# Patient Record
Sex: MALE | Race: WHITE | Employment: OTHER | URBAN - METROPOLITAN AREA
[De-identification: names, ages, dates, MRNs, and addresses within clinical notes are randomized per-mention and may not be internally consistent; named-entity substitution may affect disease eponyms.]

---

## 2021-08-18 ENCOUNTER — HOSPITAL ENCOUNTER (OUTPATIENT)
Age: 86
Setting detail: SPECIMEN
Discharge: HOME | End: 2021-08-18
Payer: MEDICARE

## 2021-08-18 DIAGNOSIS — I25.10 CORONARY ARTERY DISEASE INVOLVING NATIVE CORONARY ARTERY OF NATIVE HEART WITHOUT ANGINA PECTORIS: ICD-10-CM

## 2021-08-18 DIAGNOSIS — E78.5 DYSLIPIDEMIA: ICD-10-CM

## 2021-08-18 DIAGNOSIS — I10 ESSENTIAL HYPERTENSION: ICD-10-CM

## 2021-08-18 DIAGNOSIS — M10.00 IDIOPATHIC GOUT, UNSPECIFIED CHRONICITY, UNSPECIFIED SITE: ICD-10-CM

## 2021-08-18 DIAGNOSIS — Z12.5 PROSTATE CANCER SCREENING: ICD-10-CM

## 2021-08-18 PROBLEM — I48.0 PAROXYSMAL A-FIB (HCC): Status: ACTIVE | Noted: 2021-08-18

## 2021-08-18 PROBLEM — I50.9 CHRONIC CONGESTIVE HEART FAILURE (HCC): Status: ACTIVE | Noted: 2021-08-18

## 2021-08-18 LAB
ALBUMIN SERPL-MCNC: 3.7 G/DL (ref 3.5–5.2)
ALBUMIN/GLOBULIN RATIO: 1.2 (ref 1–2.5)
ALP BLD-CCNC: 59 U/L (ref 40–129)
ALT SERPL-CCNC: 9 U/L (ref 5–41)
ANION GAP SERPL CALCULATED.3IONS-SCNC: 18 MMOL/L (ref 9–17)
AST SERPL-CCNC: 17 U/L
BILIRUB SERPL-MCNC: 0.24 MG/DL (ref 0.3–1.2)
BUN BLDV-MCNC: 31 MG/DL (ref 8–23)
BUN/CREAT BLD: ABNORMAL (ref 9–20)
CALCIUM SERPL-MCNC: 8.7 MG/DL (ref 8.6–10.4)
CHLORIDE BLD-SCNC: 103 MMOL/L (ref 98–107)
CHOLESTEROL, FASTING: 122 MG/DL
CHOLESTEROL/HDL RATIO: 2.5
CO2: 17 MMOL/L (ref 20–31)
CREAT SERPL-MCNC: 1.78 MG/DL (ref 0.7–1.2)
GFR AFRICAN AMERICAN: 44 ML/MIN
GFR NON-AFRICAN AMERICAN: 37 ML/MIN
GFR SERPL CREATININE-BSD FRML MDRD: ABNORMAL ML/MIN/{1.73_M2}
GFR SERPL CREATININE-BSD FRML MDRD: ABNORMAL ML/MIN/{1.73_M2}
GLUCOSE BLD-MCNC: 87 MG/DL (ref 70–99)
HCT VFR BLD CALC: 37.8 % (ref 40.7–50.3)
HDLC SERPL-MCNC: 48 MG/DL
HEMOGLOBIN: 10.4 G/DL (ref 13–17)
LDL CHOLESTEROL: 54 MG/DL (ref 0–130)
MCH RBC QN AUTO: 25.8 PG (ref 25.2–33.5)
MCHC RBC AUTO-ENTMCNC: 27.5 G/DL (ref 28.4–34.8)
MCV RBC AUTO: 93.8 FL (ref 82.6–102.9)
NRBC AUTOMATED: 0 PER 100 WBC
PDW BLD-RTO: 15.7 % (ref 11.8–14.4)
PLATELET # BLD: 197 K/UL (ref 138–453)
PMV BLD AUTO: 10.5 FL (ref 8.1–13.5)
POTASSIUM SERPL-SCNC: 4.7 MMOL/L (ref 3.7–5.3)
PROSTATE SPECIFIC ANTIGEN: 0.98 UG/L
RBC # BLD: 4.03 M/UL (ref 4.21–5.77)
SODIUM BLD-SCNC: 138 MMOL/L (ref 135–144)
TOTAL PROTEIN: 6.9 G/DL (ref 6.4–8.3)
TRIGLYCERIDE, FASTING: 99 MG/DL
URIC ACID: 7.1 MG/DL (ref 3.4–7)
VLDLC SERPL CALC-MCNC: NORMAL MG/DL (ref 1–30)
WBC # BLD: 4.5 K/UL (ref 3.5–11.3)

## 2021-08-30 RX ORDER — ALLOPURINOL 100 MG/1
100 TABLET ORAL DAILY
Qty: 30 TABLET | Refills: 2 | Status: SHIPPED | OUTPATIENT
Start: 2021-08-30 | End: 2021-09-18 | Stop reason: SDUPTHER

## 2021-08-30 NOTE — TELEPHONE ENCOUNTER
----- Message from Donnell Doherty sent at 8/30/2021 10:46 AM EDT -----  Subject: Refill Request    QUESTIONS  Name of Medication? Other - Alloqurinol 100mg  Patient-reported dosage and instructions? By mouth once a day   How many days do you have left? 0  Preferred Pharmacy? [id number]  Pharmacy phone number (if available)? [phone]  ---------------------------------------------------------------------------  --------------  CALL BACK INFO  What is the best way for the office to contact you? OK to leave message on   voicemail  Preferred Call Back Phone Number?  [phone]

## 2021-11-22 RX ORDER — ALLOPURINOL 100 MG/1
TABLET ORAL
Qty: 90 TABLET | Refills: 3 | Status: SHIPPED | OUTPATIENT
Start: 2021-11-22 | End: 2022-08-30

## 2022-04-02 ENCOUNTER — HOSPITAL ENCOUNTER (INPATIENT)
Age: 87
LOS: 3 days | Discharge: ANOTHER HOSPITAL | DRG: 291 | End: 2022-04-05
Attending: STUDENT IN AN ORGANIZED HEALTH CARE EDUCATION/TRAINING PROGRAM | Admitting: FAMILY MEDICINE
Payer: MEDICARE

## 2022-04-02 ENCOUNTER — APPOINTMENT (OUTPATIENT)
Dept: GENERAL RADIOLOGY | Age: 87
DRG: 291 | End: 2022-04-02
Payer: MEDICARE

## 2022-04-02 DIAGNOSIS — I50.9 ACUTE ON CHRONIC CONGESTIVE HEART FAILURE, UNSPECIFIED HEART FAILURE TYPE (HCC): Primary | ICD-10-CM

## 2022-04-02 PROBLEM — E78.2 MIXED HYPERLIPIDEMIA: Status: ACTIVE | Noted: 2022-04-02

## 2022-04-02 PROBLEM — E66.9 OBESITY, CLASS II, BMI 35-39.9: Status: ACTIVE | Noted: 2022-04-02

## 2022-04-02 LAB
ABSOLUTE EOS #: 0.1 K/UL (ref 0–0.4)
ABSOLUTE LYMPH #: 0.9 K/UL (ref 1–4.8)
ABSOLUTE MONO #: 0.9 K/UL (ref 0.1–1.3)
ANION GAP SERPL CALCULATED.3IONS-SCNC: 11 MMOL/L (ref 9–17)
BASOPHILS # BLD: 0 % (ref 0–2)
BASOPHILS ABSOLUTE: 0 K/UL (ref 0–0.2)
BUN BLDV-MCNC: 26 MG/DL (ref 8–23)
CALCIUM SERPL-MCNC: 8.9 MG/DL (ref 8.6–10.4)
CHLORIDE BLD-SCNC: 99 MMOL/L (ref 98–107)
CO2: 29 MMOL/L (ref 20–31)
CREAT SERPL-MCNC: 1.55 MG/DL (ref 0.7–1.2)
EOSINOPHILS RELATIVE PERCENT: 1 % (ref 0–4)
GFR AFRICAN AMERICAN: 52 ML/MIN
GFR NON-AFRICAN AMERICAN: 43 ML/MIN
GFR SERPL CREATININE-BSD FRML MDRD: ABNORMAL ML/MIN/{1.73_M2}
GLUCOSE BLD-MCNC: 113 MG/DL (ref 70–99)
HCT VFR BLD CALC: 28.4 % (ref 41–53)
HEMOGLOBIN: 9.4 G/DL (ref 13.5–17.5)
LYMPHOCYTES # BLD: 9 % (ref 24–44)
MCH RBC QN AUTO: 26.4 PG (ref 26–34)
MCHC RBC AUTO-ENTMCNC: 33 G/DL (ref 31–37)
MCV RBC AUTO: 80 FL (ref 80–100)
MONOCYTES # BLD: 9 % (ref 1–7)
PDW BLD-RTO: 18.2 % (ref 11.5–14.9)
PLATELET # BLD: 318 K/UL (ref 150–450)
PMV BLD AUTO: 6.7 FL (ref 6–12)
POTASSIUM SERPL-SCNC: 4.4 MMOL/L (ref 3.7–5.3)
PRO-BNP: 823 PG/ML
RBC # BLD: 3.55 M/UL (ref 4.5–5.9)
SEG NEUTROPHILS: 81 % (ref 36–66)
SEGMENTED NEUTROPHILS ABSOLUTE COUNT: 8 K/UL (ref 1.3–9.1)
SODIUM BLD-SCNC: 139 MMOL/L (ref 135–144)
TROPONIN, HIGH SENSITIVITY: 26 NG/L (ref 0–22)
TROPONIN, HIGH SENSITIVITY: 28 NG/L (ref 0–22)
TROPONIN, HIGH SENSITIVITY: 29 NG/L (ref 0–22)
WBC # BLD: 9.9 K/UL (ref 3.5–11)

## 2022-04-02 PROCEDURE — 99285 EMERGENCY DEPT VISIT HI MDM: CPT

## 2022-04-02 PROCEDURE — 71046 X-RAY EXAM CHEST 2 VIEWS: CPT

## 2022-04-02 PROCEDURE — 85025 COMPLETE CBC W/AUTO DIFF WBC: CPT

## 2022-04-02 PROCEDURE — 6370000000 HC RX 637 (ALT 250 FOR IP): Performed by: STUDENT IN AN ORGANIZED HEALTH CARE EDUCATION/TRAINING PROGRAM

## 2022-04-02 PROCEDURE — 83880 ASSAY OF NATRIURETIC PEPTIDE: CPT

## 2022-04-02 PROCEDURE — 80048 BASIC METABOLIC PNL TOTAL CA: CPT

## 2022-04-02 PROCEDURE — 36415 COLL VENOUS BLD VENIPUNCTURE: CPT

## 2022-04-02 PROCEDURE — 94640 AIRWAY INHALATION TREATMENT: CPT

## 2022-04-02 PROCEDURE — 84484 ASSAY OF TROPONIN QUANT: CPT

## 2022-04-02 PROCEDURE — 94761 N-INVAS EAR/PLS OXIMETRY MLT: CPT

## 2022-04-02 PROCEDURE — 1200000000 HC SEMI PRIVATE

## 2022-04-02 PROCEDURE — 93005 ELECTROCARDIOGRAM TRACING: CPT | Performed by: STUDENT IN AN ORGANIZED HEALTH CARE EDUCATION/TRAINING PROGRAM

## 2022-04-02 RX ORDER — ACETAMINOPHEN 325 MG/1
650 TABLET ORAL EVERY 4 HOURS PRN
Status: DISCONTINUED | OUTPATIENT
Start: 2022-04-02 | End: 2022-04-05 | Stop reason: HOSPADM

## 2022-04-02 RX ORDER — SODIUM CHLORIDE 0.9 % (FLUSH) 0.9 %
5-40 SYRINGE (ML) INJECTION PRN
Status: DISCONTINUED | OUTPATIENT
Start: 2022-04-02 | End: 2022-04-05 | Stop reason: HOSPADM

## 2022-04-02 RX ORDER — SODIUM CHLORIDE 9 MG/ML
INJECTION, SOLUTION INTRAVENOUS PRN
Status: DISCONTINUED | OUTPATIENT
Start: 2022-04-02 | End: 2022-04-05 | Stop reason: HOSPADM

## 2022-04-02 RX ORDER — SODIUM CHLORIDE 0.9 % (FLUSH) 0.9 %
5-40 SYRINGE (ML) INJECTION EVERY 12 HOURS SCHEDULED
Status: DISCONTINUED | OUTPATIENT
Start: 2022-04-02 | End: 2022-04-05 | Stop reason: HOSPADM

## 2022-04-02 RX ORDER — IPRATROPIUM BROMIDE AND ALBUTEROL SULFATE 2.5; .5 MG/3ML; MG/3ML
1 SOLUTION RESPIRATORY (INHALATION)
Status: DISCONTINUED | OUTPATIENT
Start: 2022-04-02 | End: 2022-04-03

## 2022-04-02 RX ORDER — ONDANSETRON 2 MG/ML
4 INJECTION INTRAMUSCULAR; INTRAVENOUS EVERY 6 HOURS PRN
Status: DISCONTINUED | OUTPATIENT
Start: 2022-04-02 | End: 2022-04-05

## 2022-04-02 RX ORDER — ONDANSETRON 4 MG/1
4 TABLET, ORALLY DISINTEGRATING ORAL EVERY 8 HOURS PRN
Status: DISCONTINUED | OUTPATIENT
Start: 2022-04-02 | End: 2022-04-05

## 2022-04-02 RX ADMIN — IPRATROPIUM BROMIDE AND ALBUTEROL SULFATE 1 AMPULE: .5; 3 SOLUTION RESPIRATORY (INHALATION) at 20:02

## 2022-04-02 RX ADMIN — IPRATROPIUM BROMIDE AND ALBUTEROL SULFATE 1 AMPULE: .5; 3 SOLUTION RESPIRATORY (INHALATION) at 16:27

## 2022-04-02 ASSESSMENT — ENCOUNTER SYMPTOMS
RHINORRHEA: 1
SHORTNESS OF BREATH: 1
NAUSEA: 0
ABDOMINAL PAIN: 0
COUGH: 1
VOMITING: 0

## 2022-04-02 NOTE — ED PROVIDER NOTES
[address]  Emergency Department Encounter  Emergency Medicine Resident     Pt Name: Tami Martínez  BKB:[id number]  [place] 1935  Date of evaluation: 22  PCP:  Bhavna Ortiz MD    CHIEF COMPLAINT       Chief Complaint   Patient presents with    Shortness of Breath     HISTORY OF PRESENT ILLNESS  (Location/Symptom, Timing/Onset, Context/Setting, Quality, Duration, ModifyingFactors, Severity.)      Tami Martínez is a 82 y.o. male with PMH of a fib on Eliquis, CHF s/p pacemaker, CAD s/p CABG, hypertension, dyslipidemia who presents for evaluation of shortness of breath x1 week. Patient initially had URI symptoms that he now feels moving onto his chest, complaining of cough and shortness of breath. Patient also with increased leg swelling x1 week. No fever, chills, chest pain, hemoptysis, numbness, weakness, abdominal pain, nausea, vomiting, diarrhea, dizziness. Recently moved to [place] from [place]. Was following with cardiology down there but has not established with cardiology since moving to [place]. No history of blood clots or cancer. No recent travel or surgery. No documented history of COPD in our records but patient does state he has needed breathing treatments in the past.  Quit smoking over 50 years ago    PAST MEDICAL / SURGICAL / SOCIAL /FAMILY HISTORY      has no past medical history on file. No other pertinent PMH on review with patient/guardian. has no past surgical history on file. No other pertinent PSH on review with patient/guardian.   Social History     Socioeconomic History    Marital status: Single     Spouse name: Not on file    Number of children: Not on file    Years of education: Not on file    Highest education level: Not on file   Occupational History    Not on file   Tobacco Use    Smoking status: Former Smoker     Quit date:      Years since quittin.3    Smokeless tobacco: Never Used   Substance and Sexual Activity    Alcohol use: Not Currently    Drug use: Never    Sexual activity: Not on file   Other Topics Concern    Not on file   Social History Narrative    Not on file     Social Determinants of Health     Financial Resource Strain: Low Risk     Difficulty of Paying Living Expenses: Not hard at all   Food Insecurity: No Food Insecurity    Worried About Running Out of Food in the Last Year: Never true    [address] in the Last Year: Never true   Transportation Needs:     Lack of Transportation (Medical): Not on file    Lack of Transportation (Non-Medical): Not on file   Physical Activity:     Days of Exercise per Week: Not on file    Minutes of Exercise per Session: Not on file   Stress:     Feeling of Stress : Not on file   Social Connections:     Frequency of Communication with Friends and Family: Not on file    Frequency of Social Gatherings with Friends and Family: Not on file    Attends [employer]oration[employer] Services: Not on file    Active Member of [address] [place] [address] or Organizations: Not on file    Attends Club or Organization Meetings: Not on file    Marital Status: Not on file   Intimate Partner Violence:     Fear of Current or Ex-Partner: Not on file    Emotionally Abused: Not on file    Physically Abused: Not on file    Sexually Abused: Not on file   Housing Stability:     Unable to Pay for Housing in the Last Year: Not on file    Number of [place] in the Last Year: Not on file    Unstable Housing in the Last Year: Not on file       I counseled the patient against using tobacco products. History reviewed. No pertinent family history. No other pertinent FamHx on review with patient/guardian. Allergies:  Patient has no known allergies. Home Medications:  Prior to Admission medications    Medication Sig Start Date End Date Taking?  Authorizing Provider   potassium chloride (KLOR-CON 10) 10 MEQ extended release tablet Take 1 tablet by mouth daily 12/15/21 3/15/22  Corine Au MD   allopurinol (ZYLOPRIM) Ht 6' (1.829 m)   Wt 281 lb (127.5 kg)   SpO2 95%   BMI 38.11 kg/m²     Physical Exam  Constitutional:       General: He is not in acute distress. Appearance: Normal appearance. He is not ill-appearing, toxic-appearing or diaphoretic. HENT:      Head: Normocephalic and atraumatic. Right Ear: External ear normal.      Left Ear: External ear normal.   Eyes:      General:         Right eye: No discharge. Left eye: No discharge. Cardiovascular:      Rate and Rhythm: Normal rate and regular rhythm. Pulses: Normal pulses. Heart sounds: No murmur heard. Pulmonary:      Effort: Pulmonary effort is normal. No respiratory distress. Breath sounds: Wheezing and rales present. No rhonchi. Abdominal:      Palpations: Abdomen is soft. Tenderness: There is no abdominal tenderness. Musculoskeletal:      Right lower leg: Edema present. Left lower leg: Edema present. Comments: Symmetric lymphedema bilateral legs with chronic overlying skin changes. Calves symmetric and nontender. Negative Phong's. Pedal pulses 1+ but symmetric bilaterally   Skin:     Capillary Refill: Capillary refill takes less than 2 seconds. Neurological:      General: No focal deficit present. Mental Status: He is alert. DIFFERENTIAL  DIAGNOSIS     PLAN (LABS / IMAGING / EKG):  Orders Placed This Encounter   Procedures    XR CHEST (2 VW)    CBC with Auto Differential    Basic Metabolic Panel w/ Reflex to MG    Troponin    Brain Natriuretic Peptide    Troponin    ADULT DIET; Regular; 3 carb choices (45 gm/meal);  Low Sodium (2 gm)    Vital signs per unit routine    Notify physician    Notify physician    Up as tolerated    Full Code    Inpatient consult to Internal Medicine    Inpatient consult to Cardiology    EKG 12 Lead    ADMIT TO INPATIENT     MEDICATIONS ORDERED:  Orders Placed This Encounter   Medications    ipratropium-albuterol (DUONEB) nebulizer solution 1 ampule     Order Specific Question:   Initiate RT Bronchodilator Protocol     Answer:    Yes    sodium chloride flush 0.9 % injection 5-40 mL    sodium chloride flush 0.9 % injection 5-40 mL    0.9 % sodium chloride infusion    enoxaparin (LOVENOX) injection 30 mg    acetaminophen (TYLENOL) tablet 650 mg    OR Linked Order Group     ondansetron (ZOFRAN-ODT) disintegrating tablet 4 mg     ondansetron (ZOFRAN) injection 4 mg     DIAGNOSTIC RESULTS / EMERGENCY DEPARTMENT COURSE / MDM     LABS:  Results for orders placed or performed during the hospital encounter of 04/02/22   CBC with Auto Differential   Result Value Ref Range    WBC 9.9 3.5 - 11.0 k/uL    RBC 3.55 (L) 4.5 - 5.9 m/uL    Hemoglobin 9.4 (L) 13.5 - 17.5 g/dL    Hematocrit 28.4 (L) 41 - 53 %    MCV 80.0 80 - 100 fL    MCH 26.4 26 - 34 pg    MCHC 33.0 31 - 37 g/dL    RDW 18.2 (H) 11.5 - 14.9 %    Platelets 983 852 - 556 k/uL    MPV 6.7 6.0 - 12.0 fL    Seg Neutrophils 81 (H) 36 - 66 %    Lymphocytes 9 (L) 24 - 44 %    Monocytes 9 (H) 1 - 7 %    Eosinophils % 1 0 - 4 %    Basophils 0 0 - 2 %    Segs Absolute 8.00 1.3 - 9.1 k/uL    Absolute Lymph # 0.90 (L) 1.0 - 4.8 k/uL    Absolute Mono # 0.90 0.1 - 1.3 k/uL    Absolute Eos # 0.10 0.0 - 0.4 k/uL    Basophils Absolute 0.00 0.0 - 0.2 k/uL   Basic Metabolic Panel w/ Reflex to MG   Result Value Ref Range    Glucose 113 (H) 70 - 99 mg/dL    BUN 26 (H) 8 - 23 mg/dL    CREATININE 1.55 (H) 0.70 - 1.20 mg/dL    Calcium 8.9 8.6 - 10.4 mg/dL    Sodium 139 135 - 144 mmol/L    Potassium 4.4 3.7 - 5.3 mmol/L    Chloride 99 98 - 107 mmol/L    CO2 29 20 - 31 mmol/L    Anion Gap 11 9 - 17 mmol/L    GFR Non-African American 43 (L) >60 mL/min    GFR  52 (L) >60 mL/min    GFR Comment         Troponin   Result Value Ref Range    Troponin, High Sensitivity 29 (H) 0 - 22 ng/L   Troponin   Result Value Ref Range    Troponin, High Sensitivity 28 (H) 0 - 22 ng/L   Brain Natriuretic Peptide   Result Value Ref Range    Pro- (H) <300 pg/mL   Troponin   Result Value Ref Range    Troponin, High Sensitivity 26 (H) 0 - 22 ng/L       IMPRESSION/MDM/ED COURSE:  82 y.o. male presented with shortness of breath, leg swelling, congestion x1 week. Patient afebrile. Satting 91% on room air. Hemodynamically stable. Exam patient resting calmly no acute distress, nontoxic-appearing. Heart RRR. Wheezing crackles throughout lungs diffusely. Lymphedema with chronic skin changes of bilateral legs, symmetric. EKG without ischemic changes. Will obtain cardiac work-up including BNP. No D-dimer as patient is low risk Via Wells criteria and already anticoagulated on Eliquis. Likely admission. ED Course as of 04/02/22 2233   Sat Apr 02, 2022   8343 Basic Metabolic Panel w/ Reflex to MG(!):    GLUCOSE, FASTING,(!)   BUN,BUNPL 26(!)   Creatinine 1.55(!)   CALCIUM, SERUM, 340151 8.9   Sodium 139   Potassium 4.4   Chloride 99   CO2 29   Anion Gap 11   GFR Non- 43(!)   GFR  52(!)   GFR Comment      [AF]   1712 Creatinine baseline [AF]   1713 CBC with Auto Differential(!):    WBC 9.9   RBC 3.55(!)   Hemoglobin Quant 9.4(!)   Hematocrit 28.4(!)   MCV 80.0   MCH 26.4   MCHC 33.0   RDW 18.2(!)   Platelet Count 028   MPV 6.7   Seg Neutrophils 81(!)   Lymphocytes 9(!)   Monocytes 9(!)   Eosinophils % 1   Basophils 0   Segs Absolute 8.00   Absolute Lymph # 0.90(!)   Absolute Mono # 0.90   Absolute Eos # 0.10   Basophils Absolute 0.00  Previous hemoglobin 10.4, decreased may be due to volume overload [AF]   1741 Troponin(!):    Troponin, High Sensitivity 26(!) [AF]   2010 Brain Natriuretic Peptide(!):    Pro-(!) [AF]   2158 Patient accepted for admission by Dr. Jorge L Guillermo.   He is feeling better after DuoNeb [AF]   2216 Spoke to Dr. Albin Prader who is in agreement with plan [AF]      ED Course User Index  [AF] Tori Mcgee DO         RADIOLOGY:  XR CHEST (2 VW)   Final Result   Pulmonary vascular congestion. Questionable rim sclerotic lesion within a lower thoracic vertebral body   versus summation artifact. If patient is focally symptomatic or as   clinically warranted, CT could be performed in the nonacute setting for   further characterization. EKG  Ventricularly paced rhythm. No ST elevation or depression. QTc 483    All EKG's are interpreted by the Emergency Department Physician who either signs or Co-signs this chart in the absence of a cardiologist.    PROCEDURES:  None    CONSULTS:  IP CONSULT TO INTERNAL MEDICINE  IP CONSULT TO CARDIOLOGY    FINAL IMPRESSION      1. Acute on chronic congestive heart failure, unspecified heart failure type ([facility] [address]          DISPOSITION / PLAN     DISPOSITION Admitted 04/02/2022 10:01:11 PM      PATIENT REFERREDTO:  No follow-up provider specified.     DISCHARGE MEDICATIONS:  New Prescriptions    No medications on file       Kelley Ramos DO  PGY 2  Resident Physician Emergency Medicine  04/02/22 10:33 PM    (Please note that portions of this note were completed with a voice recognition program.Efforts were made to edit the dictations but occasionally words are mis-transcribed.)       Niecy Mina DO  Resident  04/02/22 1031       Niecy Mina DO  Resident  04/02/22 7365

## 2022-04-02 NOTE — ED PROVIDER NOTES
EMERGENCY DEPARTMENT ENCOUNTER   ATTENDING ATTESTATION     Pt Name: Andrzej Mcdaniel  MRN: [id number]  [place] 1935  Date of evaluation: 4/2/22       Andrzej Mcdaniel is a 82 y.o. male who presents with Shortness of Breath      MDM:   62-year-old male history of paroxysmal A. fib, coronary artery disease, congestive heart failure presents for evaluation of worsening shortness of breath. Appears grossly volume overloaded on exam.  Chest x-ray shows pulmonary vascular congestion. We will plan to admit for IV diuresis    EKG  Ventricularly paced rhythm rate of 82 normal axis retrograde inverted P waves no prior to compare   Vitals:   Vitals:    04/02/22 1810 04/02/22 1830 04/02/22 1910 04/02/22 2002   BP:       Pulse: 93 90 100    Resp: 25 23 26 25   Temp:       SpO2: 90% 94% 94% 93%   Weight:       Height:             I personally saw and examined the patient. I have reviewed and agree with the resident's findings, including all diagnostic interpretations and treatment plan as written. I was present for the key portions of any procedures performed and the inclusive time noted for any critical care statement. The care is provided during an unprecedented national emergency due to the novel coronavirus, COVID 19.   Kristin Jordan MD  Attending Emergency Physician            Kristin Jordan MD  04/02/22 2017

## 2022-04-03 PROCEDURE — 2500000003 HC RX 250 WO HCPCS: Performed by: INTERNAL MEDICINE

## 2022-04-03 PROCEDURE — 94640 AIRWAY INHALATION TREATMENT: CPT

## 2022-04-03 PROCEDURE — 1200000000 HC SEMI PRIVATE

## 2022-04-03 PROCEDURE — 2580000003 HC RX 258: Performed by: FAMILY MEDICINE

## 2022-04-03 PROCEDURE — 94761 N-INVAS EAR/PLS OXIMETRY MLT: CPT

## 2022-04-03 PROCEDURE — 6370000000 HC RX 637 (ALT 250 FOR IP): Performed by: FAMILY MEDICINE

## 2022-04-03 PROCEDURE — 99223 1ST HOSP IP/OBS HIGH 75: CPT | Performed by: FAMILY MEDICINE

## 2022-04-03 RX ORDER — FUROSEMIDE 40 MG/1
40 TABLET ORAL 2 TIMES DAILY
Status: DISCONTINUED | OUTPATIENT
Start: 2022-04-03 | End: 2022-04-03

## 2022-04-03 RX ORDER — BUMETANIDE 1 MG/1
2 TABLET ORAL 2 TIMES DAILY
Status: DISCONTINUED | OUTPATIENT
Start: 2022-04-03 | End: 2022-04-03

## 2022-04-03 RX ORDER — ALLOPURINOL 100 MG/1
100 TABLET ORAL DAILY
Status: DISCONTINUED | OUTPATIENT
Start: 2022-04-03 | End: 2022-04-05 | Stop reason: HOSPADM

## 2022-04-03 RX ORDER — MECOBALAMIN 5000 MCG
1 TABLET,DISINTEGRATING ORAL DAILY
Status: DISCONTINUED | OUTPATIENT
Start: 2022-04-03 | End: 2022-04-03

## 2022-04-03 RX ORDER — LISINOPRIL 5 MG/1
5 TABLET ORAL DAILY
Status: DISCONTINUED | OUTPATIENT
Start: 2022-04-03 | End: 2022-04-05 | Stop reason: HOSPADM

## 2022-04-03 RX ORDER — CLOPIDOGREL BISULFATE 75 MG/1
75 TABLET ORAL DAILY
Status: DISCONTINUED | OUTPATIENT
Start: 2022-04-03 | End: 2022-04-05

## 2022-04-03 RX ORDER — METOPROLOL TARTRATE 50 MG/1
50 TABLET, FILM COATED ORAL 2 TIMES DAILY
Status: DISCONTINUED | OUTPATIENT
Start: 2022-04-03 | End: 2022-04-05 | Stop reason: HOSPADM

## 2022-04-03 RX ORDER — ROSUVASTATIN CALCIUM 20 MG/1
20 TABLET, COATED ORAL DAILY
Status: DISCONTINUED | OUTPATIENT
Start: 2022-04-03 | End: 2022-04-05 | Stop reason: HOSPADM

## 2022-04-03 RX ORDER — IPRATROPIUM BROMIDE AND ALBUTEROL SULFATE 2.5; .5 MG/3ML; MG/3ML
1 SOLUTION RESPIRATORY (INHALATION) EVERY 4 HOURS
Status: DISCONTINUED | OUTPATIENT
Start: 2022-04-03 | End: 2022-04-05 | Stop reason: HOSPADM

## 2022-04-03 RX ORDER — LANOLIN ALCOHOL/MO/W.PET/CERES
1000 CREAM (GRAM) TOPICAL DAILY
Status: DISCONTINUED | OUTPATIENT
Start: 2022-04-03 | End: 2022-04-05 | Stop reason: HOSPADM

## 2022-04-03 RX ORDER — BUMETANIDE 0.25 MG/ML
2 INJECTION, SOLUTION INTRAMUSCULAR; INTRAVENOUS EVERY 12 HOURS
Status: DISCONTINUED | OUTPATIENT
Start: 2022-04-03 | End: 2022-04-05 | Stop reason: HOSPADM

## 2022-04-03 RX ORDER — MAGNESIUM OXIDE 400 MG/1
400 TABLET ORAL 2 TIMES DAILY
Status: DISCONTINUED | OUTPATIENT
Start: 2022-04-03 | End: 2022-04-03 | Stop reason: SDUPTHER

## 2022-04-03 RX ORDER — ASCORBIC ACID 500 MG
500 TABLET ORAL 2 TIMES DAILY
Status: DISCONTINUED | OUTPATIENT
Start: 2022-04-03 | End: 2022-04-05 | Stop reason: HOSPADM

## 2022-04-03 RX ORDER — POTASSIUM CHLORIDE 750 MG/1
10 CAPSULE, EXTENDED RELEASE ORAL DAILY
Status: DISCONTINUED | OUTPATIENT
Start: 2022-04-03 | End: 2022-04-05 | Stop reason: HOSPADM

## 2022-04-03 RX ADMIN — BUMETANIDE 2 MG: 1 TABLET ORAL at 08:32

## 2022-04-03 RX ADMIN — OXYCODONE HYDROCHLORIDE AND ACETAMINOPHEN 500 MG: 500 TABLET ORAL at 20:58

## 2022-04-03 RX ADMIN — METOPROLOL TARTRATE 50 MG: 50 TABLET, FILM COATED ORAL at 01:18

## 2022-04-03 RX ADMIN — ROSUVASTATIN CALCIUM 20 MG: 20 TABLET, FILM COATED ORAL at 08:32

## 2022-04-03 RX ADMIN — FUROSEMIDE 40 MG: 40 TABLET ORAL at 08:31

## 2022-04-03 RX ADMIN — Medication 10 ML: at 21:01

## 2022-04-03 RX ADMIN — METOPROLOL TARTRATE 50 MG: 50 TABLET, FILM COATED ORAL at 20:58

## 2022-04-03 RX ADMIN — Medication 10 ML: at 08:33

## 2022-04-03 RX ADMIN — ALLOPURINOL 100 MG: 100 TABLET ORAL at 08:31

## 2022-04-03 RX ADMIN — IPRATROPIUM BROMIDE AND ALBUTEROL SULFATE 1 AMPULE: 2.5; .5 SOLUTION RESPIRATORY (INHALATION) at 08:43

## 2022-04-03 RX ADMIN — IPRATROPIUM BROMIDE AND ALBUTEROL SULFATE 1 AMPULE: 2.5; .5 SOLUTION RESPIRATORY (INHALATION) at 15:10

## 2022-04-03 RX ADMIN — Medication 400 MG: at 01:18

## 2022-04-03 RX ADMIN — IPRATROPIUM BROMIDE AND ALBUTEROL SULFATE 1 AMPULE: 2.5; .5 SOLUTION RESPIRATORY (INHALATION) at 20:09

## 2022-04-03 RX ADMIN — APIXABAN 2.5 MG: 2.5 TABLET, FILM COATED ORAL at 08:32

## 2022-04-03 RX ADMIN — OXYCODONE HYDROCHLORIDE AND ACETAMINOPHEN 500 MG: 500 TABLET ORAL at 08:32

## 2022-04-03 RX ADMIN — OXYCODONE HYDROCHLORIDE AND ACETAMINOPHEN 500 MG: 500 TABLET ORAL at 01:18

## 2022-04-03 RX ADMIN — Medication 400 MG: at 08:31

## 2022-04-03 RX ADMIN — APIXABAN 2.5 MG: 2.5 TABLET, FILM COATED ORAL at 20:58

## 2022-04-03 RX ADMIN — Medication 10 ML: at 01:18

## 2022-04-03 RX ADMIN — Medication 1000 MCG: at 10:15

## 2022-04-03 RX ADMIN — BUMETANIDE 2 MG: 0.25 INJECTION INTRAMUSCULAR; INTRAVENOUS at 20:58

## 2022-04-03 RX ADMIN — LISINOPRIL 5 MG: 5 TABLET ORAL at 08:32

## 2022-04-03 RX ADMIN — POTASSIUM CHLORIDE 10 MEQ: 10 CAPSULE, COATED, EXTENDED RELEASE ORAL at 08:31

## 2022-04-03 RX ADMIN — CLOPIDOGREL BISULFATE 75 MG: 75 TABLET ORAL at 08:32

## 2022-04-03 RX ADMIN — APIXABAN 2.5 MG: 2.5 TABLET, FILM COATED ORAL at 01:18

## 2022-04-03 RX ADMIN — Medication 400 MG: at 20:58

## 2022-04-03 RX ADMIN — IPRATROPIUM BROMIDE AND ALBUTEROL SULFATE 1 AMPULE: 2.5; .5 SOLUTION RESPIRATORY (INHALATION) at 11:22

## 2022-04-03 RX ADMIN — IPRATROPIUM BROMIDE AND ALBUTEROL SULFATE 1 AMPULE: 2.5; .5 SOLUTION RESPIRATORY (INHALATION) at 03:17

## 2022-04-03 RX ADMIN — METOPROLOL TARTRATE 50 MG: 50 TABLET, FILM COATED ORAL at 08:32

## 2022-04-03 NOTE — PLAN OF CARE
Problem: Falls - Risk of:  Goal: Will remain free from falls  Description: Will remain free from falls  4/3/2022 1623 by Edwin Friedman RN  Outcome: Ongoing     Problem: Falls - Risk of:  Goal: Absence of physical injury  Description: Absence of physical injury  4/3/2022 1623 by Edwin Friedman RN  Outcome: Ongoing     Problem: OXYGENATION/RESPIRATORY FUNCTION  Goal: Patient will achieve/maintain normal respiratory rate/effort  Description: Respiratory rate and effort will be within normal limits for the patient  4/3/2022 1623 by Edwin Friedman RN  Outcome: Ongoing     Problem: ACTIVITY INTOLERANCE/IMPAIRED MOBILITY  Goal: Mobility/activity is maintained at optimum level for patient  4/3/2022 1623 by Edwin Friedman RN  Outcome: Ongoing     Problem: FLUID AND ELECTROLYTE IMBALANCE  Goal: Fluid and electrolyte balance are achieved/maintained  4/3/2022 1623 by Edwin Friedman RN  Outcome: Ongoing   Patient is up ambulating in room with help of nursing staff. Patient remains on room air. Patient does not complain of pain. Patient is without falls or physical injury this shift.

## 2022-04-03 NOTE — CARE COORDINATION
CASE MANAGEMENT NOTE:    Admission Date:  4/2/2022 Genaro Reed is a 82 y.o.  male    Admitted for : Heart failure exacerbated by sotalol ([facility] [address] [I50.9]  Acute on chronic congestive heart failure, unspecified heart failure type ([facility] [address] [I50.9]    Met with:  Patient    PCP:  Familia Ortiz                                Insurance:  [employer] medicare      Is patient alert and oriented at time of discussion:  Yes    Current Residence/ Living Arrangements:  independently at home w/ DaughterSteven PTA:  No    Does patient go to outpatient dialysis: No  If yes, location and chair time: NA    Is patient agreeable to VNS: No    Freedom of choice provided:  Yes    List of [address] provided: No    VNS chosen:  No, Denies the need    DME:  none    Home Oxygen: No    Nebulizer: No    CPAP/BIPAP: No    Supplier: N/A    Potential Assistance Needed: Will follow    SNF needed: No    Freedom of choice and list provided: NA    Pharmacy:  [facility]       Does Patient want to use MEDS to BEDS? No    Is patient currently receiving oral anticoagulation therapy? Yes, Eliquis PTA    Is the Patient an [facility] with Readmission Risk Score greater than 14%? No  If yes, pt needs a follow up appointment made within 7 days. Family Members/Caregivers that pt would like involved in their care:    Yes    If yes, list name here:  DaughterCesia    Transportation Provider:  Patient             Discharge Plan:  4/3/22 [employer] Pt. Lives in [address] home w/ DaughterCesia. No DME, Denies VNS, Eliquis PTA. CR 1.55. Cardio, .  Denies needs, will follow//KB                 Electronically signed by: Lesley Valiente RN on 4/3/2022 at 12:11 PM

## 2022-04-03 NOTE — ACP (ADVANCE CARE PLANNING)
Advance Care Planning     Advance Care Planning Activator (Inpatient)  Conversation Note      Date of ACP Conversation: 4/3/2022     Conversation Conducted with: Patient with Decision Making Capacity    ACP Activator: Chito Payton RN        Health Care Decision Maker:    Daughter, Araceli Mckinnon, [address] [place] [address]    Current Designated Health Care Decision Maker:     Care Preferences    Ventilation: \"If you were in your present state of health and suddenly became very ill and were unable to breathe on your own, what would your preference be about the use of a ventilator (breathing machine) if it were available to you? \"      Would the patient desire the use of ventilator (breathing machine)?: yes    \"If your health worsens and it becomes clear that your chance of recovery is unlikely, what would your preference be about the use of a ventilator (breathing machine) if it were available to you? \"     Would the patient desire the use of ventilator (breathing machine)?: No      Resuscitation  \"CPR works best to restart the heart when there is a sudden event, like a heart attack, in someone who is otherwise healthy. Unfortunately, CPR does not typically restart the heart for people who have serious health conditions or who are very sick. \"    \"In the event your heart stopped as a result of an underlying serious health condition, would you want attempts to be made to restart your heart (answer \"yes\" for attempt to resuscitate) or would you prefer a natural death (answer \"no\" for do not attempt to resuscitate)? \" yes       [] Yes   [] No   Educated Patient / Almaz Diaz regarding differences between Advance Directives and portable DNR orders.     Length of ACP Conversation in minutes:      Conversation Outcomes:  [x] ACP discussion completed  [] Existing advance directive reviewed with patient; no changes to patient's previously recorded wishes  [] New Advance Directive completed  [] Portable Do Not Rescitate prepared for Provider review and signature  [] POLST/POST/MOLST/MOST prepared for Provider review and signature      Follow-up plan:    [] Schedule follow-up conversation to continue planning  [] Referred individual to Provider for additional questions/concerns   [] Advised patient/agent/surrogate to review completed ACP document and update if needed with changes in condition, patient preferences or care setting    [] This note routed to one or more involved healthcare providers

## 2022-04-03 NOTE — H&P
Family Medicine Admit Note    PCP: Nikki Vera MD    Date of Admission: 4/2/2022    Date of Service: Pt seen/examined on 4/3/22 and Admitted to Inpatient. Chief Complaint:  Shortness of Breath      History Of Present Illness: The patient is a 82 y.o. male who presents to [facility] with complaints of increasing SOB for the past week. He reports that initially he had what felt like URI sx's but now complaining of a dry cough with the SOB. He also reports increased leg swelling. He denies any fevers, chills, chest pain, abdominal pain, N/V/D, headaches or dizziness. Past Medical History:    History reviewed. No pertinent past medical history. Past Surgical History:    History reviewed. No pertinent surgical history. Medications Prior to Admission:    Prior to Admission medications    Medication Sig Start Date End Date Taking?  Authorizing Provider   potassium chloride (KLOR-CON 10) 10 MEQ extended release tablet Take 1 tablet by mouth daily 12/15/21 3/15/22  Nikki Vera MD   allopurinol (ZYLOPRIM) 100 MG tablet TAKE 1 TABLET BY MOUTH EVERY DAY 11/22/21   Adalgisa Renner APRN - CNP   bumetanide (BUMEX) 2 MG tablet Take 1 tablet by mouth 2 times daily 9/19/21   Nikki Vera MD   apixaban (ELIQUIS) 2.5 MG TABS tablet Take 1 tablet by mouth 2 times daily 9/19/21   Nikki Vera MD   lisinopril (PRINIVIL;ZESTRIL) 5 MG tablet Take 1 tablet by mouth daily 9/19/21   Nikki Vera MD   metoprolol tartrate (LOPRESSOR) 50 MG tablet Take 1 tablet by mouth 2 times daily 9/19/21   Nikki Vera MD   magnesium oxide (MAG-OX) 400 MG tablet Take 1 tablet by mouth 2 times daily 9/19/21   Nikki Vera MD   rosuvastatin (CRESTOR) 20 MG tablet Take 1 tablet by mouth daily 9/19/21   Nikki Vera MD   furosemide (LASIX) 40 MG tablet Take 1 tablet by mouth 2 times daily 9/19/21   Nikki Vera MD   Methylcobalamin (B-12) 5000 MCG TBDP Take by mouth    Historical Provider, MD   clopidogrel (PLAVIX) 75 MG tablet Take 1 tablet by mouth daily 8/18/21   Grant Irwin MD   ascorbic acid ([employer] VITAMIN C) 500 MG tablet Take 1 tablet by mouth 2 times daily 8/18/21   Grant Irwin MD       Allergies:  Patient has no known allergies. Social History:  The patient currently lives at home    TOBACCO:   reports that he quit smoking about 55 years ago. He has never used smokeless tobacco.  ETOH:   reports previous alcohol use. Review of Systems - Ophthalmic ROS: positive for - uses glasses  Respiratory ROS: positive for - cough and shortness of breath  Cardiovascular ROS: positive for - edema and irregular heartbeat  Musculoskeletal ROS: positive for - muscle pain      Family History:      History reviewed. No pertinent family history. PHYSICAL EXAM:    /64   Pulse 94   Temp 98.3 °F (36.8 °C) (Oral)   Resp 18   Ht 6' (1.829 m)   Wt 158 lb 8.2 oz (71.9 kg)   SpO2 94%   BMI 21.50 kg/m²     General appearance: No apparent distress appears stated age and cooperative. HEENT Normal cephalic, atraumatic without obvious deformity. Pupils equal, round, and reactive to light. Extra ocular muscles intact. Conjunctivae/corneas clear. Neck: Supple, No jugular venous distention/bruits. Trachea midline without thyromegaly or adenopathy with full range of motion. Lungs: Clear to auscultation, bilaterally without Rales/Wheezes/Rhonchi with good respiratory effort. Heart: Regular rate and rhythm with Normal S1/S2 without murmurs, rubs or gallops, point of maximum impulse non-displaced  Abdomen: Soft, non-tender or non-distended without rigidity or guarding and positive bowel sounds all four quadrants. Extremities: No clubbing or cyanosis, 4+ edema bilateral LE's. Skin: Skin color, texture, turgor normal.  No rashes or lesions. Neurologic: Alert and oriented X 3, neurovascularly intact with sensory/motor intact upper extremities/lower extremities, bilaterally.   Cranial nerves: II-XII intact, grossly non-focal.  Mental status: Alert, oriented, thought content appropriate. CXR:  I have reviewed the CXR with the following interpretation: pulmonary vascular congestion  EKG:  I have reviewed the EKG with the following interpretation: ventricular-paced rhythm    CBC   Recent Labs     04/02/22  1633   WBC 9.9   HGB 9.4*   HCT 28.4*         RENAL  Recent Labs     04/02/22  1633      K 4.4   CL 99   CO2 29   BUN 26*   CREATININE 1.55*     LFT'S  No results for input(s): AST, ALT, ALB, BILIDIR, BILITOT, ALKPHOS in the last 72 hours. COAG  No results for input(s): INR in the last 72 hours. CARDIAC ENZYMES  No results for input(s): CKTOTAL, CKMB, CKMBINDEX, TROPONINI in the last 72 hours. U/A:  No results found for: NITRITE, COLORU, WBCUA, RBCUA, MUCUS, BACTERIA, CLARITYU, SPECGRAV, LEUKOCYTESUR, BLOODU, GLUCOSEU, AMORPHOUS    ABG  No results found for: JQF6ZKD, BEART, Q7LAPVBF, PHART, THGBART, OSA8NDW, PO2ART, WHC9EQI        Active Hospital Problems    Diagnosis Date Noted    Heart failure exacerbated by sotalol ([facility] [address] [I50.9] 04/02/2022    Obesity, Class II, BMI 35-39.9 [E66.9] 04/02/2022    Mixed hyperlipidemia [E78.2] 04/02/2022    Chronic congestive heart failure ([facility] [address] [I50.9] 08/18/2021    Coronary artery disease involving native coronary artery of native heart without angina pectoris [I25.10] 08/18/2021    Paroxysmal A-fib ([facility] [address] [I48.0] 08/18/2021         ASSESSMENT/PLAN:  Patient admitted with CHF exacerbation. Co-morbidities as above.    -Consult Cardiology - further plans per their service.  -Home medications reviewed and reconciled. -Continue current treatments.  -Complete orders per chart. DVT Prophylaxis: Eliquis  Diet: ADULT DIET; Regular; 3 carb choices (45 gm/meal); Low Sodium (2 gm)  Code Status: Full Code      Dispo - admitted to [facility].        @[facility]@

## 2022-04-03 NOTE — PROGRESS NOTES
Pharmacy wanting clarification on pt taking both po Bumex and po Lasix. RN perfect served Dr. Natali Lyons, RN will also ask about an IV option  d/t pt c/o SOB, fine crackles, and +4 pitting edema. Awaiting call back.

## 2022-04-03 NOTE — PROGRESS NOTES
Pt arrived to unit via wheelchair from ED. Pt in no distress. Gown and heart monitor on. Bed locked and in lowest position. Call light within reach. Admission complete. Will continue to monitor.

## 2022-04-04 ENCOUNTER — APPOINTMENT (OUTPATIENT)
Dept: NON INVASIVE DIAGNOSTICS | Age: 87
DRG: 291 | End: 2022-04-04
Payer: MEDICARE

## 2022-04-04 ENCOUNTER — APPOINTMENT (OUTPATIENT)
Dept: NUCLEAR MEDICINE | Age: 87
DRG: 291 | End: 2022-04-04
Payer: MEDICARE

## 2022-04-04 LAB
EKG ATRIAL RATE: 49 BPM
EKG Q-T INTERVAL: 414 MS
EKG QRS DURATION: 144 MS
EKG QTC CALCULATION (BAZETT): 483 MS
EKG R AXIS: 137 DEGREES
EKG T AXIS: 88 DEGREES
EKG VENTRICULAR RATE: 82 BPM
LV EF: 45 %
LV EF: 55 %
LVEF MODALITY: NORMAL
LVEF MODALITY: NORMAL
SARS-COV-2, RAPID: NOT DETECTED
SPECIMEN DESCRIPTION: NORMAL

## 2022-04-04 PROCEDURE — 2580000003 HC RX 258: Performed by: FAMILY MEDICINE

## 2022-04-04 PROCEDURE — 3430000000 HC RX DIAGNOSTIC RADIOPHARMACEUTICAL: Performed by: INTERNAL MEDICINE

## 2022-04-04 PROCEDURE — 94640 AIRWAY INHALATION TREATMENT: CPT

## 2022-04-04 PROCEDURE — 93017 CV STRESS TEST TRACING ONLY: CPT

## 2022-04-04 PROCEDURE — 6370000000 HC RX 637 (ALT 250 FOR IP): Performed by: FAMILY MEDICINE

## 2022-04-04 PROCEDURE — 78452 HT MUSCLE IMAGE SPECT MULT: CPT

## 2022-04-04 PROCEDURE — 87635 SARS-COV-2 COVID-19 AMP PRB: CPT

## 2022-04-04 PROCEDURE — 1200000000 HC SEMI PRIVATE

## 2022-04-04 PROCEDURE — 99232 SBSQ HOSP IP/OBS MODERATE 35: CPT | Performed by: FAMILY MEDICINE

## 2022-04-04 PROCEDURE — 6360000002 HC RX W HCPCS: Performed by: INTERNAL MEDICINE

## 2022-04-04 PROCEDURE — 93010 ELECTROCARDIOGRAM REPORT: CPT | Performed by: INTERNAL MEDICINE

## 2022-04-04 PROCEDURE — 2500000003 HC RX 250 WO HCPCS: Performed by: INTERNAL MEDICINE

## 2022-04-04 PROCEDURE — 93306 TTE W/DOPPLER COMPLETE: CPT

## 2022-04-04 PROCEDURE — 6370000000 HC RX 637 (ALT 250 FOR IP): Performed by: INTERNAL MEDICINE

## 2022-04-04 PROCEDURE — A9500 TC99M SESTAMIBI: HCPCS | Performed by: INTERNAL MEDICINE

## 2022-04-04 PROCEDURE — 94761 N-INVAS EAR/PLS OXIMETRY MLT: CPT

## 2022-04-04 RX ORDER — AMINOPHYLLINE DIHYDRATE 25 MG/ML
50 INJECTION, SOLUTION INTRAVENOUS PRN
Status: ACTIVE | OUTPATIENT
Start: 2022-04-04 | End: 2022-04-04

## 2022-04-04 RX ORDER — NITROGLYCERIN 0.4 MG/1
0.4 TABLET SUBLINGUAL EVERY 5 MIN PRN
Status: ACTIVE | OUTPATIENT
Start: 2022-04-04 | End: 2022-04-04

## 2022-04-04 RX ORDER — SODIUM CHLORIDE 0.9 % (FLUSH) 0.9 %
10 SYRINGE (ML) INJECTION PRN
Status: DISCONTINUED | OUTPATIENT
Start: 2022-04-04 | End: 2022-04-05 | Stop reason: HOSPADM

## 2022-04-04 RX ORDER — METOPROLOL TARTRATE 5 MG/5ML
5 INJECTION INTRAVENOUS EVERY 5 MIN PRN
Status: ACTIVE | OUTPATIENT
Start: 2022-04-04 | End: 2022-04-04

## 2022-04-04 RX ORDER — SODIUM CHLORIDE 0.9 % (FLUSH) 0.9 %
5-40 SYRINGE (ML) INJECTION PRN
Status: ACTIVE | OUTPATIENT
Start: 2022-04-04 | End: 2022-04-04

## 2022-04-04 RX ORDER — ALBUTEROL SULFATE 90 UG/1
2 AEROSOL, METERED RESPIRATORY (INHALATION) PRN
Status: ACTIVE | OUTPATIENT
Start: 2022-04-04 | End: 2022-04-04

## 2022-04-04 RX ORDER — SODIUM CHLORIDE 9 MG/ML
500 INJECTION, SOLUTION INTRAVENOUS CONTINUOUS PRN
Status: ACTIVE | OUTPATIENT
Start: 2022-04-04 | End: 2022-04-04

## 2022-04-04 RX ORDER — ATROPINE SULFATE 0.1 MG/ML
0.5 INJECTION INTRAVENOUS EVERY 5 MIN PRN
Status: ACTIVE | OUTPATIENT
Start: 2022-04-04 | End: 2022-04-04

## 2022-04-04 RX ADMIN — Medication 400 MG: at 12:35

## 2022-04-04 RX ADMIN — Medication 400 MG: at 20:25

## 2022-04-04 RX ADMIN — Medication 10 ML: at 20:25

## 2022-04-04 RX ADMIN — ALLOPURINOL 100 MG: 100 TABLET ORAL at 12:33

## 2022-04-04 RX ADMIN — METOPROLOL TARTRATE 50 MG: 50 TABLET, FILM COATED ORAL at 20:25

## 2022-04-04 RX ADMIN — IPRATROPIUM BROMIDE AND ALBUTEROL SULFATE 1 AMPULE: 2.5; .5 SOLUTION RESPIRATORY (INHALATION) at 11:16

## 2022-04-04 RX ADMIN — IPRATROPIUM BROMIDE AND ALBUTEROL SULFATE 1 AMPULE: 2.5; .5 SOLUTION RESPIRATORY (INHALATION) at 20:29

## 2022-04-04 RX ADMIN — APIXABAN 5 MG: 5 TABLET, FILM COATED ORAL at 12:34

## 2022-04-04 RX ADMIN — IPRATROPIUM BROMIDE AND ALBUTEROL SULFATE 1 AMPULE: 2.5; .5 SOLUTION RESPIRATORY (INHALATION) at 07:53

## 2022-04-04 RX ADMIN — LISINOPRIL 5 MG: 5 TABLET ORAL at 12:35

## 2022-04-04 RX ADMIN — IPRATROPIUM BROMIDE AND ALBUTEROL SULFATE 1 AMPULE: 2.5; .5 SOLUTION RESPIRATORY (INHALATION) at 03:03

## 2022-04-04 RX ADMIN — METOPROLOL TARTRATE 50 MG: 50 TABLET, FILM COATED ORAL at 12:35

## 2022-04-04 RX ADMIN — POTASSIUM CHLORIDE 10 MEQ: 10 CAPSULE, COATED, EXTENDED RELEASE ORAL at 12:36

## 2022-04-04 RX ADMIN — CLOPIDOGREL BISULFATE 75 MG: 75 TABLET ORAL at 12:34

## 2022-04-04 RX ADMIN — TETRAKIS(2-METHOXYISOBUTYLISOCYANIDE)COPPER(I) TETRAFLUOROBORATE 14.1 MILLICURIE: 1 INJECTION, POWDER, LYOPHILIZED, FOR SOLUTION INTRAVENOUS at 10:19

## 2022-04-04 RX ADMIN — BUMETANIDE 2 MG: 0.25 INJECTION INTRAMUSCULAR; INTRAVENOUS at 12:38

## 2022-04-04 RX ADMIN — IPRATROPIUM BROMIDE AND ALBUTEROL SULFATE 1 AMPULE: 2.5; .5 SOLUTION RESPIRATORY (INHALATION) at 14:55

## 2022-04-04 RX ADMIN — BUMETANIDE 2 MG: 0.25 INJECTION INTRAMUSCULAR; INTRAVENOUS at 20:25

## 2022-04-04 RX ADMIN — TETRAKIS(2-METHOXYISOBUTYLISOCYANIDE)COPPER(I) TETRAFLUOROBORATE 36.6 MILLICURIE: 1 INJECTION, POWDER, LYOPHILIZED, FOR SOLUTION INTRAVENOUS at 13:25

## 2022-04-04 RX ADMIN — ROSUVASTATIN CALCIUM 20 MG: 20 TABLET, FILM COATED ORAL at 12:36

## 2022-04-04 RX ADMIN — Medication 1000 MCG: at 12:37

## 2022-04-04 RX ADMIN — OXYCODONE HYDROCHLORIDE AND ACETAMINOPHEN 500 MG: 500 TABLET ORAL at 20:25

## 2022-04-04 RX ADMIN — OXYCODONE HYDROCHLORIDE AND ACETAMINOPHEN 500 MG: 500 TABLET ORAL at 12:34

## 2022-04-04 RX ADMIN — APIXABAN 5 MG: 5 TABLET, FILM COATED ORAL at 20:25

## 2022-04-04 RX ADMIN — REGADENOSON 0.4 MG: 0.08 INJECTION, SOLUTION INTRAVENOUS at 09:59

## 2022-04-04 NOTE — PROGRESS NOTES
Writer attempted to pass morning meds. Dot for stress test entered room and writer unable to finish med pass. Writer waiting for patient to return to room to give morning meds. Writer locked meds in patient bin in room until patient returns to room.

## 2022-04-04 NOTE — PROGRESS NOTES
CST Lexiscan. Stress Tech performs patient preparation of physical comfort, review test procedures, pre-stress EKG. Lung Sounds clear in all fields. Consent verified. Educated patient on test procedure and possible side effects of Lexiscan. Cardiologist reviewed pre-test EKG and is present for test. Patient tolerated test well with minor SOB which resolved to baseline after test with caffeine. EKG portion of test complete, Nuc Med portion pending.   Pretest VS: /61 HR 94  Post test VS: /65 HR 99

## 2022-04-04 NOTE — PLAN OF CARE
Problem: Falls - Risk of:  Goal: Will remain free from falls  Description: Will remain free from falls  4/4/2022 1526 by Olive Pratt RN  Outcome: Ongoing  Note: Patient remained free of falls during shift.    4/4/2022 0414 by Lita Weiss RN  Outcome: Ongoing  Goal: Absence of physical injury  Description: Absence of physical injury  4/4/2022 1526 by Olive Pratt RN  Outcome: Ongoing  4/4/2022 0414 by Lita Weiss RN  Outcome: Ongoing     Problem: OXYGENATION/RESPIRATORY FUNCTION  Goal: Patient will maintain patent airway  4/4/2022 1526 by Olive Pratt RN  Outcome: Ongoing  4/4/2022 0414 by Lita Weiss RN  Outcome: Ongoing  Goal: Patient will achieve/maintain normal respiratory rate/effort  Description: Respiratory rate and effort will be within normal limits for the patient  4/4/2022 1526 by Olive Pratt RN  Outcome: Ongoing  4/4/2022 0414 by Lita Weiss RN  Outcome: Ongoing     Problem: HEMODYNAMIC STATUS  Goal: Patient has stable vital signs and fluid balance  4/4/2022 1526 by Olive Pratt RN  Outcome: Ongoing  4/4/2022 0414 by Lita Weiss RN  Outcome: Ongoing     Problem: FLUID AND ELECTROLYTE IMBALANCE  Goal: Fluid and electrolyte balance are achieved/maintained  4/4/2022 1526 by Olive Pratt RN  Outcome: Ongoing  4/4/2022 0414 by Lita Weiss RN  Outcome: Ongoing     Problem: ACTIVITY INTOLERANCE/IMPAIRED MOBILITY  Goal: Mobility/activity is maintained at optimum level for patient  4/4/2022 1526 by Olive Pratt RN  Outcome: Ongoing  4/4/2022 0414 by Lita Weiss RN  Outcome: Ongoing

## 2022-04-04 NOTE — PROGRESS NOTES
Writer spoke with patient regarding results of stress test. Writer educated patient on cardiology recommendations to get a cardiac cath. Writer informed and educated patient on cardiac cath. Patient stated he would like to think about it before making a decision.

## 2022-04-04 NOTE — PROGRESS NOTES
[facility]   Progress Note                   Date:   4/4/2022  Patient name: Johnnie Mcgrath  Date of admission:  4/2/2022  3:28 PM  MRN:   [id number]  YOB: 1935  PCP: Chasity Mcgowan MD    Reason for Admission:      Subjective:       Clinical Changes / Abnormalities: Denies any chest pain short of breath patient had stress test done awaiting for the result no shocks from AICD      Medications:   Scheduled Meds:   apixaban  5 mg Oral BID    clopidogrel  75 mg Oral Daily    ascorbic acid  500 mg Oral BID    lisinopril  5 mg Oral Daily    metoprolol tartrate  50 mg Oral BID    rosuvastatin  20 mg Oral Daily    allopurinol  100 mg Oral Daily    potassium chloride  10 mEq Oral Daily    magnesium oxide  400 mg Oral BID    ipratropium-albuterol  1 ampule Inhalation Q4H    vitamin B-12  1,000 mcg Oral Daily    bumetanide  2 mg IntraVENous Q12H    sodium chloride flush  5-40 mL IntraVENous 2 times per day     Continuous Infusions:   sodium chloride      sodium chloride       CBC:   Recent Labs     04/02/22  1633   WBC 9.9   HGB 9.4*        BMP:    Recent Labs     04/02/22  1633      K 4.4   CL 99   CO2 29   BUN 26*   CREATININE 1.55*   GLUCOSE 113*     Hepatic: No results for input(s): AST, ALT, ALB, BILITOT, ALKPHOS in the last 72 hours. Troponin: No results for input(s): TROPONINI in the last 72 hours. BNP: No results for input(s): BNP in the last 72 hours. Lipids: No results for input(s): CHOL, HDL in the last 72 hours. Invalid input(s): LDLCALCU  INR: No results for input(s): INR in the last 72 hours. Objective:   Vitals: BP (!) 132/51   Pulse 84   Temp 97.7 °F (36.5 °C) (Oral)   Resp 17   Ht 6' (1.829 m)   Wt 158 lb 8.2 oz (71.9 kg)   SpO2 94%   BMI 21.50 kg/m²   I/O last 3 completed shifts: In: 10 [I.V.:10]  Out: 250 [Urine:250]  No intake/output data recorded.   Scheduled Meds:   apixaban  5 mg Oral BID    clopidogrel  75 mg Oral Daily    ascorbic acid  500 mg Oral BID    lisinopril  5 mg Oral Daily    metoprolol tartrate  50 mg Oral BID    rosuvastatin  20 mg Oral Daily    allopurinol  100 mg Oral Daily    potassium chloride  10 mEq Oral Daily    magnesium oxide  400 mg Oral BID    ipratropium-albuterol  1 ampule Inhalation Q4H    vitamin B-12  1,000 mcg Oral Daily    bumetanide  2 mg IntraVENous Q12H    sodium chloride flush  5-40 mL IntraVENous 2 times per day     Continuous Infusions:   sodium chloride      sodium chloride       PRN Meds:.perflutren lipid microspheres, sodium chloride flush, sodium chloride, albuterol sulfate HFA, atropine, nitroGLYCERIN, metoprolol, aminophylline, sodium chloride flush, sodium chloride, acetaminophen, ondansetron **OR** ondansetron    CONSTITUTIONAL: AOx4, no apparent distress, appears stated age   HEAD: normocephalic, atraumatic   EYES: PERRLA, EOMI   ENT: moist mucous membranes, uvula midline   NECK: symmetric, no midline tenderness to palpation   LUNGS: clear to auscultation bilaterally   CARDIOVASCULAR: regular rate and rhythm, no murmurs, rubs or gallops   ABDOMEN: Soft, non-tender, non-distended with normal active bowel sounds   SKIN: no rash       EKG: AF BiV pacing   ECHO: 4/4/22  Summary  Normal left ventricle size and function with an estimated EF > 55%. No segmental wall motion abnormalities seen. Moderate left ventricular hypertrophy. Normal right ventricular size and function. Pacemaker lead seen in right  ventricle. Mild tricuspid regurgitation. Normal right ventricular systolic pressure. No significant pericardial effusion is seen.     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 fib continue Eliquis  If the stress test is negative can be discharged  Patient can follow in our office in 2 to 3 weeks    Wing Theron MD, [facility] Cardiology  [phone]

## 2022-04-04 NOTE — PROGRESS NOTES
FAMILY MEDICINE  - PROGRESS NOTE    Date:  4/4/2022  Rene Lopez  [id number]      Chief Complaint   Patient presents with    Shortness of Breath         Interval History:  Improved, he is feeling better this am and has decreased SOB. Cardiology consulted and has ordered a stress test and ECHO. No significant events overnight. Specialists notes, labs & imaging reviewed.       Subjective  Constitutional: positive for obesity  Cardiovascular: positive for irregular heart beat and lower extremity edema  Gastrointestinal: negative  Musculoskeletal:positive for arthralgias, myalgias and stiff joints:    Objective:    /65   Pulse 92   Temp 98 °F (36.7 °C) (Oral)   Resp 16   Ht 6' (1.829 m)   Wt 158 lb 8.2 oz (71.9 kg)   SpO2 94%   BMI 21.50 kg/m²   General appearance - alert, well appearing, and in no distress and overweight  Mental status - alert, oriented to person, place, and time  Eyes - pupils equal and reactive, extraocular eye movements intact  Ears - hearing grossly normal bilaterally  Nose - normal and patent, no erythema, discharge or polyps  Mouth - mucous membranes moist, pharynx normal without lesions  Neck - supple, no significant adenopathy  Lymphatics - no palpable lymphadenopathy, no hepatosplenomegaly  Chest - clear to auscultation, no wheezes, rales or rhonchi, symmetric air entry  Heart - irregularly irregular rhythm with rate 92  Abdomen - soft, nontender, nondistended, no masses or organomegaly  Breasts - not examined  Back exam - not examined  Neurological - alert, oriented, normal speech, no focal findings or movement disorder noted  Musculoskeletal - osteoarthritic changes noted in both hands  Extremities - pedal edema 3 +  Skin - normal coloration and turgor, no rashes, no suspicious skin lesions noted    Data:   Medications:   Current Facility-Administered Medications   Medication Dose Route Frequency Provider Last Rate Last Admin    clopidogrel (PLAVIX) tablet 75 mg  75 mg Oral Daily Orlene Hatchet, MD   75 mg at 04/03/22 9412    ascorbic acid (VITAMIN C) tablet 500 mg  500 mg Oral BID Orlene Hatchet, MD   500 mg at 04/03/22 2058    apixaban (ELIQUIS) tablet 2.5 mg  2.5 mg Oral BID Orlene Hatchet, MD   2.5 mg at 04/03/22 2058    lisinopril (PRINIVIL;ZESTRIL) tablet 5 mg  5 mg Oral Daily Orlene Hatchet, MD   5 mg at 04/03/22 0232    metoprolol tartrate (LOPRESSOR) tablet 50 mg  50 mg Oral BID Orlene Hatchet, MD   50 mg at 04/03/22 2058    rosuvastatin (CRESTOR) tablet 20 mg  20 mg Oral Daily Orlene Hatchet, MD   20 mg at 04/03/22 1292    allopurinol (ZYLOPRIM) tablet 100 mg  100 mg Oral Daily Orlene Hatchet, MD   100 mg at 04/03/22 0831    potassium chloride (MICRO-K) extended release capsule 10 mEq  10 mEq Oral Daily Orlene Hatchet, MD   10 mEq at 04/03/22 0831    magnesium oxide (MAG-OX) tablet 400 mg  400 mg Oral BID Orlene Hatchet, MD   400 mg at 04/03/22 2058    ipratropium-albuterol (DUONEB) nebulizer solution 1 ampule  1 ampule Inhalation Q4H Orlene Hatchet, MD   1 ampule at 04/04/22 0303    vitamin B-12 (CYANOCOBALAMIN) tablet 1,000 mcg  1,000 mcg Oral Daily Orlene Hatchet, MD   1,000 mcg at 04/03/22 1015    bumetanide (BUMEX) injection 2 mg  2 mg IntraVENous Q12H Morenita Arzate MD   2 mg at 04/03/22 2058    sodium chloride flush 0.9 % injection 5-40 mL  5-40 mL IntraVENous 2 times per day Orlene Hatchet, MD   10 mL at 04/03/22 2101    sodium chloride flush 0.9 % injection 5-40 mL  5-40 mL IntraVENous PRN Orlene Hatchet, MD        0.9 % sodium chloride infusion   IntraVENous PRN Orlene Hatchet, MD        acetaminophen (TYLENOL) tablet 650 mg  650 mg Oral Q4H PRN Orlene Hatchet, MD        ondansetron (ZOFRAN-ODT) disintegrating tablet 4 mg  4 mg Oral Q8H PRN Orlene Hatchet, MD        Or    ondansetron [facility]) injection 4 mg  4 mg IntraVENous Q6H PRN Orlene Hatchet, MD           Intake/Output Summary (Last 24 hours) at 4/4/2022 0631  Last data filed at 4/3/2022 6009  Gross per 24 hour   Intake 10 ml   Output 50 ml   Net -40 ml     No results found for this or any previous visit (from the past 24 hour(s)).  -----------------------------------------------------------------  RAD:  EKG:  Micro:     Assessment & Plan:    Patient Active Problem List:     Paroxysmal A-fib ([facility] [address]     Chronic congestive heart failure ([facility] [address]     Coronary artery disease involving native coronary artery of native heart without angina pectoris     Heart failure exacerbated by sotalol ([facility] [address]     Obesity, Class II, BMI 35-39.9     Mixed hyperlipidemia           Plan:  -Acute on chronic CHF - patient scheduled for ECHO and Stress test today, Cardiology managing, further plans per their service.  -Paroxysmal A. Fib. - stable on Eliquis.  -Continue current treatments.  -Further treatment plans pending the results of studies.  -Complete orders per chart.     See orders   Disposition:    Electronically signed by Galina Huynh MD on 4/4/2022 at 6:31 AM

## 2022-04-04 NOTE — PLAN OF CARE
Problem: Falls - Risk of:  Goal: Will remain free from falls  Description: Will remain free from falls  4/4/2022 0414 by Pamela Wise RN  Outcome: Ongoing     Problem: Falls - Risk of:  Goal: Absence of physical injury  Description: Absence of physical injury  4/4/2022 0414 by Pamela Wise RN  Outcome: Ongoing     Problem: OXYGENATION/RESPIRATORY FUNCTION  Goal: Patient will maintain patent airway  4/4/2022 0414 by Pamela Wise RN  Outcome: Ongoing     Problem: OXYGENATION/RESPIRATORY FUNCTION  Goal: Patient will achieve/maintain normal respiratory rate/effort  Description: Respiratory rate and effort will be within normal limits for the patient  4/4/2022 0414 by Pamela Wise RN  Outcome: Ongoing     Problem: HEMODYNAMIC STATUS  Goal: Patient has stable vital signs and fluid balance  4/4/2022 0414 by Pamela Wise RN  Outcome: Ongoing     Problem: FLUID AND ELECTROLYTE IMBALANCE  Goal: Fluid and electrolyte balance are achieved/maintained  4/4/2022 0414 by Pamela Wise RN  Outcome: Ongoing     Problem: ACTIVITY INTOLERANCE/IMPAIRED MOBILITY  Goal: Mobility/activity is maintained at optimum level for patient  4/4/2022 0414 by Pamela Wise RN  Outcome: Ongoing

## 2022-04-04 NOTE — CONSULTS
[facility]  Inpatient Cardiology Consult             Date:   4/3/22  Patient name: Suman Osborn  Date of admission:  4/2/2022  3:28 PM  MRN:   [id number]  YOB: 1935      Reason for Admission:  CHF    CHIEF COMPLAINT:  Edema, SOB    History Obtained From:  Patient and medical record    HISTORY OF PRESENT ILLNESS:    The patient is a 82 y.o. gentleman with h/o AF on Eliquis, CAD s/p CABG, CM with CRT-D,  Hypertension and dyslipidemia who presents for evaluation of shortness of breath x1 week. Patient initially had URI symptoms that he now feels moving onto his chest, complaining of cough and shortness of breath. Patient also with increased leg swelling x1 week. No fever, chills, chest pain, hemoptysis, numbness, weakness, abdominal pain, nausea, vomiting, diarrhea, dizziness. EKG shows AF with biventricular pacing, and CXR shows mild pulmonary vascular congestion with no pulmonary edema, infiltrates or effusions. Pro  pg/ml with troponins 26 and 28 ng/L.     Recently moved to [place] from [place]. Was following with cardiology down there but has not established with cardiology since moving to [place]. No history of blood clots or cancer. No recent travel or surgery. No documented history of COPD in our records but patient does state he has needed breathing treatments in the past.  Quit smoking over 50 years ago      Past Medical History:  HTN, DM, CAD, h/o CABG, CRT-D    Past Surgical History:   has no past surgical history on file. Home Medications:    Prior to Admission medications    Medication Sig Start Date End Date Taking?  Authorizing Provider   potassium chloride (KLOR-CON 10) 10 MEQ extended release tablet Take 1 tablet by mouth daily 12/15/21 3/15/22  Brianna Santillan MD   allopurinol (ZYLOPRIM) 100 MG tablet TAKE 1 TABLET BY MOUTH EVERY DAY 11/22/21   JUAN FRANCISCO Parker - CNP   bumetanide (BUMEX) 2 MG tablet Take 1 tablet by mouth 2 times daily 9/19/21 Rosa Rodrigues MD   [facility]) 2.5 MG TABS tablet Take 1 tablet by mouth 2 times daily 9/19/21   Rosa Rodrigues MD   lisinopril (PRINIVIL;ZESTRIL) 5 MG tablet Take 1 tablet by mouth daily 9/19/21   Rosa Rodrigues MD   metoprolol tartrate (LOPRESSOR) 50 MG tablet Take 1 tablet by mouth 2 times daily 9/19/21   Rosa Rodrigues MD   magnesium oxide (MAG-OX) 400 MG tablet Take 1 tablet by mouth 2 times daily 9/19/21   Rosa Rodrigues MD   rosuvastatin (CRESTOR) 20 MG tablet Take 1 tablet by mouth daily 9/19/21   Rosa Rodrigues MD   furosemide (LASIX) 40 MG tablet Take 1 tablet by mouth 2 times daily 9/19/21   Rosa Rodrigues MD   Methylcobalamin (B-12) 5000 MCG TBDP Take by mouth    Historical Provider, MD   clopidogrel (PLAVIX) 75 MG tablet Take 1 tablet by mouth daily 8/18/21   Rosa Rodrigues MD   ascorbic acid ([employer] VITAMIN C) 500 MG tablet Take 1 tablet by mouth 2 times daily 8/18/21   Rosa Rodrigues MD       Allergies:  Patient has no known allergies. Social History:   reports that he quit smoking about 55 years ago. He has never used smokeless tobacco. He reports previous alcohol use. He reports that he does not use drugs. Family History:   Positive for early CAD    REVIEW OF SYSTEMS:    · Constitutional: there has been no unanticipated weight loss. There's been No change in energy level, No change in activity level. · Eyes: No visual changes or diplopia. No scleral icterus. · ENT: No Headaches, hearing loss or vertigo. No mouth sores or sore throat. · Cardiovascular: No problem  · Respiratory: No previous reported problems  · Gastrointestinal: No abdominal pain, appetite loss, blood in stools. No change in bowel or bladder habits. · Genitourinary: No dysuria, trouble voiding, or hematuria. · Musculoskeletal:  No gait disturbance, No weakness or joint complaints. · Integumentary: No rash or pruritis.   · Neurological: No headache, diplopia, change in muscle strength, numbness or tingling. No change in gait, balance, coordination, mood, affect, memory, mentation, behavior. · Psychiatric: No anxiety, or depression. · Endocrine: No temperature intolerance. No excessive thirst, fluid intake, or urination. No tremor. · Hematologic/Lymphatic: No abnormal bruising or bleeding, blood clots or swollen lymph nodes. · Allergic/Immunologic: No nasal congestion or hives. PHYSICAL EXAM:    Physical Examination:    /65   Pulse 92   Temp 98 °F (36.7 °C) (Oral)   Resp 16   Ht 6' (1.829 m)   Wt 158 lb 8.2 oz (71.9 kg)   SpO2 94%   BMI 21.50 kg/m²    Constitutional and General Appearance: alert, cooperative, no distress and appears stated age  HEENT: PERRL, no cervical lymphadenopathy. No masses palpable. Normal oral mucosa  Respiratory:  · Normal excursion and expansion without use of accessory muscles  · Resp Auscultation: Good respiratory effort. No for increased work of breathing. On auscultation: Decreased throughout with faint crackles at right base  Cardiovascular:  · The apical impulse is not displaced  · Heart tones are crisp and normal. regular S1 and S2. Murmurs: None  · Jugular venous pulsation Normal  · The carotid upstroke is normal in amplitude and contour without delay or bruit  · Peripheral pulses are symmetrical and full   Abdomen:  · No masses or tenderness  · Bowel sounds present  Extremities:  ·  No Cyanosis or Clubbing  ·  Lower extremity edema: 1-2+   ·  Skin: Warm and dry  Neurological:  · Alert and oriented.   · Moves all extremities well  · No abnormalities of mood, affect, memory, mentation, or behavior are noted    DATA:    Diagnostics:      EKG: AF with biventricular pacing, 82 ppm      Labs:     CBC:   Recent Labs     04/02/22  1633   WBC 9.9   HGB 9.4*   HCT 28.4*        BMP:   Recent Labs     04/02/22  1633      K 4.4   CO2 29   BUN 26*   CREATININE 1.55*   LABGLOM 43*   GLUCOSE 113*     BNP: No results for input(s): BNP in the last 72 hours.  PT/INR: No results for input(s): PROTIME, INR in the last 72 hours. APTT:No results for input(s): APTT in the last 72 hours. CARDIAC ENZYMES:No results for input(s): CKTOTAL, CKMB, CKMBINDEX, TROPONINI in the last 72 hours. FASTING LIPID PANEL:  Lab Results   Component Value Date    HDL 48 08/18/2021     LIVER PROFILE:No results for input(s): AST, ALT, LABALBU in the last 72 hours. IMPRESSION:    1. Stable CAD, h/o CABG; marginally elevated troponins, stable with no evidence of acute coronary syndrome  2. Cardiomyopathy with mild CHF, improved with diuresis  3. Recent URI  4. PAF, on Eliquis  5. CRT-D in-situ  6. Essential HTN, controlled  7. Dyslipidemia    Patient Active Problem List   Diagnosis    Paroxysmal A-fib (HCC)    Chronic congestive heart failure ([facility]    Coronary artery disease involving native coronary artery of native heart without angina pectoris    Heart failure exacerbated by sotalol ([facility]    Obesity, Class II, BMI 35-39.9    Mixed hyperlipidemia       RECOMMENDATIONS:  1. Bumex 2 mg IV q 12 hrs. 2. Continue metoprolol and lisinopril  3. Continue Plavix and Eliquis ( increase to 5 mg bid)  4. Continue rosuvastatin  5. 2D echo and Persantine Cardiolite to reassess LVEF and coronary reserve  6. ICD interrogation    Discussed with patient and nursing.     Electronically signed by Li Johnson MD on 4/4/2022 at 6:32 AM.  [place] [facility]

## 2022-04-05 ENCOUNTER — HOSPITAL ENCOUNTER (INPATIENT)
Dept: CARDIAC CATH/INVASIVE PROCEDURES | Age: 87
LOS: 2 days | Discharge: HOME | DRG: 287 | End: 2022-04-07
Attending: INTERNAL MEDICINE | Admitting: INTERNAL MEDICINE
Payer: MEDICARE

## 2022-04-05 ENCOUNTER — APPOINTMENT (OUTPATIENT)
Dept: ULTRASOUND IMAGING | Age: 87
DRG: 287 | End: 2022-04-05
Attending: INTERNAL MEDICINE
Payer: MEDICARE

## 2022-04-05 VITALS
RESPIRATION RATE: 18 BRPM | HEART RATE: 93 BPM | WEIGHT: 158.51 LBS | SYSTOLIC BLOOD PRESSURE: 110 MMHG | OXYGEN SATURATION: 94 % | DIASTOLIC BLOOD PRESSURE: 80 MMHG | HEIGHT: 72 IN | TEMPERATURE: 97.6 F | BODY MASS INDEX: 21.47 KG/M2

## 2022-04-05 DIAGNOSIS — N17.9 ACUTE KIDNEY INJURY SUPERIMPOSED ON CKD (HCC): Primary | ICD-10-CM

## 2022-04-05 DIAGNOSIS — I50.9 HEART FAILURE EXACERBATED BY SOTALOL (HCC): ICD-10-CM

## 2022-04-05 DIAGNOSIS — N18.32 STAGE 3B CHRONIC KIDNEY DISEASE (HCC): ICD-10-CM

## 2022-04-05 DIAGNOSIS — R94.39 POSITIVE CARDIAC STRESS TEST: ICD-10-CM

## 2022-04-05 DIAGNOSIS — N18.9 ACUTE KIDNEY INJURY SUPERIMPOSED ON CKD (HCC): Primary | ICD-10-CM

## 2022-04-05 LAB
ANION GAP SERPL CALCULATED.3IONS-SCNC: 13 MMOL/L (ref 9–17)
BILIRUBIN URINE: NEGATIVE
BUN BLDV-MCNC: 24 MG/DL (ref 8–23)
CALCIUM SERPL-MCNC: 8.8 MG/DL (ref 8.6–10.4)
CHLORIDE BLD-SCNC: 97 MMOL/L (ref 98–107)
CO2: 30 MMOL/L (ref 20–31)
COLOR: YELLOW
COMMENT UA: NORMAL
CREAT SERPL-MCNC: 1.67 MG/DL (ref 0.7–1.2)
CREATININE URINE: 37.1 MG/DL (ref 39–259)
GFR AFRICAN AMERICAN: 48 ML/MIN
GFR NON-AFRICAN AMERICAN: 39 ML/MIN
GFR SERPL CREATININE-BSD FRML MDRD: ABNORMAL ML/MIN/{1.73_M2}
GLUCOSE BLD-MCNC: 102 MG/DL (ref 70–99)
GLUCOSE URINE: NEGATIVE
HCT VFR BLD CALC: 30.4 % (ref 40.7–50.3)
HEMOGLOBIN: 9.3 G/DL (ref 13–17)
KETONES, URINE: NEGATIVE
LEUKOCYTE ESTERASE, URINE: NEGATIVE
MCH RBC QN AUTO: 25.8 PG (ref 25.2–33.5)
MCHC RBC AUTO-ENTMCNC: 30.6 G/DL (ref 28.4–34.8)
MCV RBC AUTO: 84.4 FL (ref 82.6–102.9)
NITRITE, URINE: NEGATIVE
NRBC AUTOMATED: 0 PER 100 WBC
PARTIAL THROMBOPLASTIN TIME: 26.1 SEC (ref 20.5–30.5)
PARTIAL THROMBOPLASTIN TIME: 32.3 SEC (ref 20.5–30.5)
PDW BLD-RTO: 17 % (ref 11.8–14.4)
PH UA: 8 (ref 5–8)
PLATELET # BLD: 297 K/UL (ref 138–453)
PMV BLD AUTO: 9 FL (ref 8.1–13.5)
POTASSIUM SERPL-SCNC: 4.3 MMOL/L (ref 3.7–5.3)
PROTEIN UA: NEGATIVE
RBC # BLD: 3.6 M/UL (ref 4.21–5.77)
SODIUM BLD-SCNC: 140 MMOL/L (ref 135–144)
SODIUM,UR: 78 MMOL/L
SPECIFIC GRAVITY UA: 1.01 (ref 1–1.03)
TOTAL PROTEIN, URINE: 4 MG/DL
TURBIDITY: CLEAR
URINE HGB: NEGATIVE
UROBILINOGEN, URINE: NORMAL
WBC # BLD: 5.7 K/UL (ref 3.5–11.3)

## 2022-04-05 PROCEDURE — 6360000002 HC RX W HCPCS: Performed by: STUDENT IN AN ORGANIZED HEALTH CARE EDUCATION/TRAINING PROGRAM

## 2022-04-05 PROCEDURE — 76770 US EXAM ABDO BACK WALL COMP: CPT

## 2022-04-05 PROCEDURE — 80048 BASIC METABOLIC PNL TOTAL CA: CPT

## 2022-04-05 PROCEDURE — 94761 N-INVAS EAR/PLS OXIMETRY MLT: CPT

## 2022-04-05 PROCEDURE — 99232 SBSQ HOSP IP/OBS MODERATE 35: CPT | Performed by: FAMILY MEDICINE

## 2022-04-05 PROCEDURE — 85027 COMPLETE CBC AUTOMATED: CPT

## 2022-04-05 PROCEDURE — 6360000002 HC RX W HCPCS

## 2022-04-05 PROCEDURE — 2500000003 HC RX 250 WO HCPCS: Performed by: INTERNAL MEDICINE

## 2022-04-05 PROCEDURE — 85730 THROMBOPLASTIN TIME PARTIAL: CPT

## 2022-04-05 PROCEDURE — 82570 ASSAY OF URINE CREATININE: CPT

## 2022-04-05 PROCEDURE — 6370000000 HC RX 637 (ALT 250 FOR IP): Performed by: FAMILY MEDICINE

## 2022-04-05 PROCEDURE — 94640 AIRWAY INHALATION TREATMENT: CPT

## 2022-04-05 PROCEDURE — 2060000000 HC ICU INTERMEDIATE R&B

## 2022-04-05 PROCEDURE — 84156 ASSAY OF PROTEIN URINE: CPT

## 2022-04-05 PROCEDURE — 36415 COLL VENOUS BLD VENIPUNCTURE: CPT

## 2022-04-05 PROCEDURE — 84300 ASSAY OF URINE SODIUM: CPT

## 2022-04-05 PROCEDURE — 99223 1ST HOSP IP/OBS HIGH 75: CPT | Performed by: INTERNAL MEDICINE

## 2022-04-05 PROCEDURE — 81003 URINALYSIS AUTO W/O SCOPE: CPT

## 2022-04-05 PROCEDURE — 2580000003 HC RX 258: Performed by: STUDENT IN AN ORGANIZED HEALTH CARE EDUCATION/TRAINING PROGRAM

## 2022-04-05 RX ORDER — ALBUTEROL SULFATE 90 UG/1
2 AEROSOL, METERED RESPIRATORY (INHALATION) EVERY 6 HOURS PRN
Status: DISCONTINUED | OUTPATIENT
Start: 2022-04-05 | End: 2022-04-07 | Stop reason: HOSPADM

## 2022-04-05 RX ORDER — HEPARIN SODIUM 1000 [USP'U]/ML
2000 INJECTION, SOLUTION INTRAVENOUS; SUBCUTANEOUS PRN
Status: DISCONTINUED | OUTPATIENT
Start: 2022-04-05 | End: 2022-04-06

## 2022-04-05 RX ORDER — ACETAMINOPHEN 650 MG/1
650 SUPPOSITORY RECTAL EVERY 6 HOURS PRN
Status: DISCONTINUED | OUTPATIENT
Start: 2022-04-05 | End: 2022-04-07 | Stop reason: HOSPADM

## 2022-04-05 RX ORDER — HEPARIN SODIUM 1000 [USP'U]/ML
4000 INJECTION, SOLUTION INTRAVENOUS; SUBCUTANEOUS ONCE
Status: COMPLETED | OUTPATIENT
Start: 2022-04-05 | End: 2022-04-05

## 2022-04-05 RX ORDER — ONDANSETRON 4 MG/1
4 TABLET, ORALLY DISINTEGRATING ORAL EVERY 8 HOURS PRN
Status: DISCONTINUED | OUTPATIENT
Start: 2022-04-05 | End: 2022-04-07 | Stop reason: HOSPADM

## 2022-04-05 RX ORDER — ONDANSETRON 2 MG/ML
4 INJECTION INTRAMUSCULAR; INTRAVENOUS EVERY 6 HOURS PRN
Status: DISCONTINUED | OUTPATIENT
Start: 2022-04-05 | End: 2022-04-07 | Stop reason: HOSPADM

## 2022-04-05 RX ORDER — ACETAMINOPHEN 325 MG/1
650 TABLET ORAL EVERY 6 HOURS PRN
Status: DISCONTINUED | OUTPATIENT
Start: 2022-04-05 | End: 2022-04-07 | Stop reason: HOSPADM

## 2022-04-05 RX ORDER — ALBUTEROL SULFATE 2.5 MG/3ML
2.5 SOLUTION RESPIRATORY (INHALATION) EVERY 6 HOURS PRN
Status: DISCONTINUED | OUTPATIENT
Start: 2022-04-05 | End: 2022-04-07 | Stop reason: HOSPADM

## 2022-04-05 RX ORDER — HEPARIN SODIUM AND DEXTROSE 10000; 5 [USP'U]/100ML; G/100ML
5-30 INJECTION INTRAVENOUS CONTINUOUS
Status: DISCONTINUED | OUTPATIENT
Start: 2022-04-05 | End: 2022-04-05

## 2022-04-05 RX ORDER — POLYETHYLENE GLYCOL 3350 17 G/17G
17 POWDER, FOR SOLUTION ORAL DAILY PRN
Status: DISCONTINUED | OUTPATIENT
Start: 2022-04-05 | End: 2022-04-07 | Stop reason: HOSPADM

## 2022-04-05 RX ORDER — SODIUM CHLORIDE 0.9 % (FLUSH) 0.9 %
10 SYRINGE (ML) INJECTION EVERY 12 HOURS SCHEDULED
Status: DISCONTINUED | OUTPATIENT
Start: 2022-04-05 | End: 2022-04-07 | Stop reason: HOSPADM

## 2022-04-05 RX ORDER — SODIUM CHLORIDE 0.9 % (FLUSH) 0.9 %
10 SYRINGE (ML) INJECTION PRN
Status: DISCONTINUED | OUTPATIENT
Start: 2022-04-05 | End: 2022-04-07 | Stop reason: HOSPADM

## 2022-04-05 RX ORDER — CLOPIDOGREL BISULFATE 75 MG/1
75 TABLET ORAL DAILY
Status: DISCONTINUED | OUTPATIENT
Start: 2022-04-06 | End: 2022-04-07 | Stop reason: HOSPADM

## 2022-04-05 RX ORDER — HEPARIN SODIUM 1000 [USP'U]/ML
4000 INJECTION, SOLUTION INTRAVENOUS; SUBCUTANEOUS PRN
Status: DISCONTINUED | OUTPATIENT
Start: 2022-04-05 | End: 2022-04-06

## 2022-04-05 RX ORDER — SODIUM CHLORIDE 9 MG/ML
INJECTION, SOLUTION INTRAVENOUS PRN
Status: DISCONTINUED | OUTPATIENT
Start: 2022-04-05 | End: 2022-04-07 | Stop reason: HOSPADM

## 2022-04-05 RX ADMIN — IPRATROPIUM BROMIDE AND ALBUTEROL SULFATE 1 AMPULE: 2.5; .5 SOLUTION RESPIRATORY (INHALATION) at 00:10

## 2022-04-05 RX ADMIN — IPRATROPIUM BROMIDE AND ALBUTEROL SULFATE 1 AMPULE: 2.5; .5 SOLUTION RESPIRATORY (INHALATION) at 03:59

## 2022-04-05 RX ADMIN — HEPARIN SODIUM 2000 UNITS: 1000 INJECTION, SOLUTION INTRAVENOUS; SUBCUTANEOUS at 21:13

## 2022-04-05 RX ADMIN — HEPARIN SODIUM 12 UNITS/KG/HR: 5000 INJECTION, SOLUTION INTRAVENOUS; SUBCUTANEOUS at 15:28

## 2022-04-05 RX ADMIN — CLOPIDOGREL BISULFATE 75 MG: 75 TABLET ORAL at 09:15

## 2022-04-05 RX ADMIN — ALBUTEROL SULFATE 2.5 MG: 2.5 SOLUTION RESPIRATORY (INHALATION) at 20:22

## 2022-04-05 RX ADMIN — BUMETANIDE 2 MG: 0.25 INJECTION INTRAMUSCULAR; INTRAVENOUS at 08:57

## 2022-04-05 RX ADMIN — HEPARIN SODIUM 4000 UNITS: 1000 INJECTION INTRAVENOUS; SUBCUTANEOUS at 15:21

## 2022-04-05 RX ADMIN — IPRATROPIUM BROMIDE AND ALBUTEROL SULFATE 1 AMPULE: 2.5; .5 SOLUTION RESPIRATORY (INHALATION) at 07:25

## 2022-04-05 ASSESSMENT — ENCOUNTER SYMPTOMS
CHOKING: 0
APNEA: 0
SHORTNESS OF BREATH: 1
WHEEZING: 0
BLOOD IN STOOL: 0
ABDOMINAL PAIN: 0
ABDOMINAL DISTENTION: 0
VOMITING: 0
CONSTIPATION: 0
STRIDOR: 0
DIARRHEA: 0
NAUSEA: 0
COUGH: 0

## 2022-04-05 ASSESSMENT — PAIN SCALES - GENERAL
PAINLEVEL_OUTOF10: 0
PAINLEVEL_OUTOF10: 0

## 2022-04-05 NOTE — PROGRESS NOTES
[place] [facility] [facility]   Progress Note                   Date:   4/5/2022  Patient name: Suman Osborn  Date of admission:  4/2/2022  3:28 PM  MRN:   [id number]  YOB: 1935  PCP: Brianna Santillan MD    Reason for Admission:      Subjective:       Clinical Changes / Abnormalities:   Stress test form yesterday abnormal.   No active CP. Medications:   Scheduled Meds:   apixaban  5 mg Oral BID    clopidogrel  75 mg Oral Daily    ascorbic acid  500 mg Oral BID    lisinopril  5 mg Oral Daily    metoprolol tartrate  50 mg Oral BID    rosuvastatin  20 mg Oral Daily    allopurinol  100 mg Oral Daily    potassium chloride  10 mEq Oral Daily    magnesium oxide  400 mg Oral BID    ipratropium-albuterol  1 ampule Inhalation Q4H    vitamin B-12  1,000 mcg Oral Daily    bumetanide  2 mg IntraVENous Q12H    sodium chloride flush  5-40 mL IntraVENous 2 times per day     Continuous Infusions:   sodium chloride       CBC:   Recent Labs     04/02/22  1633   WBC 9.9   HGB 9.4*        BMP:    Recent Labs     04/02/22  1633      K 4.4   CL 99   CO2 29   BUN 26*   CREATININE 1.55*   GLUCOSE 113*     Hepatic: No results for input(s): AST, ALT, ALB, BILITOT, ALKPHOS in the last 72 hours. Troponin: No results for input(s): TROPONINI in the last 72 hours. BNP: No results for input(s): BNP in the last 72 hours. Lipids: No results for input(s): CHOL, HDL in the last 72 hours. Invalid input(s): LDLCALCU  INR: No results for input(s): INR in the last 72 hours. Objective:   Vitals: /80   Pulse 93   Temp 97.6 °F (36.4 °C) (Oral)   Resp 18   Ht 6' (1.829 m)   Wt 158 lb 8.2 oz (71.9 kg)   SpO2 94%   BMI 21.50 kg/m²   I/O last 3 completed shifts:  In: -   Out: 200 [Urine:200]  No intake/output data recorded.   Scheduled Meds:   apixaban  5 mg Oral BID    clopidogrel  75 mg Oral Daily    ascorbic acid  500 mg Oral BID    lisinopril  5 mg Oral Daily    metoprolol tartrate  50 mg Oral BID    rosuvastatin  20 mg Oral Daily    allopurinol  100 mg Oral Daily    potassium chloride  10 mEq Oral Daily    magnesium oxide  400 mg Oral BID    ipratropium-albuterol  1 ampule Inhalation Q4H    vitamin B-12  1,000 mcg Oral Daily    bumetanide  2 mg IntraVENous Q12H    sodium chloride flush  5-40 mL IntraVENous 2 times per day     Continuous Infusions:   sodium chloride       PRN Meds:.perflutren lipid microspheres, sodium chloride flush, sodium chloride flush, sodium chloride, acetaminophen, ondansetron **OR** ondansetron    CONSTITUTIONAL: AOx4, no apparent distress, appears stated age   HEAD: normocephalic, atraumatic   EYES: PERRLA, EOMI   ENT: moist mucous membranes, uvula midline   NECK: symmetric, no midline tenderness to palpation   LUNGS: clear to auscultation bilaterally   CARDIOVASCULAR: regular rate and rhythm, no murmurs, rubs or gallops   ABDOMEN: Soft, non-tender, non-distended with normal active bowel sounds   SKIN: no rash       EKG: AF BiV pacing   ECHO: 4/4/22  Summary  Normal left ventricle size and function with an estimated EF > 55%. No segmental wall motion abnormalities seen. Moderate left ventricular hypertrophy. Normal right ventricular size and function. Pacemaker lead seen in right  ventricle. Mild tricuspid regurgitation. Normal right ventricular systolic pressure. No significant pericardial effusion is seen. Stress Test 4/4/22:   Patient refusal of prone images limits evaluation.       Fixed inferior wall and mostly fixed partially reversible lateral wall   perfusion defects as discussed above.  Findings indicative of infarct in the   mid and basal inferior wall as well as in the mid and inferolateral wall. Small amount of reversibility in the lateral wall would be compatible with   gonzalez-infarct ischemia.       LVEF 45%       Risk stratification: Intermediate risk. Assessment / Acute Cardiac Problems:     1.   Abnormal nuclear stress test with findings consistent of infarction inferiorly as well as a partially reversible lateral wall defect likely gonzalez-infarct ischemia. With lower LVEF of 45%. 2.  Recently moved from [place]. 3.  Has coronary artery disease status post CABG. 4.  Has a history of cardiomyopathy status post CRT-D with what appears to be recovered LVEF of 55% on echo from 4/22. #5.  Minimally elevated troponin  6. Recent URI  7. History of Parox A. fib on Eliquis    Plan of Treatment:   - I had an extensive discussion with the patient about his stress test finding  - he would like to proceed with cardiac cath as he wants to know exactly what is going on with his CABG grafts  - last eliquis yesterday evening  - plan for cath today  - I have recommend left heart catheterization with possible PCI. I have discussed risks (including but not limited to vascular injury, infection, hematoma, contrast induced kidney dysfunction, CVA and MI), benefits, alternatives in detail. All questions answered. Patient agrees to proceed. \    Sacha Bain DO, [facility] [place], [address], [address], [place] [address] [facility]  [employer]  [phone]    Addendum:  D/w cath lab staff, no recent Cr from this AM  He is at cath lab and will get point of care Cr and then will decide candidacy for cath today. Sacha Bain DO, [facility] [place], [address], [address], [place] [address] [facility]  [employer]  [phone]

## 2022-04-05 NOTE — H&P
[facility]  Procedure History and Physical Update          Patient Name: [facility]  MRN:    [id number]  YOB: 1935  Date of evaluation:  4/5/2022  Procedure:    Cardiac cath +/- PCI    Indication for procedure:  Abnormal stress test      Please refer to the office note completed by Dr. Josseline Thomas on 04/05/2022 in the medical record and note that:    [x] I have examined the patient and reviewed the H&P/Consult and there are no changes to be made to the assessment or plan. [] I have examined the patient and reviewed the H&P/Consult and have noted the following changes:    No past medical history on file. No past surgical history on file. No family history on file. No Known Allergies    Prior to Admission medications    Medication Sig Start Date End Date Taking?  Authorizing Provider   potassium chloride (KLOR-CON 10) 10 MEQ extended release tablet Take 1 tablet by mouth daily 12/15/21 3/15/22  Carmen White MD   allopurinol (ZYLOPRIM) 100 MG tablet TAKE 1 TABLET BY MOUTH EVERY DAY 11/22/21   Donnie Bolton APRN - CNP   bumetanide (BUMEX) 2 MG tablet Take 1 tablet by mouth 2 times daily 9/19/21   Carmen White MD   apixaban (ELIQUIS) 2.5 MG TABS tablet Take 1 tablet by mouth 2 times daily 9/19/21   Carmen White MD   lisinopril (PRINIVIL;ZESTRIL) 5 MG tablet Take 1 tablet by mouth daily 9/19/21   Carmen White MD   metoprolol tartrate (LOPRESSOR) 50 MG tablet Take 1 tablet by mouth 2 times daily 9/19/21   Carmen White MD   magnesium oxide (MAG-OX) 400 MG tablet Take 1 tablet by mouth 2 times daily 9/19/21   Carmen White MD   rosuvastatin (CRESTOR) 20 MG tablet Take 1 tablet by mouth daily 9/19/21   Carmen White MD   furosemide (LASIX) 40 MG tablet Take 1 tablet by mouth 2 times daily 9/19/21   Carmen White MD   Methylcobalamin (B-12) 5000 MCG TBDP Take by mouth    Historical Provider, MD   clopidogrel (PLAVIX) 75 MG tablet Take 1 tablet by mouth daily 8/18/21   Brianna Santillan MD   ascorbic acid ([employer] VITAMIN C) 500 MG tablet Take 1 tablet by mouth 2 times daily 8/18/21   Brianna Santillan MD         There were no vitals filed for this visit. Constitutional and General Appearance:   alert, cooperative, no distress and appears stated age  [de-identified]:  · PERRL, EOMI  Respiratory:  · Normal excursion and expansion without use of accessory muscles  · Resp Auscultation:  Good respiratory effort. No for increased work of breathing. On auscultation: clear to auscultation bilaterally  Cardiovascular:  · Regular rate and rhythm. · S1/S2  · No murmurs. · The apical impulse is not displaced  Abdomen:  · Soft  · Bowel sounds present  · Non-tender to palpation  Extremities:  · 2+ Lower extremity edema:   Skin:  · Warm and dry  Neurological:  · Alert and oriented. · Moves all extremities well      Stress test 04/02/2022:     Large severe fixed inferior wall perfusion defect involving the mid and basal   segments.  Minimal inferior wall hypokinesis.  Differential is infarct   plus/minus diaphragmatic attenuation artifact.       Large severe mostly fixed partially reversible perfusion defect involving the   mid and inferolateral wall.  Mild hypokinesis of the lateral wall.  Findings   most probably represent infarct with some gonzalez-infarct ischemia.       Extent of defect at rest is 12% and stress 21%.       Generalized hypokinesis sparing the anterior wall. Impression:  1. Stable CAD, h/o CABG X 3, no records available; marginally elevated troponins, stable with no evidence of acute coronary syndrome  2. Cardiomyopathy with mild CHF, improved with diuresis  3. Recent URI  4. PAF, on Eliquis, received one dose last night. 5.   CRT-D in-situ  6. Essential HTN, controlled  7. Dyslipidemia    Plan:  · Proceed with planned procedure. · Further orders to follow.       Risks, benefits, and alternatives of cardiac catheterization were discussed, in detail, with patient. Risks include, but not limited to, bleeding, requiring blood transfusion, vascular complication requiring surgery, renal failure with need of dialysis, CVA, MI, death and anesthesia complications including intubation were discussed. Patient verbalized understanding and agreed to proceed with the procedure understanding the above risks and alternatives to the procedure.     Jevon Dawn MD       Cardiovascular Fellow PGY-4  4/5/2022, 8:51 AM

## 2022-04-05 NOTE — PROGRESS NOTES
Patient admitted, consent signed and questions answered. Patient ready for procedure. Call light to reach with side rails up 2 of 2 Bilateral groin clipped. History and physical needs to be completed.

## 2022-04-05 NOTE — H&P
[address]     Department of Internal Medicine - Staff Internal Medicine Teaching Service          ADMISSION NOTE/HISTORY AND PHYSICAL EXAMINATION   Date: 4/5/2022  Patient Name: Nathan Up  Date of admission: 4/5/2022  1:55 PM  YOB: 1935  PCP: Galina Huynh MD  History Obtained From:  patient    CHIEF COMPLAINT     Chief complaint: abnormal stress test    HISTORY OF PRESENTING ILLNESS     The patient is a pleasant 82 y.o. male pmh CABG x3 6 years go in [address] with CRT-D  presents with a chief complaint of SOB. Patient was taken for stress test yesterday and was found Large severe fixed inferior wall perfusion defect involving the mid and basal segments. Minimal inferior wall hypokinesis. Large severe fixed inferior wall perfusion defect involving the mid and basal segments. Minimal inferior wall hypokinesis. His cr was elevated at 1.6. thus he was transferred to [facility] for nephro clearance for heart cath. Denies any CP, SOB currently. Vitals:    04/05/22 1401   BP: (!) 148/70   Pulse: 96   Resp: 16   Temp: 98.1 °F (36.7 °C)   SpO2: 94%       On my evaluation,  Vitals:    04/05/22 1401   BP: (!) 148/70   Pulse: 96   Resp: 16   Temp: 98.1 °F (36.7 °C)   SpO2: 94%         Review of Systems:  Review of Systems   Constitutional: Negative for chills, diaphoresis, fatigue and fever. Respiratory: Positive for shortness of breath. Negative for apnea, cough, choking, wheezing and stridor. Cardiovascular: Negative for chest pain and leg swelling. Gastrointestinal: Negative for abdominal distention, abdominal pain, blood in stool, constipation, diarrhea, nausea and vomiting. Genitourinary: Negative for decreased urine volume, dysuria and urgency. Skin: Negative for pallor and wound. Neurological: Negative for syncope, weakness, numbness and headaches.    Psychiatric/Behavioral: Negative for agitation, behavioral problems, confusion, decreased concentration and self-injury. PAST MEDICAL HISTORY     No past medical history on file. PAST SURGICAL HISTORY     No past surgical history on file. ALLERGIES     Patient has no known allergies. MEDICATIONS PRIOR TO ADMISSION     Prior to Admission medications    Medication Sig Start Date End Date Taking? Authorizing Provider   potassium chloride (KLOR-CON 10) 10 MEQ extended release tablet Take 1 tablet by mouth daily 12/15/21 3/15/22  Suzi Messer MD   allopurinol (ZYLOPRIM) 100 MG tablet TAKE 1 TABLET BY MOUTH EVERY DAY 21   JUAN FRANCISCO Dominguez CNP   bumetanide (BUMEX) 2 MG tablet Take 1 tablet by mouth 2 times daily 21   Suzi Messer MD   apixaban (ELIQUIS) 2.5 MG TABS tablet Take 1 tablet by mouth 2 times daily 21   Suzi Messer MD   lisinopril (PRINIVIL;ZESTRIL) 5 MG tablet Take 1 tablet by mouth daily 21   Suzi Messer MD   metoprolol tartrate (LOPRESSOR) 50 MG tablet Take 1 tablet by mouth 2 times daily 21   Suzi Messer MD   magnesium oxide (MAG-OX) 400 MG tablet Take 1 tablet by mouth 2 times daily 21   Suzi Messer MD   rosuvastatin (CRESTOR) 20 MG tablet Take 1 tablet by mouth daily 21   Suzi Messer MD   furosemide (LASIX) 40 MG tablet Take 1 tablet by mouth 2 times daily 21   Suzi Messer MD   Methylcobalamin (B-12) 5000 MCG TBDP Take by mouth    Historical Provider, MD   clopidogrel (PLAVIX) 75 MG tablet Take 1 tablet by mouth daily 21   Suzi Messer MD   ascorbic acid ([employer] VITAMIN C) 500 MG tablet Take 1 tablet by mouth 2 times daily 21   Suzi Messer MD       SOCIAL HISTORY     Tobacco: former  Alcohol: no  Illicits: no  Occupation: retired     FAMILY HISTORY     No family history on file.     PHYSICAL EXAM     Vitals: BP (!) 148/70   Pulse 96   Temp 98.1 °F (36.7 °C) (Oral)   Resp 16   SpO2 94%   Tmax: Temp (24hrs), Av °F (36.7 °C), Min:97.4 °F (36.3 °C), Max:98.8 °F (37.1 °C)    Last Body weight:   Wt Readings from Last 3 Encounters:   04/03/22 158 lb 8.2 oz (71.9 kg)   11/18/21 281 lb (127.5 kg)   08/18/21 277 lb (125.6 kg)     Body Mass Index : There is no height or weight on file to calculate BMI. PHYSICAL EXAMINATION:  Physical Exam  Constitutional:       General: He is not in acute distress. Appearance: Normal appearance. He is obese. He is not ill-appearing, toxic-appearing or diaphoretic. HENT:      Mouth/Throat:      Mouth: Mucous membranes are moist.      Pharynx: Oropharynx is clear. Eyes:      General: No scleral icterus. Cardiovascular:      Rate and Rhythm: Normal rate and regular rhythm. Pulses: Normal pulses. Heart sounds: Normal heart sounds. No murmur heard. No friction rub. No gallop. Pulmonary:      Effort: Pulmonary effort is normal.      Breath sounds: Normal breath sounds. Abdominal:      General: Abdomen is flat. Bowel sounds are normal. There is no distension. Palpations: Abdomen is soft. There is no mass. Tenderness: There is no abdominal tenderness. There is no guarding or rebound. Hernia: No hernia is present. Musculoskeletal:         General: Swelling present. No tenderness, deformity or signs of injury. Right lower leg: No edema. Left lower leg: No edema. Skin:     General: Skin is warm and dry. Coloration: Skin is not jaundiced or pale. Findings: No bruising. Neurological:      General: No focal deficit present. Mental Status: He is alert and oriented to person, place, and time. Cranial Nerves: No cranial nerve deficit. Motor: No weakness. Psychiatric:         Mood and Affect: Mood normal.         Behavior: Behavior normal.         Thought Content:  Thought content normal.         Judgment: Judgment normal.           INVESTIGATIONS     Laboratory Testing:     Recent Results (from the past 24 hour(s))   Basic Metabolic Panel    Collection Time: 04/05/22  9:54 AM   Result Value Ref Range    Glucose 102 (H) 70 - 99 mg/dL    BUN 24 (H) 8 - 23 mg/dL    CREATININE 1.67 (H) 0.70 - 1.20 mg/dL    Calcium 8.8 8.6 - 10.4 mg/dL    Sodium 140 135 - 144 mmol/L    Potassium 4.3 3.7 - 5.3 mmol/L    Chloride 97 (L) 98 - 107 mmol/L    CO2 30 20 - 31 mmol/L    Anion Gap 13 9 - 17 mmol/L    GFR Non-African American 39 (L) >60 mL/min    GFR  48 (L) >60 mL/min    GFR Comment         APTT    Collection Time: 04/05/22  2:42 PM   Result Value Ref Range    PTT 26.1 20.5 - 30.5 sec   CBC    Collection Time: 04/05/22  2:42 PM   Result Value Ref Range    WBC 5.7 3.5 - 11.3 k/uL    RBC 3.60 (L) 4.21 - 5.77 m/uL    Hemoglobin 9.3 (L) 13.0 - 17.0 g/dL    Hematocrit 30.4 (L) 40.7 - 50.3 %    MCV 84.4 82.6 - 102.9 fL    MCH 25.8 25.2 - 33.5 pg    MCHC 30.6 28.4 - 34.8 g/dL    RDW 17.0 (H) 11.8 - 14.4 %    Platelets 703 844 - 632 k/uL    MPV 9.0 8.1 - 13.5 fL    NRBC Automated 0.0 0.0 per 100 WBC       Imaging:   XR CHEST (2 VW)    Result Date: 4/2/2022  Pulmonary vascular congestion. Questionable rim sclerotic lesion within a lower thoracic vertebral body versus summation artifact. If patient is focally symptomatic or as clinically warranted, CT could be performed in the nonacute setting for further characterization. NM MYOCARDIAL SPECT REST EXERCISE OR RX    Result Date: 4/4/2022  Patient refusal of prone images limits evaluation. Fixed inferior wall and mostly fixed partially reversible lateral wall perfusion defects as discussed above. 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 LVEF 45% Risk stratification: Intermediate risk. The findings were sent to the Radiology Results [address] at 3:25 pm on 4/4/2022to be communicated to a licensed caregiver.  RECOMMENDATIONS: Unavailable       ASSESSMENT & PLAN     ASSESSMENT: Primary Problem  Positive cardiac stress test    Active Hospital Problems    Diagnosis Date Noted    Positive cardiac stress test [R94.39] 04/05/2022     Priority: High    Obesity, Class II, BMI 35-39.9 [E66.9] 04/02/2022    Paroxysmal A-fib ([facility] [address] [I48.0] 08/18/2021    Coronary artery disease involving native coronary artery of native heart without angina pectoris [I25.10] 08/18/2021       PLAN:     IMPRESSION  This is a 82 y.o. male who presented with above mentioned complaints and was admitted to inpatient service for further management as follows:     Principal Problem:    Positive cardiac stress test  Active Problems:    Paroxysmal A-fib ([facility] [address]    Coronary artery disease involving native coronary artery of native heart without angina pectoris    Obesity, Class II, BMI 35-39.9  Resolved Problems:    * No resolved hospital problems. *    Positive stress test  CAD  - heart cath most likely tommorow  - needs nephro clearance due to elevated Cr  - currently Cr is 1.6  - NPO at [place]  - on heparin gtt  - continue AP  pAfib  - continue to monitor  - hold eliquis    Obesity   - recommend weight loss. PT/OT/SW-consulted  Discharge Planning:consulted       Ej Voss MD  Internal Medicine Resident  [facility]  4/5/2022 3:48 PM   Attending Physician Statement  I have discussed the care of Judith Velasquez, including pertinent history and exam findings,  with the resident. I have seen and examined the patient and the key elements of all parts of the encounter have been performed by me. I agree with the assessment, plan and orders as documented by the resident with additions . Treatment plan Discussed with nursing staff in detail , all questions answered . Electronically signed by Taniya Carrasco MD on   4/6/22 at 7:19 PM EDT    Please note that this chart was generated using voice recognition Dragon dictation software.   Although every effort was made to ensure the accuracy of this automated transcription, some errors in transcription may have occurred.

## 2022-04-05 NOTE — PLAN OF CARE
Cardiac cath cancelled for today due to elevated creatinine. Will admit to Internal medicine, get clearance for cardiac cath from nephrology, discussed with Senior resident on call. Will hold Eliquis and start heparin.  Patient to be admitted under Dr Geraldine Reynoso team.     Ayah Augustine MD       Cardiovascular Fellow PGY-4  4/5/2022, 12:20 PM

## 2022-04-05 NOTE — CARE COORDINATION
ONGOING DISCHARGE PLAN:    Patient is alert and oriented x4. Spoke with patient regarding discharge plan and patient confirms that plan is still home  With no needs. Pt was transferred to [facility] for cardiac cath today. Awaiting to see if patient returns to [facility]. Will continue to follow for additional discharge needs.     Electronically signed by Tita Campbell RN on 4/5/2022 at 9:56 AM

## 2022-04-05 NOTE — ADT AUTH CERT
Utilization Reviews         PA RECOMMENDATION FOR IP by Gabby Franklin RN       Review Status Review Entered   In Primary 2022 11:02      Criteria Review   We recommend that the following pt's current hospitalization under Inpatient status Is appropriate       Name: Moe Reese   : 1935   CSN: [id number]   INSURANCE: [employer]        Clinical summary 81 yo M presented with chest pain found to have abnormal nuclear stress test with findings consistent of infarction inferiorly as well as a partially reversible lateral wall defect likely gonzalez-infarct ischemia. With lower LVEF of 45%.   -Per Cardiology, plan for cardiac cath  Vitals Stable  Labs and Imaging See above  MCG criteria applies yes  Comments       This chart was reviewed at 10:51 AM 2022    Ceci Hemphill MD   Physician [facility] [employer]   CELL : [phone]        Heart Failure - Care Day 2 (4/3/2022) by Gabby Franklin RN       Review Status Review Entered   Completed 2022 16:43      Criteria Review      Care Day: 2 Care Date: 4/3/2022 Level of Care: Intermediate Care    Guideline Day 2    Clinical Status    (X) * Hemodynamic stability    2022 4:43 PM EDT by Jamison Knight      22 0700 98.4 (36.9) 17 84 125/68 -- -- -- -- 90 None (Room air)    (X) * Mental status at baseline    (X) * No evidence of myocardial ischemia    (X) * Cardiac rate and rhythm acceptable    ( ) * Oxygenation at baseline or improved    2022 4:43 PM EDT by Sylvia Bertrand q4    ( ) * Pulmonary edema absent or improved    2022 4:43 PM EDT by Jamison Knight      bumex iv    Interventions    (X) * Pulmonary catheter absent    Medications    (X) Diuretics    * Milestone   Additional Notes   DATE: 4/3      Pertinent Updates:   Bumex iv      Vitals:    22 0700 98.4 (36.9) 17 84 125/68 - - - - 90 None (Room air)       Physical Exam:   Decreased throughout with faint crackles at right base   Inspiratory wheezes      MD Consults/Assessments & Plans:   H&P:   ASSESSMENT/PLAN:   Patient admitted with CHF exacerbation. Co-morbidities as above.       -Consult Cardiology - further plans per their service.   -Home medications reviewed and reconciled. -Continue current treatments.   -Complete orders per chart.       DVT Prophylaxis: Eliquis   Diet: ADULT DIET; Regular; 3 carb choices (45 gm/meal); Low Sodium (2 gm)   Code Status: Full Code           Dispo - admitted to [facility]. CARDIO:   IMPRESSION:     1. Stable CAD, h/o CABG; marginally elevated troponins, stable with no evidence of acute coronary syndrome   2. Cardiomyopathy with mild CHF, improved with diuresis   3. Recent URI   4.   PAF, on Eliquis   5.   CRT-D in-situ   6.   Essential HTN, controlled   7.   Dyslipidemia       Patient Active Problem List   Diagnosis   · Paroxysmal A-fib (HCC)   · Chronic congestive heart failure (HCC)   · Coronary artery disease involving native coronary artery of native heart without angina pectoris   · Heart failure exacerbated by sotalol (HCC)   · Obesity, Class II, BMI 35-39.9   · Mixed hyperlipidemia           RECOMMENDATIONS:   1. Bumex 2 mg IV q 12 hrs. 2. Continue metoprolol and lisinopril   3. Continue Plavix and Eliquis ( increase to 5 mg bid)   4. Continue rosuvastatin   5. 2D echo and Persantine Cardiolite to reassess LVEF and coronary reserve   6.  ICD interrogation         Medications:      furosemide (LASIX) tablet 40 mg   Dose: 40 mg   Freq: 2 TIMES DAILY Route: PO   Start: 04/03/22 0045 End: 04/03/22 1521      bumetanide (BUMEX) injection 2 mg   Dose: 2 mg   Freq: EVERY 12 HOURS Route: IV   Start: 04/03/22 2000      ipratropium-albuterol (DUONEB) nebulizer solution 1 ampule   Dose: 1 ampule   Freq: EVERY 4 HOURS Route: IN   Start: 04/03/22 0245

## 2022-04-05 NOTE — PROGRESS NOTES
FAMILY MEDICINE  - PROGRESS NOTE    Date:  4/5/2022  Suman Osborn  [id number]      Chief Complaint   Patient presents with    Shortness of Breath         Interval History:  Improved, he is feeling better this am and has decreased SOB. Stress test was high risk/positive. No significant events overnight. Specialists notes, labs & imaging reviewed. I discussed the patient with Dr. Mariel Blackburn and he states that he will talk to the patient about his treatment options.        Subjective  Constitutional: positive for obesity  Cardiovascular: positive for irregular heart beat and lower extremity edema  Gastrointestinal: negative  Musculoskeletal:positive for arthralgias, myalgias and stiff joints:    Objective:    BP (!) 116/59   Pulse 89   Temp 98.8 °F (37.1 °C)   Resp 18   Ht 6' (1.829 m)   Wt 158 lb 8.2 oz (71.9 kg)   SpO2 95%   BMI 21.50 kg/m²   General appearance - alert, well appearing, and in no distress and overweight  Mental status - alert, oriented to person, place, and time  Eyes - pupils equal and reactive, extraocular eye movements intact  Ears - hearing grossly normal bilaterally  Nose - normal and patent, no erythema, discharge or polyps  Mouth - mucous membranes moist, pharynx normal without lesions  Neck - supple, no significant adenopathy  Lymphatics - no palpable lymphadenopathy, no hepatosplenomegaly  Chest - clear to auscultation, no wheezes, rales or rhonchi, symmetric air entry  Heart - irregularly irregular rhythm with rate 92  Abdomen - soft, nontender, nondistended, no masses or organomegaly  Breasts - not examined  Back exam - not examined  Neurological - alert, oriented, normal speech, no focal findings or movement disorder noted  Musculoskeletal - osteoarthritic changes noted in both hands  Extremities - pedal edema 3 +  Skin - normal coloration and turgor, no rashes, no suspicious skin lesions noted    Data:   Medications:   Current Facility-Administered Medications   Medication Dose Route Frequency Provider Last Rate Last Admin    apixaban (ELIQUIS) tablet 5 mg  5 mg Oral BID Jt Conklin MD   5 mg at 04/04/22 2025    perflutren lipid microspheres (DEFINITY) injection 1.65 mg  1.5 mL IntraVENous ONCE PRN Jt Conklin MD        sodium chloride flush 0.9 % injection 10 mL  10 mL IntraVENous PRN Jt Conklin MD        clopidogrel (PLAVIX) tablet 75 mg  75 mg Oral Daily Arlyn Coleman MD   75 mg at 04/04/22 1234    ascorbic acid (VITAMIN C) tablet 500 mg  500 mg Oral BID Arlyn Coleman MD   500 mg at 04/04/22 2025    lisinopril (PRINIVIL;ZESTRIL) tablet 5 mg  5 mg Oral Daily Arlyn Coleman MD   5 mg at 04/04/22 1235    metoprolol tartrate (LOPRESSOR) tablet 50 mg  50 mg Oral BID Arlyn Coleman MD   50 mg at 04/04/22 2025    rosuvastatin (CRESTOR) tablet 20 mg  20 mg Oral Daily Arlyn Coleman MD   20 mg at 04/04/22 1236    allopurinol (ZYLOPRIM) tablet 100 mg  100 mg Oral Daily Arlyn Coleman MD   100 mg at 04/04/22 1233    potassium chloride (MICRO-K) extended release capsule 10 mEq  10 mEq Oral Daily Arlyn Coleman MD   10 mEq at 04/04/22 1236    magnesium oxide (MAG-OX) tablet 400 mg  400 mg Oral BID Arlyn Coleman MD   400 mg at 04/04/22 2025    ipratropium-albuterol (DUONEB) nebulizer solution 1 ampule  1 ampule Inhalation Q4H Arlyn Coleman MD   1 ampule at 04/05/22 0359    vitamin B-12 (CYANOCOBALAMIN) tablet 1,000 mcg  1,000 mcg Oral Daily Arlyn Coleman MD   1,000 mcg at 04/04/22 1237    bumetanide (BUMEX) injection 2 mg  2 mg IntraVENous Q12H Jt Conklin MD   2 mg at 04/04/22 2025    sodium chloride flush 0.9 % injection 5-40 mL  5-40 mL IntraVENous 2 times per day Arlyn Coleman MD   10 mL at 04/04/22 2025    sodium chloride flush 0.9 % injection 5-40 mL  5-40 mL IntraVENous PRN Arlyn Coleman MD        0.9 % sodium chloride infusion   IntraVENous PRN MD Emilia Moise acetaminophen (TYLENOL) tablet 650 mg  650 mg Oral Q4H PRN Ani Shah MD        ondansetron (ZOFRAN-ODT) disintegrating tablet 4 mg  4 mg Oral Q8H PRN Ani Shah MD        Or    ondansetron [facility]) injection 4 mg  4 mg IntraVENous Q6H PRN Ani Shah MD         No intake or output data in the 24 hours ending 04/05/22 0710  Recent Results (from the past 24 hour(s))   COVID-19, Rapid    Collection Time: 04/04/22  8:40 AM    Specimen: Nasopharyngeal Swab   Result Value Ref Range    Specimen Description . NASOPHARYNGEAL SWAB     SARS-CoV-2, Rapid Not Detected Not Detected     -----------------------------------------------------------------  RAD:  EKG:  Micro:     Assessment & Plan:    Patient Active Problem List:     Paroxysmal A-fib ([facility] [address]     Chronic congestive heart failure ([facility] [address]     Coronary artery disease involving native coronary artery of native heart without angina pectoris     Heart failure exacerbated by sotalol ([facility] [address]     Obesity, Class II, BMI 35-39.9     Mixed hyperlipidemia           Plan:  -Acute on chronic CHF - patient had an ECHO and Stress test, stress test was positive, patient states to me that he wants to proceed with the cardiac cath, Cardiology managing.  -Paroxysmal A. Fib. - stable on Eliquis.  -Continue current treatments.  -Further treatment plans pending the results of cardiac cath. -Complete orders per chart.     See orders   Disposition:    Electronically signed by Ani Shah MD on 4/5/2022 at 7:10 AM

## 2022-04-05 NOTE — PROGRESS NOTES
Report given to receiving nurse at [facility]. [facility] cath lab. Transport here to pick patient up.  Plavix and bumex given

## 2022-04-05 NOTE — FLOWSHEET NOTE
SPIRITUAL CARE DEPARTMENT - [facility]  PROGRESS NOTE    Shift date: 4/5/22  Shift day: Tuesday   Shift # 2    Room # 1909/8548-65   Name: Katherine Ross            Age: 82 y.o. Gender: male          Mormon: Baptism   Place of Anabaptism: Unknown    Referral: Routine Visit    Admit Date & Time: 4/5/2022  1:55 PM    PATIENT/EVENT DESCRIPTION:  Katherine Ross is a 82 y.o. male in room 1029 of CAR-1. Patient is a transfer from [facility]. SPIRITUAL ASSESSMENT/INTERVENTION:   rounded on CAR-1.  met and conversed with patient in room 1029. Patient appeared to be cold. Nurse provided blanket to patient.  provided active listening to patient for patient to share his feelings. Patient going to Cath lab most likely tomorrow.  provided prayer cards and personal encouraging prayers for patient's well-being. SPIRITUAL CARE FOLLOW-UP PLAN:  Chaplains will remain available to offer spiritual and emotional support as needed. Electronically signed by Gabbie Thomas Resident, on 4/5/2022 at 5:44 PM.  [facility]  [phone]       04/05/22 1742   Encounter Summary   Services provided to: Patient   Referral/Consult From: [id number] Saurabh Pulido   (Baptism)   Continue Visiting   (4/5/22)   Complexity of Encounter Moderate   Length of Encounter 15 minutes   Spiritual Assessment Completed Yes   Routine   Type Initial   Assessment Calm; Approachable;Peaceful; Hopeful;Coping   Intervention Active listening;Explored feelings, thoughts, concerns;Nurtured hope;Prayer;Sustaining presence/ Ministry of presence;Provided reading materials/devotional materials   Outcome Acceptance;Comfort;Expressed gratitude;Coping;Encouraged; Hopeful;Receptive;Engaged in conversation

## 2022-04-06 ENCOUNTER — APPOINTMENT (OUTPATIENT)
Dept: CARDIAC CATH/INVASIVE PROCEDURES | Age: 87
DRG: 287 | End: 2022-04-06
Attending: INTERNAL MEDICINE
Payer: MEDICARE

## 2022-04-06 LAB
ABSOLUTE EOS #: 0.26 K/UL (ref 0–0.44)
ABSOLUTE IMMATURE GRANULOCYTE: 0.04 K/UL (ref 0–0.3)
ABSOLUTE LYMPH #: 1.07 K/UL (ref 1.1–3.7)
ABSOLUTE MONO #: 0.57 K/UL (ref 0.1–1.2)
ANION GAP SERPL CALCULATED.3IONS-SCNC: 8 MMOL/L (ref 9–17)
BASOPHILS # BLD: 1 % (ref 0–2)
BASOPHILS ABSOLUTE: 0.04 K/UL (ref 0–0.2)
BUN BLDV-MCNC: 22 MG/DL (ref 8–23)
CALCIUM SERPL-MCNC: 8.6 MG/DL (ref 8.6–10.4)
CHLORIDE BLD-SCNC: 96 MMOL/L (ref 98–107)
CO2: 30 MMOL/L (ref 20–31)
COMPLEMENT C3: 153 MG/DL (ref 90–180)
COMPLEMENT C4: 30 MG/DL (ref 10–40)
CREAT SERPL-MCNC: 1.57 MG/DL (ref 0.7–1.2)
EOSINOPHILS RELATIVE PERCENT: 5 % (ref 1–4)
FREE KAPPA/LAMBDA RATIO: 1.56 (ref 0.26–1.65)
GFR AFRICAN AMERICAN: 51 ML/MIN
GFR NON-AFRICAN AMERICAN: 42 ML/MIN
GFR SERPL CREATININE-BSD FRML MDRD: ABNORMAL ML/MIN/{1.73_M2}
GLUCOSE BLD-MCNC: 110 MG/DL (ref 70–99)
HAV IGM SER IA-ACNC: NONREACTIVE
HCT VFR BLD CALC: 29.7 % (ref 40.7–50.3)
HEMOGLOBIN: 9 G/DL (ref 13–17)
HEPATITIS B CORE IGM ANTIBODY: NONREACTIVE
HEPATITIS B SURFACE ANTIGEN: NONREACTIVE
HEPATITIS C ANTIBODY: NONREACTIVE
IMMATURE GRANULOCYTES: 1 %
KAPPA FREE LIGHT CHAINS QNT: 5.16 MG/DL (ref 0.37–1.94)
LAMBDA FREE LIGHT CHAINS QNT: 3.31 MG/DL (ref 0.57–2.63)
LYMPHOCYTES # BLD: 19 % (ref 24–43)
MCH RBC QN AUTO: 25.8 PG (ref 25.2–33.5)
MCHC RBC AUTO-ENTMCNC: 30.3 G/DL (ref 28.4–34.8)
MCV RBC AUTO: 85.1 FL (ref 82.6–102.9)
MONOCYTES # BLD: 10 % (ref 3–12)
NRBC AUTOMATED: 0 PER 100 WBC
PARTIAL THROMBOPLASTIN TIME: 31.9 SEC (ref 20.5–30.5)
PARTIAL THROMBOPLASTIN TIME: 33.8 SEC (ref 20.5–30.5)
PDW BLD-RTO: 17 % (ref 11.8–14.4)
PLATELET # BLD: 293 K/UL (ref 138–453)
PMV BLD AUTO: 8.8 FL (ref 8.1–13.5)
POTASSIUM SERPL-SCNC: 4.5 MMOL/L (ref 3.7–5.3)
RBC # BLD: 3.49 M/UL (ref 4.21–5.77)
RBC # BLD: ABNORMAL 10*6/UL
SEG NEUTROPHILS: 64 % (ref 36–65)
SEGMENTED NEUTROPHILS ABSOLUTE COUNT: 3.57 K/UL (ref 1.5–8.1)
SODIUM BLD-SCNC: 134 MMOL/L (ref 135–144)
WBC # BLD: 5.6 K/UL (ref 3.5–11.3)

## 2022-04-06 PROCEDURE — B2111ZZ FLUOROSCOPY OF MULTIPLE CORONARY ARTERIES USING LOW OSMOLAR CONTRAST: ICD-10-PCS | Performed by: INTERNAL MEDICINE

## 2022-04-06 PROCEDURE — B2131ZZ FLUOROSCOPY OF MULTIPLE CORONARY ARTERY BYPASS GRAFTS USING LOW OSMOLAR CONTRAST: ICD-10-PCS | Performed by: INTERNAL MEDICINE

## 2022-04-06 PROCEDURE — 86038 ANTINUCLEAR ANTIBODIES: CPT

## 2022-04-06 PROCEDURE — 6370000000 HC RX 637 (ALT 250 FOR IP): Performed by: STUDENT IN AN ORGANIZED HEALTH CARE EDUCATION/TRAINING PROGRAM

## 2022-04-06 PROCEDURE — B2151ZZ FLUOROSCOPY OF LEFT HEART USING LOW OSMOLAR CONTRAST: ICD-10-PCS | Performed by: INTERNAL MEDICINE

## 2022-04-06 PROCEDURE — 6360000002 HC RX W HCPCS: Performed by: STUDENT IN AN ORGANIZED HEALTH CARE EDUCATION/TRAINING PROGRAM

## 2022-04-06 PROCEDURE — 85730 THROMBOPLASTIN TIME PARTIAL: CPT

## 2022-04-06 PROCEDURE — 93455 CORONARY ART/GRFT ANGIO S&I: CPT

## 2022-04-06 PROCEDURE — 2580000003 HC RX 258: Performed by: STUDENT IN AN ORGANIZED HEALTH CARE EDUCATION/TRAINING PROGRAM

## 2022-04-06 PROCEDURE — 86160 COMPLEMENT ANTIGEN: CPT

## 2022-04-06 PROCEDURE — 2060000000 HC ICU INTERMEDIATE R&B

## 2022-04-06 PROCEDURE — C1894 INTRO/SHEATH, NON-LASER: HCPCS

## 2022-04-06 PROCEDURE — 99222 1ST HOSP IP/OBS MODERATE 55: CPT | Performed by: INTERNAL MEDICINE

## 2022-04-06 PROCEDURE — 4A023N7 MEASUREMENT OF CARDIAC SAMPLING AND PRESSURE, LEFT HEART, PERCUTANEOUS APPROACH: ICD-10-PCS | Performed by: INTERNAL MEDICINE

## 2022-04-06 PROCEDURE — 80048 BASIC METABOLIC PNL TOTAL CA: CPT

## 2022-04-06 PROCEDURE — 2709999900 HC NON-CHARGEABLE SUPPLY

## 2022-04-06 PROCEDURE — 84155 ASSAY OF PROTEIN SERUM: CPT

## 2022-04-06 PROCEDURE — 86334 IMMUNOFIX E-PHORESIS SERUM: CPT

## 2022-04-06 PROCEDURE — 36415 COLL VENOUS BLD VENIPUNCTURE: CPT

## 2022-04-06 PROCEDURE — 83883 ASSAY NEPHELOMETRY NOT SPEC: CPT

## 2022-04-06 PROCEDURE — 84165 PROTEIN E-PHORESIS SERUM: CPT

## 2022-04-06 PROCEDURE — 85025 COMPLETE CBC W/AUTO DIFF WBC: CPT

## 2022-04-06 PROCEDURE — 80074 ACUTE HEPATITIS PANEL: CPT

## 2022-04-06 PROCEDURE — 6370000000 HC RX 637 (ALT 250 FOR IP): Performed by: NURSE PRACTITIONER

## 2022-04-06 PROCEDURE — C1760 CLOSURE DEV, VASC: HCPCS

## 2022-04-06 PROCEDURE — 86225 DNA ANTIBODY NATIVE: CPT

## 2022-04-06 RX ORDER — ALBUTEROL SULFATE 90 UG/1
2 AEROSOL, METERED RESPIRATORY (INHALATION) EVERY 6 HOURS PRN
Qty: 18 G | Refills: 3 | Status: CANCELLED | OUTPATIENT
Start: 2022-04-06

## 2022-04-06 RX ORDER — ACETAMINOPHEN 325 MG/1
650 TABLET ORAL EVERY 4 HOURS PRN
Status: DISCONTINUED | OUTPATIENT
Start: 2022-04-06 | End: 2022-04-07 | Stop reason: HOSPADM

## 2022-04-06 RX ORDER — SODIUM CHLORIDE 9 MG/ML
25 INJECTION, SOLUTION INTRAVENOUS PRN
Status: DISCONTINUED | OUTPATIENT
Start: 2022-04-06 | End: 2022-04-07 | Stop reason: HOSPADM

## 2022-04-06 RX ORDER — ACETYLCYSTEINE 200 MG/ML
600 SOLUTION ORAL; RESPIRATORY (INHALATION) 2 TIMES DAILY
Status: DISCONTINUED | OUTPATIENT
Start: 2022-04-06 | End: 2022-04-07 | Stop reason: HOSPADM

## 2022-04-06 RX ORDER — SODIUM CHLORIDE 0.9 % (FLUSH) 0.9 %
5-40 SYRINGE (ML) INJECTION PRN
Status: DISCONTINUED | OUTPATIENT
Start: 2022-04-06 | End: 2022-04-07 | Stop reason: HOSPADM

## 2022-04-06 RX ORDER — SODIUM CHLORIDE 9 MG/ML
INJECTION, SOLUTION INTRAVENOUS CONTINUOUS
Status: DISCONTINUED | OUTPATIENT
Start: 2022-04-06 | End: 2022-04-07 | Stop reason: HOSPADM

## 2022-04-06 RX ORDER — ROSUVASTATIN CALCIUM 20 MG/1
20 TABLET, COATED ORAL NIGHTLY
Status: DISCONTINUED | OUTPATIENT
Start: 2022-04-06 | End: 2022-04-07 | Stop reason: HOSPADM

## 2022-04-06 RX ORDER — SODIUM CHLORIDE 0.9 % (FLUSH) 0.9 %
5-40 SYRINGE (ML) INJECTION EVERY 12 HOURS SCHEDULED
Status: DISCONTINUED | OUTPATIENT
Start: 2022-04-06 | End: 2022-04-07 | Stop reason: HOSPADM

## 2022-04-06 RX ADMIN — Medication 600 MG: at 10:43

## 2022-04-06 RX ADMIN — Medication 600 MG: at 20:22

## 2022-04-06 RX ADMIN — APIXABAN 5 MG: 5 TABLET, FILM COATED ORAL at 20:22

## 2022-04-06 RX ADMIN — Medication 9 UNITS/KG/HR: at 03:57

## 2022-04-06 RX ADMIN — METOPROLOL TARTRATE 25 MG: 25 TABLET ORAL at 20:22

## 2022-04-06 RX ADMIN — HEPARIN SODIUM 2000 UNITS: 1000 INJECTION, SOLUTION INTRAVENOUS; SUBCUTANEOUS at 10:49

## 2022-04-06 RX ADMIN — ROSUVASTATIN CALCIUM 20 MG: 20 TABLET, FILM COATED ORAL at 20:22

## 2022-04-06 RX ADMIN — HEPARIN SODIUM 2000 UNITS: 1000 INJECTION, SOLUTION INTRAVENOUS; SUBCUTANEOUS at 03:48

## 2022-04-06 RX ADMIN — METOPROLOL TARTRATE 25 MG: 25 TABLET ORAL at 11:50

## 2022-04-06 RX ADMIN — SODIUM CHLORIDE: 9 INJECTION, SOLUTION INTRAVENOUS at 10:04

## 2022-04-06 ASSESSMENT — ENCOUNTER SYMPTOMS
GASTROINTESTINAL NEGATIVE: 1
SHORTNESS OF BREATH: 1
RESPIRATORY NEGATIVE: 1
EYES NEGATIVE: 1
TACHYPNEA: 1

## 2022-04-06 ASSESSMENT — PAIN SCALES - GENERAL
PAINLEVEL_OUTOF10: 0
PAINLEVEL_OUTOF10: 0

## 2022-04-06 NOTE — PROCEDURES
[address] [facility]                    [facility]                              CARDIAC STRESS TEST    PATIENT NAME: Chris Sim                   :        1935  MED REC NO:   [id number]                              ROOM:       2  ACCOUNT NO:   [de-identified]                           ADMIT DATE: 2022  PROVIDER:     Junior Lawrence    DATE OF STUDY:  2022    TEST TYPE: LEXISCAN CARDIOLYTE STRESS TEST  INDICATION: SHORTNESS OF BREATH  REFERRING PHYSICIAN: JAYA MARIE    RESTING HEART RATE: 94 BEATS PER MINUTE  RESTING BLOOD PRESSURE: 124/61    MEDICATION(S) GIVEN: 0.4MG IV LEXISCAN  REASON FOR TERMINATION: MEDICATION INFUSION COMPLETE    RESTING EKG: ABNORMAL, BIVENTRICULAR PACING  STRESS HEART RESPONSE: NORMAL RESPONSE  BLOOD PRESSURE RESPONSE: APPROPRIATE  STRESS EKGs: NORMAL  CHEST DISCOMFORT: NO PAIN DURING STRESS  ISCHEMIC EKG CHANGES: NONE    EKG IMPRESSION: ELECTROCARDIOGRAPHICALLY NEGATIVE LEXISCAN STRESS TEST. RADIOISOTOPE RESULTS TO FOLLOW FROM THE DEPARTMENT OF NUCLEAR MEDICINE.       Shashi Lind    D: 2022 15:05:44       T: 2022 15:19:39     [email or web address]/[email or web address]  Job#: [id number]     Doc#: Unknown    CC:    (Retain this field even if not dictated or not decipherable)

## 2022-04-06 NOTE — PLAN OF CARE
Problem: Skin Integrity:  Goal: Will show no infection signs and symptoms  Description: Will show no infection signs and symptoms  Outcome: Ongoing  Goal: Absence of new skin breakdown  Description: Absence of new skin breakdown  Outcome: Ongoing     Problem: Nutrition  Goal: Optimal nutrition therapy  4/6/2022 1545 by Kvng Solitario RN  Outcome: Ongoing  4/6/2022 1446 by Patricia Macedo RD, LD  Outcome: Ongoing  Note: Nutrition Problem #1: Increased nutrient needs  Intervention: Food and/or Nutrient Delivery: Continue NPO (Monitor for start of oral diet and provide Titus wound healing ONS x 2 per day as able.)  Nutritional Goals: Meet at least 75% of estimated nutrition needs.

## 2022-04-06 NOTE — CONSULTS
Eliquis. PSH: h/o CABG. No Known Allergies    Social History     Socioeconomic History    Marital status: Single     Spouse name: Not on file    Number of children: Not on file    Years of education: Not on file    Highest education level: Not on file   Occupational History    Not on file   Tobacco Use    Smoking status: Former Smoker     Quit date: 1967     Years since quittin.3    Smokeless tobacco: Never Used   Substance and Sexual Activity    Alcohol use: Not Currently    Drug use: Never    Sexual activity: Not on file   Other Topics Concern    Not on file   Social History Narrative    Not on file     Social Determinants of Health     Financial Resource Strain: Low Risk     Difficulty of Paying Living Expenses: Not hard at all   Food Insecurity: No Food Insecurity    Worried About [address] in the Last Year: Never true    [address] [employer] in the Last Year: Never true   Transportation Needs:     Lack of Transportation (Medical): Not on file    Lack of Transportation (Non-Medical):  Not on file   Physical Activity:     Days of Exercise per Week: Not on file    Minutes of Exercise per Session: Not on file   Stress:     Feeling of Stress : Not on file   Social Connections:     Frequency of Communication with Friends and Family: Not on file    Frequency of Social Gatherings with Friends and Family: Not on file    Attends Restoration Services: Not on file    Active Member of [address] [place] [address] or Organizations: Not on file    Attends Club or Organization Meetings: Not on file    Marital Status: Not on file   Intimate Partner Violence:     Fear of Current or Ex-Partner: Not on file    Emotionally Abused: Not on file    Physically Abused: Not on file    Sexually Abused: Not on file   Housing Stability:     Unable to Pay for Housing in the Last Year: Not on file    Number of [place] in the Last Year: Not on file    Unstable Housing in the Last Year: Not on file       Family History:      No reported. Outpatient Medications:     Medications Prior to Admission: potassium chloride (KLOR-CON 10) 10 MEQ extended release tablet, Take 1 tablet by mouth daily  allopurinol (ZYLOPRIM) 100 MG tablet, TAKE 1 TABLET BY MOUTH EVERY DAY  bumetanide (BUMEX) 2 MG tablet, Take 1 tablet by mouth 2 times daily  apixaban (ELIQUIS) 2.5 MG TABS tablet, Take 1 tablet by mouth 2 times daily  lisinopril (PRINIVIL;ZESTRIL) 5 MG tablet, Take 1 tablet by mouth daily  metoprolol tartrate (LOPRESSOR) 50 MG tablet, Take 1 tablet by mouth 2 times daily  magnesium oxide (MAG-OX) 400 MG tablet, Take 1 tablet by mouth 2 times daily  rosuvastatin (CRESTOR) 20 MG tablet, Take 1 tablet by mouth daily  furosemide (LASIX) 40 MG tablet, Take 1 tablet by mouth 2 times daily  Methylcobalamin (B-12) 5000 MCG TBDP, Take by mouth  clopidogrel (PLAVIX) 75 MG tablet, Take 1 tablet by mouth daily  ascorbic acid (CVS VITAMIN C) 500 MG tablet, Take 1 tablet by mouth 2 times daily    Current Medications:       Scheduled Meds:    acetylcysteine  600 mg Oral BID    sodium chloride flush  10 mL IntraVENous 2 times per day    clopidogrel  75 mg Oral Daily     Continuous Infusions:    heparin (PORCINE) Infusion 9 Units/kg/hr (04/06/22 0357)    sodium chloride      sodium chloride       PRN Meds:  sodium chloride flush, sodium chloride, ondansetron **OR** ondansetron, polyethylene glycol, acetaminophen **OR** acetaminophen, heparin (porcine), heparin (porcine), albuterol, albuterol sulfate HFA    Review of Systems:       Review of Systems   HENT: Negative. Eyes: Negative. Respiratory: Positive for shortness of breath. Cardiovascular: Negative. Gastrointestinal: Negative. Endocrine: Negative. Genitourinary: Negative. Musculoskeletal: Negative. Neurological: Negative. Hematological: Negative. Input/Output:       I/O last 3 completed shifts:   In: 87.7 [I.V.:87.7]  Out: 700 [Urine:700]    Patient Vitals for the past 96 hrs (Last 3 readings):   Weight   22 0100 282 lb 6.6 oz (128.1 kg)   22 1600 258 lb (117 kg)        Vital Signs:     Temperature:  Temp: 98.2 °F (36.8 °C)  TMax:   Temp (24hrs), Av.9 °F (36.6 °C), Min:97.4 °F (36.3 °C), Max:98.2 °F (36.8 °C)    Respirations:  Resp: 17  Pulse:   Pulse: 94  BP:    BP: 129/79  BP Range: Systolic (37SSO), AIB:146 , Min:91 , SPN:716       Diastolic (93PYE), TON:10, Min:56, Max:79      Physical Examination:       Physical Exam  HENT:      Head: Normocephalic. Mouth/Throat:      Mouth: Mucous membranes are moist.   Eyes:      Pupils: Pupils are equal, round, and reactive to light. Cardiovascular:      Rate and Rhythm: Normal rate and regular rhythm. Pulmonary:      Effort: Pulmonary effort is normal.      Breath sounds: Normal breath sounds. Abdominal:      Palpations: Abdomen is soft. Musculoskeletal:         General: Normal range of motion. Cervical back: Normal range of motion. Neurological:      General: No focal deficit present. Mental Status: He is alert.           Labs:       Recent Labs     22  1442 22  0246   WBC 5.7 5.6   RBC 3.60* 3.49*   HGB 9.3* 9.0*   HCT 30.4* 29.7*   MCV 84.4 85.1   MCH 25.8 25.8   MCHC 30.6 30.3   RDW 17.0* 17.0*    293   MPV 9.0 8.8      BMP:   Recent Labs     22  0954 22  0246    134*   K 4.3 4.5   CL 97* 96*   CO2 30 30   BUN 24* 22   CREATININE 1.67* 1.57*   GLUCOSE 102* 110*   CALCIUM 8.8 8.6      SPEP:     Lab Results   Component Value Date    PROT 6.9 2021     Urinalysis/Chemistries:      Lab Results   Component Value Date    NITRU NEGATIVE 2022    COLORU Yellow 2022    PHUR 8.0 2022    SPECGRAV 1.007 2022    LEUKOCYTESUR NEGATIVE 2022    UROBILINOGEN Normal 2022    BILIRUBINUR NEGATIVE 2022    GLUCOSEU NEGATIVE 2022    KETUA NEGATIVE 2022     Urine Sodium:     Lab Results   Component Value Date SHANIA 78 04/05/2022     Urine Creatinine:     Lab Results   Component Value Date    LABCREA 37.1 04/05/2022     Radiology:       US RENAL COMPLETE    Result Date: 4/5/2022  1. Increased renal cortical echogenicity, concerning for medical renal disease. 2. No nephrolithiasis or hydronephrosis. 3. Mild bladder wall thickening. This could be due to cystitis, but could also be seen with chronic outlet obstruction. Clinical correlation is recommended. NM MYOCARDIAL SPECT REST EXERCISE OR RX    Result Date: 4/4/2022  Patient refusal of prone images limits evaluation. Fixed inferior wall and mostly fixed partially reversible lateral wall perfusion defects as discussed above. 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 LVEF 45% Risk stratification: Intermediate risk. The findings were sent to the Radiology Results [address] at 3:25 pm on 4/4/2022to be communicated to a licensed caregiver. RECOMMENDATIONS: Unavailable        Assessment:       1. 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 Baseline creatinine 1.6-1.8  2. Abnormal nuclear stress test planning for cardiac cath  3. CAD h/o CABG  4. Cardiomyopathy s/p CRT  5. Atrial fibrillation on Eliquis  6. Essential hypertension  7. Hyponatremia  8. Anemia. Plan:      Will start the patient on IV NS infusion at 75 cc/h 6 hours before and 6 hours after the cardiac cath.  Mucomyst 600 mg before and after cath   It is okay to proceed with cardiac cath from nephrology standpoint. .  Contrast related renal risks all the way up to requiring HD were explained and patient is ok with getting with procedure done.  Strict measurements of Input and Output, daily measurement of patient weight.    BMP in am.    Comprehensive urine testing including Urinalysis, Urine sodium, potassium, chloride, Urine protein and creatinine to quantify the proteinuria if any at all. Will check urinary eosinophils as well.   Will Order serum and urine protein electrophoresis to r/o element to occult paraprotein disease.  Will order Hepatitis B and C, AKI, Complement levels.  Recommend getting Urology on board due to renal US findings.  Will follow. Nutrition    Please ensure that patient is on a renal diet/TF.  Avoid nephrotoxic drugs/contrast exposure. Thank you for the consultation. I will discuss the care of this patient with the attending physician. Please do not hesitate to contact us for any further questions/concerns. We will continue to follow this patient along with you. Evelyne Hudson MD  PGY-3 Internal Medicine Resident  [address] [place] [address]  4/6/2022 9:27 AM    Attending Physician Statement  I have discussed the care of this patient, including pertinent history and exam findings, with the Resident/CNP. I have reviewed and edited the key elements of all parts of the encounter with the Resident/CNP. I agree with the assessment, plan and orders as documented by the Resident/CNP. Regan Sands MD   Nephrology [address] [place] [address] Drive    This note is created with the assistance of a speech-recognition program. While intending to generate a document that actually reflects the content of the visit, no guarantees can be provided that every mistake has been identified and corrected by editing.

## 2022-04-06 NOTE — PROGRESS NOTES
Occupational [facility]  Occupational Therapy Not Seen Note    DATE: 2022    NAME: Carolyn Obando  MRN: [id number]   : 1935      Patient not seen this date for Occupational Therapy due to: Other: Pt scheduled for cardiac cath this pm, in am pt at baseline with no needs. Will return on  to ensure pt still (I). Next Scheduled Treatment: Attempt on  as appropriate.     Electronically signed by Moiz Clark OT on 2022 at 3:57 PM

## 2022-04-06 NOTE — OP NOTE
[facility]    CARDIAC CATHETERIZATION    Date:   4/6/2022  Patient name:  Sera Smith  Date of admission:  4/5/2022  1:55 PM  MRN:   [id number]  YOB: 1935    Operators:  Primary:   Reina Weber MD (Attending Physician)    Assistant/CV fellow:        Procedure performed:     [x] Left Heart Catheterization. [] Graft Angiography. [x] Left Ventriculography. [] Right Heart Catheterization. [x] Coronary Angiography. [] Aortic Valve Studies. [] PCI:      [] Other:       Pre Procedure Conscious Sedation Data:  ASA Class:    [] I [] II [x] III [] IV    Mallampati Class:  [] I [x] II [] III [] IV      Indication:  [] STEMI      [x] + Stress test  [] ACS      [] + EKG Changes  [] Non Q MI       [] Significant Risk Factors  [] Recurrent Angina             [] Diabetes Mellitus    [] New LBBB      [] Uncontrolled HTN. [] CHF / Low EF changes     [] Abnormal CTA / Ca Score      Procedure:  Access:  [x] Femoral  [] Radial  artery       [x] Right  [] Left    Procedure: After informed consent was obtained with explanation of the risks and benefits, patient was brought to the cath lab. The access area was prepped and draped in sterile fashion. 1% lidocaine was used for local block. The artery was cannulated with 6  Fr sheath with brisk arterial blood return. The side port was frequently flushed and aspirated with normal saline. Findings:    LMCA: Diffuse irregularities 30-40%. LAD: Diffuse irregularities 30-40% and Single stenosis. with patent LIMA-LAD  and SVG-Diagonal     LCx: Diffuse irregularities 20-30%. RCA: Single stenosis. with patent SVG-RPDA     Cardiac Grafts      -  There is a LIMA graft that originates at the LIMA and attaches to the      Mid LAD. Patent with 0% stenosis      -  There is a Vein graft that originates at the Aorta Left and attaches to      the 1st Diag.  Patent with luminal irregularities of 20-30%      -  There is a Vein graft that originates at the Aorta Right and attaches      to the R PDA. Patent luminal irregularities of 20-30%     Ventriculography Findings:  LV was not done      Estimated Blood Loss: Less than 25 mL    Conclusions:   Multi-vessel Coronary Artery Disease. All 3 grafts are patent. Patent LIMA-LAD, SVG-Diagonal and SVG to RPDA   LV was not done. Recommendations      Medical therapy as needed. Risk factor modification. Titi Pardo MD       Cardiovascular Fellow PGY-4  4/6/2022, 4:14 PM      Physician Statement  I have discussed the case of Constantino Stahl including pertinent history and exam findings with the resident. I have seen and examined the patient and the key elements of the encounter have been performed by me. I agree with the assessment, plan and orders as documented by the resident With changes made to the note. Procedure performed by me.     Electronically signed by Trini Carrillo MD on 4/7/2022 at 3:41 PM.    [facility]      [phone]

## 2022-04-06 NOTE — PROGRESS NOTES
[facility]  Internal Medicine Teaching Residency Program  Inpatient Daily Progress Note  ______________________________________________________________________________    Patient: Nilesh Jean  YOB: 1935   EMN:[id number]    Acct: [de-identified]     Room: [address] [place] [address]  Admit date: 2022  Today's date: 22  Number of days in the hospital: 1    SUBJECTIVE   Admitting Diagnosis: Positive cardiac stress test  CC: abnormal stress test    NAEON. nephro to clear patient for cath. Most likely go to cath today. ROS:  Constitutional:  negative for chills, fevers, sweats  Respiratory:  negative for cough, dyspnea on exertion, hemoptysis, shortness of breath, wheezing  Cardiovascular:  negative for chest pain, chest pressure/discomfort, lower extremity edema, palpitations  Gastrointestinal:  negative for abdominal pain, constipation, diarrhea, nausea, vomiting  Neurological:  negative for dizziness, headache    BRIEF HISTORY     The patient is a pleasant 82 y.o. 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 Patient was taken for stress test yesterday and was found Large severe fixed inferior wall perfusion defect involving the mid and basal segments.  Minimal inferior wall hypokinesis. Large severe fixed inferior wall perfusion defect involving the mid and basal segments.  Minimal inferior wall hypokinesis. His cr was elevated at 1.6. thus he was transferred to [facility] for nephro clearance for heart cath.  Denies any CP, SOB currently.        OBJECTIVE     Vital Signs:  /79   Pulse 94   Temp 98.2 °F (36.8 °C) (Oral)   Resp 17   Ht 6' (1.829 m)   Wt 282 lb 6.6 oz (128.1 kg)   SpO2 97%   BMI 38.30 kg/m²     Temp (24hrs), Av °F (36.7 °C), Min:97.6 °F (36.4 °C), Max:98.2 °F (36.8 °C)    In: 245.9   Out: 700 [Urine:700]    Physical Exam:  Physical Exam  Constitutional:       General: He is not in acute distress. Appearance: Normal appearance. He is obese. He is not ill-appearing, toxic-appearing or diaphoretic. HENT:      Mouth/Throat:      Mouth: Mucous membranes are moist.      Pharynx: Oropharynx is clear. Eyes:      General: No scleral icterus. Cardiovascular:      Rate and Rhythm: Normal rate and regular rhythm. Pulses: Normal pulses. Heart sounds: Normal heart sounds. No murmur heard. No friction rub. No gallop. Pulmonary:      Effort: Pulmonary effort is normal.      Breath sounds: Normal breath sounds. Abdominal:      General: Abdomen is flat. Bowel sounds are normal. There is no distension. Palpations: Abdomen is soft. There is no mass. Tenderness: There is no abdominal tenderness. There is no guarding or rebound. Hernia: No hernia is present. Musculoskeletal:         General: No swelling, tenderness, deformity or signs of injury. Right lower leg: No edema. Left lower leg: No edema. Skin:     General: Skin is warm and dry. Coloration: Skin is not jaundiced or pale. Findings: No bruising. Neurological:      General: No focal deficit present. Mental Status: He is alert and oriented to person, place, and time. Cranial Nerves: No cranial nerve deficit. Motor: No weakness. Psychiatric:         Mood and Affect: Mood normal.         Behavior: Behavior normal.         Thought Content:  Thought content normal.         Judgment: Judgment normal.           Medications:  Scheduled Medications:    acetylcysteine  600 mg Oral BID    sodium chloride flush  10 mL IntraVENous 2 times per day    clopidogrel  75 mg Oral Daily     Continuous Infusions:    heparin (PORCINE) Infusion 11 Units/kg/hr (04/06/22 1048)    sodium chloride 75 mL/hr at 04/06/22 1046    sodium chloride       PRN Medicationssodium chloride flush, 10 mL, PRN  sodium chloride, , PRN  ondansetron, 4 mg, Q8H PRN   Or  ondansetron, 4 mg, Q6H PRN  polyethylene glycol, 17 g, Daily PRN  acetaminophen, 650 mg, Q6H PRN   Or  acetaminophen, 650 mg, Q6H PRN  heparin (porcine), 4,000 Units, PRN  heparin (porcine), 2,000 Units, PRN  albuterol, 2.5 mg, Q6H PRN  albuterol sulfate HFA, 2 puff, Q6H PRN        Diagnostic Labs:  CBC:   Recent Labs     04/05/22  1442 04/06/22  0246   WBC 5.7 5.6   RBC 3.60* 3.49*   HGB 9.3* 9.0*   HCT 30.4* 29.7*   MCV 84.4 85.1   RDW 17.0* 17.0*    293     BMP:   Recent Labs     04/05/22  0954 04/06/22  0246    134*   K 4.3 4.5   CL 97* 96*   CO2 30 30   BUN 24* 22   CREATININE 1.67* 1.57*     BNP: No results for input(s): BNP in the last 72 hours. PT/INR: No results for input(s): PROTIME, INR in the last 72 hours. APTT:   Recent Labs     04/05/22 2035 04/06/22  0246 04/06/22  1017   APTT 32.3* 33.8* 31.9*     CARDIAC ENZYMES: No results for input(s): CKMB, CKMBINDEX, TROPONINI in the last 72 hours. Invalid input(s): CKTOTAL;3  FASTING LIPID PANEL:  Lab Results   Component Value Date    HDL 48 08/18/2021     LIVER PROFILE: No results for input(s): AST, ALT, ALB, BILIDIR, BILITOT, ALKPHOS in the last 72 hours. MICROBIOLOGY: No results found for: CULTURE    Imaging:    XR CHEST (2 VW)    Result Date: 4/2/2022  Pulmonary vascular congestion. Questionable rim sclerotic lesion within a lower thoracic vertebral body versus summation artifact. If patient is focally symptomatic or as clinically warranted, CT could be performed in the nonacute setting for further characterization. US RENAL COMPLETE    Result Date: 4/5/2022  1. Increased renal cortical echogenicity, concerning for medical renal disease. 2. No nephrolithiasis or hydronephrosis. 3. Mild bladder wall thickening. This could be due to cystitis, but could also be seen with chronic outlet obstruction. Clinical correlation is recommended. NM MYOCARDIAL SPECT REST EXERCISE OR RX    Result Date: 4/4/2022  Patient refusal of prone images limits evaluation.  Fixed inferior wall and mostly fixed partially reversible lateral wall perfusion defects as discussed above. 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 LVEF 45% Risk stratification: Intermediate risk. The findings were sent to the Radiology Results [address] at 3:25 pm on 4/4/2022to be communicated to a licensed caregiver. 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 *      Positive stress test  CAD  - heart cath most likely today   - needs nephro clearance due to elevated Cr  - currently Cr is 1.57  - NPO   - on heparin gtt  - continue AP  pAfib  - continue to monitor  - hold eliquis     Obesity   - recommend weight loss.        Marj Quinteros MD  Internal Medicine Resident  [address] [facility]  4/6/2022 11:00 AM

## 2022-04-06 NOTE — PROGRESS NOTES
Physical Therapy        Physical Therapy Cancel Note      DATE: 2022    NAME: Suman Osborn  MRN: [id number]   : 1935      Patient not seen this date for Physical Therapy due to: Other: Pt reports baseline and denies mobility concerns. Pt agreeable to ck post-op. anticipated next check .       Electronically signed by Camille Esquivel PT on 8150 at 10:19 AM

## 2022-04-06 NOTE — CARE COORDINATION
04/06/22 1327   Readmission Assessment   Number of Days since last admission?    (Not a readmission, transferred from [facility] for a cath.)

## 2022-04-06 NOTE — CARE COORDINATION
Case Management Initial Discharge Plan  Janel Bautista,             Met with:lyle's daughter, Ever Carpenter, (pt on way to procedure) to discuss discharge plans. Information verified: address, contacts, phone number, , insurance Yes  Insurance Provider: [facility]    Emergency Contact/Next of Kin name & number: Jaziel Grande, [phone]  Who are involved in patient's support system? Leroy    PCP: Malik Florez MD  Date of last visit: 3 months ago      Discharge Planning    Living Arrangements:  [address] 2 [address]  5 stairs (also has lift) to climb to get into front door, flight of stairs to climb to reach second floor  Location of bedroom/bathroom in home Upper level    Patient able to perform ADL's:Independent     Current Services (outpatient & in home) None  DME equipment: Nebulizer  DME provider: Unknown    Is patient receiving oral anticoagulation therapy? Yes, Eliquis    If indicated:   Physician managing anticoagulation treatment: Dr. Ronaldo Dumas    Does patient have any issues/concerns obtaining medications? No  If yes, what are patient's concerns? Is there a preferred Pharmacy after hours or on weekends? Yes    If yes, which pharmacy? [facility], [address] [facility], [facility] and [place]    Potential Assistance Needed:  Needs denied    Patient agreeable to home care: Yes, if needed. Freedom of choice provided:  yes    Prior SNF/Rehab Placement and Facility: No  Agreeable to SNF/Rehab: No  Maceo of choice provided: n/a     Evaluation: no    Patient expects to be discharged to:    Home    If home: is the family and/or caregiver wiling & able to provide support at home? Yes  Who will be providing this support?  Pt's daughter    Transportation provider: Pt's daughter  Transportation arrangements needed for discharge: No    Readmission Risk              Risk of Unplanned Readmission:  13             Does patient have a readmission risk score greater than 14?: No  If yes, follow-up appointment must be made within 7 days of discharge. Goals of Care: Increase comfort. Educated pt on transitional options, provided freedom of choice and are agreeable with plan      Discharge Plan: Pt's plan is to home, [facility], per daughter.           Electronically signed by Collette Oakland, RN on 4/6/22 at 1:19 PM EDT

## 2022-04-06 NOTE — PROGRESS NOTES
[facility]   Progress Note                   Date:   4/6/2022  Patient name: Lady Rubin  Date of admission:  4/5/2022  1:55 PM  MRN:   [id number]  YOB: 1935  PCP: Orlene Hatchet, MD    Reason for Admission:      Subjective:       Clinical Changes / Abnormalities:   Pt seen and examined in the room. Pt denies any CP or SOB. Cardiac cath cancelled yesterday due to DONI. No CV issues overnight. Medications:   Scheduled Meds:   acetylcysteine  600 mg Oral BID    sodium chloride flush  10 mL IntraVENous 2 times per day    clopidogrel  75 mg Oral Daily     Continuous Infusions:   heparin (PORCINE) Infusion 11 Units/kg/hr (04/06/22 1048)    sodium chloride 75 mL/hr at 04/06/22 1046    sodium chloride       CBC:   Recent Labs     04/05/22  1442 04/06/22  0246   WBC 5.7 5.6   HGB 9.3* 9.0*    293     BMP:    Recent Labs     04/05/22  0954 04/06/22  0246    134*   K 4.3 4.5   CL 97* 96*   CO2 30 30   BUN 24* 22   CREATININE 1.67* 1.57*   GLUCOSE 102* 110*     Hepatic: No results for input(s): AST, ALT, ALB, BILITOT, ALKPHOS in the last 72 hours. Troponin: No results for input(s): TROPONINI in the last 72 hours. BNP: No results for input(s): BNP in the last 72 hours. Lipids: No results for input(s): CHOL, HDL in the last 72 hours. Invalid input(s): LDLCALCU  INR: No results for input(s): INR in the last 72 hours. Objective:   Vitals: /79   Pulse 94   Temp 98.2 °F (36.8 °C) (Oral)   Resp 17   Ht 6' (1.829 m)   Wt 282 lb 6.6 oz (128.1 kg)   SpO2 97%   BMI 38.30 kg/m²   I/O last 3 completed shifts:   In: 87.7 [I.V.:87.7]  Out: 700 [Urine:700]  I/O this shift:  In: 158.3 [I.V.:158.3]  Out: -   Scheduled Meds:   acetylcysteine  600 mg Oral BID    sodium chloride flush  10 mL IntraVENous 2 times per day    clopidogrel  75 mg Oral Daily     Continuous Infusions:   heparin (PORCINE) Infusion 11 Units/kg/hr (04/06/22 3678)    sodium chloride 75 mL/hr at 04/06/22 1046    sodium chloride       PRN Meds:.sodium chloride flush, sodium chloride, ondansetron **OR** ondansetron, polyethylene glycol, acetaminophen **OR** acetaminophen, heparin (porcine), heparin (porcine), albuterol, albuterol sulfate HFA    CONSTITUTIONAL: AOx4, no apparent distress, appears stated age   HEAD: normocephalic, atraumatic   EYES: PERRLA, EOMI   ENT: moist mucous membranes, uvula midline   NECK: symmetric, no midline tenderness to palpation   LUNGS: clear to auscultation bilaterally   CARDIOVASCULAR: regular rate and rhythm, no murmurs, rubs or gallops   ABDOMEN: Soft, non-tender, non-distended with normal active bowel sounds   SKIN: no rash       EKG: AF BiV pacing   ECHO: 4/4/22  Summary  Normal left ventricle size and function with an estimated EF > 55%. No segmental wall motion abnormalities seen. Moderate left ventricular hypertrophy. Normal right ventricular size and function. Pacemaker lead seen in right  ventricle. Mild tricuspid regurgitation. Normal right ventricular systolic pressure. No significant pericardial effusion is seen. Stress Test 4/4/22:   Patient refusal of prone images limits evaluation.       Fixed inferior wall and mostly fixed partially reversible lateral wall   perfusion defects as discussed above.  Findings indicative of infarct in the   mid and basal inferior wall as well as in the mid and inferolateral wall. Small amount of reversibility in the lateral wall would be compatible with   gonzalez-infarct ischemia.       LVEF 45%       Risk stratification: Intermediate risk. Assessment / Acute Cardiac Problems:     1. Abnormal nuclear stress test with findings consistent of infarction inferiorly as well as a partially reversible lateral wall defect likely gonzalez-infarct ischemia. With lower LVEF of 45%. 2.  Recently moved from [place]. 3.  Has coronary artery disease status post CABG.   4.  Has a history of cardiomyopathy status post CRT-D with what appears to be recovered LVEF of 55% on echo from 4/22. #5.  Minimally elevated troponin  6. Recent URI  7. History of Parox A. fib on Eliquis    Plan of Treatment:   - Abnormal stress test.  Plan for cardiac cath once cleared by nephrology. Continue plavix. Mucomyst given this am.   - NPO currently  - Will restart BB and crestor   - On heparin gtt. Will restart Eliquis after cardiac cath     Hans So, [address] [facility]  [facility]. [facility]  [phone]

## 2022-04-06 NOTE — PROGRESS NOTES
Patient arrived. , consent signed, all questions answered. Pt ready for procedure. Bed in low position, call light to reach with side rails up 2 of 2. Groins  clipped. Family  at bedside with patient.

## 2022-04-06 NOTE — PROGRESS NOTES
Comprehensive Nutrition Assessment    Type and Reason for Visit:  Initial,Positive Nutrition Screen (Wounds)    Nutrition Recommendations/Plan: Continue NPO. Monitor for start of diet. As diet started, provide Titus wound healing ONS x 2 per day. Will monitor labs, weights, and plan of care. Nutrition Assessment:  Pt admitted with SOB and abnormal stress test.  Pt remains NPO today for a cardiac cath to be completed. Reports having a usual good appetite and PO intakes. Pt with a stage 2 pressure ulcer to buttocks present. Will provide oral supplements to aid in wound healing as able. Labs reviewed: Na 134 mmol/L. Meds reviewed. Malnutrition Assessment:  Malnutrition Status: At risk for malnutrition    Context:  Acute Illness     Findings of the 6 clinical characteristics of malnutrition:  Energy Intake:  Mild decrease in energy intake  Weight Loss:  No significant weight loss     Body Fat Loss:  No significant body fat loss   Muscle Mass Loss:  No significant muscle mass loss  Fluid Accumulation:  7 - Moderate to Severe Extremities   Strength:  Not Performed    Estimated Daily Nutrient Needs:  Energy (kcal):  MSJ x 1.0-1.05 = 9992-3843 kcals/day; Weight Used for Energy Requirements:  Current     Protein (g):  1.2-1.5 gm/kg = 100-120 gm pro/day; Weight Used for Protein Requirements:  Ideal        Fluid (ml/day):  2400 mL/day or per MD; Method Used for Fluid Requirements: Laura Khan      Nutrition Related Findings:  Labs/Meds reviewed. +3 extremity edema. Last BM 4/6.       Wounds:  Pressure Injury,Stage II (to left buttocks)       Current Nutrition Therapies:    Diet NPO    Anthropometric Measures:  · Height: 6' (182.9 cm)  · Current Body Weight: 282 lb 6.6 oz (128.1 kg)   · Admission Body Weight: 282 lb 6.6 oz (128.1 kg)    · Usual Body Weight:  (277-281 lb per chart review)     · Ideal Body Weight: 178 lbs; % Ideal Body Weight 158.7 %   · BMI: 38.3  · BMI Categories: Obese Class 2 (BMI 35.0 -39.9)       Nutrition Diagnosis:   · Increased nutrient needs related to  (healing) as evidenced by wounds (pressure ulcer)    Nutrition Interventions:   Food and/or Nutrient Delivery:  Continue NPO (Monitor for start of oral diet and provide Titus wound healing ONS x 2 per day as able.)  Nutrition Education/Counseling:  No recommendation at this time   Coordination of Nutrition Care:  Continue to monitor while inpatient    Goals:  Meet at least 75% of estimated nutrition needs. Nutrition Monitoring and Evaluation:   Behavioral-Environmental Outcomes:  None Identified   Food/Nutrient Intake Outcomes:  Diet Advancement/Tolerance  Physical Signs/Symptoms Outcomes:  Biochemical Data,GI Status,Fluid Status or Edema,Hemodynamic Status,Nutrition Focused Physical Findings,Skin,Weight     Discharge Planning:     Too soon to determine     Electronically signed by Joann Trujillo RD, LD on 4/6/22 at 2:45 PM EDT    Contact: [phone]

## 2022-04-06 NOTE — PLAN OF CARE
Nutrition Problem #1: Increased nutrient needs  Intervention: Food and/or Nutrient Delivery: Continue NPO (Monitor for start of oral diet and provide Titus wound healing ONS x 2 per day as able.)  Nutritional Goals: Meet at least 75% of estimated nutrition needs.

## 2022-04-07 VITALS
RESPIRATION RATE: 23 BRPM | HEART RATE: 78 BPM | HEIGHT: 72 IN | OXYGEN SATURATION: 96 % | TEMPERATURE: 98 F | SYSTOLIC BLOOD PRESSURE: 128 MMHG | BODY MASS INDEX: 38.34 KG/M2 | WEIGHT: 283.07 LBS | DIASTOLIC BLOOD PRESSURE: 70 MMHG

## 2022-04-07 LAB
ABSOLUTE EOS #: 0.18 K/UL (ref 0–0.44)
ABSOLUTE IMMATURE GRANULOCYTE: 0.04 K/UL (ref 0–0.3)
ABSOLUTE LYMPH #: 0.86 K/UL (ref 1.1–3.7)
ABSOLUTE MONO #: 0.55 K/UL (ref 0.1–1.2)
ALBUMIN (CALCULATED): 3.3 G/DL (ref 3.2–5.2)
ALBUMIN PERCENT: 53 % (ref 45–65)
ALPHA 1 PERCENT: 4 % (ref 3–6)
ALPHA 2 PERCENT: 19 % (ref 6–13)
ALPHA-1-GLOBULIN: 0.2 G/DL (ref 0.1–0.4)
ALPHA-2-GLOBULIN: 1.2 G/DL (ref 0.5–0.9)
ANION GAP SERPL CALCULATED.3IONS-SCNC: 7 MMOL/L (ref 9–17)
ANTI DNA DOUBLE STRANDED: 1.8 IU/ML
ANTI-NUCLEAR ANTIBODY (ANA): NEGATIVE
BASOPHILS # BLD: 1 % (ref 0–2)
BASOPHILS ABSOLUTE: 0.04 K/UL (ref 0–0.2)
BETA GLOBULIN: 0.7 G/DL (ref 0.5–1.1)
BETA PERCENT: 11 % (ref 11–19)
BUN BLDV-MCNC: 16 MG/DL (ref 8–23)
CALCIUM SERPL-MCNC: 8.8 MG/DL (ref 8.6–10.4)
CHLORIDE BLD-SCNC: 101 MMOL/L (ref 98–107)
CO2: 29 MMOL/L (ref 20–31)
CREAT SERPL-MCNC: 1.43 MG/DL (ref 0.7–1.2)
ENA ANTIBODIES SCREEN: 0.4 U/ML
EOSINOPHILS RELATIVE PERCENT: 3 % (ref 1–4)
GAMMA GLOBULIN %: 14 % (ref 9–20)
GAMMA GLOBULIN: 0.9 G/DL (ref 0.5–1.5)
GFR AFRICAN AMERICAN: 57 ML/MIN
GFR NON-AFRICAN AMERICAN: 47 ML/MIN
GFR SERPL CREATININE-BSD FRML MDRD: ABNORMAL ML/MIN/{1.73_M2}
GLUCOSE BLD-MCNC: 110 MG/DL (ref 70–99)
HCT VFR BLD CALC: 34 % (ref 40.7–50.3)
HEMOGLOBIN: 9.7 G/DL (ref 13–17)
IMMATURE GRANULOCYTES: 1 %
LYMPHOCYTES # BLD: 17 % (ref 24–43)
MCH RBC QN AUTO: 25.7 PG (ref 25.2–33.5)
MCHC RBC AUTO-ENTMCNC: 28.5 G/DL (ref 28.4–34.8)
MCV RBC AUTO: 89.9 FL (ref 82.6–102.9)
MONOCYTES # BLD: 11 % (ref 3–12)
NRBC AUTOMATED: 0 PER 100 WBC
PATHOLOGIST: ABNORMAL
PATHOLOGIST: NORMAL
PDW BLD-RTO: 16.8 % (ref 11.8–14.4)
PLATELET # BLD: 282 K/UL (ref 138–453)
PMV BLD AUTO: 8.7 FL (ref 8.1–13.5)
POTASSIUM SERPL-SCNC: 4.7 MMOL/L (ref 3.7–5.3)
PROTEIN ELECTROPHORESIS, SERUM: ABNORMAL
RBC # BLD: 3.78 M/UL (ref 4.21–5.77)
RBC # BLD: ABNORMAL 10*6/UL
SEG NEUTROPHILS: 67 % (ref 36–65)
SEGMENTED NEUTROPHILS ABSOLUTE COUNT: 3.55 K/UL (ref 1.5–8.1)
SERUM IFX INTERP: NORMAL
SODIUM BLD-SCNC: 137 MMOL/L (ref 135–144)
TOTAL PROT. SUM,%: 101 % (ref 98–102)
TOTAL PROT. SUM: 6.3 G/DL (ref 6.3–8.2)
TOTAL PROTEIN: 6.2 G/DL (ref 6.4–8.3)
WBC # BLD: 5.2 K/UL (ref 3.5–11.3)

## 2022-04-07 PROCEDURE — 80048 BASIC METABOLIC PNL TOTAL CA: CPT

## 2022-04-07 PROCEDURE — 6370000000 HC RX 637 (ALT 250 FOR IP): Performed by: NURSE PRACTITIONER

## 2022-04-07 PROCEDURE — 99291 CRITICAL CARE FIRST HOUR: CPT | Performed by: INTERNAL MEDICINE

## 2022-04-07 PROCEDURE — 97166 OT EVAL MOD COMPLEX 45 MIN: CPT

## 2022-04-07 PROCEDURE — 6370000000 HC RX 637 (ALT 250 FOR IP): Performed by: STUDENT IN AN ORGANIZED HEALTH CARE EDUCATION/TRAINING PROGRAM

## 2022-04-07 PROCEDURE — 85025 COMPLETE CBC W/AUTO DIFF WBC: CPT

## 2022-04-07 PROCEDURE — 97535 SELF CARE MNGMENT TRAINING: CPT

## 2022-04-07 PROCEDURE — 2580000003 HC RX 258: Performed by: STUDENT IN AN ORGANIZED HEALTH CARE EDUCATION/TRAINING PROGRAM

## 2022-04-07 PROCEDURE — 36415 COLL VENOUS BLD VENIPUNCTURE: CPT

## 2022-04-07 RX ORDER — ACETYLCYSTEINE 200 MG/ML
600 SOLUTION ORAL; RESPIRATORY (INHALATION) 2 TIMES DAILY
Qty: 6 ML | Refills: 0 | Status: SHIPPED | OUTPATIENT
Start: 2022-04-07 | End: 2022-10-14 | Stop reason: SDUPTHER

## 2022-04-07 RX ADMIN — CLOPIDOGREL 75 MG: 75 TABLET, FILM COATED ORAL at 08:15

## 2022-04-07 RX ADMIN — METOPROLOL TARTRATE 25 MG: 25 TABLET ORAL at 08:15

## 2022-04-07 RX ADMIN — SODIUM CHLORIDE, PRESERVATIVE FREE 10 ML: 5 INJECTION INTRAVENOUS at 08:14

## 2022-04-07 RX ADMIN — APIXABAN 5 MG: 5 TABLET, FILM COATED ORAL at 08:15

## 2022-04-07 ASSESSMENT — ENCOUNTER SYMPTOMS
NAUSEA: 0
ABDOMINAL PAIN: 0
VOMITING: 0
DIARRHEA: 1
SHORTNESS OF BREATH: 1
RHINORRHEA: 0
PHOTOPHOBIA: 0
CONSTIPATION: 0

## 2022-04-07 ASSESSMENT — PAIN SCALES - GENERAL
PAINLEVEL_OUTOF10: 0

## 2022-04-07 NOTE — PLAN OF CARE
Pt has been in stable condition with intact fem site after cardiac cath. Pt has had several runs of stable vtach, strip posted and internal med made aware since discharge instructions have been placed.  Pt unsure of type of device since it was placed in a facility in [place], will continue to monitor and report changes   Problem: Skin Integrity:  Goal: Will show no infection signs and symptoms  Description: Will show no infection signs and symptoms  4/7/2022 1114 by Clemente Salgado RN  Outcome: Ongoing  4/7/2022 1114 by Clemente Salgado RN  Outcome: Ongoing  Goal: Absence of new skin breakdown  Description: Absence of new skin breakdown  4/7/2022 1114 by Clemente Salgado RN  Outcome: Ongoing  4/7/2022 1114 by Clemente Salgado RN  Outcome: Ongoing     Problem: Nutrition  Goal: Optimal nutrition therapy  4/7/2022 1114 by Clemente Salgado RN  Outcome: Ongoing  4/7/2022 1114 by Clemente Salgado RN  Outcome: Ongoing

## 2022-04-07 NOTE — PROGRESS NOTES
Physical Therapy        Physical Therapy Cancel Note      DATE: 2022    NAME: Carolyn Obando  MRN: [id number]   : 1935      Patient not seen this date for Physical Therapy due to:    Patient independent with functional mobility. Will defer PT evaluation at this time. Please reorder PT if future needs arise. Pt notes baseline mobility, denies any safety mobility concerns and requests to defer physical therapy. Author observed pt amb 40' independently without an AD.        Electronically signed by Winsome Nickerson PT on 832 at 10:43 AM

## 2022-04-07 NOTE — PROGRESS NOTES
Occupational Therapy   Occupational Therapy Initial Assessment    Date: 2022   Patient Name: Inna Pinto  MRN: [id number]     : 1935    Date of Service: 2022  CC: Abnormal stress test    Discharge Recommendations:     OT Equipment Recommendations  Equipment Needed: No    Assessment   Assessment: Pt is currently functioning Independently / Modified Independently (at his baseline per Pt report) with all Transfers, Functional Mobility, and Self-Care Tasks. At this time, he does not have Acute OT needs, will be Discharged from OT services. Pt is highly motivated to return home independently - has verbalized understanding and agreement to recommendations for DC from Acute OT Services. Prognosis: Good  Decision Making: Medium Complexity  OT Education: OT Role;ADL Adaptive Strategies; Energy Conservation;Transfer Training  Barriers to Learning: Good return by Pt  No Skilled OT: Independent with functional mobility; Independent with ADL's;At baseline function; Safe to return home  REQUIRES OT FOLLOW UP: No  Activity Tolerance  Activity Tolerance: Patient Tolerated treatment well  Activity Tolerance: Vitals at end of session seated: /91, HR 83-87, SpO2 93-97 on RA  Safety Devices  Safety Devices in place: Yes  Type of devices: All fall risk precautions in place; Left in chair;Call light within reach;Nurse notified  Restraints  Initially in place: No         Patient Diagnosis(es): The primary encounter diagnosis was Acute kidney injury superimposed on CKD ([facility] [address] A diagnosis of Positive cardiac stress test was also pertinent to this visit. has no past medical history on file. has no past surgical history on file. Restrictions  Restrictions/Precautions  Restrictions/Precautions: General Precautions  Required Braces or Orthoses?: No  Position Activity Restriction  Other position/activity restrictions:  Up with Assist / Up as Tolerated    Subjective   General  Patient assessed for rehabilitation services?: Yes  Family / Caregiver Present: No  Diagnosis: Chest pain. Cardiac Cath (4/6/22). MVD (finding per Cath). AFib. Subjective  Subjective: RN approved Pt to be seen for OT evaluation. Pt was agreeable and cooperative throughout. Patient Currently in Pain: Denies  Pain Assessment  Pain Assessment: 0-10  Pain Level: 0  Patient's Stated Pain Goal: No pain  Vital Signs  Pulse: 79  Resp: 21  BP: 102/65  MAP (mmHg): 75  Patient Currently in Pain: Denies  Oxygen Therapy  SpO2: 94 %     Social/Functional History  Social/Functional History  Lives With: Daughter  Type of Home: House  Home Layout: Two level (Bedroom on 2nd floor. Main bathroom on 2nd floor (walk-in shower). Also has full bath on 1st floor.)  Home Access: Stairs to enter with rails  Entrance Stairs - Number of Steps: 6 entrance rails (c Bilat Handrails), there is left available he does not use. Full flight between floors (c Bilat Handrails).   Bathroom Shower/Tub: Walk-in shower (Main 2nd floor bathroom; grab bars; fold down shower chair (built-in) available)  Bathroom Toilet: Handicap height (Elevated toilet seats in both bathrooms (1st and 2nd floors))  Bathroom Accessibility: Accessible  Home Equipment:  (No additional DME)  Receives Help From:  (Daughter as needed)  ADL Assistance: Independent  Homemaking Assistance: Independent (Pt and Daughter share IADL tasks - he completes light clean-up / meal prep / laundry)  Ambulation Assistance: Independent (No DME)  Transfer Assistance: Independent  Active : Yes (Pt and Daughter)  Occupation: Retired  Leisure & Hobbies: Likes to fish, be outside     Objective   Vision: Impaired  Vision Exceptions: Wears glasses for reading (Denies dizziness / blurred vision / etc)  Hearing: Within functional limits    Orientation  Overall Orientation Status: Within Functional Limits (orientation x4)     Balance  Sitting Balance: Modified independent   Standing Balance: Supervision  Standing Balance  Time: 4 (degrees)  Left Hand AROM: WFL  RUE AROM (degrees)  RUE AROM : WFL  Right Hand AROM (degrees)  Right Hand AROM: WFL  LUE Strength  Gross LUE Strength: WFL  L Hand General: 4/5  LUE Strength Comment: 4/5  RUE Strength  Gross RUE Strength: WFL  R Hand General: 4/5  RUE Strength Comment: 4/5     Plan   Plan  Times per week: N/A  Plan Comment: Discharge from OT    AM-PAC Score  AM-[facility] Inpatient Daily Activity Raw Score: 22 (04/07/22 1137)  AM-PAC Inpatient ADL T-Scale Score : 47.1 (04/07/22 1137)  ADL Inpatient CMS 0-100% Score: 25.8 (04/07/22 1137)  ADL Inpatient CMS G-Code Modifier : CJ (04/07/22 1137)    Goals  N/A    Therapy Time   Individual Concurrent Group Co-treatment   Time In 0910         Time Out 0948         Minutes 38         Timed Code Treatment Minutes: 24 Minutes (ADL)       PAUL Helton, OTR/L

## 2022-04-07 NOTE — PROGRESS NOTES
[facility]  Internal Medicine Teaching Residency Program  Inpatient Daily Progress Note  ______________________________________________________________________________    Patient: Darek Prado  YOB: 1935   KTA:[id number]    Acct: [de-identified]     Room: [address] [place] [address]  Admit date: 2022  Today's date: 22  Number of days in the hospital: 2    SUBJECTIVE   Admitting Diagnosis: Positive cardiac stress test  CC: abnormal stress test    Cath yesterday. All grafts patent. Possible DC today if nephro clears. ROS:  Constitutional:  negative for chills, fevers, sweats  Respiratory:  negative for cough, dyspnea on exertion, hemoptysis, shortness of breath, wheezing  Cardiovascular:  negative for chest pain, chest pressure/discomfort, lower extremity edema, palpitations  Gastrointestinal:  negative for abdominal pain, constipation, diarrhea, nausea, vomiting  Neurological:  negative for dizziness, headache    BRIEF HISTORY     The patient is a pleasant 82 y.o. 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 Patient was taken for stress test yesterday and was found Large severe fixed inferior wall perfusion defect involving the mid and basal segments.  Minimal inferior wall hypokinesis. Large severe fixed inferior wall perfusion defect involving the mid and basal segments.  Minimal inferior wall hypokinesis. His cr was elevated at 1.6. thus he was transferred to [facility] for nephro clearance for heart cath.  Denies any CP, SOB currently.        OBJECTIVE     Vital Signs:  /64   Pulse 87   Temp 97.9 °F (36.6 °C) (Oral)   Resp 20   Ht 6' (1.829 m)   Wt 283 lb 1.1 oz (128.4 kg)   SpO2 94%   BMI 38.39 kg/m²     Temp (24hrs), Av °F (36.7 °C), Min:97.9 °F (36.6 °C), Max:98.1 °F (36.7 °C)    In: 695.6   Out: 800 [Urine:800]    Physical Exam:  Physical Exam  Constitutional:       General: He is not in acute distress. Appearance: Normal appearance. He is obese. He is not ill-appearing, toxic-appearing or diaphoretic. HENT:      Mouth/Throat:      Mouth: Mucous membranes are moist.      Pharynx: Oropharynx is clear. Eyes:      General: No scleral icterus. Cardiovascular:      Rate and Rhythm: Normal rate and regular rhythm. Pulses: Normal pulses. Heart sounds: Normal heart sounds. No murmur heard. No friction rub. No gallop. Pulmonary:      Effort: Pulmonary effort is normal.      Breath sounds: Normal breath sounds. Abdominal:      General: Abdomen is flat. Bowel sounds are normal. There is no distension. Palpations: Abdomen is soft. There is no mass. Tenderness: There is no abdominal tenderness. There is no guarding or rebound. Hernia: No hernia is present. Musculoskeletal:         General: No swelling, tenderness, deformity or signs of injury. Right lower leg: No edema. Left lower leg: No edema. Skin:     General: Skin is warm and dry. Coloration: Skin is not jaundiced or pale. Findings: No bruising. Neurological:      General: No focal deficit present. Mental Status: He is alert and oriented to person, place, and time. Cranial Nerves: No cranial nerve deficit. Motor: No weakness. Psychiatric:         Mood and Affect: Mood normal.         Behavior: Behavior normal.         Thought Content:  Thought content normal.         Judgment: Judgment normal.           Medications:  Scheduled Medications:    acetylcysteine  600 mg Oral BID    metoprolol tartrate  25 mg Oral BID    rosuvastatin  20 mg Oral Nightly    sodium chloride flush  5-40 mL IntraVENous 2 times per day    apixaban  5 mg Oral BID    sodium chloride flush  10 mL IntraVENous 2 times per day    clopidogrel  75 mg Oral Daily     Continuous Infusions:    sodium chloride Stopped (04/06/22 9088)    sodium chloride Stopped (04/06/22 1244)    sodium chloride      sodium chloride       PRN Medicationssodium chloride flush, 5-40 mL, PRN  sodium chloride, 25 mL, PRN  acetaminophen, 650 mg, Q4H PRN  sodium chloride flush, 10 mL, PRN  sodium chloride, , PRN  ondansetron, 4 mg, Q8H PRN   Or  ondansetron, 4 mg, Q6H PRN  polyethylene glycol, 17 g, Daily PRN  acetaminophen, 650 mg, Q6H PRN   Or  acetaminophen, 650 mg, Q6H PRN  albuterol, 2.5 mg, Q6H PRN  albuterol sulfate HFA, 2 puff, Q6H PRN        Diagnostic Labs:  CBC:   Recent Labs     04/05/22  1442 04/06/22  0246 04/07/22  0328   WBC 5.7 5.6 5.2   RBC 3.60* 3.49* 3.78*   HGB 9.3* 9.0* 9.7*   HCT 30.4* 29.7* 34.0*   MCV 84.4 85.1 89.9   RDW 17.0* 17.0* 16.8*    293 282     BMP:   Recent Labs     04/05/22  0954 04/06/22  0246 04/07/22  0328    134* 137   K 4.3 4.5 4.7   CL 97* 96* 101   CO2 30 30 29   BUN 24* 22 16   CREATININE 1.67* 1.57* 1.43*     BNP: No results for input(s): BNP in the last 72 hours. PT/INR: No results for input(s): PROTIME, INR in the last 72 hours. APTT:   Recent Labs     04/05/22 2035 04/06/22  0246 04/06/22  1017   APTT 32.3* 33.8* 31.9*     CARDIAC ENZYMES: No results for input(s): CKMB, CKMBINDEX, TROPONINI in the last 72 hours. Invalid input(s): CKTOTAL;3  FASTING LIPID PANEL:  Lab Results   Component Value Date    HDL 48 08/18/2021     LIVER PROFILE: No results for input(s): AST, ALT, ALB, BILIDIR, BILITOT, ALKPHOS in the last 72 hours. MICROBIOLOGY: No results found for: CULTURE    Imaging:    XR CHEST (2 VW)    Result Date: 4/2/2022  Pulmonary vascular congestion. Questionable rim sclerotic lesion within a lower thoracic vertebral body versus summation artifact. If patient is focally symptomatic or as clinically warranted, CT could be performed in the nonacute setting for further characterization. US RENAL COMPLETE    Result Date: 4/5/2022  1. Increased renal cortical echogenicity, concerning for medical renal disease. 2. No nephrolithiasis or hydronephrosis.  3. Mild bladder wall thickening. This could be due to cystitis, but could also be seen with chronic outlet obstruction. Clinical correlation is recommended. NM MYOCARDIAL SPECT REST EXERCISE OR RX    Result Date: 4/4/2022  Patient refusal of prone images limits evaluation. Fixed inferior wall and mostly fixed partially reversible lateral wall perfusion defects as discussed above. 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 LVEF 45% Risk stratification: Intermediate risk. The findings were sent to the Radiology Results [address] at 3:25 pm on 4/4/2022to be communicated to a licensed caregiver. 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 *      Positive stress test  CAD  - heart cath shows patent vessels and grafts  - currently Cr is 1.43 (1.57)   - continue AP  - possible DC today pending nephro clearance. pAfib  - continue to monitor  - eliquis restarted      Obesity   - recommend weight loss. Jayson Holman MD  Internal Medicine Resident  [facility]  4/7/2022 10:20 AM   Attending Physician Statement  I have discussed the care of Naz Perdomo, including pertinent history and exam findings,  with the resident. I have seen and examined the patient and the key elements of all parts of the encounter have been performed by me. I agree with the assessment, plan and orders as documented by the resident with additions . Treatment plan Discussed with nursing staff in detail , all questions answered . Electronically signed by Pratibha Sousa MD on   4/7/22 at 6:14 PM EDT  Cc time 30 minutes  Please note that this chart was generated using voice recognition Dragon dictation software. Although every effort was made to ensure the accuracy of this automated transcription, some errors in transcription may have occurred.

## 2022-04-07 NOTE — PROGRESS NOTES
Physician Progress Note      PATIENT:               Emeka Garcia  CSN #:                  [id number]  :                       1935  ADMIT DATE:       2022 1:55 PM  [address] DATE:  RESPONDING  PROVIDER #:        Magdy Fajardo          QUERY TEXT:    Pt admitted with ASHD with abnormal stress testing and transferred from [facility], operated by [facility] and has CHF documented. If possible, please document in progress notes   and discharge summary further specificity regarding the type and acuity of   CHF:    The medical record reflects the following:  Risk Factors: HTN, CHF, Cardiomyopathy, Paroxysmal Atrial fibrillation, CKD 3,   abnormal stress test - of wall perfusion in jaycee inferior and basal segments. Clinical Indicators: respiratory rate - 18-30, CXR- pulmonary vascular   congestion, ECHO- - EF>55% with normal systolic function. PROBNP- 823   from outside facility. GFR DEVIKA- 42 - 39, creat- 1.67-1.57  Treatment: IV- Bumex - /5, Po- Lopressor, Lasix, Klor-con, Plavix. plan for   cardiac cath. Thank Prudencio Neal RN, [employer]  cell- [phone]  office hours [phone]  email - [email or web address]  Options provided:  -- Acute on Chronic Systolic CHF/HFrEF  -- Acute on Chronic Diastolic CHF/HFpEF  -- Acute on Chronic Systolic and Diastolic CHF  -- Chronic Systolic CHF/HFrEF  -- Chronic Diastolic CHF/HFpEF  -- Chronic Systolic and Diastolic CHF  -- Other - I will add my own diagnosis  -- Disagree - Not applicable / Not valid  -- Disagree - Clinically unable to determine / Unknown  -- Refer to Clinical Documentation Reviewer    PROVIDER RESPONSE TEXT:    This patient has chronic systolic CHF/HFrEF.     Query created by: Malika Calhoun on 2022 11:18 AM      Electronically signed by:  Magdy Fajardo 2022 7:37 AM

## 2022-04-07 NOTE — CARE COORDINATION
Transitional Planning     Spoke to patient about plan for discharge. He plans to go home. He lives with his dtr, Kathrin Alejandra who is an RN. He has a ride. Declines home care.

## 2022-04-07 NOTE — PROGRESS NOTES
[facility]   Progress Note                   Date:   4/7/2022  Patient name: Nunu Feliz  Date of admission:  4/5/2022  1:55 PM  MRN:   [id number]  YOB: 1935  PCP: Rajinder Interiano MD    Reason for Admission:      Subjective:       Clinical Changes / Abnormalities:   Pt seen and examined in the room. Pt denies any CP or SOB. No CV issues overnight         Medications:   Scheduled Meds:   acetylcysteine  600 mg Oral BID    metoprolol tartrate  25 mg Oral BID    rosuvastatin  20 mg Oral Nightly    sodium chloride flush  5-40 mL IntraVENous 2 times per day    apixaban  5 mg Oral BID    sodium chloride flush  10 mL IntraVENous 2 times per day    clopidogrel  75 mg Oral Daily     Continuous Infusions:   sodium chloride Stopped (04/06/22 1508)    sodium chloride Stopped (04/06/22 1244)    sodium chloride      sodium chloride       CBC:   Recent Labs     04/05/22  1442 04/06/22  0246 04/07/22  0328   WBC 5.7 5.6 5.2   HGB 9.3* 9.0* 9.7*    293 282     BMP:    Recent Labs     04/05/22  0954 04/06/22  0246 04/07/22  0328    134* 137   K 4.3 4.5 4.7   CL 97* 96* 101   CO2 30 30 29   BUN 24* 22 16   CREATININE 1.67* 1.57* 1.43*   GLUCOSE 102* 110* 110*     Hepatic: No results for input(s): AST, ALT, ALB, BILITOT, ALKPHOS in the last 72 hours. Troponin: No results for input(s): TROPONINI in the last 72 hours. BNP: No results for input(s): BNP in the last 72 hours. Lipids: No results for input(s): CHOL, HDL in the last 72 hours. Invalid input(s): LDLCALCU  INR: No results for input(s): INR in the last 72 hours. CARDIAC CATH 4/6/22  Findings:     LMCA: Diffuse irregularities 30-40%.     LAD: Diffuse irregularities 30-40% and Single stenosis. with patent LIMA-LAD  and SVG-Diagonal     LCx: Diffuse irregularities 20-30%.     RCA: Single stenosis. with patent SVG-RPDA     Cardiac Grafts      - Angely Perales is a MARTIN graft that originates at the LIMA and attaches to

## 2022-04-07 NOTE — DISCHARGE INSTR - COC
Continuity of Care Form    Patient Name: Nilesh Jean   :  1935  MRN:  [id number]    Admit date:  2022  Discharge date:  ***    Code Status Order: Full Code   Advance Directives:      Admitting Physician:  Pratibha Sousa MD  PCP: Pura Soares MD    Discharging Nurse: [facility] Unit/Room#: 7066/2692-92  Discharging Unit Phone Number: ***    Emergency Contact:   Extended Emergency Contact Information  Primary Emergency Contact: Yadira Escalera  Mobile Phone: [phone]  Relation: Child    Past Surgical History:  No past surgical history on file.     Immunization History:   Immunization History   Administered Date(s) Administered    COVID-19, Moderna, Booster, PF, 50mcg/0.25ml 2020, 2021       Active Problems:  Patient Active Problem List   Diagnosis Code    Paroxysmal A-fib ([facility] [address] I48.0    Acute on chronic congestive heart failure (HCC) I50.9    Coronary artery disease involving native coronary artery of native heart without angina pectoris I25.10    Heart failure exacerbated by sotalol (HCC) I50.9    Obesity, Class II, BMI 35-39.9 E66.9    Mixed hyperlipidemia E78.2    Positive cardiac stress test R94.39       Isolation/Infection:   Isolation            No Isolation          Patient Infection Status       Infection Onset Added Last Indicated Last Indicated By Review Planned Expiration Resolved Resolved By    None active    Resolved    COVID-19 (Rule Out) 22 COVID-19, Rapid (Ordered)   22 Rule-Out Test Resulted            Nurse Assessment:  Last Vital Signs: /64   Pulse 87   Temp 97.9 °F (36.6 °C) (Oral)   Resp 20   Ht 6' (1.829 m)   Wt 283 lb 1.1 oz (128.4 kg)   SpO2 94%   BMI 38.39 kg/m²     Last documented pain score (0-10 scale): Pain Level: 0  Last Weight:   Wt Readings from Last 1 Encounters:   22 283 lb 1.1 oz (128.4 kg)     Mental Status:  {IP PT MENTAL STATUS:07111}    IV Access:  [facility] IV ACCESS:420650184}    Nursing Mobility/ADLs:  Walking   {CHP DME WRJ}  Transfer  {P DME QJBA:778795921}  Bathing  {CHP DME RSUP:346627313}  Dressing  {CHP DME VZYU:698093128}  Toileting  {CHP DME WOEC:146279037}  Feeding  {[facility] DME YUOD:489909600}  Med Admin  {P DME AAMQ:921656897}  Med Delivery   { LACIE MED Delivery:739261250}    Wound Care Documentation and Therapy:  Wound 22 Buttocks Left (Active)   Wound Etiology Pressure Stage  2 22 0400   Wound Cleansed Soap and water 22 0400   Dressing/Treatment Open to air 22 0400   Wound Assessment Epithelialization 22 0400   Drainage Amount Scant 22 0400   Drainage Description Serosanguinous 22 0400   Odor None 22 0400   Sara-wound Assessment Blanchable erythema 22 0400   Number of days: 1        Elimination:  Continence: Bowel: {YES / ZO:20511}  Bladder: {YES / RP:49827}  Urinary Catheter: {Urinary Catheter:069084788}   Colostomy/Ileostomy/Ileal Conduit: {YES / GC:07303}       Date of Last BM: ***    Intake/Output Summary (Last 24 hours) at 2022 1042  Last data filed at 2022 0619  Gross per 24 hour   Intake 695.59 ml   Output 800 ml   Net -104.41 ml     I/O last 3 completed shifts: In: 758.4 [P.O.:200;  I.V.:558.4]  Out: 1050 [Urine:1050]    Safety Concerns:     [employer] Safety Concerns:900001148}    Impairments/Disabilities:      { LACIE Impairments/Disabilities:032006456}    Nutrition Therapy:  Current Nutrition Therapy:   [address] [employer] LACIE Diet List:800528086}    Routes of Feeding: {P DME Other Feedings:832982429}  Liquids: {Slp liquid thickness:86489}  Daily Fluid Restriction: {P DME Yes amt example:680541227}  Last Modified Barium Swallow with Video (Video Swallowing Test): {Done Not Done [facility]:950065539}    Treatments at the Time of Hospital Discharge:   Respiratory Treatments: ***  Oxygen Therapy:  {Therapy; copd oxygen:61282}  Ventilator:    {MH CC Vent NQNK:542806611}    Rehab Therapies: {THERAPEUTIC INTERVENTION:4175383984}  Weight Bearing Status/Restrictions: 50Nohelia Shafer CC Weight Bearin}  Other Medical Equipment (for information only, NOT a DME order):  {EQUIPMENT:100653824}  Other Treatments: ***    Patient's personal belongings (please select all that are sent with patient):  {CHP DME Belongings:189775707}    RN SIGNATURE:  {Esignature:725360430}    CASE MANAGEMENT/SOCIAL WORK SECTION    Inpatient Status Date: ***    Readmission Risk Assessment Score:  Readmission Risk              Risk of Unplanned Readmission:  13           Discharging to Facility/ Agency   Name:   Address:  Phone:  Fax:    Dialysis Facility (if applicable)   Name:  Address:  Dialysis Schedule:  Phone:  Fax:    / signature: {Esignature:800095872}    PHYSICIAN SECTION    Prognosis: {Prognosis:3312947705}    Condition at Discharge: 50Nohelia Shafer Patient Condition:337978861}    Rehab Potential (if transferring to Rehab): {Prognosis:7054549721}    Recommended Labs or Other Treatments After Discharge: ***    Physician Certification: I certify the above information and transfer of Karuna Perea  is necessary for the continuing treatment of the diagnosis listed and that he requires {Admit to Appropriate Level of Care:85514} for {GREATER/LESS:248444068} 30 days.      Update Admission H&P: {CHP DME Changes in CHNCQ:173905865}    PHYSICIAN SIGNATURE:  {Esignature:408520290}

## 2022-04-07 NOTE — PLAN OF CARE
Problem: Skin Integrity:  Goal: Will show no infection signs and symptoms  Description: Will show no infection signs and symptoms  4/6/2022 2105 by Jr Leal  Outcome: Ongoing  4/6/2022 1545 by Kvng Solitario RN  Outcome: Ongoing  Goal: Absence of new skin breakdown  Description: Absence of new skin breakdown  4/6/2022 2105 by Jr Leal  Outcome: Ongoing  4/6/2022 1545 by Kvng Solitario RN  Outcome: Ongoing     Problem: Nutrition  Goal: Optimal nutrition therapy  4/6/2022 2105 by Jr Leal  Outcome: Ongoing  4/6/2022 1545 by Kvng Solitario RN  Outcome: Ongoing  4/6/2022 1446 by Patricia Macedo RD, LD  Outcome: Ongoing  Note: Nutrition Problem #1: Increased nutrient needs  Intervention: Food and/or Nutrient Delivery: Continue NPO (Monitor for start of oral diet and provide Titus wound healing ONS x 2 per day as able.)  Nutritional Goals: Meet at least 75% of estimated nutrition needs.

## 2022-04-07 NOTE — PROGRESS NOTES
that he has had no difficulty or pain during urination. Nephro was consulted for Cardiac cath clearance. Pt has had no deterioration in renal function since admission. His Cr has improved and is below his baseline. Cr dropped from 1.59 to 1.43 today. U/A was negative He has obvious b/l LE pitting edema but states that his legs are always like this and denies any pain from them. Primary team plans to DC pt today. Past History/Allergies? Social History:   PMH: CAD s/p CABG, cardiomyopathy s/p CRT-D, paroxysmal A. fib on Eliquis. PSH: h/o CABG. No Known Allergies    Social History     Socioeconomic History    Marital status: Single     Spouse name: Not on file    Number of children: Not on file    Years of education: Not on file    Highest education level: Not on file   Occupational History    Not on file   Tobacco Use    Smoking status: Former Smoker     Quit date:      Years since quittin.3    Smokeless tobacco: Never Used   Substance and Sexual Activity    Alcohol use: Not Currently    Drug use: Never    Sexual activity: Not on file   Other Topics Concern    Not on file   Social History Narrative    Not on file     Social Determinants of Health     Financial Resource Strain: Low Risk     Difficulty of Paying Living Expenses: Not hard at all   Food Insecurity: No Food Insecurity    Worried About [address] in the Last Year: Never true    [address] in the Last Year: Never true   Transportation Needs:     Lack of Transportation (Medical): Not on file    Lack of Transportation (Non-Medical):  Not on file   Physical Activity:     Days of Exercise per Week: Not on file    Minutes of Exercise per Session: Not on file   Stress:     Feeling of Stress : Not on file   Social Connections:     Frequency of Communication with Friends and Family: Not on file    Frequency of Social Gatherings with Friends and Family: Not on file    Attends Hoahaoism Services: Not on file   [facility] Active Member of Clubs or Organizations: Not on file    Attends Club or Organization Meetings: Not on file    Marital Status: Not on file   Intimate Partner Violence:     Fear of Current or Ex-Partner: Not on file    Emotionally Abused: Not on file    Physically Abused: Not on file    Sexually Abused: Not on file   Housing Stability:     Unable to Pay for Housing in the Last Year: Not on file    Number of [place] in the Last Year: Not on file    Unstable Housing in the Last Year: Not on file       Family History:      No reported.      Outpatient Medications:     Medications Prior to Admission: potassium chloride (KLOR-CON 10) 10 MEQ extended release tablet, Take 1 tablet by mouth daily  allopurinol (ZYLOPRIM) 100 MG tablet, TAKE 1 TABLET BY MOUTH EVERY DAY  bumetanide (BUMEX) 2 MG tablet, Take 1 tablet by mouth 2 times daily  apixaban (ELIQUIS) 2.5 MG TABS tablet, Take 1 tablet by mouth 2 times daily  lisinopril (PRINIVIL;ZESTRIL) 5 MG tablet, Take 1 tablet by mouth daily  metoprolol tartrate (LOPRESSOR) 50 MG tablet, Take 1 tablet by mouth 2 times daily  magnesium oxide (MAG-OX) 400 MG tablet, Take 1 tablet by mouth 2 times daily  rosuvastatin (CRESTOR) 20 MG tablet, Take 1 tablet by mouth daily  furosemide (LASIX) 40 MG tablet, Take 1 tablet by mouth 2 times daily  Methylcobalamin (B-12) 5000 MCG TBDP, Take by mouth  clopidogrel (PLAVIX) 75 MG tablet, Take 1 tablet by mouth daily  ascorbic acid (CVS VITAMIN C) 500 MG tablet, Take 1 tablet by mouth 2 times daily    Current Medications:       Scheduled Meds:    acetylcysteine  600 mg Oral BID    metoprolol tartrate  25 mg Oral BID    rosuvastatin  20 mg Oral Nightly    sodium chloride flush  5-40 mL IntraVENous 2 times per day    apixaban  5 mg Oral BID    sodium chloride flush  10 mL IntraVENous 2 times per day    clopidogrel  75 mg Oral Daily     Continuous Infusions:    sodium chloride Stopped (04/06/22 3768)    sodium chloride Stopped Pulmonary:      Effort: Pulmonary effort is normal.      Breath sounds: Normal breath sounds. Abdominal:      Palpations: Abdomen is soft. Tenderness: There is no abdominal tenderness. There is no rebound. Musculoskeletal:         General: Normal range of motion. Cervical back: Normal range of motion. Right lower leg: Edema present. Left lower leg: Edema present. Comments: +3 pitting edema   Skin:     General: Skin is warm and dry. Neurological:      General: No focal deficit present. Mental Status: He is alert and oriented to person, place, and time.    Psychiatric:         Mood and Affect: Mood normal.          Labs:       Recent Labs     04/05/22  1442 04/06/22  0246 04/07/22  0328   WBC 5.7 5.6 5.2   RBC 3.60* 3.49* 3.78*   HGB 9.3* 9.0* 9.7*   HCT 30.4* 29.7* 34.0*   MCV 84.4 85.1 89.9   MCH 25.8 25.8 25.7   MCHC 30.6 30.3 28.5   RDW 17.0* 17.0* 16.8*    293 282   MPV 9.0 8.8 8.7      BMP:   Recent Labs     04/05/22  0954 04/06/22  0246 04/07/22  0328    134* 137   K 4.3 4.5 4.7   CL 97* 96* 101   CO2 30 30 29   BUN 24* 22 16   CREATININE 1.67* 1.57* 1.43*   GLUCOSE 102* 110* 110*   CALCIUM 8.8 8.6 8.8      SPEP:     Lab Results   Component Value Date    PROT 6.2 04/06/2022    ALBCAL PENDING 04/06/2022    ALBPCT PENDING 04/06/2022    LABALPH PENDING 04/06/2022    LABALPH PENDING 04/06/2022    A1PCT PENDING 04/06/2022    A2PCT PENDING 04/06/2022    LABBETA PENDING 04/06/2022    BETAPCT PENDING 04/06/2022    GAMGLOB PENDING 04/06/2022    GGPCT PENDING 04/06/2022    PATH PENDING 04/06/2022     Urinalysis/Chemistries:      Lab Results   Component Value Date    NITRU NEGATIVE 04/05/2022    COLORU Yellow 04/05/2022    PHUR 8.0 04/05/2022    SPECGRAV 1.007 04/05/2022    LEUKOCYTESUR NEGATIVE 04/05/2022    UROBILINOGEN Normal 04/05/2022    BILIRUBINUR NEGATIVE 04/05/2022    GLUCOSEU NEGATIVE 04/05/2022    KETUA NEGATIVE 04/05/2022     Urine Sodium:     Lab Results Component Value Date    SHANIA 78 04/05/2022     Urine Creatinine:     Lab Results   Component Value Date    LABCREA 37.1 04/05/2022     Radiology:       US RENAL COMPLETE        Result Date: 4/5/2022  1. Increased renal cortical echogenicity, concerning for medical renal disease. 2. No nephrolithiasis or hydronephrosis. 3. Mild bladder wall thickening. This could be due to cystitis, but could also be seen with chronic outlet obstruction. Clinical correlation is recommended. NM MYOCARDIAL SPECT REST EXERCISE OR RX    Result Date: 4/4/2022  Patient refusal of prone images limits evaluation. Fixed inferior wall and mostly fixed partially reversible lateral wall perfusion defects as discussed above. 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 LVEF 45% Risk stratification: Intermediate risk. The findings were sent to the Radiology Results [address] at 3:25 pm on 4/4/2022to be communicated to a licensed caregiver. RECOMMENDATIONS: Unavailable        Assessment:       1. 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 Baseline creatinine 1.6-1.8  2. Abnormal nuclear stress test planning for cardiac cath  3. CAD h/o CABG  4. Cardiomyopathy s/p CRT  5. Atrial fibrillation on Eliquis  6. Essential hypertension  7. Anemia    Plan:      Clear to DC from nephro standpoint   Follow-up with Nephrologist outpatient   BMP in 1 week    Nutrition    Please ensure that patient is on a renal diet/TF.  Avoid nephrotoxic drugs/contrast exposure. Thank you for the consultation. I will discuss the care of this patient with the attending physician. Please do not hesitate to contact us for any further questions/concerns. We will continue to follow this patient along with you.      [address] [facility]  4/7/2022 10:42 AM    Attending Physician Statement  I have discussed the care of Jaclyn Quinteros, including pertinent history and exam findings with the medical student. I have reviewed the key elements of all parts of the encounter with the medical student. Note was updated and recorded changes were made I have seen and examined the patient with the medical student. I agree with the assessment and plan and status of the problem list as documented.   Addiitionally I recommend  BMP a week post discharge  Follow up with nephrology in 4-6 weeks  Kerri May MD

## 2022-04-08 NOTE — DISCHARGE SUMMARY
[facility] Discharge Summary      Patient ID: Venus Hirsch    MRN: [id number]     Acct:  [de-identified]       Patient's PCP: Chantal Fields MD    Admit Date: 4/2/2022     Discharge Date: 4/5/2022      Admitting Physician: Chantal Fields MD    Discharge Physician: Chantal Fields MD     Discharge Diagnoses:    Primary Problem  Heart failure exacerbated by sotalol [facility])    Active Hospital Problems    Diagnosis Date Noted    Heart failure exacerbated by sotalol ([facility] [address] [I50.9] 04/02/2022    Obesity, Class II, BMI 35-39.9 [E66.9] 04/02/2022    Mixed hyperlipidemia [E78.2] 04/02/2022    Acute on chronic congestive heart failure ([address] [I50.9] 08/18/2021    Coronary artery disease involving native coronary artery of native heart without angina pectoris [I25.10] 08/18/2021    Paroxysmal A-fib ([facility] [address] [I48.0] 08/18/2021     History reviewed. No pertinent past medical history. The patient was seen and examined on day of discharge and this discharge summary is in conjunction with any daily progress note from day of discharge. Code Status:  Prior    Hospital Course: uncomplicated    Consults:  cardiology    Significant Diagnostic Studies: as above, and as follows: see chart    Treatments: as above    Disposition: transfer to [facility]. V's    Discharged Condition: Stable    Follow Up:   Chantal Fields MD in one week    Discharge Medications:      Medication List      ASK your doctor about these medications    allopurinol 100 MG tablet  Commonly known as: ZYLOPRIM  TAKE 1 TABLET BY MOUTH EVERY DAY     apixaban 2.5 MG Tabs tablet  Commonly known as: ELIQUIS  Take 1 tablet by mouth 2 times daily     ascorbic acid 500 MG tablet  Commonly known as: [employer] Vitamin C  Take 1 tablet by mouth 2 times daily     B-12 5000 MCG Tbdp     bumetanide 2 MG tablet  Commonly known as: Bumex  Take 1 tablet by mouth 2 times daily     clopidogrel 75 MG tablet  Commonly known as: PLAVIX  Take 1 tablet by mouth daily     furosemide 40 MG tablet  Commonly known as: LASIX  Take 1 tablet by mouth 2 times daily     lisinopril 5 MG tablet  Commonly known as: PRINIVIL;ZESTRIL  Take 1 tablet by mouth daily     magnesium oxide 400 MG tablet  Commonly known as: MAG-OX  Take 1 tablet by mouth 2 times daily     metoprolol tartrate 50 MG tablet  Commonly known as: LOPRESSOR  Take 1 tablet by mouth 2 times daily     potassium chloride 10 MEQ extended release tablet  Commonly known as: Klor-Con 10  Take 1 tablet by mouth daily     rosuvastatin 20 MG tablet  Commonly known as: CRESTOR  Take 1 tablet by mouth daily             Activity: activity as tolerated    Diet: cardiac diet    Time Spent on discharge is more than 40 minutes in the examination, evaluation, counseling and review of medications and discharge plan.       Electronically signed by Rosa Rodrigues MD on 4/8/2022 at 5:29 PM

## 2022-04-12 NOTE — DISCHARGE SUMMARY
[address]     Department of Internal Medicine - Staff Internal Medicine Teaching Service    INPATIENT DISCHARGE SUMMARY      Patient Identification:  Chris Trivedi is a 82 y.o. male. :  1935  MRN: [id number]     Acct: [de-identified]   PCP: Veronica Watkins MD  Admit Date:  2022  Discharge date and time: 2022  Attending Provider: No att. providers found                                     [address] Problem Lists:  Principal Problem:    Positive cardiac stress test  Active Problems:    Paroxysmal A-fib ([address]    Coronary artery disease involving native coronary artery of native heart without angina pectoris    Obesity, Class II, BMI 35-39.9  Resolved Problems:    * No resolved hospital problems. *      HOSPITAL STAY     Brief Inpatient course:   Chris Trivedi is a 82 y.o. male who was admitted for the management of Positive cardiac stress test, presented to the hospital for cardiac cath. Due to elevated creatnine, he was admitted for nephro clearance beofre being taken for cath on  . He was discharged in good condition the next day after mucromyst. Cath was nonobstructive, no stents placed. Procedures/ Significant Interventions:      US RENAL COMPLETE    Result Date: 2022  1. Increased renal cortical echogenicity, concerning for medical renal disease. 2. No nephrolithiasis or hydronephrosis. 3. Mild bladder wall thickening. This could be due to cystitis, but could also be seen with chronic outlet obstruction. Clinical correlation is recommended.          Consults:     Consults:     Final Specialist Recommendations/Findings:   IP CONSULT TO NEPHROLOGY  IP CONSULT TO CASE MANAGEMENT      Any Hospital Acquired Infections: none    Discharge Functional Status:  stable    DISCHARGE PLAN     Disposition: home    Patient Instructions:   Discharge Medication List as of 2022 12:18 PM        START taking these medications    Details acetylcysteine (MUCOMYST) 20 % nebulizer solution Take 3 mLs by mouth 2 times daily for 2 doses, Disp-6 mL, R-0Normal           CONTINUE these medications which have NOT CHANGED    Details   potassium chloride (KLOR-CON 10) 10 MEQ extended release tablet Take 1 tablet by mouth daily, Disp-90 tablet, R-1Normal      allopurinol (ZYLOPRIM) 100 MG tablet TAKE 1 TABLET BY MOUTH EVERY DAY, Disp-90 tablet, R-3Normal      bumetanide (BUMEX) 2 MG tablet Take 1 tablet by mouth 2 times daily, Disp-180 tablet, R-3Normal      apixaban (ELIQUIS) 2.5 MG TABS tablet Take 1 tablet by mouth 2 times daily, Disp-180 tablet, R-3Normal      lisinopril (PRINIVIL;ZESTRIL) 5 MG tablet Take 1 tablet by mouth daily, Disp-90 tablet, R-3Normal      metoprolol tartrate (LOPRESSOR) 50 MG tablet Take 1 tablet by mouth 2 times daily, Disp-180 tablet, R-3Normal      rosuvastatin (CRESTOR) 20 MG tablet Take 1 tablet by mouth daily, Disp-90 tablet, R-3Normal      furosemide (LASIX) 40 MG tablet Take 1 tablet by mouth 2 times daily, Disp-180 tablet, R-3Normal      magnesium oxide (MAG-OX) 400 MG tablet Take 1 tablet by mouth 2 times daily, Disp-180 tablet, R-3Normal      Methylcobalamin (B-12) 5000 MCG TBDP Take by mouthHistorical Med      ascorbic acid (CVS VITAMIN C) 500 MG tablet Take 1 tablet by mouth 2 times daily, Disp-180 tablet, R-1Normal      clopidogrel (PLAVIX) 75 MG tablet Take 1 tablet by mouth daily, Disp-90 tablet, R-1Normal             Activity: activity as tolerated    Diet: regular diet    Follow-up:    [facility]  [facility][note 3]1 [facility]. [address]  On 4/20/2022  at 8:45 am with SAMAN Rodrigues, [address] [place] [address],[address] [address] [phone]    Schedule an appointment as soon as possible for a visit in 1 week        Patient Instructions: 1. Please take your medications as advised  2. Please follow up with your PCP as an outpatient  3. Please do BMP test in 1 week time  4. Please return to us if there is any concern    Note that over 30 minutes was spent in preparing discharge papers, discussing discharge with patient, medication review, etc.      Tad Dee MD  Internal Medicine Resident  [facility]  4/12/2022 2:06 PM

## 2022-04-13 NOTE — PROGRESS NOTES
Physician Progress Note      PATIENT:               Travis Leon  CSN #:                  [id number]  :                       1935  ADMIT DATE:       2022 1:55 PM  [address] [place] DATE:        2022 2:02 PM  RESPONDING  PROVIDER #:        Armen Morrison          QUERY TEXT:    Patient admitted with ASHD with angina. Noted documentation of Acute Kidney   Injury in H&P and subsequent progress notes. In order to support the   diagnosis of DONI, please include additional clinical indicators in your   documentation. Or please document if the diagnosis of DONI has been ruled out   after further study    The medical record reflects the following:  Risk Factors: HTN , chronic systolic CHF, ASHD, Cardiomyopathy, paroxysmal   atrial fibrillation, HLD,  CKD stage 3. Clinical Indicators: creatinine- 1.67,  1.57,  1.43,  BUN- 24, 22,  16. GFR   DEVIKA- 39,  42,  47  Treatment: monito labs, IV- NS @ 75 ml/hr, PO- Lopressor, Lasix, Bumex. Thank Catherine Pelletier RN, [facility]  cell- [phone]  office hours [id number]  email - [email or web address]      Defined by Kidney Disease Improving Global Outcomes (KDIGO) clinical practice   guideline for acute kidney injury:  -Increase in SCr by greater than or equal to 0.3 mg/dl within 48 hours; or  -Increase or decrease in SCr to greater than or equal to 1.5 times baseline,   which is known or presumed to have occurred within the prior 7 days; or  -Urine volume < 0.5ml/kg/h for 6 hours  Options provided:  -- Acute kidney injury evidenced by, Please document evidence as well as   baseline creatinine, if known. -- Currently resolved acute kidney injury was evidenced by, Please document   evidence as well as baseline creatinine, if known.   -- Acute kidney injury ruled out after study  -- Other - I will add my own diagnosis  -- Disagree - Not applicable / Not valid  -- Disagree - Clinically unable to determine / Unknown  -- Refer to Clinical Documentation Reviewer    PROVIDER RESPONSE TEXT:    This patient has an acute kidney injury as evidenced by high creatnine    Query created by: Sergei Kim on 4/12/2022 3:31 PM      Electronically signed by:  Miesha Cheema 4/13/2022 7:34 AM

## 2022-04-14 NOTE — PROGRESS NOTES
Physician Progress Note      PATIENT:               Aaron Blair  CSN #:                  [id number]  :                       1935  ADMIT DATE:       2022 3:28 PM  [address] [place] DATE:        2022 12:44 PM  RESPONDING  PROVIDER #:        Loreta Weems MD          QUERY TEXT:    Pt presented with c/o worsening SOB and LE edema. Admitted for evaluation and   treatment of Acute on Chronic CHF. If possible, please document in progress   notes and discharge summary further specificity regarding the type and acuity   of CHF:    The medical record reflects the following:  Risk Factors: CAD s/p CABG, Cardiomyopathy, PAF  Clinical Indicators: c/o SOB x1 week. ED notes: SOB, LE swelling, wheezing/   crackles t/o lungs. Pro-; XRay: Pulmonary vascular congestion. ,   Cardiologist notes: \"Normal LV size and function,  EF > 55%. Mod LVH, Normal   RV systolic pressure. AFib/  BiV pacing\". Stress Test 22 \"Fixed inferior   wall and mostly fixed partially reversible lateral wall perfusion defects. Findings indicative of infarct in the mid and basal inferior wall as well as   in the mid and inferolateral wall. LVEF 45%\". Treatment: Cardiology consult, IV Bumex; metoprolol, lisinopril, ICD   interrogation, 2D echo, Cardiac Stress Test, Transfer to [facility]. V's for Cardiac   Cath  Options provided:  -- Acute on Chronic Systolic CHF/HFrEF  -- Acute on Chronic Diastolic CHF/HFpEF  -- Acute on Chronic Systolic and Diastolic CHF  -- Chronic Systolic CHF/HFrEF  -- Chronic Diastolic CHF/HFpEF  -- Chronic Systolic and Diastolic CHF  -- Defer to Cardiology consult  -- Other - I will add my own diagnosis  -- Disagree - Not applicable / Not valid  -- Disagree - Clinically unable to determine / Unknown  -- Refer to Clinical Documentation Reviewer    PROVIDER RESPONSE TEXT:    I would like to defer the diagnosis of CHF to the Cardiology consult.     Query created by: Tanmay Macias on 2022 5:59 AM      Electronically signed by:  Jason Estrada MD 4/14/2022 7:40 AM

## 2022-04-19 ASSESSMENT — ENCOUNTER SYMPTOMS
ABDOMINAL PAIN: 0
SHORTNESS OF BREATH: 1
VOMITING: 0
PHOTOPHOBIA: 0
NAUSEA: 0
RHINORRHEA: 0
DIARRHEA: 1
CONSTIPATION: 0

## 2022-04-19 NOTE — PROGRESS NOTES
Physician Progress Note      PATIENT:               Zac Merrill  [facility] #:                  [id number]  :                       1935  ADMIT DATE:       2022 3:28 PM  [address] DATE:        2022 12:44 PM  RESPONDING  PROVIDER #:        John BRYAN DO          QUERY TEXT:    Pt presented with c/o increased SOB w/ LE edema. Admitted for treatment of CHF   exacerbation and further cardiac workup. Pt transferred to [facility]. V's on  for   left heart cath d/t positive stress concerning for inferior and inferolateral   wall infarction w/ reduced LVEF of 45%. After review, could you please   further specify the type and acuity of CHF this patient was evaluated and   treated for: The medical record reflects the following:  Risk Factors: HLD, HTN, CAD s/p CABG x3, Cardiomyopathy, ICD, PAF  Clinical Indicators: c/o SOB x1 week. Pro-; XRay: Pulmonary vascular   congestion. ECHO: V-paced, Mod LVH w/ EF >55%. Normal RVSP. STRESS TEST: Large severe fixed inferior wall defect, partially reversible   perfusion defect of the mid & inferolateral wall. ?Mild hypokinesis/ lateral   wall. Findings indicate infarct w/ some gonzalez-infarct ischemia. Generalized   hypokinesis sparing the anterior wall. Cardio Note on  prior to transfer    \"Abnormal stress w/ lower LVEF of 45%. Hx of cardiomyopathy s/p CRT-D w/   recovered LVEF of 55%. Cardiac Cath w/ poss PCI. \"  Post Cath report : multi   vessel CAD, All 3 grafts patent. Cardiomyopathy w/ CHF, improved with   diuresis.   Treatment: Cardiology consult, IV Bumex; metoprolol, lisinopril, ICD   interrogation, 2D echo, Cardiac Stress Test, Transfer to [facility]. 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

## 2022-11-14 ENCOUNTER — OFFICE VISIT (OUTPATIENT)
Dept: PODIATRY | Age: 87
End: 2022-11-14
Payer: MEDICARE

## 2022-11-14 VITALS — WEIGHT: 300 LBS | HEIGHT: 72 IN | BODY MASS INDEX: 40.63 KG/M2

## 2022-11-14 DIAGNOSIS — R60.0 EDEMA, LOWER EXTREMITY: ICD-10-CM

## 2022-11-14 DIAGNOSIS — M79.674 PAIN OF TOES OF BOTH FEET: ICD-10-CM

## 2022-11-14 DIAGNOSIS — M79.675 PAIN OF TOES OF BOTH FEET: ICD-10-CM

## 2022-11-14 DIAGNOSIS — B35.1 ONYCHOMYCOSIS OF TOENAIL: Primary | ICD-10-CM

## 2022-11-14 DIAGNOSIS — I73.9 PERIPHERAL VASCULAR DISEASE (HCC): ICD-10-CM

## 2022-11-14 PROCEDURE — G8484 FLU IMMUNIZE NO ADMIN: HCPCS | Performed by: PODIATRIST

## 2022-11-14 PROCEDURE — 1036F TOBACCO NON-USER: CPT | Performed by: PODIATRIST

## 2022-11-14 PROCEDURE — 1123F ACP DISCUSS/DSCN MKR DOCD: CPT | Performed by: PODIATRIST

## 2022-11-14 PROCEDURE — G8427 DOCREV CUR MEDS BY ELIG CLIN: HCPCS | Performed by: PODIATRIST

## 2022-11-14 PROCEDURE — 99203 OFFICE O/P NEW LOW 30 MIN: CPT | Performed by: PODIATRIST

## 2022-11-14 PROCEDURE — G8417 CALC BMI ABV UP PARAM F/U: HCPCS | Performed by: PODIATRIST

## 2022-11-14 PROCEDURE — 11721 DEBRIDE NAIL 6 OR MORE: CPT | Performed by: PODIATRIST

## 2022-11-14 ASSESSMENT — ENCOUNTER SYMPTOMS
COLOR CHANGE: 0
BACK PAIN: 0
SHORTNESS OF BREATH: 0
DIARRHEA: 0
NAUSEA: 0

## 2022-11-14 NOTE — PROGRESS NOTES
Summer White is a 82 y.o. male who presents to the office today with chief complaint of thick, painful nails to both feet. Chief Complaint   Patient presents with    Nail Problem     B/l nail trim, last seen Brandon Hernandez MD 8/11/22    Foot Swelling     B/l   Symptoms began about 1 year(s) ago. Patient denies injury to the feet. Patient states that his toenails are painful with shoe gear and ambulation. Pain is rated 5 out of 10 at it's worst and is described as intermittent. Treatments prior to today's visit include: None. No Known Allergies    No past medical history on file. Prior to Admission medications    Medication Sig Start Date End Date Taking? Authorizing Provider   bumetanide (BUMEX) 2 MG tablet TAKE 1 TABLET TWICE DAILY 10/25/22  Yes Jean Paul Castillo MD   albuterol (PROVENTIL) (5 MG/ML) 0.5% nebulizer solution Take 0.5 mLs by nebulization 4 times daily as needed for Wheezing 10/20/22  Yes Jean Paul Castillo MD   furosemide (LASIX) 40 MG tablet Take 1 tablet by mouth 2 times daily 10/11/22 1/9/23 Yes JUAN FRANCISCO Tompkins CNP   magnesium oxide (MAG-OX) 400 (240 Mg) MG tablet TAKE 1 TABLET TWICE DAILY 9/30/22  Yes Jean Paul Castillo MD   rosuvastatin (CRESTOR) 20 MG tablet TAKE 1 TABLET EVERY DAY 9/22/22  Yes Jean Paul Castillo MD   lisinopril (PRINIVIL;ZESTRIL) 5 MG tablet TAKE 1 TABLET EVERY DAY 9/22/22  Yes Jean Paul Castillo MD   clopidogrel (PLAVIX) 75 MG tablet TAKE 1 TABLET EVERY DAY 9/6/22  Yes JUAN FRANCISCO Tompkins CNP   allopurinol (ZYLOPRIM) 100 MG tablet TAKE 1 TABLET EVERY DAY 8/30/22  Yes Jean Paul Castillo MD   potassium chloride (KLOR-CON) 10 MEQ extended release tablet TAKE 1 TABLET EVERY DAY 8/14/22  Yes Jean Paul Castillo MD   apixaban (ELIQUIS) 2.5 MG TABS tablet Take 1 tablet by mouth in the morning and 1 tablet before bedtime.  8/11/22  Yes Jean Paul Castillo MD   metoprolol tartrate (LOPRESSOR) 50 MG tablet TAKE 1 TABLET TWICE DAILY 6/15/22  Yes Jean Paul Castillo MD   Methylcobalamin (B-12) 5000 MCG TBDP Take by mouth   Yes Historical Provider, MD   ascorbic acid ([employer] VITAMIN C) 500 MG tablet Take 1 tablet by mouth 2 times daily 21  Yes Sylvan Harada, MD   acetylcysteine (MUCOMYST) 20 % nebulizer solution Take 3 mLs by mouth 2 times daily for 2 doses 10/14/22 10/15/22  Sylvan Harada, MD       No past surgical history on file. No family history on file. Social History     Tobacco Use    Smoking status: Former     Types: Cigarettes     Quit date:      Years since quittin.9    Smokeless tobacco: Never   Substance Use Topics    Alcohol use: Not Currently       Review of Systems   Constitutional:  Negative for activity change, appetite change, chills, diaphoresis, fatigue and fever. Respiratory:  Negative for shortness of breath. Cardiovascular:  Positive for leg swelling. Gastrointestinal:  Negative for diarrhea and nausea. Endocrine: Negative for cold intolerance, heat intolerance and polyuria. Musculoskeletal:  Positive for arthralgias. Negative for back pain, gait problem, joint swelling and myalgias. Skin:  Negative for color change, pallor, rash and wound. Allergic/Immunologic: Negative for environmental allergies and food allergies. Neurological:  Negative for dizziness, weakness, light-headedness and numbness. Hematological:  Does not bruise/bleed easily. Psychiatric/Behavioral:  Negative for behavioral problems, confusion and self-injury. The patient is not nervous/anxious. Vitals: There were no vitals filed for this visit. General: AAO x 3 in NAD. Integument: There are no rashes, ulcers, or breaks in the skin noted to the bilateral lower extremities. There is no induration, subcutaneous nodules, or tightening of the skin noted to the bilateral.     Toenails 1-5 of the right foot do present with thickness, elongation, discoloration, brittleness, subungual debris.      Toenails 1-5 of the left foot do present with thickness, elongation, discoloration, brittleness, subungual debris. There is pain with palpation and debridement of toenails 1-5 of the right foot and 1-5 of the left foot. Interdigital maceration absent to web spaces 1-4, Bilateral.     There are no preulcerative lesions noted to the right foot. There are no preulcerative lesions noted to the left foot. The skin to the bilateral feet is not thin and shiny. The skin to the bilateral feet is  warm, supple, and dry. Vascular: DP pulse of the right foot is not palpable. DP pulse of the left foot is not palpable. PT pulse of the right foot is not palpable. PT pulse of the left foot is not palpable. The right DP and PT pulses are monophasic on doppler exam. The left DP and PT pulses are not audible on doppler exam. However, patient has extensive pitting edema to the bilateral feet, ankles, and legs. CFT is less than 3 secs to the digits of the right foot. CFT is less than 3 secs to the digits of the left foot. There is pitting edema noted to the bilateral foot, ankle, leg. There is no hair growth noted to the digits of the bilateral feet. There are no varicosities noted to the right foot/ankle. There are no varicosities noted to the left foot/ankle. Erythema is absent to the bilateral feet. Neurological: Reflexes are present to the right plantar foot and to the Achilles tendon. Reflexes are present to the left plantar foot and to the Achilles tendon. Epicritic sensation is  intact to the right foot. Epicritic sensation is  intact to the left foot. Musculoskeletal:  Muscle strength is +5/5 to all four muscle groups of the right lower extremity and +5/5 to all four muscle groups of the left lower extremity. There are no areas of subluxation, dislocation, or laxity noted to either lower extremity. Range of motion to the right ankle is  free of pain or grinding.      Range of motion to the left ankle is  free of pain or grinding. Range of motion to the right subtalar joint is  free of pain or grinding. Range of motion to the left subtalar joint is  free of pain or grinding. No abnormalities, asymmetries, or misalignments are seen between the extremities. Weightbearing evaluation does reveal rearfoot eversion, medial prominence of the talar head, loss of the medial longitudinal arch height, and too many toes sign bilaterally. The lesser digits of the right foot are contracted. The lesser digits of the left foot are contracted. There is no prominence noted to the first metatarsal head without abduction of the hallux of the right foot. There is no prominence noted to the first metatarsal head without abduction of the hallux of the left foot. Shoe examination was performed. Biomechanical Exam: normal bilaterally. Class A Findings (1 needed)   [] Non-traumatic amputation of foot or integral skeleton portion thereof. [] Q7.      Class B Findings (2 needed)   1. [x] Absent posterior tibial pulse   2. [x] Absent dorsalis pedis pulse   3. [] Advanced trophic changes; three of the following are required:   ·         [] hair growth (decrease or absence)   ·         [] nail changes (thickening)   ·         [] pigmentary changes (discoloration)   ·         [] skin texture (thin, shiny)   ·         [] skin color (rubor or redness)   [x] Q8.      Class C Findings (1 Class B, 2 Class C needed)   1. [] Claudication   2. [] Temperature changes   3. [] Edema   4. [] Paresthesia   5. [] Burning   [] Q9.     Asessment: Patient is a 82 y.o. male with:    Diagnosis Orders   1. Onychomycosis of toenail  ID DEBRIDEMENT OF NAILS, 6 OR MORE      2. Edema, lower extremity        3. Pain of toes of both feet  ID DEBRIDEMENT OF NAILS, 6 OR MORE      4. Peripheral vascular disease (HCC)  ID DEBRIDEMENT OF NAILS, 6 OR MORE          Plan:  1. Clinical evaluation of the patient.  2. Toenails 1-5 of the right foot and 1-5 of the left foot were debrided in length and thickness using a nail nipper and a . Patient advised to check feet daily for wounds. Patient advised to see his pulmonologist for his leg swelling. 3. Contact office with any questions/problems/concerns. Return in about 3 months (around 2/14/2023) for At risk foot care.    11/14/2022      Nitesh Lockett DPM

## 2023-01-31 ENCOUNTER — HOSPITAL ENCOUNTER (OUTPATIENT)
Age: 88
Setting detail: SPECIMEN
Discharge: HOME | End: 2023-01-31

## 2023-01-31 LAB
ANION GAP SERPL CALCULATED.3IONS-SCNC: 17 MMOL/L (ref 9–17)
BUN SERPL-MCNC: 27 MG/DL (ref 8–23)
CALCIUM SERPL-MCNC: 8.8 MG/DL (ref 8.6–10.4)
CHLORIDE SERPL-SCNC: 99 MMOL/L (ref 98–107)
CO2 SERPL-SCNC: 23 MMOL/L (ref 20–31)
CREAT SERPL-MCNC: 1.91 MG/DL (ref 0.7–1.2)
GFR SERPL CREATININE-BSD FRML MDRD: 34 ML/MIN/1.73M2
GLUCOSE SERPL-MCNC: 115 MG/DL (ref 70–99)
MAGNESIUM SERPL-MCNC: 2.4 MG/DL (ref 1.6–2.6)
POTASSIUM SERPL-SCNC: 4.5 MMOL/L (ref 3.7–5.3)
SODIUM SERPL-SCNC: 139 MMOL/L (ref 135–144)

## 2023-02-09 ENCOUNTER — HOSPITAL ENCOUNTER (OUTPATIENT)
Dept: WOUND CARE | Age: 88
Discharge: HOME | End: 2023-02-09
Payer: MEDICARE

## 2023-02-09 VITALS
HEART RATE: 93 BPM | RESPIRATION RATE: 20 BRPM | SYSTOLIC BLOOD PRESSURE: 114 MMHG | WEIGHT: 302 LBS | BODY MASS INDEX: 40.9 KG/M2 | HEIGHT: 72 IN | DIASTOLIC BLOOD PRESSURE: 62 MMHG | TEMPERATURE: 98.4 F

## 2023-02-09 DIAGNOSIS — I89.0 LYMPHEDEMA OF BOTH LOWER EXTREMITIES: Primary | ICD-10-CM

## 2023-02-09 DIAGNOSIS — S81.801A LEG WOUND, RIGHT, INITIAL ENCOUNTER: ICD-10-CM

## 2023-02-09 DIAGNOSIS — S81.802A LEG WOUND, LEFT, INITIAL ENCOUNTER: ICD-10-CM

## 2023-02-09 PROCEDURE — 29580 STRAPPING UNNA BOOT: CPT

## 2023-02-09 PROCEDURE — 99203 OFFICE O/P NEW LOW 30 MIN: CPT | Performed by: NURSE PRACTITIONER

## 2023-02-09 PROCEDURE — 99214 OFFICE O/P EST MOD 30 MIN: CPT

## 2023-02-09 RX ORDER — LIDOCAINE HYDROCHLORIDE 20 MG/ML
JELLY TOPICAL ONCE
OUTPATIENT
Start: 2023-02-09 | End: 2023-02-09

## 2023-02-09 RX ORDER — LIDOCAINE HYDROCHLORIDE 40 MG/ML
SOLUTION TOPICAL ONCE
Status: DISCONTINUED | OUTPATIENT
Start: 2023-02-09 | End: 2023-02-10 | Stop reason: HOSPADM

## 2023-02-09 RX ORDER — LIDOCAINE HYDROCHLORIDE 40 MG/ML
SOLUTION TOPICAL ONCE
OUTPATIENT
Start: 2023-02-09 | End: 2023-02-09

## 2023-02-09 ASSESSMENT — ENCOUNTER SYMPTOMS
NAUSEA: 0
RHINORRHEA: 0
COUGH: 0
DIARRHEA: 0
VOMITING: 0
SHORTNESS OF BREATH: 0

## 2023-02-09 ASSESSMENT — PAIN SCALES - GENERAL: PAINLEVEL_OUTOF10: 5

## 2023-02-09 NOTE — DISCHARGE INSTRUCTIONS
[id number]      Visit  Discharge Instructions / Physician Orders  DATE: 2/9/23     Home Care: [facility] living (new referral 2/9/2023)     SUPPLIES ORDERED THRU:  [employer]               DATE LAST SUPPLIED 2/9/2023     Wound Location:  right and left lower legs     Cleanse with: Liquid antibacterial soap and water, rinse well      Dressing Orders:  Primary dressing  silvercel , kerramax                     Secondary dressing  calamine unna boots   to both legs                      secure with           x 30days     Frequency:  home care change sat and tues, we will change in wound care on thurs. Additional Orders: Increase protein to diet (meat, cheese, eggs, fish, peanut butter, nuts and beans)  Multivitamin daily    OFFLOADING [] YES  TYPE:                  [] NA    Weekly wound care visits until determined otherwise. Antibiotic therapy-wound care related YES [] NO [] NA[]    MY CHART []     Smart Device  []     HYPERBARIC TREATMENT-                TREATMENT #                          Your next appointment with the [facility] is in 1 week                                                                                                   (Please note your next appointment above and if you are unable to keep, kindly give a 24 hour notice. Thank you.)  If more than 15 min late we cannot guarantee you will be seen due to clinician schedule  Per Policy, Excessive cancellation will call for dismissal from program.  If you experience any of the following, please call the [facility] during business hours:  [phone]     * Increase in Pain  * Temperature over 101  * Increase in drainage from your wound  * Drainage with a foul odor  * Bleeding  * Increase in swelling  * Need for compression bandage changes due to slippage, breakthrough drainage.      If you need medical attention outside of the business hours of the [facility] please contact your PCP or go to the nearest emergency room. The information contained in the After Visit Summary has been reviewed with me, the patient and/or responsible adult, by my health care provider(s). I had the opportunity to ask questions regarding this information.  I have elected to receive;      []After Visit Summary  [x]Comprehensive Discharge Instruction      Patient signature______________________________________Date:________  Electronically signed by Mohini Tanner RN on 2/9/2023 at 2:38 PM   Electronically signed by JUAN FRANCISCO Miles CNP on 2/9/2023 at 2:43 PM

## 2023-02-09 NOTE — PROGRESS NOTES
[employer] Application   Below Knee    NAME:  Bebeto Villanueva  YOB: 1935  MEDICAL RECORD NUMBER:  [id number]  DATE:  2/9/2023    [x] Removed old Unna boot if indicated and wash leg with mild soap and water. [x] Applied moisturizing agent to dry skin as needed. [x] Appied primary and secondary dressing as ordered    [x] Applied Unna roll from toes to knee overlapping each time. [x] Applied ace wrap or coban from toes to below the knee. [x] Secured with tape and/or metal clips covered with tape. [x] Instructed patient/caregiver to keep dressing dry and intact. DO NOT REMOVE DRESSING. [x] Instructed pt/family/caregiver to report excessive draining, loose bandage, wet dressing, severe pain or tingling in toes. [x] Applied [place] dressing below the knee to Bilateral lower leg(s)       Unna Boot(s) were applied per  Guidelines.      Electronically signed by Kathy Mir RN on 2/9/2023 at 3:08 PM

## 2023-02-09 NOTE — PROGRESS NOTES
[address]:     Other innovative wound solutions      Ordering Center:     [address] [place] [id number]  [address] Dept: [address] [place] [address] LOCO [unfilled]    Patient Information:      Pastor Mcdowell  [address]   [phone]   : 1935  AGE: 82 y.o. GENDER: male   TODAYS DATE:  2023    Insurance:      PRIMARY INSURANCE:  Plan: [address] MEDICARE  Coverage: [employer] MEDICARE  Effective Date: 11/15/2018  [id number] - (Medicare Managed)    SECONDARY INSURANCE:  Plan:   Coverage:   Effective Date:   @TruzipGROUPNUM@    [unfilled]   [unfilled]     Patient Wound Information:      Problem List Items Addressed This Visit          Other    Lymphedema of both lower extremities - Primary    Leg wound, right, initial encounter    Leg wound, left, initial encounter       WOUNDS REQUIRING DRESSING SUPPLIES:     Wound 23 Leg Right #1 circumfrential (Active)   Wound Image    23 1424   Wound Etiology Venous 23 1424   Dressing Status New drainage noted; Old drainage noted 23 1424   Wound Cleansed Soap and water 23 1424   Wound Length (cm) 12.5 cm 23 1424   Wound Width (cm) 44.7 cm 23 1424   Wound Depth (cm) 0.3 cm 23 1424   Wound Surface Area (cm^2) 558.75 cm^2 23 1424   Wound Volume (cm^3) 167.625 cm^3 23 1424   Post-Procedure Length (cm) 12.5 cm 23 1424   Post-Procedure Width (cm) 44.7 cm 23 1424   Post-Procedure Depth (cm) 0.3 cm 23 1424   Post-Procedure Surface Area (cm^2) 558.75 cm^2 23 1424   Post-Procedure Volume (cm^3) 167.625 cm^3 23 1424   Wound Assessment Pink/red 23 1424   Drainage Amount Copious 23 1424   Drainage Description Serous; Serosanguinous 23 1424   Odor Mild 23 1424   Sara-wound Assessment Maceration 23 1424   Margins Undefined edges 23 1424   Wound Thickness Description not for Pressure Injury Full thickness 02/09/23 1424   Number of days: 0       Wound 02/09/23 Leg Left;Medial #2 cluster (Active)   Wound Image   02/09/23 1424   Wound Etiology Venous 02/09/23 1424   Dressing Status New drainage noted; Old drainage noted 02/09/23 1424   Wound Cleansed Soap and water 02/09/23 1424   Wound Length (cm) 7 cm 02/09/23 1424   Wound Width (cm) 9 cm 02/09/23 1424   Wound Depth (cm) 0.1 cm 02/09/23 1424   Wound Surface Area (cm^2) 63 cm^2 02/09/23 1424   Wound Volume (cm^3) 6.3 cm^3 02/09/23 1424   Post-Procedure Length (cm) 7 cm 02/09/23 1424   Post-Procedure Width (cm) 9 cm 02/09/23 1424   Post-Procedure Depth (cm) 0.1 cm 02/09/23 1424   Post-Procedure Surface Area (cm^2) 63 cm^2 02/09/23 1424   Post-Procedure Volume (cm^3) 6.3 cm^3 02/09/23 1424   Wound Assessment Pink/red 02/09/23 1424   Drainage Amount Copious 02/09/23 1424   Drainage Description Clear 02/09/23 1424   Odor Mild 02/09/23 1424   Sara-wound Assessment Maceration 02/09/23 1424   Margins Undefined edges 02/09/23 1424   Wound Thickness Description not for Pressure Injury Full thickness 02/09/23 1424   Number of days: 0          Supplies Requested :      WOUND #: 1 and 2   PRIMARY DRESSING:  Other: silvercel, lory max,    Cover and Secure with: Other calamine unna boots     FREQUENCY OF DRESSING CHANGES:  Other twice a week            ADDITIONAL ITEMS:  [] Gloves Small  [x] Gloves Medium [] Gloves Large [] Gloves XLarge  [] Tape 1\" [x] Tape 2\" [] Tape 3\"  [] Medipore Tape  [x] Saline  [] Skin Prep   [] Adhesive Remover   [] Cotton Tip Applicators   [] Other:    Patient Wound(s) Debrided: [] Yes   [x] No      Is the patient currently on an antibiotic for their Wound(s): [] Yes if yes please add name and dose     [x] No  Weekly wound care visits until determined otherwise. Patient currently being seen by Home Health: [] Yes   [x] No referral sent, but not in place.      Duration for needed supplies:  []15  [x]30 []60  []90 Days    Provider Information:      PROVIDER'S NAME: Ania CHICAS-SAMAN    [facility]-[id number]

## 2023-02-09 NOTE — PROGRESS NOTES
[address]   Progress Note and Procedure Note      [address] RECORD NUMBER:  [id number]  AGE: 82 y.o. GENDER: male  : 1935  EPISODE DATE:  2023    Subjective:     Chief Complaint   Patient presents with    Wound Check     Bilateral lower legs         HISTORY of PRESENT ILLNESS HPI     Tammy Woods is a 82 y.o. male who presents today for wound/ulcer evaluation. History of Wound Context: presents to wound clinic today with daughter for evaluation of bilateral lower leg weeping ulcers that have been present for many years. At times worse than others. Daughter is currently wrapping strips of bath blankets around legs with ace wraps d/t copious amounts of drainage from legs. He would benefit from referral to lymphedema once wounds are healed. [facility] is reviewing case to see if they can come into home and rdz unna boots twice weekly. Wound/Ulcer Pain Timing/Severity: none  Quality of pain: N/A  Severity:  0 / 10   Modifying Factors: None  Associated Signs/Symptoms: none    Ulcer Identification:  Ulcer Type: venous  Contributing Factors: edema, venous stasis, lymphedema, decreased mobility, and obesity         PAST MEDICAL HISTORY        Diagnosis Date    Arthritis     CHF (congestive heart failure) (HCC)     Chronic kidney disease     History of blood transfusion     Hyperlipidemia        PAST SURGICAL HISTORY    Past Surgical History:   Procedure Laterality Date    CARDIAC SURGERY      bypass    JOINT REPLACEMENT      Left hip    PACEMAKER PLACEMENT Right     and defib       FAMILY HISTORY    History reviewed. No pertinent family history.     SOCIAL HISTORY    Social History     Tobacco Use    Smoking status: Former     Types: Cigarettes     Quit date:      Years since quittin.1     Passive exposure: Never    Smokeless tobacco: Never   Vaping Use    Vaping Use: Never used   Substance Use Topics    Alcohol use: Not Currently Drug use: Never       ALLERGIES    No Known Allergies    MEDICATIONS    Current Outpatient Medications on File Prior to Encounter   Medication Sig Dispense Refill    metoprolol tartrate (LOPRESSOR) 50 MG tablet TAKE 1 TABLET TWICE DAILY 180 tablet 1    bumetanide (BUMEX) 2 MG tablet TAKE 1 TABLET TWICE DAILY 180 tablet 0    albuterol (PROVENTIL) (5 MG/ML) 0.5% nebulizer solution Take 0.5 mLs by nebulization 4 times daily as needed for Wheezing 120 each 3    acetylcysteine (MUCOMYST) 20 % nebulizer solution Take 3 mLs by mouth 2 times daily for 2 doses 6 mL 0    furosemide (LASIX) 40 MG tablet Take 1 tablet by mouth 2 times daily 180 tablet 3    magnesium oxide (MAG-OX) 400 (240 Mg) MG tablet TAKE 1 TABLET TWICE DAILY 180 tablet 0    rosuvastatin (CRESTOR) 20 MG tablet TAKE 1 TABLET EVERY DAY 90 tablet 3    lisinopril (PRINIVIL;ZESTRIL) 5 MG tablet TAKE 1 TABLET EVERY DAY 90 tablet 3    clopidogrel (PLAVIX) 75 MG tablet TAKE 1 TABLET EVERY DAY 90 tablet 1    allopurinol (ZYLOPRIM) 100 MG tablet TAKE 1 TABLET EVERY DAY 90 tablet 1    potassium chloride (KLOR-CON) 10 MEQ extended release tablet TAKE 1 TABLET EVERY DAY 90 tablet 1    apixaban (ELIQUIS) 2.5 MG TABS tablet Take 1 tablet by mouth in the morning and 1 tablet before bedtime. 180 tablet 3    Methylcobalamin (B-12) 5000 MCG TBDP Take by mouth (Patient not taking: Reported on 2/9/2023)      ascorbic acid ([employer] VITAMIN C) 500 MG tablet Take 1 tablet by mouth 2 times daily 180 tablet 1     No current facility-administered medications on file prior to encounter. REVIEW OF SYSTEMS    Review of Systems   Constitutional:  Negative for chills, fatigue and fever. HENT:  Negative for congestion and rhinorrhea. Respiratory:  Negative for cough and shortness of breath. Cardiovascular:  Negative for chest pain and leg swelling. Obese  CHF history   Gastrointestinal:  Negative for diarrhea, nausea and vomiting.    Musculoskeletal:  Positive for gait problem (uses cane). Skin:  Positive for wound (bilateral lower leg ulcers with weeping). Lymphedema bilateral lower legs   Allergic/Immunologic: Negative for immunocompromised state. Neurological:  Positive for weakness (generalized). Negative for dizziness and syncope. Psychiatric/Behavioral:  Negative for agitation. The patient is not nervous/anxious. Objective:      /62   Pulse 93   Temp 98.4 °F (36.9 °C) (Tympanic)   Resp 20   Ht 6' (1.829 m)   Wt (!) 302 lb (137 kg)   BMI 40.96 kg/m²     Wt Readings from Last 3 Encounters:   02/09/23 (!) 302 lb (137 kg)   02/02/23 (!) 302 lb (137 kg)   11/14/22 300 lb (136.1 kg)       PHYSICAL EXAM    General Appearance: alert and oriented to person, place and time, well-developed and obese, in no acute distress  Skin: warm and dry, no rash or erythema, bilateral lower leg ulcers with weeping  Head: normocephalic and atraumatic  Eyes: pupils equal, round and conjunctivae normal  Pulmonary/Chest: normal air movement, no respiratory distress  Extremities: no cyanosis and no clubbing.  Bilateral lower leg edema   Musculoskeletal: no joint swelling, deformity or tenderness  Neurologic: gait, coordination normal and speech normal      Assessment:     Problem List Items Addressed This Visit       Leg wound, left, initial encounter    Relevant Medications    lidocaine (XYLOCAINE) 4 % external solution (Start on 2/9/2023  3:15 PM)    Other Relevant Orders    Initiate Outpatient Wound Care Protocol    Leg wound, right, initial encounter    Relevant Medications    lidocaine (XYLOCAINE) 4 % external solution (Start on 2/9/2023  3:15 PM)    Other Relevant Orders    Initiate Outpatient Wound Care Protocol    Lymphedema of both lower extremities - Primary    Relevant Medications    lidocaine (XYLOCAINE) 4 % external solution (Start on 2/9/2023  3:15 PM)    Other Relevant Orders    Initiate Outpatient Wound Care Protocol        Procedure Note  Indications:  Based on my examination of this patient's wound(s)/ulcer(s) today, debridement is not required to promote healing and evaluate the wound base. Wound Measurements:  Wound/Ulcer Descriptions are Pre Debridement except measurements:    Wound 02/09/23 Leg Right #1 circumfrential (Active)   Wound Image    02/09/23 1424   Wound Etiology Venous 02/09/23 1424   Dressing Status New drainage noted; Old drainage noted 02/09/23 1424   Wound Cleansed Soap and water 02/09/23 1424   Wound Length (cm) 12.5 cm 02/09/23 1424   Wound Width (cm) 44.7 cm 02/09/23 1424   Wound Depth (cm) 0.3 cm 02/09/23 1424   Wound Surface Area (cm^2) 558.75 cm^2 02/09/23 1424   Wound Volume (cm^3) 167.625 cm^3 02/09/23 1424   Post-Procedure Length (cm) 12.5 cm 02/09/23 1424   Post-Procedure Width (cm) 44.7 cm 02/09/23 1424   Post-Procedure Depth (cm) 0.3 cm 02/09/23 1424   Post-Procedure Surface Area (cm^2) 558.75 cm^2 02/09/23 1424   Post-Procedure Volume (cm^3) 167.625 cm^3 02/09/23 1424   Wound Assessment Pink/red 02/09/23 1424   Drainage Amount Copious 02/09/23 1424   Drainage Description Serous; Serosanguinous 02/09/23 1424   Odor Mild 02/09/23 1424   Sara-wound Assessment Maceration 02/09/23 1424   Margins Undefined edges 02/09/23 1424   Wound Thickness Description not for Pressure Injury Full thickness 02/09/23 1424   Number of days: 0       Wound 02/09/23 Leg Left;Medial #2 cluster (Active)   Wound Image   02/09/23 1424   Wound Etiology Venous 02/09/23 1424   Dressing Status New drainage noted; Old drainage noted 02/09/23 1424   Wound Cleansed Soap and water 02/09/23 1424   Wound Length (cm) 7 cm 02/09/23 1424   Wound Width (cm) 9 cm 02/09/23 1424   Wound Depth (cm) 0.1 cm 02/09/23 1424   Wound Surface Area (cm^2) 63 cm^2 02/09/23 1424   Wound Volume (cm^3) 6.3 cm^3 02/09/23 1424   Post-Procedure Length (cm) 7 cm 02/09/23 1424   Post-Procedure Width (cm) 9 cm 02/09/23 1424   Post-Procedure Depth (cm) 0.1 cm 02/09/23 1424   Post-Procedure Surface Area (cm^2) 63 cm^2 02/09/23 1424   Post-Procedure Volume (cm^3) 6.3 cm^3 02/09/23 1424   Wound Assessment Pink/red 02/09/23 1424   Drainage Amount Copious 02/09/23 1424   Drainage Description Clear 02/09/23 1424   Odor Mild 02/09/23 1424   Sara-wound Assessment Maceration 02/09/23 1424   Margins Undefined edges 02/09/23 1424   Wound Thickness Description not for Pressure Injury Full thickness 02/09/23 1424   Number of days: 0            Plan:     Treatment Note please see Discharge Instructions    Written patient dismissal instructions given to patient and signed by patient or POA.            Electronically signed by JUAN FRANCISCO Retana CNP on 2/9/2023 at 2:55 PM

## 2023-02-11 NOTE — DISCHARGE INSTRUCTIONS
[address]                            Visit  Discharge Instructions / Physician Orders  DATE: 2/15/23     Home Care: None     SUPPLIES ORDERED THRU:  none          DATE LAST SUPPLIED  NA     Wound Location:  right and left lower legs     Cleanse with: keep dry and intact     Dressing Orders:  Primary dressing  silvercel , kerramax                     Secondary dressing  calamine unna boots   to both legs                      secure with           x 30days     Frequency:  keep dry and intact for the week     Additional Orders: Increase protein to diet (meat, cheese, eggs, fish, peanut butter, nuts and beans)  Multivitamin daily    Referral for lymphedema clinic placed today     OFFLOADING [] YES  TYPE:                  [] NA     Weekly wound care visits until determined otherwise. Antibiotic therapy-wound care related YES [] NO [] NA[]     MY CHART []     Smart Device  []            Your next appointment with the [address] [place] [address] is in 1 week                                                                                                   (Please note your next appointment above and if you are unable to keep, kindly give a 24 hour notice. Thank you.)  If more than 15 min late we cannot guarantee you will be seen due to clinician schedule  Per Policy, Excessive cancellation will call for dismissal from program.  If you experience any of the following, please call the [address] [place] [address] during business hours:  [phone]     * Increase in Pain  * Temperature over 101  * Increase in drainage from your wound  * Drainage with a foul odor  * Bleeding  * Increase in swelling  * Need for compression bandage changes due to slippage, breakthrough drainage. If you need medical attention outside of the business hours of the [address] [place] [address] please contact your PCP or go to the nearest emergency room.      The information contained in the After Visit Summary has been reviewed with me, the patient and/or responsible adult, by my health care provider(s). I had the opportunity to ask questions regarding this information.  I have elected to receive;      []After Visit Summary  [x]Comprehensive Discharge Instruction        Patient signature______________________________________Date:________  Electronically signed by Jah Alvares RN on 2/15/2023 at 2:29 PM   Electronically signed by Gennaro Serrano DPM on 2/15/2023 at 1:46 PM

## 2023-02-15 ENCOUNTER — HOSPITAL ENCOUNTER (OUTPATIENT)
Dept: WOUND CARE | Age: 88
Discharge: HOME | End: 2023-02-15
Payer: MEDICARE

## 2023-02-15 VITALS
HEIGHT: 72 IN | DIASTOLIC BLOOD PRESSURE: 66 MMHG | HEART RATE: 92 BPM | BODY MASS INDEX: 40.9 KG/M2 | RESPIRATION RATE: 20 BRPM | SYSTOLIC BLOOD PRESSURE: 130 MMHG | TEMPERATURE: 98 F | WEIGHT: 302 LBS

## 2023-02-15 DIAGNOSIS — R60.0 LOCALIZED EDEMA: ICD-10-CM

## 2023-02-15 DIAGNOSIS — S81.801A LEG WOUND, RIGHT, INITIAL ENCOUNTER: ICD-10-CM

## 2023-02-15 DIAGNOSIS — I89.0 LYMPHEDEMA OF BOTH LOWER EXTREMITIES: Primary | ICD-10-CM

## 2023-02-15 DIAGNOSIS — L97.811 NON-PRESSURE CHRONIC ULCER OF OTHER PART OF RIGHT LOWER LEG LIMITED TO BREAKDOWN OF SKIN (HCC): ICD-10-CM

## 2023-02-15 DIAGNOSIS — S81.802A LEG WOUND, LEFT, INITIAL ENCOUNTER: ICD-10-CM

## 2023-02-15 DIAGNOSIS — L97.821 NON-PRESSURE CHRONIC ULCER OF OTHER PART OF LEFT LOWER LEG LIMITED TO BREAKDOWN OF SKIN (HCC): ICD-10-CM

## 2023-02-15 PROCEDURE — 29580 STRAPPING UNNA BOOT: CPT

## 2023-02-15 RX ORDER — LIDOCAINE HYDROCHLORIDE 40 MG/ML
SOLUTION TOPICAL ONCE
Status: COMPLETED | OUTPATIENT
Start: 2023-02-15 | End: 2023-02-15

## 2023-02-15 RX ORDER — LIDOCAINE HYDROCHLORIDE 40 MG/ML
SOLUTION TOPICAL ONCE
OUTPATIENT
Start: 2023-02-15 | End: 2023-02-15

## 2023-02-15 RX ORDER — LIDOCAINE HYDROCHLORIDE 20 MG/ML
JELLY TOPICAL ONCE
OUTPATIENT
Start: 2023-02-15 | End: 2023-02-15

## 2023-02-15 RX ADMIN — LIDOCAINE HYDROCHLORIDE 30 ML: 40 SOLUTION TOPICAL at 14:09

## 2023-02-15 ASSESSMENT — PAIN SCALES - GENERAL: PAINLEVEL_OUTOF10: 0

## 2023-02-15 NOTE — PROGRESS NOTES
[employer] Application   Below Knee    NAME:  Milton Nunez  YOB: 1935  MEDICAL RECORD NUMBER:  [id number]  DATE:  2/15/2023    [x] Applied moisturizing agent to dry skin as needed. [x] Appied primary and secondary dressing as ordered    [x] Applied Unna roll from toes to knee overlapping each time. [x] Applied ace wrap or coban from toes to below the knee. [x] Secured with tape and/or metal clips covered with tape. [x] Instructed patient/caregiver to keep dressing dry and intact. DO NOT REMOVE DRESSING. [x] Instructed pt/family/caregiver to report excessive draining, loose bandage, wet dressing, severe pain or tingling in toes. [x] Applied [place] dressing below the knee to Bilateral lower leg(s)       Unna Boot(s) were applied per  Guidelines. Silvercel, kerramax, CALAMINE unna boots.    Electronically signed by Edna Haynes RN on 2/15/2023 at 2:33 PM

## 2023-02-15 NOTE — PROGRESS NOTES
[address]   Progress Note and Procedure Note      [address] RECORD NUMBER:  [id number]  AGE: 82 y.o. GENDER: male  : 1935  EPISODE DATE:  2/15/2023    Subjective:     Chief Complaint   Patient presents with    Wound Check     BLE         HISTORY of PRESENT ILLNESS HPI     Suzie Grider is a 82 y.o. male who presents today for wound/ulcer evaluation. History of Wound Context: Tiago Alanis presents for evaluation of bilateral lower leg ulcerations. He has lymphedema and has large ulcerations to both legs. Has not had any sign or symptom of infection. Has not seen a lymphedema clinic before and does not have wraps for at home. Ulcer Identification:  Ulcer Type: lymphedema  Contributing Factors: edema, lymphedema, and obesity          PAST MEDICAL HISTORY        Diagnosis Date    Arthritis     CHF (congestive heart failure) (HCC)     Chronic kidney disease     History of blood transfusion     Hyperlipidemia        PAST SURGICAL HISTORY    Past Surgical History:   Procedure Laterality Date    CARDIAC SURGERY      bypass    JOINT REPLACEMENT      Left hip    PACEMAKER PLACEMENT Right     and defib       FAMILY HISTORY    History reviewed. No pertinent family history.     SOCIAL HISTORY    Social History     Tobacco Use    Smoking status: Former     Types: Cigarettes     Quit date:      Years since quittin.1     Passive exposure: Never    Smokeless tobacco: Never   Vaping Use    Vaping Use: Never used   Substance Use Topics    Alcohol use: Not Currently    Drug use: Never       ALLERGIES    No Known Allergies    MEDICATIONS    Current Outpatient Medications on File Prior to Encounter   Medication Sig Dispense Refill    metoprolol tartrate (LOPRESSOR) 50 MG tablet TAKE 1 TABLET TWICE DAILY 180 tablet 1    bumetanide (BUMEX) 2 MG tablet TAKE 1 TABLET TWICE DAILY 180 tablet 0    albuterol (PROVENTIL) (5 MG/ML) 0.5% nebulizer solution Take 0.5 mLs by nebulization 4 times daily as needed for Wheezing 120 each 3    acetylcysteine (MUCOMYST) 20 % nebulizer solution Take 3 mLs by mouth 2 times daily for 2 doses 6 mL 0    furosemide (LASIX) 40 MG tablet Take 1 tablet by mouth 2 times daily 180 tablet 3    magnesium oxide (MAG-OX) 400 (240 Mg) MG tablet TAKE 1 TABLET TWICE DAILY 180 tablet 0    rosuvastatin (CRESTOR) 20 MG tablet TAKE 1 TABLET EVERY DAY 90 tablet 3    lisinopril (PRINIVIL;ZESTRIL) 5 MG tablet TAKE 1 TABLET EVERY DAY 90 tablet 3    clopidogrel (PLAVIX) 75 MG tablet TAKE 1 TABLET EVERY DAY 90 tablet 1    allopurinol (ZYLOPRIM) 100 MG tablet TAKE 1 TABLET EVERY DAY 90 tablet 1    potassium chloride (KLOR-CON) 10 MEQ extended release tablet TAKE 1 TABLET EVERY DAY 90 tablet 1    apixaban (ELIQUIS) 2.5 MG TABS tablet Take 1 tablet by mouth in the morning and 1 tablet before bedtime. 180 tablet 3    Methylcobalamin (B-12) 5000 MCG TBDP Take by mouth (Patient not taking: No sig reported)      ascorbic acid ([employer] VITAMIN C) 500 MG tablet Take 1 tablet by mouth 2 times daily 180 tablet 1     No current facility-administered medications on file prior to encounter. REVIEW OF SYSTEMS    Review of Systems   Constitutional:  Negative for chills and fever. Skin:  Positive for wound. Objective:      /66   Pulse 92   Temp 98 °F (36.7 °C) (Tympanic)   Resp 20   Ht 6' (1.829 m)   Wt (!) 302 lb (137 kg)   BMI 40.96 kg/m²     Wt Readings from Last 3 Encounters:   02/15/23 (!) 302 lb (137 kg)   02/09/23 (!) 302 lb (137 kg)   02/02/23 (!) 302 lb (137 kg)       Physical Exam:  General:  Alert and oriented x3. In no acute distress.      Lower Extremity Physical Exam:    Vascular: DP pulses are not palpable, Bilateral. PT pulses are not palpable, Bilateral. CFT <3 seconds to all digits, Bilateral.  +4 pitting edema, Bilateral.  Hair growth is absent to the level of the digits, Bilateral.     Neuro: Saph/sural/SP/DP/plantar sensation intact to light touch.    Musculoskeletal: EHL/FHL/GS/TA gross motor intact. Gross deformity is absent. Dermatologic: Open wound present to present to bilateral pretibial and calf as documented in detail below. Wound base is granular and limited to the dermal layer. Negative probe to bone. There is no erythema. There is no purulent drainage. There is no fluctuance or crepitus. Interdigital maceration absent, Bilateral.     Wound 02/09/23 Leg Right #1 circumfrential (Active)   Wound Image    02/09/23 1424   Wound Etiology Venous 02/15/23 1405   Dressing Status New drainage noted; Old drainage noted 02/15/23 1405   Wound Cleansed Soap and water 02/15/23 1405   Dressing/Treatment Other (comment) 02/09/23 1502   Wound Length (cm) 16.5 cm 02/15/23 1405   Wound Width (cm) 17 cm 02/15/23 1405   Wound Depth (cm) 0.1 cm 02/15/23 1405   Wound Surface Area (cm^2) 280.5 cm^2 02/15/23 1405   Change in Wound Size % (l*w) 49.8 02/15/23 1405   Wound Volume (cm^3) 28.05 cm^3 02/15/23 1405   Wound Healing % 83 02/15/23 1405   Post-Procedure Length (cm) 16.5 cm 02/15/23 1405   Post-Procedure Width (cm) 17 cm 02/15/23 1405   Post-Procedure Depth (cm) 0.1 cm 02/15/23 1405   Post-Procedure Surface Area (cm^2) 280.5 cm^2 02/15/23 1405   Post-Procedure Volume (cm^3) 28.05 cm^3 02/15/23 1405   Wound Assessment Pink/red;Slough 02/15/23 1405   Drainage Amount Large 02/15/23 1405   Drainage Description Serous 02/15/23 1405   Odor None 02/15/23 1405   Sara-wound Assessment Hyperpigmented 02/15/23 1405   Margins Defined edges 02/15/23 1405   Wound Thickness Description not for Pressure Injury Full thickness 02/09/23 1424   Number of days: 6       Wound 02/09/23 Leg Left;Medial #2 cluster (Active)   Wound Image   02/09/23 1424   Wound Etiology Venous 02/15/23 1405   Dressing Status New drainage noted; Old drainage noted 02/15/23 1405   Wound Cleansed Soap and water 02/15/23 1405   Dressing/Treatment Other (comment) 02/09/23 1502   Wound Length (cm) 29 cm 02/15/23 1405   Wound Width (cm) 39 cm 02/15/23 1405   Wound Depth (cm) 0.1 cm 02/15/23 1405   Wound Surface Area (cm^2) 1131 cm^2 02/15/23 1405   Change in Wound Size % (l*w) -1695.24 02/15/23 1405   Wound Volume (cm^3) 113.1 cm^3 02/15/23 1405   Wound Healing % -1695 02/15/23 1405   Post-Procedure Length (cm) 29 cm 02/15/23 1405   Post-Procedure Width (cm) 39 cm 02/15/23 1405   Post-Procedure Depth (cm) 0.1 cm 02/15/23 1405   Post-Procedure Surface Area (cm^2) 1131 cm^2 02/15/23 1405   Post-Procedure Volume (cm^3) 113.1 cm^3 02/15/23 1405   Wound Assessment Pink/red;Slough 02/15/23 1405   Drainage Amount Large 02/15/23 1405   Drainage Description Serous 02/15/23 1405   Odor None 02/15/23 1405   Sara-wound Assessment Hyperpigmented 02/15/23 1405   Margins Undefined edges 02/15/23 1405   Wound Thickness Description not for Pressure Injury Full thickness 02/09/23 1424   Number of days: 6              Assessment:      Active Hospital Problems    Diagnosis Date Noted    Localized edema [R60.0] 02/15/2023     Priority: Medium    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  Discussed the importance of continued compression therapy for prevention of re-ulceration once healed.    Plan for Unna boot as he has done well with this in the past  Discussed elevation of the legs when he is sitting.     Educated on signs and symptoms of infection. Instructed to  call clinic immediately or go to ER if signs and symptoms of infection are present. Recommended lymphedema clinic referral upon healing. Referral placed today. RTC 1 week         Written patient discharge instructions given to patient and signed by patient or POA.       Orders Placed This Encounter   Medications    lidocaine (XYLOCAINE) 4 % external solution     Orders Placed This Encounter   Procedures    Initiate Outpatient Wound Care Protocol     Cleanse wound with saline    If wound contains bioburden or contamination cleanse with wound cleanser or antimicrobial solution     For normal periwound tissue without irritation nor maceration, apply topical skin protectant    For periwound tissue with irritation and/or maceration, apply zinc based product, topical steroid cream/ointment, or equivalent     For wounds with dry firm black eschar and/or without exudate, apply betadine and leave open to air      For wounds with scant/small to no exudate or drainage, apply wound gel, hydrocolloid, polymer, or equivalent and cover with secondary dressing/foam      For wounds with moderate/large exudate or drainage, apply alginate, hydrofiber, polymer, or equivalent and cover with secondary dressing/foam    For wounds with nonviable tissue requiring removal, apply chemical or mechanical debrider and cover with secondary dressing/foam    For wounds with tunneling, dead space, or cavity, fill or pack with strip/gauze/kerlex to fit and cover with secondary dressing/foam    For wounds with adequate granulation or epithelization, apply wound gel, hydrocolloid, polymer, collagen, or transparent film, and cover secondary dry dressing/foam    For wounds that need additional secondary dressing to help pad or control additional drainage/exudates, add foam, absorbent pad or hydrocolloid    For wounds with suspected or known infection, apply antimicrobial mesh and/or antimicrobial alginate/hydrofiber, or antimicrobial solution moistened gauze/kerlex, or equivalent and cover with secondary dressing/foam    Compression Management needed for edema control, apply multilayer compression or tubular garment or equivalent    Offloading Management needed for pressure relief, apply offloading shoe/boot or equivalent     Standing Status:   Standing     Number of Occurrences:   [address] ([facility])     Referral Priority:   Routine     Referral Type:   Eval and Treat     Referral Reason:   Specialty Services Required     Number of Visits Requested:   1          Discharge Instructions           [address] [facility]                            Visit  Discharge Instructions / Physician Orders  DATE: 2/15/23     Home Care: None     SUPPLIES ORDERED THRU:  none          DATE LAST SUPPLIED  NA     Wound Location:  right and left lower legs     Cleanse with: keep dry and intact     Dressing Orders:  Primary dressing  silvercel , kerramax                     Secondary dressing  calamine unna boots   to both legs                      secure with           x 30days     Frequency:  keep dry and intact for the week     Additional Orders: Increase protein to diet (meat, cheese, eggs, fish, peanut butter, nuts and beans)  Multivitamin daily    Referral for lymphedema clinic placed today     OFFLOADING [] YES  TYPE:                  [] NA     Weekly wound care visits until determined otherwise. Antibiotic therapy-wound care related YES [] NO [] NA[]     MY CHART []     Smart Device  []            Your next appointment with the [address] [place] [address] is in 1 week                                                                                                   (Please note your next appointment above and if you are unable to keep, kindly give a 24 hour notice.  Thank you.)  If more than 15 min late we cannot guarantee you will be seen due to clinician schedule  Per Policy, Excessive cancellation will call for dismissal from program.  If you experience any of the following, please call the [address] during business hours:  [phone]     * Increase in Pain  * Temperature over 101  * Increase in drainage from your wound  * Drainage with a foul odor  * Bleeding  * Increase in swelling  * Need for compression bandage changes due to slippage, breakthrough drainage. If you need medical attention outside of the business hours of the [address] please contact your PCP or go to the nearest emergency room. The information contained in the After Visit Summary has been reviewed with me, the patient and/or responsible adult, by my health care provider(s). I had the opportunity to ask questions regarding this information.  I have elected to receive;      []After Visit Summary  [x]Comprehensive Discharge Instruction        Patient signature______________________________________Date:________  Electronically signed by Alonzo Sellers RN on 2/15/2023 at 2:29 PM   Electronically signed by Tanika Nunez DPM on 2/15/2023 at 1:46 PM        Electronically signed by Tanika Nunez DPM on 2/15/2023 at 2:31 PM

## 2023-02-22 NOTE — DISCHARGE INSTRUCTIONS
[id number]                            Visit  Discharge Instructions / Physician Orders  DATE: 2/23/23     Home Care: None     SUPPLIES ORDERED THRU:  none          DATE LAST SUPPLIED  NA     Wound Location:  right and left lower legs     Cleanse with: keep dry and intact     Dressing Orders:  Primary dressing  silvercel , kerramax                     Secondary dressing  calamine unna boots   to both legs                      secure with           x 30days     Frequency:  keep dry and intact for the week     Additional Orders: Increase protein to diet (meat, cheese, eggs, fish, peanut butter, nuts and beans)  Multivitamin daily     Referral for lymphedema clinic placed today     OFFLOADING [] YES  TYPE:                  [] NA     Weekly wound care visits until determined otherwise. Antibiotic therapy-wound care related YES [] NO [] NA[]     MY CHART []     Smart Device  []            Your next appointment with the [facility] is in 1 week                                                                                                   (Please note your next appointment above and if you are unable to keep, kindly give a 24 hour notice. Thank you.)  If more than 15 min late we cannot guarantee you will be seen due to clinician schedule  Per Policy, Excessive cancellation will call for dismissal from program.  If you experience any of the following, please call the [facility] during business hours:  [phone]     * Increase in Pain  * Temperature over 101  * Increase in drainage from your wound  * Drainage with a foul odor  * Bleeding  * Increase in swelling  * Need for compression bandage changes due to slippage, breakthrough drainage. If you need medical attention outside of the business hours of the [facility] please contact your PCP or go to the nearest emergency room.      The information contained in the After Visit Summary has been reviewed with me, the patient and/or responsible adult, by my health care provider(s). I had the opportunity to ask questions regarding this information.  I have elected to receive;      []After Visit Summary  [x]Comprehensive Discharge Instruction        Patient signature______________________________________Date:________  Electronically signed by Ragini Gordillo RN on 2/23/2023 at 2:22 PM   Electronically signed by JUAN FRANCISCO Dempsey CNP on 2/23/2023 at 2:26 PM

## 2023-02-23 ENCOUNTER — HOSPITAL ENCOUNTER (OUTPATIENT)
Dept: WOUND CARE | Age: 88
Discharge: HOME | End: 2023-02-23
Payer: MEDICARE

## 2023-02-23 VITALS
BODY MASS INDEX: 40.9 KG/M2 | HEART RATE: 78 BPM | TEMPERATURE: 97.2 F | HEIGHT: 72 IN | DIASTOLIC BLOOD PRESSURE: 61 MMHG | SYSTOLIC BLOOD PRESSURE: 115 MMHG | WEIGHT: 302 LBS | RESPIRATION RATE: 20 BRPM

## 2023-02-23 DIAGNOSIS — S81.802A LEG WOUND, LEFT, INITIAL ENCOUNTER: ICD-10-CM

## 2023-02-23 DIAGNOSIS — I89.0 LYMPHEDEMA OF BOTH LOWER EXTREMITIES: Primary | ICD-10-CM

## 2023-02-23 DIAGNOSIS — S81.801D LEG WOUND, RIGHT, SUBSEQUENT ENCOUNTER: ICD-10-CM

## 2023-02-23 DIAGNOSIS — S81.802D LEG WOUND, LEFT, SUBSEQUENT ENCOUNTER: ICD-10-CM

## 2023-02-23 DIAGNOSIS — S81.801A LEG WOUND, RIGHT, INITIAL ENCOUNTER: ICD-10-CM

## 2023-02-23 PROCEDURE — 99213 OFFICE O/P EST LOW 20 MIN: CPT | Performed by: NURSE PRACTITIONER

## 2023-02-23 PROCEDURE — 29580 STRAPPING UNNA BOOT: CPT

## 2023-02-23 RX ORDER — LIDOCAINE HYDROCHLORIDE 40 MG/ML
SOLUTION TOPICAL ONCE
Status: COMPLETED | OUTPATIENT
Start: 2023-02-23 | End: 2023-02-23

## 2023-02-23 RX ORDER — LIDOCAINE HYDROCHLORIDE 40 MG/ML
SOLUTION TOPICAL ONCE
OUTPATIENT
Start: 2023-02-23 | End: 2023-02-23

## 2023-02-23 RX ORDER — LIDOCAINE HYDROCHLORIDE 20 MG/ML
JELLY TOPICAL ONCE
OUTPATIENT
Start: 2023-02-23 | End: 2023-02-23

## 2023-02-23 RX ADMIN — LIDOCAINE HYDROCHLORIDE 10 ML: 40 SOLUTION TOPICAL at 13:49

## 2023-02-23 ASSESSMENT — ENCOUNTER SYMPTOMS
SHORTNESS OF BREATH: 0
DIARRHEA: 0
NAUSEA: 0
VOMITING: 0
COUGH: 0
RHINORRHEA: 0

## 2023-02-23 NOTE — PROGRESS NOTES
[address]   Progress Note and Procedure Note      [address] RECORD NUMBER:  [id number]  AGE: 82 y.o. GENDER: male  : 1935  EPISODE DATE:  2023    Subjective:     Chief Complaint   Patient presents with    Wound Check     Lower legs         HISTORY of PRESENT ILLNESS HPI     Augustus Luu is a 82 y.o. male who presents today for wound/ulcer evaluation. History of Wound Context: here to follow up on bilateral lower leg wounds that are slowly improving. No signs or symptoms of infection. Wound/Ulcer Pain Timing/Severity: none  Quality of pain: N/A  Severity:  0 / 10   Modifying Factors: None  Associated Signs/Symptoms: none    Ulcer Identification:  Ulcer Type: lymphedema  Contributing Factors: edema, lymphedema, and obesity         PAST MEDICAL HISTORY        Diagnosis Date    Arthritis     CHF (congestive heart failure) (HCC)     Chronic kidney disease     History of blood transfusion     Hyperlipidemia        PAST SURGICAL HISTORY    Past Surgical History:   Procedure Laterality Date    CARDIAC SURGERY      bypass    JOINT REPLACEMENT      Left hip    PACEMAKER PLACEMENT Right     and defib       FAMILY HISTORY    History reviewed. No pertinent family history.     SOCIAL HISTORY    Social History     Tobacco Use    Smoking status: Former     Types: Cigarettes     Quit date:      Years since quittin.1     Passive exposure: Never    Smokeless tobacco: Never   Vaping Use    Vaping Use: Never used   Substance Use Topics    Alcohol use: Not Currently    Drug use: Never       ALLERGIES    No Known Allergies    MEDICATIONS    Current Outpatient Medications on File Prior to Encounter   Medication Sig Dispense Refill    metoprolol tartrate (LOPRESSOR) 50 MG tablet TAKE 1 TABLET TWICE DAILY 180 tablet 1    bumetanide (BUMEX) 2 MG tablet TAKE 1 TABLET TWICE DAILY 180 tablet 0    albuterol (PROVENTIL) (5 MG/ML) 0.5% nebulizer solution Take 0.5 mLs by nebulization 4 times daily as needed for Wheezing 120 each 3    acetylcysteine (MUCOMYST) 20 % nebulizer solution Take 3 mLs by mouth 2 times daily for 2 doses 6 mL 0    furosemide (LASIX) 40 MG tablet Take 1 tablet by mouth 2 times daily 180 tablet 3    magnesium oxide (MAG-OX) 400 (240 Mg) MG tablet TAKE 1 TABLET TWICE DAILY 180 tablet 0    rosuvastatin (CRESTOR) 20 MG tablet TAKE 1 TABLET EVERY DAY 90 tablet 3    lisinopril (PRINIVIL;ZESTRIL) 5 MG tablet TAKE 1 TABLET EVERY DAY 90 tablet 3    clopidogrel (PLAVIX) 75 MG tablet TAKE 1 TABLET EVERY DAY 90 tablet 1    allopurinol (ZYLOPRIM) 100 MG tablet TAKE 1 TABLET EVERY DAY 90 tablet 1    potassium chloride (KLOR-CON) 10 MEQ extended release tablet TAKE 1 TABLET EVERY DAY 90 tablet 1    apixaban (ELIQUIS) 2.5 MG TABS tablet Take 1 tablet by mouth in the morning and 1 tablet before bedtime. 180 tablet 3    Methylcobalamin (B-12) 5000 MCG TBDP Take by mouth (Patient not taking: No sig reported)      ascorbic acid ([employer]S VITAMIN C) 500 MG tablet Take 1 tablet by mouth 2 times daily 180 tablet 1     No current facility-administered medications on file prior to encounter. REVIEW OF SYSTEMS    Review of Systems   Constitutional:  Negative for chills, fatigue and fever. HENT:  Negative for congestion and rhinorrhea. Respiratory:  Negative for cough and shortness of breath. Cardiovascular:  Negative for chest pain and leg swelling. Obese  CHF history   Gastrointestinal:  Negative for diarrhea, nausea and vomiting. Musculoskeletal:  Positive for gait problem (uses cane). Skin:  Positive for wound (bilateral lower leg ulcers with weeping). Lymphedema bilateral lower legs   Allergic/Immunologic: Negative for immunocompromised state. Neurological:  Positive for weakness (generalized). Negative for dizziness and syncope. Psychiatric/Behavioral:  Negative for agitation. The patient is not nervous/anxious.       Objective:      /61 Pulse 78   Temp 97.2 °F (36.2 °C) (Tympanic)   Resp 20   Ht 6' (1.829 m)   Wt (!) 302 lb (137 kg)   BMI 40.96 kg/m²     Wt Readings from Last 3 Encounters:   02/23/23 (!) 302 lb (137 kg)   02/15/23 (!) 302 lb (137 kg)   02/09/23 (!) 302 lb (137 kg)       PHYSICAL EXAM    General Appearance: alert and oriented to person, place and time, well-developed and well-nourished, in no acute distress  Skin: warm and dry, no rash or erythema, bilateral lower leg ulcerations   Head: normocephalic and atraumatic  Eyes: pupils equal, round and conjunctivae normal  Pulmonary/Chest: normal air movement, no respiratory distress  Extremities: no cyanosis and no clubbing or edema   Musculoskeletal: no joint swelling, deformity or tenderness  Neurologic: gait, coordination normal and speech normal      Assessment:     Problem List Items Addressed This Visit       Leg wound, left, subsequent encounter    Leg wound, right, subsequent encounter    Lymphedema of both lower extremities - Primary    Relevant Orders    Initiate Outpatient Wound Care Protocol        Procedure Note  Indications:  Based on my examination of this patient's wound(s)/ulcer(s) today, debridement is not required to promote healing and evaluate the wound base. Wound Measurements:  Wound/Ulcer Descriptions are Pre Debridement except measurements:    Wound 02/09/23 Leg Right #1 circumfrential (Active)   Wound Image    02/09/23 1424   Wound Etiology Venous 02/23/23 1332   Dressing Status New drainage noted; Old drainage noted 02/23/23 1332   Wound Cleansed Soap and water 02/23/23 1332   Dressing/Treatment Other (comment) 02/09/23 1502   Wound Length (cm) 22 cm 02/23/23 1332   Wound Width (cm) 19 cm 02/23/23 1332   Wound Depth (cm) 0.1 cm 02/23/23 1332   Wound Surface Area (cm^2) 418 cm^2 02/23/23 1332   Change in Wound Size % (l*w) 25.19 02/23/23 1332   Wound Volume (cm^3) 41.8 cm^3 02/23/23 1332   Wound Healing % 75 02/23/23 1332   Post-Procedure Length (cm) 22 cm 02/23/23 1332   Post-Procedure Width (cm) 19 cm 02/23/23 1332   Post-Procedure Depth (cm) 0.1 cm 02/23/23 1332   Post-Procedure Surface Area (cm^2) 418 cm^2 02/23/23 1332   Post-Procedure Volume (cm^3) 41.8 cm^3 02/23/23 1332   Wound Assessment [place]/red;Slough 02/23/23 1332   Drainage Amount Large 02/23/23 1332   Drainage Description Serous 02/23/23 1332   Odor None 02/23/23 1332   Sara-wound Assessment Hyperpigmented 02/23/23 1332   Margins Defined edges 02/23/23 1332   Wound Thickness Description not for Pressure Injury Full thickness 02/23/23 1332   Number of days: 14       Wound 02/09/23 Leg Left;Medial #2 cluster (Active)   Wound Image   02/09/23 1424   Wound Etiology Venous 02/23/23 1332   Dressing Status New drainage noted; Old drainage noted 02/23/23 1332   Wound Cleansed Soap and water 02/23/23 1332   Dressing/Treatment Other (comment) 02/09/23 1502   Wound Length (cm) 22 cm 02/23/23 1332   Wound Width (cm) 22 cm 02/23/23 1332   Wound Depth (cm) 0.1 cm 02/23/23 1332   Wound Surface Area (cm^2) 484 cm^2 02/23/23 1332   Change in Wound Size % (l*w) -668.25 02/23/23 1332   Wound Volume (cm^3) 48.4 cm^3 02/23/23 1332   Wound Healing % -668 02/23/23 1332   Post-Procedure Length (cm) 22 cm 02/23/23 1332   Post-Procedure Width (cm) 22 cm 02/23/23 1332   Post-Procedure Depth (cm) 0.1 cm 02/23/23 1332   Post-Procedure Surface Area (cm^2) 484 cm^2 02/23/23 1332   Post-Procedure Volume (cm^3) 48.4 cm^3 02/23/23 1332   Wound Assessment [place]/red;Slough 02/23/23 1332   Drainage Amount Large 02/23/23 1332   Drainage Description Serous 02/23/23 1332   Odor None 02/23/23 1332   Sara-wound Assessment Hyperpigmented 02/23/23 1332   Margins Undefined edges 02/23/23 1332   Wound Thickness Description not for Pressure Injury Full thickness 02/23/23 1332   Number of days: 14            Plan:     Treatment Note please see Discharge Instructions    Written patient dismissal instructions given to patient and signed by patient or POA.           Electronically signed by JUAN FRANCISCO Diallo CNP on 2/23/2023 at 2:37 PM

## 2023-02-27 ENCOUNTER — HOSPITAL ENCOUNTER (OUTPATIENT)
Age: 88
Setting detail: SPECIMEN
Discharge: HOME | End: 2023-02-27

## 2023-02-27 DIAGNOSIS — E78.5 DYSLIPIDEMIA: ICD-10-CM

## 2023-02-27 DIAGNOSIS — I50.9 HEART FAILURE EXACERBATED BY SOTALOL (HCC): ICD-10-CM

## 2023-02-27 DIAGNOSIS — N18.32 STAGE 3B CHRONIC KIDNEY DISEASE (HCC): ICD-10-CM

## 2023-02-27 DIAGNOSIS — Z12.5 PROSTATE CANCER SCREENING: ICD-10-CM

## 2023-02-27 NOTE — DISCHARGE INSTRUCTIONS
[address]                            Visit  Discharge Instructions / Physician Orders  DATE: 3/1/23     Home Care: None     SUPPLIES ORDERED THRU:  none          DATE LAST SUPPLIED  NA     Wound Location:  right and left lower legs     Cleanse with: keep dry and intact     Dressing Orders:  Primary dressing  silvercel , kerramax                     Secondary dressing  calamine unna boots   to both legs                      secure with           x 30days     Frequency:  keep dry and intact for the week     Additional Orders: Increase protein to diet (meat, cheese, eggs, fish, peanut butter, nuts and beans)  Multivitamin daily     Referral for lymphedema clinic placed      OFFLOADING [] YES  TYPE:                  [] NA     Weekly wound care visits until determined otherwise. Antibiotic therapy-wound care related YES [] NO [] NA[]     MY CHART []     Smart Device  []            Your next appointment with the [facility] is in 1 week                                                                                                   (Please note your next appointment above and if you are unable to keep, kindly give a 24 hour notice. Thank you.)  If more than 15 min late we cannot guarantee you will be seen due to clinician schedule  Per Policy, Excessive cancellation will call for dismissal from program.  If you experience any of the following, please call the [facility] during business hours:  [phone]     * Increase in Pain  * Temperature over 101  * Increase in drainage from your wound  * Drainage with a foul odor  * Bleeding  * Increase in swelling  * Need for compression bandage changes due to slippage, breakthrough drainage. If you need medical attention outside of the business hours of the [facility] please contact your PCP or go to the nearest emergency room.      The information contained in the After Visit Summary has been reviewed with me, the patient and/or responsible adult, by my health care provider(s). I had the opportunity to ask questions regarding this information.  I have elected to receive;      []After Visit Summary  [x]Comprehensive Discharge Instruction        Patient signature______________________________________Date:________  Electronically signed by Betty Pina RN on 3/1/2023 at 3:39 PM   Electronically signed by Akshat Mcghee DPM on 3/1/2023 at 3:02 PM

## 2023-02-28 LAB
ANION GAP SERPL CALCULATED.3IONS-SCNC: 19 MMOL/L (ref 9–17)
BUN SERPL-MCNC: 44 MG/DL (ref 8–23)
CALCIUM SERPL-MCNC: 9.1 MG/DL (ref 8.6–10.4)
CHLORIDE SERPL-SCNC: 93 MMOL/L (ref 98–107)
CHOLEST SERPL-MCNC: 118 MG/DL
CHOLESTEROL/HDL RATIO: 2.3
CO2 SERPL-SCNC: 24 MMOL/L (ref 20–31)
CREAT SERPL-MCNC: 1.79 MG/DL (ref 0.7–1.2)
GFR SERPL CREATININE-BSD FRML MDRD: 37 ML/MIN/1.73M2
GLUCOSE SERPL-MCNC: 95 MG/DL (ref 70–99)
HDLC SERPL-MCNC: 52 MG/DL
LDLC SERPL CALC-MCNC: ABNORMAL MG/DL (ref 0–130)
LDLC SERPL DIRECT ASSAY-MCNC: 53 MG/DL
POTASSIUM SERPL-SCNC: 4 MMOL/L (ref 3.7–5.3)
PROSTATE SPECIFIC ANTIGEN: 1.69 NG/ML
SODIUM SERPL-SCNC: 136 MMOL/L (ref 135–144)
TRIGL SERPL-MCNC: 410 MG/DL

## 2023-03-01 ENCOUNTER — HOSPITAL ENCOUNTER (OUTPATIENT)
Dept: WOUND CARE | Age: 88
Discharge: HOME | End: 2023-03-01
Payer: MEDICARE

## 2023-03-01 VITALS
WEIGHT: 302 LBS | HEIGHT: 72 IN | BODY MASS INDEX: 40.9 KG/M2 | SYSTOLIC BLOOD PRESSURE: 140 MMHG | RESPIRATION RATE: 20 BRPM | DIASTOLIC BLOOD PRESSURE: 70 MMHG | HEART RATE: 78 BPM | TEMPERATURE: 97.9 F

## 2023-03-01 DIAGNOSIS — S81.801D LEG WOUND, RIGHT, SUBSEQUENT ENCOUNTER: ICD-10-CM

## 2023-03-01 DIAGNOSIS — I89.0 LYMPHEDEMA OF BOTH LOWER EXTREMITIES: Primary | ICD-10-CM

## 2023-03-01 DIAGNOSIS — S81.802D LEG WOUND, LEFT, SUBSEQUENT ENCOUNTER: ICD-10-CM

## 2023-03-01 PROCEDURE — 29580 STRAPPING UNNA BOOT: CPT

## 2023-03-01 RX ORDER — LIDOCAINE HYDROCHLORIDE 20 MG/ML
JELLY TOPICAL ONCE
OUTPATIENT
Start: 2023-03-01 | End: 2023-03-01

## 2023-03-01 RX ORDER — LIDOCAINE HYDROCHLORIDE 40 MG/ML
SOLUTION TOPICAL ONCE
Status: COMPLETED | OUTPATIENT
Start: 2023-03-01 | End: 2023-03-01

## 2023-03-01 RX ORDER — LIDOCAINE HYDROCHLORIDE 40 MG/ML
SOLUTION TOPICAL ONCE
OUTPATIENT
Start: 2023-03-01 | End: 2023-03-01

## 2023-03-01 RX ADMIN — LIDOCAINE HYDROCHLORIDE 10 ML: 40 SOLUTION TOPICAL at 15:20

## 2023-03-01 NOTE — PROGRESS NOTES
[address]   Progress Note and Procedure Note      [address] RECORD NUMBER:  [id number]  AGE: 82 y.o. GENDER: male  : 1935  EPISODE DATE:  3/1/2023    Subjective:     Chief Complaint   Patient presents with    Wound Check     BLE         HISTORY of PRESENT ILLNESS HPI     Noel Lake is a 82 y.o. male who presents today for wound/ulcer evaluation. Interval history: [facility] History of Wound Context: [place] presents for evaluation of bilateral lower leg ulcerations. He has lymphedema and has large ulcerations to both legs. Has not had any sign or symptom of infection. Has not seen a lymphedema clinic before and does not have wraps for at home. Ulcer Identification:  Ulcer Type: lymphedema  Contributing Factors: edema, lymphedema, and obesity          PAST MEDICAL HISTORY        Diagnosis Date    Arthritis     CHF (congestive heart failure) (HCC)     Chronic kidney disease     History of blood transfusion     Hyperlipidemia        PAST SURGICAL HISTORY    Past Surgical History:   Procedure Laterality Date    CARDIAC SURGERY      bypass    JOINT REPLACEMENT      Left hip    PACEMAKER PLACEMENT Right     and defib       FAMILY HISTORY    History reviewed. No pertinent family history.     SOCIAL HISTORY    Social History     Tobacco Use    Smoking status: Former     Types: Cigarettes     Quit date:      Years since quittin.2     Passive exposure: Never    Smokeless tobacco: Never   Vaping Use    Vaping Use: Never used   Substance Use Topics    Alcohol use: Not Currently    Drug use: Never       ALLERGIES    No Known Allergies    MEDICATIONS    Current Outpatient Medications on File Prior to Encounter   Medication Sig Dispense Refill    metoprolol tartrate (LOPRESSOR) 50 MG tablet TAKE 1 TABLET TWICE DAILY 180 tablet 1    bumetanide (BUMEX) 2 MG tablet TAKE 1 TABLET TWICE DAILY 180 tablet 0    albuterol (PROVENTIL) (5 MG/ML) 0.5% nebulizer solution Take 0.5 mLs by nebulization 4 times daily as needed for Wheezing 120 each 3    acetylcysteine (MUCOMYST) 20 % nebulizer solution Take 3 mLs by mouth 2 times daily for 2 doses 6 mL 0    furosemide (LASIX) 40 MG tablet Take 1 tablet by mouth 2 times daily 180 tablet 3    magnesium oxide (MAG-OX) 400 (240 Mg) MG tablet TAKE 1 TABLET TWICE DAILY 180 tablet 0    rosuvastatin (CRESTOR) 20 MG tablet TAKE 1 TABLET EVERY DAY 90 tablet 3    lisinopril (PRINIVIL;ZESTRIL) 5 MG tablet TAKE 1 TABLET EVERY DAY 90 tablet 3    clopidogrel (PLAVIX) 75 MG tablet TAKE 1 TABLET EVERY DAY 90 tablet 1    allopurinol (ZYLOPRIM) 100 MG tablet TAKE 1 TABLET EVERY DAY 90 tablet 1    potassium chloride (KLOR-CON) 10 MEQ extended release tablet TAKE 1 TABLET EVERY DAY 90 tablet 1    apixaban (ELIQUIS) 2.5 MG TABS tablet Take 1 tablet by mouth in the morning and 1 tablet before bedtime. 180 tablet 3    Methylcobalamin (B-12) 5000 MCG TBDP Take by mouth (Patient not taking: No sig reported)      ascorbic acid ([employer] VITAMIN C) 500 MG tablet Take 1 tablet by mouth 2 times daily 180 tablet 1     No current facility-administered medications on file prior to encounter. REVIEW OF SYSTEMS    Review of Systems   Constitutional:  Negative for chills and fever. Skin:  Positive for wound. Objective:      BP (!) 140/70   Pulse 78   Temp 97.9 °F (36.6 °C) (Tympanic)   Resp 20   Ht 6' (1.829 m)   Wt (!) 302 lb (137 kg)   BMI 40.96 kg/m²     Wt Readings from Last 3 Encounters:   03/01/23 (!) 302 lb (137 kg)   02/23/23 (!) 302 lb (137 kg)   02/15/23 (!) 302 lb (137 kg)       Physical Exam:  General:  Alert and oriented x3. In no acute distress.      Lower Extremity Physical Exam:    Vascular: DP pulses are not palpable, Bilateral. PT pulses are not palpable, Bilateral. CFT <3 seconds to all digits, Bilateral.  +4 pitting edema, Bilateral.  Hair growth is absent to the level of the digits, Bilateral.     Neuro: Saph/sural/SP/DP/plantar sensation intact to light touch. Musculoskeletal: EHL/FHL/GS/TA gross motor intact. Gross deformity is absent. Dermatologic: Open wound present to present to bilateral pretibial and calf as documented in detail below. Wound base is granular and limited to the dermal layer. Negative probe to bone. There is no erythema. There is no purulent drainage. There is no fluctuance or crepitus. Interdigital maceration absent, Bilateral.     Wound 02/09/23 Leg Right #1 circumfrential (Active)   Wound Image    02/09/23 1424   Wound Etiology Venous 03/01/23 1518   Dressing Status New drainage noted; Old drainage noted 03/01/23 1518   Wound Cleansed Soap and water 03/01/23 1518   Dressing/Treatment Other (comment) 02/09/23 1502   Wound Length (cm) 0.6 cm 03/01/23 1518   Wound Width (cm) 0.4 cm 03/01/23 1518   Wound Depth (cm) 0.2 cm 03/01/23 1518   Wound Surface Area (cm^2) 0.24 cm^2 03/01/23 1518   Change in Wound Size % (l*w) 99.96 03/01/23 1518   Wound Volume (cm^3) 0.048 cm^3 03/01/23 1518   Wound Healing % 100 03/01/23 1518   Post-Procedure Length (cm) 22 cm 02/23/23 1332   Post-Procedure Width (cm) 19 cm 02/23/23 1332   Post-Procedure Depth (cm) 0.1 cm 02/23/23 1332   Post-Procedure Surface Area (cm^2) 418 cm^2 02/23/23 1332   Post-Procedure Volume (cm^3) 41.8 cm^3 02/23/23 1332   Wound Assessment Pink/red;Slough 03/01/23 1518   Drainage Amount Moderate 03/01/23 1518   Drainage Description Serosanguinous 03/01/23 1518   Odor None 03/01/23 1518   Sara-wound Assessment Fragile 03/01/23 1518   Margins Defined edges 03/01/23 1518   Wound Thickness Description not for Pressure Injury Full thickness 02/23/23 1332   Number of days: 20       Wound 02/09/23 Leg Left;Medial #2 cluster (Active)   Wound Image   02/09/23 1424   Wound Etiology Venous 03/01/23 1518   Dressing Status New drainage noted; Old drainage noted 03/01/23 1518   Wound Cleansed Soap and water 03/01/23 1512 Dressing/Treatment Other (comment) 02/09/23 1502   Wound Length (cm) 12 cm 03/01/23 1518   Wound Width (cm) 8 cm 03/01/23 1518   Wound Depth (cm) 0.1 cm 03/01/23 1518   Wound Surface Area (cm^2) 96 cm^2 03/01/23 1518   Change in Wound Size % (l*w) -52.38 03/01/23 1518   Wound Volume (cm^3) 9.6 cm^3 03/01/23 1518   Wound Healing % -52 03/01/23 1518   Post-Procedure Length (cm) 22 cm 02/23/23 1332   Post-Procedure Width (cm) 22 cm 02/23/23 1332   Post-Procedure Depth (cm) 0.1 cm 02/23/23 1332   Post-Procedure Surface Area (cm^2) 484 cm^2 02/23/23 1332   Post-Procedure Volume (cm^3) 48.4 cm^3 02/23/23 1332   Wound Assessment Slough;Pink/red 03/01/23 1518   Drainage Amount Moderate 03/01/23 1518   Drainage Description Serosanguinous 03/01/23 1518   Odor None 03/01/23 1518   Sara-wound Assessment Fragile 03/01/23 1518   Margins Attached edges 03/01/23 1518   Wound Thickness Description not for Pressure Injury Full thickness 02/23/23 1332   Number of days: 20              Assessment:      Active Hospital Problems    Diagnosis Date Noted    Localized edema [R60.0] 02/15/2023     Priority: Medium    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 [facility]) [L97.811] 02/15/2023     Priority: Medium    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   Discussed the importance of continued compression therapy for prevention of re-ulceration once healed. Continue Unna boot   Discussed elevation of the legs when he is sitting. Educated on signs and symptoms of infection. Instructed to call clinic immediately or go to ER if signs and symptoms of infection are present. Recommended again lymphedema clinic referral upon healing. They are aware and making appt. RTC 1 week         Written patient discharge instructions given to patient and signed by patient or POA.       Orders Placed This Encounter   Medications    lidocaine (XYLOCAINE) 4 % external solution     Orders Placed This Encounter   Procedures    Initiate Outpatient Wound Care Protocol     Cleanse wound with saline    If wound contains bioburden or contamination cleanse with wound cleanser or antimicrobial solution     For normal periwound tissue without irritation nor maceration, apply topical skin protectant    For periwound tissue with irritation and/or maceration, apply zinc based product, topical steroid cream/ointment, or equivalent     For wounds with dry firm black eschar and/or without exudate, apply betadine and leave open to air      For wounds with scant/small to no exudate or drainage, apply wound gel, hydrocolloid, polymer, or equivalent and cover with secondary dressing/foam      For wounds with moderate/large exudate or drainage, apply alginate, hydrofiber, polymer, or equivalent and cover with secondary dressing/foam    For wounds with nonviable tissue requiring removal, apply chemical or mechanical debrider and cover with secondary dressing/foam    For wounds with tunneling, dead space, or cavity, fill or pack with strip/gauze/kerlex to fit and cover with secondary dressing/foam    For wounds with adequate granulation or epithelization, apply wound gel, hydrocolloid, polymer, collagen, or transparent film, and cover secondary dry dressing/foam    For wounds that need additional secondary dressing to help pad or control additional drainage/exudates, add foam, absorbent pad or hydrocolloid    For wounds with suspected or known infection, apply antimicrobial mesh and/or antimicrobial alginate/hydrofiber, or antimicrobial solution moistened gauze/kerlex, or equivalent and cover with secondary dressing/foam    Compression Management needed for edema control, apply multilayer compression or tubular garment or equivalent    Offloading Management needed for pressure relief, apply offloading shoe/boot or equivalent     Standing Status:   Standing     Number of Occurrences:   1          Discharge Instructions           [address] [facility]                            Visit  Discharge Instructions / Physician Orders  DATE: 3/1/23     Home Care: None     SUPPLIES ORDERED THRU:  none          DATE LAST SUPPLIED  NA     Wound Location:  right and left lower legs     Cleanse with: keep dry and intact     Dressing Orders:  Primary dressing  silvercel , kerramax                     Secondary dressing  calamine unna boots   to both legs                      secure with           x 30days     Frequency:  keep dry and intact for the week     Additional Orders: Increase protein to diet (meat, cheese, eggs, fish, peanut butter, nuts and beans)  Multivitamin daily     Referral for lymphedema clinic placed today     OFFLOADING [] YES  TYPE:                  [] NA     Weekly wound care visits until determined otherwise. Antibiotic therapy-wound care related YES [] NO [] NA[]     MY CHART []     Smart Device  []            Your next appointment with the [address] [place] [address] is in 1 week                                                                                                   (Please note your next appointment above and if you are unable to keep, kindly give a 24 hour notice.  Thank you.)  If more than 15 min late we cannot guarantee you will be seen due to clinician schedule  Per Policy, Excessive cancellation will call for dismissal from program.  If you experience any of the following, please call the [address] during business hours:  [phone]     * Increase in Pain  * Temperature over 101  * Increase in drainage from your wound  * Drainage with a foul odor  * Bleeding  * Increase in swelling  * Need for compression bandage changes due to slippage, breakthrough drainage. If you need medical attention outside of the business hours of the [address] please contact your PCP or go to the nearest emergency room. The information contained in the After Visit Summary has been reviewed with me, the patient and/or responsible adult, by my health care provider(s). I had the opportunity to ask questions regarding this information.  I have elected to receive;      []After Visit Summary  [x]Comprehensive Discharge Instruction        Patient signature______________________________________Date:________  Electronically signed by Opal Benjamin DPM on 3/1/2023 at 3:02 PM        Electronically signed by Opal Benjamin DPM on 3/1/2023 at 3:38 PM

## 2023-03-01 NOTE — PROGRESS NOTES
[employer] Application   Below Knee    NAME:  Trip Irizarry  YOB: 1935  MEDICAL RECORD NUMBER:  [id number]  DATE:  3/1/2023    [x] Applied moisturizing agent to dry skin as needed. [x] Appied primary and secondary dressing as ordered    [x] Applied Unna roll from toes to knee overlapping each time. [x] Applied ace wrap or coban from toes to below the knee. [x] Secured with tape and/or metal clips covered with tape. [x] Instructed patient/caregiver to keep dressing dry and intact. DO NOT REMOVE DRESSING. [x] Instructed pt/family/caregiver to report excessive draining, loose bandage, wet dressing, severe pain or tingling in toes. [x] Applied [employer] dressing below the knee to Bilateral lower leg(s)       Unna Boot(s) were applied per  Guidelines.      Electronically signed by Sary Martinez RN on 3/1/2023 at 4:08 PM

## 2023-03-05 NOTE — DISCHARGE INSTRUCTIONS
[facility]                            Visit  Discharge Instructions / Physician Orders  DATE: 3/9/23     Home Care: None     SUPPLIES ORDERED THRU:  none          DATE LAST SUPPLIED  NA     Wound Location:  right and left lower legs     Cleanse with: keep dry and intact     Dressing Orders:  Primary dressing  silvercel , kerramax                     Secondary dressing  calamine unna boots   to both legs                      secure with           x 30days     Frequency:  keep dry and intact for the week     Additional Orders: Increase protein to diet (meat, cheese, eggs, fish, peanut butter, nuts and beans)  Multivitamin daily     Referral for lymphedema clinic placed      OFFLOADING [] YES  TYPE:                  [] NA     Weekly wound care visits until determined otherwise.     Antibiotic therapy-wound care related YES [] NO [] NA[]     MY CHART []     Smart Device  []            Your next appointment with the [facility] is in 1 week                                                                                                   (Please note your next appointment above and if you are unable to keep, kindly give a 24 hour notice. Thank you.)  If more than 15 min late we cannot guarantee you will be seen due to clinician schedule  Per Policy, Excessive cancellation will call for dismissal from program.  If you experience any of the following, please call the [facility] Care Center during business hours:  [phone]     * Increase in Pain  * Temperature over 101  * Increase in drainage from your wound  * Drainage with a foul odor  * Bleeding  * Increase in swelling  * Need for compression bandage changes due to slippage, breakthrough drainage.     If you need medical attention outside of the business hours of the [facility] please contact your PCP or go to the nearest emergency room.     The information contained in the After Visit Summary has been reviewed with  me, the patient and/or responsible adult, by my health care provider(s). I had the opportunity to ask questions regarding this information.  I have elected to receive;      []After Visit Summary  [x]Comprehensive Discharge Instruction        Patient signature______________________________________Date:________  Electronically signed by Dominique Lebron RN on 3/9/2023 at 11:08 AM   Electronically signed by Shanae Aguilar DPM on 3/9/2023 at 10:20 AM

## 2023-03-09 ENCOUNTER — HOSPITAL ENCOUNTER (OUTPATIENT)
Dept: WOUND CARE | Age: 88
Discharge: HOME | End: 2023-03-09
Payer: MEDICARE

## 2023-03-09 VITALS
RESPIRATION RATE: 20 BRPM | DIASTOLIC BLOOD PRESSURE: 70 MMHG | SYSTOLIC BLOOD PRESSURE: 148 MMHG | HEART RATE: 91 BPM | WEIGHT: 302 LBS | TEMPERATURE: 96.5 F | BODY MASS INDEX: 40.9 KG/M2 | HEIGHT: 72 IN

## 2023-03-09 DIAGNOSIS — S81.802D LEG WOUND, LEFT, SUBSEQUENT ENCOUNTER: ICD-10-CM

## 2023-03-09 DIAGNOSIS — I89.0 LYMPHEDEMA OF BOTH LOWER EXTREMITIES: Primary | ICD-10-CM

## 2023-03-09 DIAGNOSIS — S81.801D LEG WOUND, RIGHT, SUBSEQUENT ENCOUNTER: ICD-10-CM

## 2023-03-09 PROCEDURE — 29580 STRAPPING UNNA BOOT: CPT

## 2023-03-09 RX ORDER — LIDOCAINE HYDROCHLORIDE 40 MG/ML
SOLUTION TOPICAL ONCE
OUTPATIENT
Start: 2023-03-09 | End: 2023-03-09

## 2023-03-09 RX ORDER — LIDOCAINE HYDROCHLORIDE 20 MG/ML
JELLY TOPICAL ONCE
OUTPATIENT
Start: 2023-03-09 | End: 2023-03-09

## 2023-03-09 RX ORDER — LIDOCAINE HYDROCHLORIDE 40 MG/ML
SOLUTION TOPICAL ONCE
Status: COMPLETED | OUTPATIENT
Start: 2023-03-09 | End: 2023-03-09

## 2023-03-09 RX ADMIN — LIDOCAINE HYDROCHLORIDE 10 ML: 40 SOLUTION TOPICAL at 10:42

## 2023-03-09 NOTE — PLAN OF CARE
Problem: Chronic Conditions and Co-morbidities  Goal: Patient's chronic conditions and co-morbidity symptoms are monitored and maintained or improved  3/9/2023 1112 by José Miguel Woods RN  Outcome: Progressing  3/9/2023 1032 by José Miguel Woods RN  Outcome: Progressing     Problem: Discharge Planning  Goal: Discharge to home or other facility with appropriate resources  3/9/2023 1112 by José Miguel Woods RN  Outcome: Progressing  3/9/2023 1032 by José Miguel Woods RN  Outcome: Progressing     Problem: Wound:  Goal: Will show signs of wound healing; wound closure and no evidence of infection  Description: Will show signs of wound healing; wound closure and no evidence of infection  3/9/2023 1112 by José Miguel Woods RN  Outcome: Progressing  3/9/2023 1032 by José Miguel Woods RN  Outcome: Progressing     Problem: Venous:  Goal: Signs of wound healing will improve  Description: Signs of wound healing will improve  3/9/2023 1112 by José Miguel Woods RN  Outcome: Progressing  3/9/2023 1032 by José Miguel Woods RN  Outcome: Progressing     Problem: Falls - Risk of:  Goal: Will remain free from falls  Description: Will remain free from falls  3/9/2023 1112 by José Miguel Woods RN  Outcome: Progressing  3/9/2023 1032 by José Miguel Woods RN  Outcome: Progressing     Problem: Skin/Tissue Integrity - Adult  Goal: Skin integrity remains intact  3/9/2023 1112 by José Miguel Woods RN  Outcome: Progressing  3/9/2023 1032 by José Miguel Woods RN  Outcome: Progressing  Goal: Incisions, wounds, or drain sites healing without S/S of infection  3/9/2023 1112 by José Miguel Woods RN  Outcome: Progressing  3/9/2023 1032 by José Miguel Woods RN  Outcome: Progressing     Problem: ABCDS Injury Assessment  Goal: Absence of physical injury  Outcome: Progressing

## 2023-03-09 NOTE — PROGRESS NOTES
[address]   Progress Note and Procedure Note      [address] RECORD NUMBER:  [id number]  AGE: 82 y.o. GENDER: male  : 1935  EPISODE DATE:  3/9/2023    Subjective:     Chief Complaint   Patient presents with    Wound Check     Lower legs         HISTORY of PRESENT ILLNESS HPI     David Roman is a 82 y.o. male who presents today for wound/ulcer evaluation. Interval history: Continues to improve with [employer] Has been complaining of increased edema. He has some shortness of breath which is chronic. They have an appt with the cardiologist next week. History of Wound Context: Robyn Garcia presents for evaluation of bilateral lower leg ulcerations. He has lymphedema and has large ulcerations to both legs. Has not had any sign or symptom of infection. Has not seen a lymphedema clinic before and does not have wraps for at home. Ulcer Identification:  Ulcer Type: lymphedema  Contributing Factors: edema, lymphedema, and obesity          PAST MEDICAL HISTORY        Diagnosis Date    Arthritis     CHF (congestive heart failure) (HCC)     Chronic kidney disease     History of blood transfusion     Hyperlipidemia        PAST SURGICAL HISTORY    Past Surgical History:   Procedure Laterality Date    CARDIAC SURGERY      bypass    JOINT REPLACEMENT      Left hip    PACEMAKER PLACEMENT Right     and defib       FAMILY HISTORY    History reviewed. No pertinent family history.     SOCIAL HISTORY    Social History     Tobacco Use    Smoking status: Former     Types: Cigarettes     Quit date:      Years since quittin.2     Passive exposure: Never    Smokeless tobacco: Never   Vaping Use    Vaping Use: Never used   Substance Use Topics    Alcohol use: Not Currently    Drug use: Never       ALLERGIES    No Known Allergies    MEDICATIONS    Current Outpatient Medications on File Prior to Encounter   Medication Sig Dispense Refill    metoprolol tartrate (LOPRESSOR) 50 MG tablet TAKE 1 TABLET TWICE DAILY 180 tablet 1    bumetanide (BUMEX) 2 MG tablet TAKE 1 TABLET TWICE DAILY 180 tablet 0    albuterol (PROVENTIL) (5 MG/ML) 0.5% nebulizer solution Take 0.5 mLs by nebulization 4 times daily as needed for Wheezing 120 each 3    acetylcysteine (MUCOMYST) 20 % nebulizer solution Take 3 mLs by mouth 2 times daily for 2 doses 6 mL 0    furosemide (LASIX) 40 MG tablet Take 1 tablet by mouth 2 times daily 180 tablet 3    magnesium oxide (MAG-OX) 400 (240 Mg) MG tablet TAKE 1 TABLET TWICE DAILY 180 tablet 0    rosuvastatin (CRESTOR) 20 MG tablet TAKE 1 TABLET EVERY DAY 90 tablet 3    lisinopril (PRINIVIL;ZESTRIL) 5 MG tablet TAKE 1 TABLET EVERY DAY 90 tablet 3    clopidogrel (PLAVIX) 75 MG tablet TAKE 1 TABLET EVERY DAY 90 tablet 1    allopurinol (ZYLOPRIM) 100 MG tablet TAKE 1 TABLET EVERY DAY 90 tablet 1    potassium chloride (KLOR-CON) 10 MEQ extended release tablet TAKE 1 TABLET EVERY DAY 90 tablet 1    apixaban (ELIQUIS) 2.5 MG TABS tablet Take 1 tablet by mouth in the morning and 1 tablet before bedtime. 180 tablet 3    Methylcobalamin (B-12) 5000 MCG TBDP Take by mouth (Patient not taking: No sig reported)      ascorbic acid ([employer] VITAMIN C) 500 MG tablet Take 1 tablet by mouth 2 times daily 180 tablet 1     No current facility-administered medications on file prior to encounter. REVIEW OF SYSTEMS    Review of Systems   Constitutional:  Negative for chills and fever. Skin:  Positive for wound. Objective:      BP (!) 148/70   Pulse 91   Temp (!) 96.5 °F (35.8 °C) (Tympanic)   Resp 20   Ht 6' (1.829 m)   Wt (!) 302 lb (137 kg)   BMI 40.96 kg/m²     Wt Readings from Last 3 Encounters:   03/09/23 (!) 302 lb (137 kg)   03/01/23 (!) 302 lb (137 kg)   02/23/23 (!) 302 lb (137 kg)       Physical Exam:  General:  Alert and oriented x3. In no acute distress.      Lower Extremity Physical Exam:    Vascular: DP pulses are not palpable, Bilateral. PT pulses are not palpable, Bilateral. CFT <3 seconds to all digits, Bilateral.  +4 pitting edema, Bilateral.  Hair growth is absent to the level of the digits, Bilateral.     Neuro: Saph/sural/SP/DP/plantar sensation intact to light touch.    Musculoskeletal: EHL/FHL/GS/TA gross motor intact. Gross deformity is absent.     Dermatologic: Open wound present to present to bilateral pretibial and calf as documented in detail below. Wound base is granular and limited to the dermal layer.  Negative probe to bone.  There is no erythema.  There is no purulent drainage.  There is no fluctuance or crepitus. Interdigital maceration absent, Bilateral.     Wound 02/09/23 Leg Right #1 circumfrential (Active)   Wound Image    02/09/23 1424   Wound Etiology Venous 03/09/23 1037   Dressing Status New drainage noted;Old drainage noted 03/09/23 1037   Wound Cleansed Soap and water 03/09/23 1037   Dressing/Treatment Other (comment) 03/01/23 1607   Wound Length (cm) 3 cm 03/09/23 1037   Wound Width (cm) 1 cm 03/09/23 1037   Wound Depth (cm) 0.1 cm 03/09/23 1037   Wound Surface Area (cm^2) 3 cm^2 03/09/23 1037   Change in Wound Size % (l*w) 99.46 03/09/23 1037   Wound Volume (cm^3) 0.3 cm^3 03/09/23 1037   Wound Healing % 100 03/09/23 1037   Post-Procedure Length (cm) 0.6 cm 03/01/23 1518   Post-Procedure Width (cm) 0.4 cm 03/01/23 1518   Post-Procedure Depth (cm) 0.2 cm 03/01/23 1518   Post-Procedure Surface Area (cm^2) 0.24 cm^2 03/01/23 1518   Post-Procedure Volume (cm^3) 0.048 cm^3 03/01/23 1518   Wound Assessment [place]/red;Slough 03/09/23 1037   Drainage Amount Moderate 03/09/23 1037   Drainage Description Serosanguinous 03/09/23 1037   Odor None 03/09/23 1037   Sara-wound Assessment Fragile 03/09/23 1037   Margins Defined edges 03/09/23 1037   Wound Thickness Description not for Pressure Injury Full thickness 02/23/23 1332   Number of days: 27       Wound 02/09/23 Leg Left;Medial #2 cluster (Active)   Wound Image   02/09/23 1424   Wound Etiology  Venous 03/09/23 1037   Dressing Status New drainage noted; Old drainage noted 03/09/23 1037   Wound Cleansed Soap and water 03/09/23 1037   Dressing/Treatment Other (comment) 03/01/23 1607   Wound Length (cm) 17 cm 03/09/23 1037   Wound Width (cm) 17 cm 03/09/23 1037   Wound Depth (cm) 0.1 cm 03/09/23 1037   Wound Surface Area (cm^2) 289 cm^2 03/09/23 1037   Change in Wound Size % (l*w) -358.73 03/09/23 1037   Wound Volume (cm^3) 28.9 cm^3 03/09/23 1037   Wound Healing % -359 03/09/23 1037   Post-Procedure Length (cm) 12 cm 03/01/23 1518   Post-Procedure Width (cm) 8 cm 03/01/23 1518   Post-Procedure Depth (cm) 0.1 cm 03/01/23 1518   Post-Procedure Surface Area (cm^2) 96 cm^2 03/01/23 1518   Post-Procedure Volume (cm^3) 9.6 cm^3 03/01/23 1518   Wound Assessment Slough;Pink/red 03/09/23 1037   Drainage Amount Moderate 03/09/23 1037   Drainage Description Serosanguinous 03/09/23 1037   Odor None 03/09/23 1037   Sara-wound Assessment Fragile 03/09/23 1037   Margins Attached edges 03/09/23 1037   Wound Thickness Description not for Pressure Injury Full thickness 02/23/23 1332   Number of days: 27              Assessment:      Active Hospital Problems    Diagnosis Date Noted    Localized edema [R60.0] 02/15/2023     Priority: Medium    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 [facility]) [L97.811] 02/15/2023     Priority: Medium    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 Discussed the importance of compression therapy for healing of a leg ulceration in the setting of venous insufficiency and lymphedema. Discussed the importance of continued compression therapy for prevention of re-ulceration once healed. Continue Unna boot   Discussed elevation of the legs when he is sitting. Educated on signs and symptoms of infection. Instructed to call clinic immediately or go to ER if signs and symptoms of infection are present. Recommended again lymphedema clinic referral upon healing. RTC 1 week         Written patient discharge instructions given to patient and signed by patient or POA.       Orders Placed This Encounter   Medications    lidocaine (XYLOCAINE) 4 % external solution     Orders Placed This Encounter   Procedures    Initiate Outpatient Wound Care Protocol     Cleanse wound with saline    If wound contains bioburden or contamination cleanse with wound cleanser or antimicrobial solution     For normal periwound tissue without irritation nor maceration, apply topical skin protectant    For periwound tissue with irritation and/or maceration, apply zinc based product, topical steroid cream/ointment, or equivalent     For wounds with dry firm black eschar and/or without exudate, apply betadine and leave open to air      For wounds with scant/small to no exudate or drainage, apply wound gel, hydrocolloid, polymer, or equivalent and cover with secondary dressing/foam      For wounds with moderate/large exudate or drainage, apply alginate, hydrofiber, polymer, or equivalent and cover with secondary dressing/foam    For wounds with nonviable tissue requiring removal, apply chemical or mechanical debrider and cover with secondary dressing/foam    For wounds with tunneling, dead space, or cavity, fill or pack with strip/gauze/kerlex to fit and cover with secondary dressing/foam    For wounds with adequate granulation or epithelization, apply wound gel, hydrocolloid, polymer, collagen, or transparent film, and cover secondary dry dressing/foam    For wounds that need additional secondary dressing to help pad or control additional drainage/exudates, add foam, absorbent pad or hydrocolloid    For wounds with suspected or known infection, apply antimicrobial mesh and/or antimicrobial alginate/hydrofiber, or antimicrobial solution moistened gauze/kerlex, or equivalent and cover with secondary dressing/foam    Compression Management needed for edema control, apply multilayer compression or tubular garment or equivalent    Offloading Management needed for pressure relief, apply offloading shoe/boot or equivalent     Standing Status:   Standing     Number of Occurrences:   1          Discharge Instructions           [address] and [address] [facility]                            Visit  Discharge Instructions / Physician Orders  DATE: 3/9/23     Home Care: None     SUPPLIES ORDERED THRU:  none          DATE LAST SUPPLIED  NA     Wound Location:  right and left lower legs     Cleanse with: keep dry and intact     Dressing Orders:  Primary dressing  silvercel , kerramax                     Secondary dressing  calamine unna boots   to both legs                      secure with           x 30days     Frequency:  keep dry and intact for the week     Additional Orders: Increase protein to diet (meat, cheese, eggs, fish, peanut butter, nuts and beans)  Multivitamin daily     Referral for lymphedema clinic placed      OFFLOADING [] YES  TYPE:                  [] NA     Weekly wound care visits until determined otherwise.      Antibiotic therapy-wound care related YES [] NO [] NA[]     MY CHART []     Smart Device  []            Your next appointment with the [address] [place] [address] is in 1 week                                                                                                   (Please note your next appointment above and if you are unable to keep, kindly give a 24 hour notice. Thank you.)  If more than 15 min late we cannot guarantee you will be seen due to clinician schedule  Per Policy, Excessive cancellation will call for dismissal from program.  If you experience any of the following, please call the [address] during business hours:  [phone]     * Increase in Pain  * Temperature over 101  * Increase in drainage from your wound  * Drainage with a foul odor  * Bleeding  * Increase in swelling  * Need for compression bandage changes due to slippage, breakthrough drainage. If you need medical attention outside of the business hours of the [address] please contact your PCP or go to the nearest emergency room. The information contained in the After Visit Summary has been reviewed with me, the patient and/or responsible adult, by my health care provider(s). I had the opportunity to ask questions regarding this information.  I have elected to receive;      []After Visit Summary  [x]Comprehensive Discharge Instruction        Patient signature______________________________________Date:________  Electronically signed by Tanika Nunez DPM on 3/9/2023 at 10:20 AM        Electronically signed by Tanika Nunez DPM on 3/9/2023 at 10:49 AM

## 2023-03-09 NOTE — PLAN OF CARE
Problem: Chronic Conditions and Co-morbidities  Goal: Patient's chronic conditions and co-morbidity symptoms are monitored and maintained or improved  Outcome: Progressing     Problem: Discharge Planning  Goal: Discharge to home or other facility with appropriate resources  Outcome: Progressing     Problem: Wound:  Goal: Will show signs of wound healing; wound closure and no evidence of infection  Description: Will show signs of wound healing; wound closure and no evidence of infection  Outcome: Progressing     Problem: Venous:  Goal: Signs of wound healing will improve  Description: Signs of wound healing will improve  Outcome: Progressing     Problem: Falls - Risk of:  Goal: Will remain free from falls  Description: Will remain free from falls  Outcome: Progressing     Problem: Skin/Tissue Integrity - Adult  Goal: Skin integrity remains intact  Outcome: Progressing  Goal: Incisions, wounds, or drain sites healing without S/S of infection  Outcome: Progressing

## 2023-03-15 ENCOUNTER — HOSPITAL ENCOUNTER (OUTPATIENT)
Dept: WOUND CARE | Age: 88
Discharge: HOME | End: 2023-03-15
Payer: MEDICARE

## 2023-03-15 VITALS
DIASTOLIC BLOOD PRESSURE: 67 MMHG | TEMPERATURE: 97.6 F | RESPIRATION RATE: 20 BRPM | HEART RATE: 84 BPM | WEIGHT: 302 LBS | SYSTOLIC BLOOD PRESSURE: 135 MMHG | HEIGHT: 72 IN | BODY MASS INDEX: 40.9 KG/M2

## 2023-03-15 DIAGNOSIS — I89.0 LYMPHEDEMA OF BOTH LOWER EXTREMITIES: Primary | ICD-10-CM

## 2023-03-15 DIAGNOSIS — S81.802D LEG WOUND, LEFT, SUBSEQUENT ENCOUNTER: ICD-10-CM

## 2023-03-15 DIAGNOSIS — S81.801D LEG WOUND, RIGHT, SUBSEQUENT ENCOUNTER: ICD-10-CM

## 2023-03-15 PROCEDURE — 29580 STRAPPING UNNA BOOT: CPT

## 2023-03-15 RX ORDER — LIDOCAINE HYDROCHLORIDE 40 MG/ML
SOLUTION TOPICAL ONCE
OUTPATIENT
Start: 2023-03-15 | End: 2023-03-15

## 2023-03-15 RX ORDER — LIDOCAINE HYDROCHLORIDE 40 MG/ML
SOLUTION TOPICAL ONCE
Status: DISCONTINUED | OUTPATIENT
Start: 2023-03-15 | End: 2023-03-16 | Stop reason: HOSPADM

## 2023-03-15 RX ORDER — LIDOCAINE HYDROCHLORIDE 20 MG/ML
JELLY TOPICAL ONCE
OUTPATIENT
Start: 2023-03-15 | End: 2023-03-15

## 2023-03-15 NOTE — PROGRESS NOTES
[employer] Application   Below Knee    NAME:  Esequiel Thomas  YOB: 1935  MEDICAL RECORD NUMBER:  [id number]  DATE:  3/15/2023    [x] Removed old Unna boot if indicated and wash leg with mild soap and water. [x] Applied moisturizing agent to dry skin as needed. [x] Appied primary and secondary dressing as ordered    [x] Applied Unna roll from toes to knee overlapping each time. [x] Applied ace wrap or coban from toes to below the knee. [x] Secured with tape and/or metal clips covered with tape. [x] Instructed patient/caregiver to keep dressing dry and intact. DO NOT REMOVE DRESSING. [x] Instructed pt/family/caregiver to report excessive draining, loose bandage, wet dressing, severe pain or tingling in toes. [x] Applied [employer] dressing below the knee to Bilateral lower leg(s)       Unna Boot(s) were applied per  Guidelines.      Electronically signed by Angela Medina RN on 3/15/2023 at 2:35 PM

## 2023-03-15 NOTE — LETTER
March 15, 2023       Mohamud Lizarraga YOB: 1935   [place] Date of Visit:  3/15/2023       To Whom It May Concern:    Ebony Gary would benefit from multilayer compression wraps to bilateral lower leg to aid in healing of the ulcerations. If this is safe to perform due to his CHF, please sign and return this letter with your medical opinion. If you have any questions or concerns, please don't hesitate to call.     Sincerely,        Jf Su DPM

## 2023-03-15 NOTE — DISCHARGE INSTRUCTIONS
[address] [facility]                            Visit  Discharge Instructions / Physician Orders  DATE: 3/15/23     Home Care: None     SUPPLIES ORDERED THRU:  none          DATE LAST SUPPLIED  NA     Wound Location:  right and left lower legs     Cleanse with: keep dry and intact     Dressing Orders:  Primary dressing  silvercel , kerramax                     Secondary dressing  calamine unna boots   to both legs                      secure with           x 30days     Frequency:  keep dry and intact for the week     Additional Orders: Increase protein to diet (meat, cheese, eggs, fish, peanut butter, nuts and beans)  Multivitamin daily     Referral for lymphedema clinic placed      OFFLOADING [] YES  TYPE:                  [] NA     Weekly wound care visits until determined otherwise. Antibiotic therapy-wound care related YES [] NO [] NA[]     MY CHART []     Smart Device  []            Your next appointment with the [address] [place] [address] is in 1 week                                                                                                   (Please note your next appointment above and if you are unable to keep, kindly give a 24 hour notice. Thank you.)  If more than 15 min late we cannot guarantee you will be seen due to clinician schedule  Per Policy, Excessive cancellation will call for dismissal from program.  If you experience any of the following, please call the [address] [place] [address] during business hours:  [phone]     * Increase in Pain  * Temperature over 101  * Increase in drainage from your wound  * Drainage with a foul odor  * Bleeding  * Increase in swelling  * Need for compression bandage changes due to slippage, breakthrough drainage. If you need medical attention outside of the business hours of the [address] [place] [address] please contact your PCP or go to the nearest emergency room.      The information contained in the After Visit Summary has been reviewed with me, the patient and/or responsible adult, by my health care provider(s). I had the opportunity to ask questions regarding this information.  I have elected to receive;      []After Visit Summary  [x]Comprehensive Discharge Instruction        Patient signature______________________________________Date:________  Electronically signed by Stormy Burkitt, RN on 3/15/2023 at 2:04 PM   Electronically signed by Isaac Chand DPM on 3/15/2023 at 1:48 PM

## 2023-03-15 NOTE — PROGRESS NOTES
[address]   Progress Note and Procedure Note      [address] RECORD NUMBER:  [id number]  AGE: 82 y.o. GENDER: male  : 1935  EPISODE DATE:  3/15/2023    Subjective:     Chief Complaint   Patient presents with    Wound Check     Lower legs         HISTORY of PRESENT ILLNESS HPI     Remy Fierro is a 82 y.o. male who presents today for wound/ulcer evaluation. Interval history: Continues to slowly improve with Unna boot. Has been complaining of increased edema. He has some shortness of breath which is chronic. They have an appt with the cardiologist today. History of Wound Context: Parris Mackey presents for evaluation of bilateral lower leg ulcerations. He has lymphedema and has large ulcerations to both legs. Has not had any sign or symptom of infection. Has not seen a lymphedema clinic before and does not have wraps for at home. Ulcer Identification:  Ulcer Type: lymphedema  Contributing Factors: edema, lymphedema, and obesity          PAST MEDICAL HISTORY        Diagnosis Date    Arthritis     CHF (congestive heart failure) (HCC)     Chronic kidney disease     History of blood transfusion     Hyperlipidemia        PAST SURGICAL HISTORY    Past Surgical History:   Procedure Laterality Date    CARDIAC SURGERY      bypass    JOINT REPLACEMENT      Left hip    PACEMAKER PLACEMENT Right     and defib       FAMILY HISTORY    History reviewed. No pertinent family history.     SOCIAL HISTORY    Social History     Tobacco Use    Smoking status: Former     Types: Cigarettes     Quit date:      Years since quittin.2     Passive exposure: Never    Smokeless tobacco: Never   Vaping Use    Vaping Use: Never used   Substance Use Topics    Alcohol use: Not Currently    Drug use: Never       ALLERGIES    No Known Allergies    MEDICATIONS    Current Outpatient Medications on File Prior to Encounter   Medication Sig Dispense Refill    metoprolol tartrate (LOPRESSOR) 50 MG tablet TAKE 1 TABLET TWICE DAILY 180 tablet 1    bumetanide (BUMEX) 2 MG tablet TAKE 1 TABLET TWICE DAILY 180 tablet 0    albuterol (PROVENTIL) (5 MG/ML) 0.5% nebulizer solution Take 0.5 mLs by nebulization 4 times daily as needed for Wheezing 120 each 3    acetylcysteine (MUCOMYST) 20 % nebulizer solution Take 3 mLs by mouth 2 times daily for 2 doses 6 mL 0    furosemide (LASIX) 40 MG tablet Take 1 tablet by mouth 2 times daily 180 tablet 3    magnesium oxide (MAG-OX) 400 (240 Mg) MG tablet TAKE 1 TABLET TWICE DAILY 180 tablet 0    rosuvastatin (CRESTOR) 20 MG tablet TAKE 1 TABLET EVERY DAY 90 tablet 3    lisinopril (PRINIVIL;ZESTRIL) 5 MG tablet TAKE 1 TABLET EVERY DAY 90 tablet 3    clopidogrel (PLAVIX) 75 MG tablet TAKE 1 TABLET EVERY DAY 90 tablet 1    allopurinol (ZYLOPRIM) 100 MG tablet TAKE 1 TABLET EVERY DAY 90 tablet 1    potassium chloride (KLOR-CON) 10 MEQ extended release tablet TAKE 1 TABLET EVERY DAY 90 tablet 1    apixaban (ELIQUIS) 2.5 MG TABS tablet Take 1 tablet by mouth in the morning and 1 tablet before bedtime. 180 tablet 3    Methylcobalamin (B-12) 5000 MCG TBDP Take by mouth (Patient not taking: No sig reported)      ascorbic acid ([employer] VITAMIN C) 500 MG tablet Take 1 tablet by mouth 2 times daily 180 tablet 1     No current facility-administered medications on file prior to encounter.       REVIEW OF SYSTEMS    Review of Systems   Constitutional:  Negative for chills and fever.   Skin:  Positive for wound.       Objective:      /67   Pulse 84   Temp 97.6 °F (36.4 °C) (Tympanic)   Resp 20   Ht 6' (1.829 m)   Wt (!) 302 lb (137 kg)   BMI 40.96 kg/m²     Wt Readings from Last 3 Encounters:   03/15/23 (!) 302 lb (137 kg)   03/09/23 (!) 302 lb (137 kg)   03/01/23 (!) 302 lb (137 kg)       Physical Exam:  General:  Alert and oriented x3. In no acute distress.     Lower Extremity Physical Exam:    Vascular: DP pulses are not palpable, Bilateral. PT pulses are  not palpable, Bilateral. CFT <3 seconds to all digits, Bilateral.  +4 pitting edema, Bilateral.  Hair growth is absent to the level of the digits, Bilateral.     Neuro: Saph/sural/SP/DP/plantar sensation intact to light touch. Musculoskeletal: EHL/FHL/GS/TA gross motor intact. Gross deformity is absent. Dermatologic: Open wound present to present to bilateral pretibial and calf as documented in detail below. Wound base is granular and limited to the dermal layer. Negative probe to bone. There is no erythema. There is no purulent drainage. There is no fluctuance or crepitus. Interdigital maceration absent, Bilateral.     Wound 02/09/23 Leg Right #1 circumfrential (Active)   Wound Image    02/09/23 1424   Wound Etiology Venous 03/15/23 1355   Dressing Status New drainage noted; Old drainage noted 03/15/23 1355   Wound Cleansed Soap and water 03/15/23 1355   Dressing/Treatment Other (comment) 03/01/23 1607   Wound Length (cm) 0.1 cm 03/15/23 1355   Wound Width (cm) 0.1 cm 03/15/23 1355   Wound Depth (cm) 0.1 cm 03/15/23 1355   Wound Surface Area (cm^2) 0.01 cm^2 03/15/23 1355   Change in Wound Size % (l*w) 100 03/15/23 1355   Wound Volume (cm^3) 0.001 cm^3 03/15/23 1355   Wound Healing % 100 03/15/23 1355   Post-Procedure Length (cm) 3 cm 03/09/23 1037   Post-Procedure Width (cm) 1 cm 03/09/23 1037   Post-Procedure Depth (cm) 0.1 cm 03/09/23 1037   Post-Procedure Surface Area (cm^2) 3 cm^2 03/09/23 1037   Post-Procedure Volume (cm^3) 0.3 cm^3 03/09/23 1037   Wound Assessment Pink/red 03/15/23 1355   Drainage Amount Moderate 03/15/23 1355   Drainage Description Serosanguinous 03/15/23 1355   Odor None 03/15/23 1355   Sara-wound Assessment Fragile 03/15/23 1355   Margins Defined edges 03/15/23 1355   Wound Thickness Description not for Pressure Injury Full thickness 02/23/23 1332   Number of days: 33       Wound 02/09/23 Leg Left;Medial #2 cluster (Active)   Wound Image   02/09/23 1424   Wound Etiology Venous 03/15/23 1355   Dressing Status New drainage noted; Old drainage noted 03/15/23 1355   Wound Cleansed Soap and water 03/15/23 1355   Dressing/Treatment Other (comment) 03/01/23 1607   Wound Length (cm) 5 cm 03/15/23 1355   Wound Width (cm) 16 cm 03/15/23 1355   Wound Depth (cm) 0.1 cm 03/15/23 1355   Wound Surface Area (cm^2) 80 cm^2 03/15/23 1355   Change in Wound Size % (l*w) -26.98 03/15/23 1355   Wound Volume (cm^3) 8 cm^3 03/15/23 1355   Wound Healing % -27 03/15/23 1355   Post-Procedure Length (cm) 17 cm 03/09/23 1037   Post-Procedure Width (cm) 17 cm 03/09/23 1037   Post-Procedure Depth (cm) 0.1 cm 03/09/23 1037   Post-Procedure Surface Area (cm^2) 289 cm^2 03/09/23 1037   Post-Procedure Volume (cm^3) 28.9 cm^3 03/09/23 1037   Wound Assessment Slough;Pink/red 03/15/23 1355   Drainage Amount Moderate 03/15/23 1355   Drainage Description Serosanguinous 03/15/23 1355   Odor None 03/15/23 1355   Sara-wound Assessment Fragile 03/15/23 1355   Margins Attached edges 03/15/23 1355   Wound Thickness Description not for Pressure Injury Full thickness 02/23/23 1332   Number of days: 33              Assessment:      Active Hospital Problems    Diagnosis Date Noted    Localized edema [R60.0] 02/15/2023     Priority: Medium    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 [facility]) [L97.811] 02/15/2023     Priority: Medium    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 [facility]) [L97.821] 02/15/2023     Priority: Medium    Lymphedema of both lower extremities [I89.0] 02/09/2023     Priority: Medium    Leg wound, right, subsequent encounter [S81.801D] 02/09/2023     Priority: Medium    Leg wound, left, subsequent encounter [S81.172D] 02/09/2023     Priority: Medium    Obesity, Class II, BMI 35-39.9 [E66.9] 04/02/2022       Plan:     Treatment Note please see attached Discharge Instructions    Note for clearance to begin multilayer compression sent to cardiologist  Luis Alberto Esteban is to discuss this with his physician today as well     Discussed the importance of compression therapy for healing of a leg ulceration in the setting of venous insufficiency and lymphedema. Discussed the importance of continued compression therapy for prevention of re-ulceration once healed. Continue Unna boot   Discussed elevation of the legs when he is sitting. Potentially begin multilayer compression at the next visit pending cardiology clearance. Would benefit from dynamic compression as he does not walk much and unna boot is thus less effective. Educated on signs and symptoms of infection. Instructed to call clinic immediately or go to ER if signs and symptoms of infection are present. Recommended again lymphedema clinic referral upon healing. RTC 1 week         Written patient discharge instructions given to patient and signed by patient or POA.       Orders Placed This Encounter   Medications    lidocaine (XYLOCAINE) 4 % external solution     Orders Placed This Encounter   Procedures    Initiate Outpatient Wound Care Protocol     Cleanse wound with saline    If wound contains bioburden or contamination cleanse with wound cleanser or antimicrobial solution     For normal periwound tissue without irritation nor maceration, apply topical skin protectant    For periwound tissue with irritation and/or maceration, apply zinc based product, topical steroid cream/ointment, or equivalent     For wounds with dry firm black eschar and/or without exudate, apply betadine and leave open to air      For wounds with scant/small to no exudate or drainage, apply wound gel, hydrocolloid, polymer, or equivalent and cover with secondary dressing/foam      For wounds with moderate/large exudate or drainage, apply alginate, hydrofiber, polymer, or equivalent and cover with secondary dressing/foam    For wounds with nonviable tissue requiring removal, apply chemical or mechanical debrider and cover with secondary dressing/foam    For wounds with tunneling, dead space, or cavity, fill or pack with strip/gauze/kerlex to fit and cover with secondary dressing/foam    For wounds with adequate granulation or epithelization, apply wound gel, hydrocolloid, polymer, collagen, or transparent film, and cover secondary dry dressing/foam    For wounds that need additional secondary dressing to help pad or control additional drainage/exudates, add foam, absorbent pad or hydrocolloid    For wounds with suspected or known infection, apply antimicrobial mesh and/or antimicrobial alginate/hydrofiber, or antimicrobial solution moistened gauze/kerlex, or equivalent and cover with secondary dressing/foam    Compression Management needed for edema control, apply multilayer compression or tubular garment or equivalent    Offloading Management needed for pressure relief, apply offloading shoe/boot or equivalent     Standing Status:   Standing     Number of Occurrences:   1          Discharge Instructions         [facility]  CENTER                            Visit  Discharge Instructions / Physician Orders  DATE: 3/15/23     Home Care: None     SUPPLIES ORDERED THRU:  none          DATE LAST SUPPLIED  NA     Wound Location:  right and left lower legs     Cleanse with: keep dry and intact     Dressing Orders:  Primary dressing  silvercel , kerramax                     Secondary dressing  calamine unna boots   to both legs                      secure with           x 30days     Frequency:  keep dry and intact for the week     Additional Orders: Increase protein to diet (meat, cheese, eggs, fish, peanut butter, nuts and beans)  Multivitamin daily     Referral for lymphedema clinic placed      OFFLOADING [] YES  TYPE:                  [] NA     Weekly wound care visits until determined otherwise.     Antibiotic therapy-wound care related YES [] NO [] NA[]     MY CHART []     Smart Device  []        Your next appointment with the [facility] [facility] is in 1 week                                                                                                   (Please note your next appointment above and if you are unable to keep, kindly give a 24 hour notice. Thank you.)  If more than 15 min late we cannot guarantee you will be seen due to clinician schedule  Per Policy, Excessive cancellation will call for dismissal from program.  If you experience any of the following, please call the [employer] [facility] during business hours:  [phone]     * Increase in Pain  * Temperature over 101  * Increase in drainage from your wound  * Drainage with a foul odor  * Bleeding  * Increase in swelling  * Need for compression bandage changes due to slippage, breakthrough drainage. If you need medical attention outside of the business hours of the [employer] [facility] please contact your PCP or go to the nearest emergency room. The information contained in the After Visit Summary has been reviewed with me, the patient and/or responsible adult, by my health care provider(s). I had the opportunity to ask questions regarding this information.  I have elected to receive;      []After Visit Summary  [x]Comprehensive Discharge Instruction        Patient signature______________________________________Date:________  Electronically signed by Malcom Matos RN on 3/15/2023 at 2:04 PM   Electronically signed by Shu Montalvo DPM on 3/15/2023 at 1:48 PM        Electronically signed by Shu Montalvo DPM on 3/15/2023 at 2:07 PM

## 2023-03-23 ENCOUNTER — HOSPITAL ENCOUNTER (OUTPATIENT)
Dept: WOUND CARE | Age: 88
Discharge: HOME | End: 2023-03-23
Payer: MEDICARE

## 2023-03-23 VITALS
HEIGHT: 72 IN | HEART RATE: 92 BPM | BODY MASS INDEX: 40.9 KG/M2 | WEIGHT: 302 LBS | TEMPERATURE: 97.3 F | DIASTOLIC BLOOD PRESSURE: 67 MMHG | SYSTOLIC BLOOD PRESSURE: 146 MMHG | RESPIRATION RATE: 20 BRPM

## 2023-03-23 DIAGNOSIS — S81.801D LEG WOUND, RIGHT, SUBSEQUENT ENCOUNTER: ICD-10-CM

## 2023-03-23 DIAGNOSIS — I89.0 LYMPHEDEMA OF BOTH LOWER EXTREMITIES: Primary | ICD-10-CM

## 2023-03-23 DIAGNOSIS — S81.802D LEG WOUND, LEFT, SUBSEQUENT ENCOUNTER: ICD-10-CM

## 2023-03-23 PROCEDURE — 29580 STRAPPING UNNA BOOT: CPT

## 2023-03-23 RX ORDER — LIDOCAINE HYDROCHLORIDE 20 MG/ML
JELLY TOPICAL ONCE
OUTPATIENT
Start: 2023-03-23 | End: 2023-03-23

## 2023-03-23 RX ORDER — LIDOCAINE HYDROCHLORIDE 40 MG/ML
SOLUTION TOPICAL ONCE
Status: COMPLETED | OUTPATIENT
Start: 2023-03-23 | End: 2023-03-23

## 2023-03-23 RX ORDER — LIDOCAINE HYDROCHLORIDE 40 MG/ML
SOLUTION TOPICAL ONCE
OUTPATIENT
Start: 2023-03-23 | End: 2023-03-23

## 2023-03-23 RX ADMIN — LIDOCAINE HYDROCHLORIDE 40 ML: 40 SOLUTION TOPICAL at 10:32

## 2023-03-23 ASSESSMENT — PAIN SCALES - GENERAL: PAINLEVEL_OUTOF10: 0

## 2023-03-23 NOTE — PLAN OF CARE
Problem: Chronic Conditions and Co-morbidities  Goal: Patient's chronic conditions and co-morbidity symptoms are monitored and maintained or improved  Outcome: Progressing     Problem: Discharge Planning  Goal: Discharge to home or other facility with appropriate resources  Outcome: Progressing     Problem: Pain  Goal: Verbalizes/displays adequate comfort level or baseline comfort level  Outcome: Progressing     Problem: Safety - Adult  Goal: Free from fall injury  Outcome: Progressing     Problem: Skin/Tissue Integrity - Adult  Goal: Skin integrity remains intact  Outcome: Progressing  Goal: Incisions, wounds, or drain sites healing without S/S of infection  Outcome: Progressing

## 2023-03-23 NOTE — DISCHARGE INSTRUCTIONS
[address] [facility] HYPER[facility] TREAT[facility]  CENTER                            Visit  Discharge Instructions / Physician Orders  DATE: 3/23/23     Home Care: None     SUPPLIES ORDERED THRU:  none          DATE LAST SUPPLIED  NA     Wound Location:  right and left lower legs     Cleanse with: keep dry and intact     Dressing Orders:  Primary dressing  silvercel , kerramax                     Secondary dressing  calamine unna boots   to both legs (use cast padding instead of kerlix)                     secure with           x 30days     Frequency:  keep dry and intact for the week     Additional Orders: Increase protein to diet (meat, cheese, eggs, fish, peanut butter, nuts and beans)  Multivitamin daily     Referral for lymphedema clinic placed      OFFLOADING [] YES  TYPE:                  [] NA     Weekly wound care visits until determined otherwise. Antibiotic therapy-wound care related YES [] NO [] NA[]     MY CHART []     Smart Device  []            Your next appointment with the [address] [place] [address] is in 1 week                                                                                                   (Please note your next appointment above and if you are unable to keep, kindly give a 24 hour notice. Thank you.)  If more than 15 min late we cannot guarantee you will be seen due to clinician schedule  Per Policy, Excessive cancellation will call for dismissal from program.  If you experience any of the following, please call the [address] [place] [address] during business hours:  [phone]     * Increase in Pain  * Temperature over 101  * Increase in drainage from your wound  * Drainage with a foul odor  * Bleeding  * Increase in swelling  * Need for compression bandage changes due to slippage, breakthrough drainage. If you need medical attention outside of the business hours of the [address] [place] [address] please contact your PCP or go to the nearest emergency room.      The information contained in the

## 2023-03-23 NOTE — PROGRESS NOTES
[employer] Application   Below Knee    NAME:  Bebeto Villanueva  YOB: 1935  MEDICAL RECORD NUMBER:  [id number]  DATE:  3/23/2023    [x] Applied moisturizing agent to dry skin as needed. [x] Appied primary and secondary dressing as ordered    [x] Applied Unna roll from toes to knee overlapping each time. [x] Applied ace wrap or coban from toes to below the knee. [x] Secured with tape and/or metal clips covered with tape. [x] Instructed patient/caregiver to keep dressing dry and intact. DO NOT REMOVE DRESSING. [x] Instructed pt/family/caregiver to report excessive draining, loose bandage, wet dressing, severe pain or tingling in toes. [x] Applied [place] dressing below the knee to Bilateral lower leg(s)       Unna Boot(s) were applied per  Guidelines.      Electronically signed by Felicita Salinas RN on 3/23/2023 at 11:09 AM
strip/gauze/kerlex to fit and cover with secondary dressing/foam    For wounds with adequate granulation or epithelization, apply wound gel, hydrocolloid, polymer, collagen, or transparent film, and cover secondary dry dressing/foam    For wounds that need additional secondary dressing to help pad or control additional drainage/exudates, add foam, absorbent pad or hydrocolloid    For wounds with suspected or known infection, apply antimicrobial mesh and/or antimicrobial alginate/hydrofiber, or antimicrobial solution moistened gauze/kerlex, or equivalent and cover with secondary dressing/foam    Compression Management needed for edema control, apply multilayer compression or tubular garment or equivalent    Offloading Management needed for pressure relief, apply offloading shoe/boot or equivalent     Standing Status:   Standing     Number of Occurrences:   1            Discharge Instructions              [address] [place] [place] [address] [facility]                            Visit  Discharge Instructions / Physician Orders  DATE: 3/23/23     Home Care: None     SUPPLIES ORDERED THRU:  none          DATE LAST SUPPLIED  NA     Wound Location:  right and left lower legs     Cleanse with: keep dry and intact     Dressing Orders:  Primary dressing  silvercel , kerramax                     Secondary dressing  calamine unna boots   to both legs                      secure with           x 30days     Frequency:  keep dry and intact for the week     Additional Orders: Increase protein to diet (meat, cheese, eggs, fish, peanut butter, nuts and beans)  Multivitamin daily     Referral for lymphedema clinic placed      OFFLOADING [] YES  TYPE:                  [] NA     Weekly wound care visits until determined otherwise.      Antibiotic therapy-wound care related YES [] NO [] NA[]     MY CHART []     Smart Device  []            Your next appointment with the [address] [place] [address] is in 1 week

## 2023-03-26 NOTE — DISCHARGE INSTRUCTIONS
[address] [place] [facility]  CENTER                            Visit  Discharge Instructions / Physician Orders  DATE: 3/28/23     Home Care: None     SUPPLIES ORDERED THRU:  none          DATE LAST SUPPLIED  NA     Wound Location:  right and left lower legs     Cleanse with: keep dry and intact     Dressing Orders:  Primary dressing   Zinc Layer Profore wrap                   Frequency:  keep dry and intact for the week     Additional Orders: Increase protein to diet (meat, cheese, eggs, fish, peanut butter, nuts and beans)  Multivitamin daily     Referral for lymphedema clinic placed    Juxta lite wraps HD ordered for both legs  OFFLOADING [] YES  TYPE:                  [] NA     Weekly wound care visits until determined otherwise. Antibiotic therapy-wound care related YES [] NO [] NA[]     MY CHART []     Smart Device  []            Your next appointment with the [address] [place] [address] is in 1 week with Stacia Linares on Thursday                                                                                                   (Please note your next appointment above and if you are unable to keep, kindly give a 24 hour notice. Thank you.)  If more than 15 min late we cannot guarantee you will be seen due to clinician schedule  Per Policy, Excessive cancellation will call for dismissal from program.  If you experience any of the following, please call the [address] [place] [address] during business hours:  [phone]     * Increase in Pain  * Temperature over 101  * Increase in drainage from your wound  * Drainage with a foul odor  * Bleeding  * Increase in swelling  * Need for compression bandage changes due to slippage, breakthrough drainage. If you need medical attention outside of the business hours of the [address] [place] [address] please contact your PCP or go to the nearest emergency room.      The information contained in the After Visit Summary has been reviewed with me, the patient and/or responsible adult,

## 2023-03-28 ENCOUNTER — HOSPITAL ENCOUNTER (OUTPATIENT)
Dept: WOUND CARE | Age: 88
Discharge: HOME | End: 2023-03-28
Payer: MEDICARE

## 2023-03-28 VITALS
RESPIRATION RATE: 28 BRPM | WEIGHT: 302 LBS | HEIGHT: 72 IN | SYSTOLIC BLOOD PRESSURE: 126 MMHG | BODY MASS INDEX: 40.9 KG/M2 | DIASTOLIC BLOOD PRESSURE: 53 MMHG | HEART RATE: 79 BPM | TEMPERATURE: 97.3 F

## 2023-03-28 DIAGNOSIS — S81.802D LEG WOUND, LEFT, SUBSEQUENT ENCOUNTER: ICD-10-CM

## 2023-03-28 DIAGNOSIS — S81.801D LEG WOUND, RIGHT, SUBSEQUENT ENCOUNTER: ICD-10-CM

## 2023-03-28 DIAGNOSIS — I89.0 LYMPHEDEMA OF BOTH LOWER EXTREMITIES: Primary | ICD-10-CM

## 2023-03-28 PROCEDURE — 99213 OFFICE O/P EST LOW 20 MIN: CPT | Performed by: NURSE PRACTITIONER

## 2023-03-28 PROCEDURE — 29581 APPL MULTLAYER CMPRN SYS LEG: CPT

## 2023-03-28 RX ORDER — LIDOCAINE HYDROCHLORIDE 40 MG/ML
SOLUTION TOPICAL ONCE
Status: DISCONTINUED | OUTPATIENT
Start: 2023-03-28 | End: 2023-03-29 | Stop reason: HOSPADM

## 2023-03-28 RX ORDER — LIDOCAINE HYDROCHLORIDE 40 MG/ML
SOLUTION TOPICAL ONCE
OUTPATIENT
Start: 2023-03-28 | End: 2023-03-28

## 2023-03-28 RX ORDER — LIDOCAINE HYDROCHLORIDE 20 MG/ML
JELLY TOPICAL ONCE
OUTPATIENT
Start: 2023-03-28 | End: 2023-03-28

## 2023-03-28 ASSESSMENT — ENCOUNTER SYMPTOMS
VOMITING: 0
SHORTNESS OF BREATH: 0
RHINORRHEA: 0
NAUSEA: 0
DIARRHEA: 0
COUGH: 0

## 2023-03-28 ASSESSMENT — PAIN SCALES - GENERAL: PAINLEVEL_OUTOF10: 0

## 2023-03-28 NOTE — PROGRESS NOTES
[address]   Progress Note and Procedure Note      [address] RECORD NUMBER:  [id number]  AGE: 82 y.o. GENDER: male  : 1935  EPISODE DATE:  3/28/2023    Subjective:     Chief Complaint   Patient presents with    Wound Check     Legs bilateral         HISTORY of PRESENT ILLNESS HPI     Ruma Salazar is a 82 y.o. male who presents today for wound/ulcer evaluation. History of Wound Context: here to follow up on bilateral lower leg wounds that are healing well. Will continue to wrap in unna boots. Will order juxta lite wraps and see him next week. Wound/Ulcer Pain Timing/Severity: none  Quality of pain: N/A  Severity:  0 / 10   Modifying Factors: None  Associated Signs/Symptoms: none    Ulcer Identification:  Ulcer Type: lymphedema  Contributing Factors: edema, lymphedema, and obesity         PAST MEDICAL HISTORY        Diagnosis Date    Arthritis     CHF (congestive heart failure) (HCC)     Chronic kidney disease     History of blood transfusion     Hyperlipidemia        PAST SURGICAL HISTORY    Past Surgical History:   Procedure Laterality Date    CARDIAC SURGERY      bypass    JOINT REPLACEMENT      Left hip    PACEMAKER PLACEMENT Right     and defib       FAMILY HISTORY    History reviewed. No pertinent family history.     SOCIAL HISTORY    Social History     Tobacco Use    Smoking status: Former     Types: Cigarettes     Quit date:      Years since quittin.2     Passive exposure: Never    Smokeless tobacco: Never   Vaping Use    Vaping Use: Never used   Substance Use Topics    Alcohol use: Not Currently    Drug use: Never       ALLERGIES    No Known Allergies    MEDICATIONS    Current Outpatient Medications on File Prior to Encounter   Medication Sig Dispense Refill    bumetanide (BUMEX) 2 MG tablet TAKE 1 TABLET TWICE DAILY 180 tablet 0    metoprolol tartrate (LOPRESSOR) 50 MG tablet TAKE 1 TABLET TWICE DAILY 180 tablet 1    albuterol

## 2023-03-28 NOTE — PROGRESS NOTES
Multilayer Compression Wrap   (Not Unna) Below the Knee    NAME:  Brian Boyd  YOB: 1935  MEDICAL RECORD NUMBER:  [id number]  DATE:  3/28/2023    Multilayer compression wrap: Removed old Multilayer wrap if indicated and wash leg with mild soap/water. Applied moisturizing agent to dry skin as needed. Applied primary and secondary dressing as ordered. Applied multilayered dressing below the knee to right lower leg. Applied multilayered dressing below the knee to left lower leg. Instructed patient/caregiver not to remove dressing and to keep it clean and dry. Instructed patient/caregiver on complications to report to provider, such as pain, numbness in toes, heavy drainage, and slippage of dressing. Instructed patient on purpose of compression dressing and on activity and exercise recommendations.       Electronically signed by Jen Green RN on 3/28/2023 at 3:58 PM

## 2023-03-28 NOTE — PROGRESS NOTES
Compression [address] for Compression Stockings:     [address]:     [address] [facility] Wound and [facility]  [address] [facility] [place] [facility] [address]  [phone]  [phone]     Patient Information:      Vicky Sullivan  [address] [address]   [phone]   : 1935  AGE: 82 y.o. GENDER: male   TODAYS DATE:  3/28/2023    Insurance:      PRIMARY INSURANCE:  Plan: Renetta Peoples MEDICARE  Coverage: HU[employer]NA MEDICARE  Effective Date: 11/15/2018  Group Number: [unfilled]  Subscriber Number: [id number] - (Medicare Managed)    Payer/Plan Subscr  Sex Relation Sub. Ins. ID Effective Group Num   1. [employer] MEDICA* Prince Servant 1935 Male Self [id number] 11/15/18 [id number]                                   [address] [address]       Patient Information:      Problem List Items Addressed This Visit          Other    Lymphedema of both lower extremities - Primary    Relevant Medications    lidocaine (XYLOCAINE) 4 % external solution    Other Relevant Orders    Initiate Outpatient Wound Care Protocol    Leg wound, right, subsequent encounter    Relevant Medications    lidocaine (XYLOCAINE) 4 % external solution    Other Relevant Orders    Initiate Outpatient Wound Care Protocol    Leg wound, left, subsequent encounter    Relevant Medications    lidocaine (XYLOCAINE) 4 % external solution    Other Relevant Orders    Initiate Outpatient Wound Care Protocol       Wound 23 Leg Right;Proximal #1 cluster (Active)   Wound Image    23 1424   Wound Etiology Venous 23 1447   Dressing Status New drainage noted; Old drainage noted 23 1447   Wound Cleansed Soap and water 23 1447   Dressing/Treatment Other (comment) 23 1107   Wound Length (cm) 3 cm 23 1447   Wound Width (cm) 10.5 cm 23 1447   Wound Depth (cm) 0.1 cm 23 1447   Wound Surface Area (cm^2) 31.5 cm^2 23 1447

## 2023-04-02 NOTE — DISCHARGE INSTRUCTIONS
reviewed with me, the patient and/or responsible adult, by my health care provider(s). I had the opportunity to ask questions regarding this information.  I have elected to receive;      []After Visit Summary  [x]Comprehensive Discharge Instruction        Patient signature______________________________________Date:________  Electronically signed by Sweetie Griffin RN on 4/6/2023 at 3:16 PM   Electronically signed by JUAN FRANCISCO Jacobson CNP on 4/6/2023 at 3:23 PM

## 2023-04-06 ENCOUNTER — HOSPITAL ENCOUNTER (OUTPATIENT)
Dept: WOUND CARE | Age: 88
Discharge: HOME | End: 2023-04-06
Payer: MEDICARE

## 2023-04-06 VITALS
BODY MASS INDEX: 40.9 KG/M2 | TEMPERATURE: 97.2 F | RESPIRATION RATE: 22 BRPM | DIASTOLIC BLOOD PRESSURE: 54 MMHG | HEART RATE: 75 BPM | SYSTOLIC BLOOD PRESSURE: 143 MMHG | WEIGHT: 302 LBS | HEIGHT: 72 IN

## 2023-04-06 DIAGNOSIS — I89.0 LYMPHEDEMA OF BOTH LOWER EXTREMITIES: Primary | ICD-10-CM

## 2023-04-06 DIAGNOSIS — S81.802D LEG WOUND, LEFT, SUBSEQUENT ENCOUNTER: ICD-10-CM

## 2023-04-06 DIAGNOSIS — S81.801D LEG WOUND, RIGHT, SUBSEQUENT ENCOUNTER: ICD-10-CM

## 2023-04-06 PROCEDURE — 29581 APPL MULTLAYER CMPRN SYS LEG: CPT

## 2023-04-06 PROCEDURE — 99213 OFFICE O/P EST LOW 20 MIN: CPT | Performed by: NURSE PRACTITIONER

## 2023-04-06 RX ORDER — LIDOCAINE HYDROCHLORIDE 40 MG/ML
SOLUTION TOPICAL ONCE
OUTPATIENT
Start: 2023-04-06 | End: 2023-04-06

## 2023-04-06 RX ORDER — LIDOCAINE HYDROCHLORIDE 40 MG/ML
SOLUTION TOPICAL ONCE
Status: COMPLETED | OUTPATIENT
Start: 2023-04-06 | End: 2023-04-06

## 2023-04-06 RX ORDER — LIDOCAINE HYDROCHLORIDE 20 MG/ML
JELLY TOPICAL ONCE
OUTPATIENT
Start: 2023-04-06 | End: 2023-04-06

## 2023-04-06 RX ADMIN — LIDOCAINE HYDROCHLORIDE 15 ML: 40 SOLUTION TOPICAL at 15:08

## 2023-04-06 ASSESSMENT — PAIN SCALES - GENERAL: PAINLEVEL_OUTOF10: 0

## 2023-04-06 ASSESSMENT — ENCOUNTER SYMPTOMS
COUGH: 0
SHORTNESS OF BREATH: 0
RHINORRHEA: 0
DIARRHEA: 0
NAUSEA: 0
VOMITING: 0

## 2023-04-06 NOTE — PROGRESS NOTES
Multilayer Compression Wrap   (Not Unna) Below the Knee    NAME:  Remy Fierro  YOB: 1935  MEDICAL RECORD NUMBER:  [id number]  DATE:  4/6/2023    Multilayer compression wrap: Removed old Multilayer wrap if indicated and wash leg with mild soap/water. Applied moisturizing agent to dry skin as needed. Applied primary and secondary dressing as ordered. Applied multilayered dressing below the knee to right lower leg. Applied multilayered dressing below the knee to left lower leg. Instructed patient/caregiver not to remove dressing and to keep it clean and dry. Instructed patient/caregiver on complications to report to provider, such as pain, numbness in toes, heavy drainage, and slippage of dressing. Instructed patient on purpose of compression dressing and on activity and exercise recommendations.       Electronically signed by Kervin Fernandez RN on 4/6/2023 at 3:50 PM
04/06/23 1504   Wound Volume (cm^3) 0 cm^3 04/06/23 1504   Wound Healing % 100 04/06/23 1504   Post-Procedure Length (cm) 0 cm 04/06/23 1504   Post-Procedure Width (cm) 0 cm 04/06/23 1504   Post-Procedure Depth (cm) 0 cm 04/06/23 1504   Post-Procedure Surface Area (cm^2) 0 cm^2 04/06/23 1504   Post-Procedure Volume (cm^3) 0 cm^3 04/06/23 1504   Wound Assessment Pink/red 03/28/23 1447   Drainage Amount Moderate 03/28/23 1447   Drainage Description Serous 03/28/23 1447   Odor None 03/28/23 1447   Sara-wound Assessment Fragile 03/28/23 1447   Margins Undefined edges 03/28/23 1447   Wound Thickness Description not for Pressure Injury Full thickness 03/28/23 1447   Number of days: 55       Wound 02/09/23 Leg Left;Medial #2 cluster (Active)   Wound Image   04/06/23 1504   Wound Etiology Venous 04/06/23 1504   Dressing Status Old drainage noted;Dry 04/06/23 1504   Wound Cleansed Soap and water 04/06/23 1504   Dressing/Treatment Other (comment) 03/28/23 1556   Wound Length (cm) 0 cm 04/06/23 1504   Wound Width (cm) 0 cm 04/06/23 1504   Wound Depth (cm) 0 cm 04/06/23 1504   Wound Surface Area (cm^2) 0 cm^2 04/06/23 1504   Change in Wound Size % (l*w) 100 04/06/23 1504   Wound Volume (cm^3) 0 cm^3 04/06/23 1504   Wound Healing % 100 04/06/23 1504   Post-Procedure Length (cm) 0 cm 04/06/23 1504   Post-Procedure Width (cm) 0 cm 04/06/23 1504   Post-Procedure Depth (cm) 0 cm 04/06/23 1504   Post-Procedure Surface Area (cm^2) 0 cm^2 04/06/23 1504   Post-Procedure Volume (cm^3) 0 cm^3 04/06/23 1504   Wound Assessment Pink/red 03/28/23 1447   Drainage Amount Moderate 03/28/23 1447   Drainage Description Serous 03/28/23 1447   Odor None 03/28/23 1447   Sara-wound Assessment Fragile 03/28/23 1447   Margins Undefined edges 03/28/23 1447   Wound Thickness Description not for Pressure Injury Full thickness 03/28/23 1447   Number of days: 55       Wound 03/28/23 Leg Left;Proximal;Lateral #3 cluster (Active)   Wound Image   04/06/23 7295

## 2023-04-06 NOTE — PLAN OF CARE
Problem: Chronic Conditions and Co-morbidities  Goal: Patient's chronic conditions and co-morbidity symptoms are monitored and maintained or improved  Outcome: Progressing     Problem: Wound:  Goal: Will show signs of wound healing; wound closure and no evidence of infection  Description: Will show signs of wound healing; wound closure and no evidence of infection  Outcome: Progressing     Problem: Discharge Planning  Goal: Discharge to home or other facility with appropriate resources  Outcome: Progressing     Problem: Venous:  Goal: Signs of wound healing will improve  Description: Signs of wound healing will improve  Outcome: Progressing     Problem: Falls - Risk of:  Goal: Will remain free from falls  Description: Will remain free from falls  Outcome: Progressing

## 2023-04-13 PROBLEM — S81.801D LEG WOUND, RIGHT, SUBSEQUENT ENCOUNTER: Status: RESOLVED | Noted: 2023-02-09 | Resolved: 2023-04-13

## 2023-04-13 PROBLEM — S81.802D LEG WOUND, LEFT, SUBSEQUENT ENCOUNTER: Status: RESOLVED | Noted: 2023-02-09 | Resolved: 2023-04-13

## 2023-05-02 ENCOUNTER — HOSPITAL ENCOUNTER (OUTPATIENT)
Dept: OCCUPATIONAL THERAPY | Age: 88
Setting detail: THERAPIES SERIES
Discharge: HOME | End: 2023-05-02
Payer: MEDICARE

## 2023-05-02 PROCEDURE — 97166 OT EVAL MOD COMPLEX 45 MIN: CPT

## 2023-05-02 NOTE — CONSULTS
limb ROM, increase ease and independence with ADL, educate on long term management of condition, and improve overall quality of life. Pt is provided with a folder which included educational hand out on the basics of lymphedema, program details and what to expect during treatment. Pt is edu on a POC that would be of benefit to them given findings made during evaluation, including the items checked below. Treatment may include the following:     [x]Bandaging   [x]Self-Bandaging/Caregiver Training   [x]MLD    [x]Self-MLD   [x] Therapeutic Exercise    [x] Education/Instruction in home management program  [x] Education and trial of a Vasopneumatic Pump    [x] Education and transition to long term management devices such as velcro compression garments and/or daytime compression sleeve/stocking(s)   [x]Discharge to Home Program     Response to treatment: Pt verbalizes and is agreeable to the instructions/ POC established at today's evaluation. PLAN FOR NEXT VISIT:   Compression bandaging  Send benefits for pump  Educate on velcro compression use      Lymphedema Stage per ISL Guidelines    [] 0: Subclinical with no evidence on physical exam  [] 1:  Early onset, swelling/heaviness, pitting edema, subsides with limb elevation  [] 2:  More advanced, fibrosis resulting in non-pitting edema, does not respond to elevation, thickening of the tissues  [x] 3: Elephantiasis, pitting absent, huge limbs with dry/scaly, papillomatosis, hyperkeratosis, fluid may be leaking with recurrent cellulitis. Acanthosis (deep body folds). Elevation &   diuretics ineffective. Therapy Goals  STG - To be addressed within 10 visits    Pt will demonstrate compliance of maintaining lymphedema precautions to reduce the risks of infection and further exacerbations. Pt will demonstrate independence with decongestive exercise program in order to expedite fluid rerouting.       LTG To be addressed within 10 visits     Pt will

## 2023-05-03 NOTE — PRE-CERTIFICATION NOTE
[] [address]  [address]  [phone]  F: [phone] [] [address]  [facility]   [address]  P: [phone]  F: [phone] [] [place]  [address]  P: [phone]  F: [phone] [] [address]  [facility]   [place]: [phone]  F: [phone]  [x] Michelle Rosa   Outpatient Occupational Therapy  [address]: [phone]  F: [phone]          Therapy Pre-certification Note      5/3/2023    Collin Lima  1935   [id number]      Insurance approval was received for Occupational Therapy from [employer] on 5/3/23. Approval was received for 10 visits, from 5/5/23 to 7/14/23.   Authorization number Authorization [id number]  [facility] [id number].    [id number]  [id number]  [id number]  [id number]  [id number]    Primary therapist was notified      Electronically signed by Essence Story PT on 5/3/2023 at 5:28 AM

## 2023-05-08 NOTE — DISCHARGE INSTRUCTIONS
[address] [place] and [facility]                            Visit  Discharge Instructions / Physician Orders  DATE: 5/10/23     Home Care: None     SUPPLIES ORDERED THRU:  none          DATE LAST SUPPLIED  NA     Wound Location:  right and left lower legs      Cleanse with: keep dry and intact     Dressing Orders:  Primary dressing   fibracol, silvercel, 3 layer compression wrap to bilateral lower legs    enlivaderm to dry skin          Frequency:  Keep dry and intact for the week     Additional Orders: Increase protein to diet (meat, cheese, eggs, fish, peanut butter, nuts and beans)  Multivitamin daily      OFFLOADING [] YES  TYPE:                  [] NA     Weekly wound care visits until determined otherwise. Antibiotic therapy-wound care related YES [] NO [] NA[]     MY CHART []     Smart Device  []            Your next appointment with the [address] [place] [address] is in 1 week                                                                                                   (Please note your next appointment above and if you are unable to keep, kindly give a 24 hour notice. Thank you.)  If more than 15 min late we cannot guarantee you will be seen due to clinician schedule  Per Policy, Excessive cancellation will call for dismissal from program.  If you experience any of the following, please call the [address] [place] [address] during business hours:  [phone]     * Increase in Pain  * Temperature over 101  * Increase in drainage from your wound  * Drainage with a foul odor  * Bleeding  * Increase in swelling  * Need for compression bandage changes due to slippage, breakthrough drainage. If you need medical attention outside of the business hours of the [address] [place] [address] please contact your PCP or go to the nearest emergency room. The information contained in the After Visit Summary has been reviewed with me, the patient and/or responsible adult, by my health care provider(s).  I had the

## 2023-05-10 ENCOUNTER — HOSPITAL ENCOUNTER (OUTPATIENT)
Dept: WOUND CARE | Age: 88
Discharge: HOME | End: 2023-05-10
Payer: MEDICARE

## 2023-05-10 VITALS
SYSTOLIC BLOOD PRESSURE: 159 MMHG | WEIGHT: 302 LBS | BODY MASS INDEX: 40.9 KG/M2 | DIASTOLIC BLOOD PRESSURE: 77 MMHG | TEMPERATURE: 97.7 F | RESPIRATION RATE: 20 BRPM | HEIGHT: 72 IN | HEART RATE: 85 BPM

## 2023-05-10 PROCEDURE — 29581 APPL MULTLAYER CMPRN SYS LEG: CPT

## 2023-05-10 NOTE — PLAN OF CARE
Problem: Chronic Conditions and Co-morbidities  Goal: Patient's chronic conditions and co-morbidity symptoms are monitored and maintained or improved  Outcome: Progressing     Problem: Discharge Planning  Goal: Discharge to home or other facility with appropriate resources  Outcome: Progressing     Problem: Safety - Adult  Goal: Free from fall injury  Outcome: Progressing     Problem: Wound:  Goal: Will show signs of wound healing; wound closure and no evidence of infection  Description: Will show signs of wound healing; wound closure and no evidence of infection  Outcome: Progressing     Problem: Venous:  Goal: Signs of wound healing will improve  Description: Signs of wound healing will improve  Outcome: Progressing     Problem: Falls - Risk of:  Goal: Will remain free from falls  Description: Will remain free from falls  Outcome: Progressing

## 2023-05-10 NOTE — PROGRESS NOTES
Multilayer Compression Wrap   (Not Unna) Below the Knee    NAME:  Ayse Friday  YOB: 1935  MEDICAL RECORD NUMBER:  [id number]  DATE:  5/10/2023    Multilayer compression wrap: Removed old Multilayer wrap if indicated and wash leg with mild soap/water. Applied moisturizing agent to dry skin as needed. Applied primary and secondary dressing as ordered. Applied multilayered dressing below the knee to right lower leg. Applied multilayered dressing below the knee to left lower leg. Instructed patient/caregiver not to remove dressing and to keep it clean and dry. Instructed patient/caregiver on complications to report to provider, such as pain, numbness in toes, heavy drainage, and slippage of dressing. Instructed patient on purpose of compression dressing and on activity and exercise recommendations.       Electronically signed by Smitha Cuevas RN on 5/10/2023 at 2:59 PM
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 Discussed the importance of continued compression therapy for prevention of re-ulceration once healed. Return to multilayer compression  Discussed elevation of the legs when he is sitting. Would benefit from dynamic compression as he does not walk much and Unna boot is thus less effective. Educated on signs and symptoms of infection. Instructed to call clinic immediately or go to ER if signs and symptoms of infection are present. Recommended again lymphedema clinic referral upon healing. RTC 1 week         Written patient discharge instructions given to patient and signed by patient or POA. No orders of the defined types were placed in this encounter. No orders of the defined types were placed in this encounter.            Discharge Instructions

## 2023-05-11 RX ORDER — BETAMETHASONE DIPROPIONATE 0.05 %
OINTMENT (GRAM) TOPICAL ONCE
OUTPATIENT
Start: 2023-05-11 | End: 2023-05-11

## 2023-05-11 RX ORDER — BACITRACIN, NEOMYCIN, POLYMYXIN B 400; 3.5; 5 [USP'U]/G; MG/G; [USP'U]/G
OINTMENT TOPICAL ONCE
OUTPATIENT
Start: 2023-05-11 | End: 2023-05-11

## 2023-05-11 RX ORDER — GINSENG 100 MG
CAPSULE ORAL ONCE
OUTPATIENT
Start: 2023-05-11 | End: 2023-05-11

## 2023-05-11 RX ORDER — LIDOCAINE HYDROCHLORIDE 40 MG/ML
SOLUTION TOPICAL ONCE
OUTPATIENT
Start: 2023-05-11 | End: 2023-05-11

## 2023-05-11 RX ORDER — LIDOCAINE HYDROCHLORIDE 20 MG/ML
JELLY TOPICAL ONCE
OUTPATIENT
Start: 2023-05-11 | End: 2023-05-11

## 2023-05-11 RX ORDER — LIDOCAINE 40 MG/G
CREAM TOPICAL ONCE
OUTPATIENT
Start: 2023-05-11 | End: 2023-05-11

## 2023-05-11 RX ORDER — GENTAMICIN SULFATE 1 MG/G
OINTMENT TOPICAL ONCE
OUTPATIENT
Start: 2023-05-11 | End: 2023-05-11

## 2023-05-11 RX ORDER — BACITRACIN ZINC AND POLYMYXIN B SULFATE 500; 1000 [USP'U]/G; [USP'U]/G
OINTMENT TOPICAL ONCE
OUTPATIENT
Start: 2023-05-11 | End: 2023-05-11

## 2023-05-11 RX ORDER — LIDOCAINE 50 MG/G
OINTMENT TOPICAL ONCE
OUTPATIENT
Start: 2023-05-11 | End: 2023-05-11

## 2023-05-11 RX ORDER — CLOBETASOL PROPIONATE 0.5 MG/G
OINTMENT TOPICAL ONCE
OUTPATIENT
Start: 2023-05-11 | End: 2023-05-11

## 2023-05-18 ENCOUNTER — HOSPITAL ENCOUNTER (OUTPATIENT)
Dept: WOUND CARE | Age: 88
Discharge: HOME | End: 2023-05-18
Payer: MEDICARE

## 2023-05-18 VITALS
SYSTOLIC BLOOD PRESSURE: 142 MMHG | TEMPERATURE: 97.5 F | HEIGHT: 72 IN | RESPIRATION RATE: 22 BRPM | DIASTOLIC BLOOD PRESSURE: 59 MMHG | BODY MASS INDEX: 42.39 KG/M2 | WEIGHT: 313 LBS | HEART RATE: 82 BPM

## 2023-05-18 DIAGNOSIS — I89.0 LYMPHEDEMA OF BOTH LOWER EXTREMITIES: ICD-10-CM

## 2023-05-18 DIAGNOSIS — L97.821 NON-PRESSURE CHRONIC ULCER OF OTHER PART OF LEFT LOWER LEG LIMITED TO BREAKDOWN OF SKIN (HCC): ICD-10-CM

## 2023-05-18 DIAGNOSIS — L97.811 NON-PRESSURE CHRONIC ULCER OF OTHER PART OF RIGHT LOWER LEG LIMITED TO BREAKDOWN OF SKIN (HCC): Primary | ICD-10-CM

## 2023-05-18 PROCEDURE — 99213 OFFICE O/P EST LOW 20 MIN: CPT | Performed by: NURSE PRACTITIONER

## 2023-05-18 PROCEDURE — 29581 APPL MULTLAYER CMPRN SYS LEG: CPT

## 2023-05-18 RX ORDER — LIDOCAINE HYDROCHLORIDE 40 MG/ML
SOLUTION TOPICAL ONCE
Status: COMPLETED | OUTPATIENT
Start: 2023-05-18 | End: 2023-05-18

## 2023-05-18 RX ORDER — BACITRACIN, NEOMYCIN, POLYMYXIN B 400; 3.5; 5 [USP'U]/G; MG/G; [USP'U]/G
OINTMENT TOPICAL ONCE
Status: CANCELLED | OUTPATIENT
Start: 2023-05-18 | End: 2023-05-18

## 2023-05-18 RX ORDER — BETAMETHASONE DIPROPIONATE 0.05 %
OINTMENT (GRAM) TOPICAL ONCE
Status: CANCELLED | OUTPATIENT
Start: 2023-05-18 | End: 2023-05-18

## 2023-05-18 RX ORDER — CLOBETASOL PROPIONATE 0.5 MG/G
OINTMENT TOPICAL ONCE
Status: CANCELLED | OUTPATIENT
Start: 2023-05-18 | End: 2023-05-18

## 2023-05-18 RX ORDER — LIDOCAINE HYDROCHLORIDE 20 MG/ML
JELLY TOPICAL ONCE
Status: CANCELLED | OUTPATIENT
Start: 2023-05-18 | End: 2023-05-18

## 2023-05-18 RX ORDER — GENTAMICIN SULFATE 1 MG/G
OINTMENT TOPICAL ONCE
Status: CANCELLED | OUTPATIENT
Start: 2023-05-18 | End: 2023-05-18

## 2023-05-18 RX ORDER — LIDOCAINE HYDROCHLORIDE 20 MG/ML
JELLY TOPICAL ONCE
OUTPATIENT
Start: 2023-05-18 | End: 2023-05-18

## 2023-05-18 RX ORDER — BACITRACIN ZINC AND POLYMYXIN B SULFATE 500; 1000 [USP'U]/G; [USP'U]/G
OINTMENT TOPICAL ONCE
Status: CANCELLED | OUTPATIENT
Start: 2023-05-18 | End: 2023-05-18

## 2023-05-18 RX ORDER — GINSENG 100 MG
CAPSULE ORAL ONCE
Status: CANCELLED | OUTPATIENT
Start: 2023-05-18 | End: 2023-05-18

## 2023-05-18 RX ORDER — LIDOCAINE 40 MG/G
CREAM TOPICAL ONCE
Status: CANCELLED | OUTPATIENT
Start: 2023-05-18 | End: 2023-05-18

## 2023-05-18 RX ORDER — LIDOCAINE HYDROCHLORIDE 40 MG/ML
SOLUTION TOPICAL ONCE
OUTPATIENT
Start: 2023-05-18 | End: 2023-05-18

## 2023-05-18 RX ORDER — LIDOCAINE 50 MG/G
OINTMENT TOPICAL ONCE
Status: CANCELLED | OUTPATIENT
Start: 2023-05-18 | End: 2023-05-18

## 2023-05-18 RX ADMIN — LIDOCAINE HYDROCHLORIDE 10 ML: 40 SOLUTION TOPICAL at 10:32

## 2023-05-18 ASSESSMENT — ENCOUNTER SYMPTOMS
DIARRHEA: 0
SHORTNESS OF BREATH: 0
RHINORRHEA: 0
COUGH: 0
NAUSEA: 0
VOMITING: 0

## 2023-05-18 NOTE — PROGRESS NOTES
Multilayer Compression Wrap   (Not Unna) Below the Knee    NAME:  Giana Aaron  YOB: 1935  MEDICAL RECORD NUMBER:  [id number]  DATE:  5/18/2023       [x] Removed old Multilayer wrap if indicated and wash leg with mild soap/water. [x] Applied moisturizing agent to dry skin as needed. [x] Applied primary and secondary dressing as ordered   [x] Applied multilayered dressing below the knee to Bilateral lower leg(s). [x] Instructed patient/caregiver not to remove dressing and to keep it clean and dry. [x] Instructed patient/caregiver on complications to report to provider, such as pain, numbness in toes, heavy drainage, and slippage of dressing. [x] Instructed patient on purpose of compression dressing and on activity and exercise recommendations.     Electronically signed by All Krishnamurthy RN on 5/18/2023 at 11:22 AM
(36.4 °C) (Tympanic)   Resp 22   Ht 6' (1.829 m)   Wt (!) 313 lb (142 kg)   BMI 42.45 kg/m²     Wt Readings from Last 3 Encounters:   05/18/23 (!) 313 lb (142 kg)   05/10/23 (!) 302 lb (137 kg)   05/10/23 (!) 313 lb (142 kg)       PHYSICAL EXAM    General Appearance: alert and oriented to person, place and time, well-developed and obese, in no acute distress  Skin: warm and dry, no rash or erythema, bilateral lower leg wounds   Head: normocephalic and atraumatic  Eyes: pupils equal, round and conjunctivae normal  Pulmonary/Chest: normal air movement, no respiratory distress  Extremities: no cyanosis and no clubbing   Musculoskeletal: no joint swelling, deformity or tenderness  Neurologic: gait, coordination normal and speech normal      Assessment:     Problem List Items Addressed This Visit       Lymphedema of both lower extremities    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 Wound Measurements:  Wound/Ulcer Descriptions are Pre Debridement except measurements:    Wound 05/10/23 Leg Proximal;Right;Lateral #2 (Active)   Wound Image   05/10/23 1400   Wound Etiology Venous 05/18/23 1028   Dressing Status New drainage noted; Old drainage noted 05/18/23 1028   Wound Cleansed Soap and water 05/18/23 1028   Wound Length (cm) 1 cm 05/18/23 1028   Wound Width (cm) 0.7 cm 05/18/23 1028   Wound Depth (cm) 0.1 cm 05/18/23 1028   Wound Surface Area (cm^2) 0.7 cm^2 05/18/23 1028   Change in Wound Size % (l*w) 72 05/18/23 1028   Wound Volume (cm^3) 0.07 cm^3 05/18/23

## 2023-05-18 NOTE — DISCHARGE INSTRUCTIONS
[address]                            Visit  Discharge Instructions / Physician Orders  DATE: 5/18/23     Home Care: None     SUPPLIES ORDERED THRU:  none          DATE LAST SUPPLIED  NA     Wound Location:  right and left lower legs      Cleanse with: keep dry and intact     Dressing Orders:  Primary dressing   ioplex, kerramax to weeping areas, zinc layer, 3 layer compression wrap to bilateral lower legs    enlivaderm to dry skin           Frequency:  Keep dry and intact for the week     Additional Orders: Increase protein to diet (meat, cheese, eggs, fish, peanut butter, nuts and beans)  Multivitamin daily        OFFLOADING [] YES  TYPE:                  [] NA     Weekly wound care visits until determined otherwise. Antibiotic therapy-wound care related YES [] NO [] NA[]     MY CHART []     Smart Device  []            Your next appointment with the [facility] is in 1 week                                                                                                   (Please note your next appointment above and if you are unable to keep, kindly give a 24 hour notice. Thank you.)  If more than 15 min late we cannot guarantee you will be seen due to clinician schedule  Per Policy, Excessive cancellation will call for dismissal from program.  If you experience any of the following, please call the [facility] during business hours:  [phone]     * Increase in Pain  * Temperature over 101  * Increase in drainage from your wound  * Drainage with a foul odor  * Bleeding  * Increase in swelling  * Need for compression bandage changes due to slippage, breakthrough drainage. If you need medical attention outside of the business hours of the [facility] please contact your PCP or go to the nearest emergency room.      The information contained in the After Visit Summary has been reviewed with me, the patient and/or responsible adult, by my health

## 2023-05-24 ENCOUNTER — HOSPITAL ENCOUNTER (OUTPATIENT)
Dept: WOUND CARE | Age: 88
Discharge: HOME | End: 2023-05-24
Payer: MEDICARE

## 2023-05-24 VITALS
TEMPERATURE: 97.4 F | BODY MASS INDEX: 42.39 KG/M2 | DIASTOLIC BLOOD PRESSURE: 51 MMHG | RESPIRATION RATE: 22 BRPM | WEIGHT: 313 LBS | HEIGHT: 72 IN | HEART RATE: 84 BPM | SYSTOLIC BLOOD PRESSURE: 119 MMHG

## 2023-05-24 DIAGNOSIS — L97.821 NON-PRESSURE CHRONIC ULCER OF OTHER PART OF LEFT LOWER LEG LIMITED TO BREAKDOWN OF SKIN (HCC): ICD-10-CM

## 2023-05-24 DIAGNOSIS — L97.811 NON-PRESSURE CHRONIC ULCER OF OTHER PART OF RIGHT LOWER LEG LIMITED TO BREAKDOWN OF SKIN (HCC): Primary | ICD-10-CM

## 2023-05-24 PROCEDURE — 29581 APPL MULTLAYER CMPRN SYS LEG: CPT

## 2023-05-24 RX ORDER — LIDOCAINE HYDROCHLORIDE 20 MG/ML
JELLY TOPICAL ONCE
OUTPATIENT
Start: 2023-05-24 | End: 2023-05-24

## 2023-05-24 RX ORDER — LIDOCAINE HYDROCHLORIDE 40 MG/ML
SOLUTION TOPICAL ONCE
OUTPATIENT
Start: 2023-05-24 | End: 2023-05-24

## 2023-05-24 RX ORDER — LIDOCAINE HYDROCHLORIDE 40 MG/ML
SOLUTION TOPICAL ONCE
Status: DISCONTINUED | OUTPATIENT
Start: 2023-05-24 | End: 2023-05-25 | Stop reason: HOSPADM

## 2023-05-24 NOTE — PROGRESS NOTES
Multilayer Compression Wrap   (Not Unna) Below the Knee    NAME:  Janet Mondragon  YOB: 1935  MEDICAL RECORD NUMBER:  [id number]  DATE:  5/24/2023    Multilayer compression wrap: Removed old Multilayer wrap if indicated and wash leg with mild soap/water. Applied moisturizing agent to dry skin as needed. Applied primary and secondary dressing as ordered. Applied multilayered dressing below the knee to right lower leg. Applied multilayered dressing below the knee to left lower leg. Instructed patient/caregiver not to remove dressing and to keep it clean and dry. Instructed patient/caregiver on complications to report to provider, such as pain, numbness in toes, heavy drainage, and slippage of dressing. Instructed patient on purpose of compression dressing and on activity and exercise recommendations.       Electronically signed by Xavier Guadarrama RN on 5/24/2023 at 3:48 PM
Care Center is in 1 week                                                                                                   (Please note your next appointment above and if you are unable to keep, kindly give a 24 hour notice. Thank you.)  If more than 15 min late we cannot guarantee you will be seen due to clinician schedule  Per Policy, Excessive cancellation will call for dismissal from program.  If you experience any of the following, please call the [facility] [address] during business hours:  [phone]     * Increase in Pain  * Temperature over 101  * Increase in drainage from your wound  * Drainage with a foul odor  * Bleeding  * Increase in swelling  * Need for compression bandage changes due to slippage, breakthrough drainage. If you need medical attention outside of the business hours of the [facility] please contact your PCP or go to the nearest emergency room. The information contained in the After Visit Summary has been reviewed with me, the patient and/or responsible adult, by my health care provider(s). I had the opportunity to ask questions regarding this information.  I have elected to receive;      []After Visit Summary  [x]Comprehensive Discharge Instruction        Patient signature______________________________________Date:________  Electronically signed by Mary Carmen Preston RN on 5/24/2023 at 3:16 PM   Electronically signed by Noemí Patel DPM on 5/24/2023 at 2:37 PM          Electronically signed by Noemí Patel DPM on 5/24/2023 at 3:18 PM

## 2023-05-31 ENCOUNTER — HOSPITAL ENCOUNTER (OUTPATIENT)
Dept: WOUND CARE | Age: 88
Discharge: HOME | End: 2023-05-31
Payer: MEDICARE

## 2023-05-31 VITALS
HEART RATE: 84 BPM | BODY MASS INDEX: 42.39 KG/M2 | SYSTOLIC BLOOD PRESSURE: 118 MMHG | DIASTOLIC BLOOD PRESSURE: 59 MMHG | WEIGHT: 313 LBS | TEMPERATURE: 98 F | RESPIRATION RATE: 20 BRPM | HEIGHT: 72 IN

## 2023-05-31 DIAGNOSIS — L97.811 NON-PRESSURE CHRONIC ULCER OF OTHER PART OF RIGHT LOWER LEG LIMITED TO BREAKDOWN OF SKIN (HCC): Primary | ICD-10-CM

## 2023-05-31 DIAGNOSIS — L97.821 NON-PRESSURE CHRONIC ULCER OF OTHER PART OF LEFT LOWER LEG LIMITED TO BREAKDOWN OF SKIN (HCC): ICD-10-CM

## 2023-05-31 PROCEDURE — 29581 APPL MULTLAYER CMPRN SYS LEG: CPT

## 2023-05-31 RX ORDER — LIDOCAINE HYDROCHLORIDE 40 MG/ML
SOLUTION TOPICAL ONCE
Status: COMPLETED | OUTPATIENT
Start: 2023-05-31 | End: 2023-05-31

## 2023-05-31 RX ORDER — LIDOCAINE HYDROCHLORIDE 20 MG/ML
JELLY TOPICAL ONCE
OUTPATIENT
Start: 2023-05-31 | End: 2023-05-31

## 2023-05-31 RX ORDER — LIDOCAINE HYDROCHLORIDE 40 MG/ML
SOLUTION TOPICAL ONCE
OUTPATIENT
Start: 2023-05-31 | End: 2023-05-31

## 2023-05-31 RX ADMIN — LIDOCAINE HYDROCHLORIDE 30 ML: 40 SOLUTION TOPICAL at 15:31

## 2023-05-31 NOTE — PROGRESS NOTES
[address]   Progress Note and Procedure Note      [address] RECORD NUMBER:  [id number]  AGE: 82 y.o. GENDER: male  : 1935  EPISODE DATE:  2023    Subjective:     Chief Complaint   Patient presents with    Wound Check     Bilateral lower legs        HISTORY of PRESENT ILLNESS HPI     Ladonna Lombard is a 82 y.o. male who presents today for wound/ulcer evaluation. Current evaluation:  Will need lymphedema appt. Interval history: Michelet Peña has seen cardiologist and clearance was given for multilayer compression. He had healed in profore wraps. However upon discontinuation of compression wraps he re-ulcerated as use of the juxta lites with inconsistent. Interval history:  Has seen the cardiologist but did not get the clearance for multilayer compression wraps signed by the cardiologist and did not discuss it with them. History of Wound Context: Michelet Peña presents for evaluation of bilateral lower leg ulcerations. He has lymphedema and has large ulcerations to both legs. Has not had any sign or symptom of infection. Has not seen a lymphedema clinic before and does not have wraps for at home. Ulcer Identification:  Ulcer Type: lymphedema  Contributing Factors: edema, lymphedema, and obesity          PAST MEDICAL HISTORY        Diagnosis Date    Arthritis     CHF (congestive heart failure) (HCC)     Chronic kidney disease     History of blood transfusion     Hyperlipidemia        PAST SURGICAL HISTORY    Past Surgical History:   Procedure Laterality Date    CARDIAC SURGERY      bypass    JOINT REPLACEMENT      Left hip    PACEMAKER PLACEMENT Right     and defib       FAMILY HISTORY    History reviewed. No pertinent family history.     SOCIAL HISTORY    Social History     Tobacco Use    Smoking status: Former     Types: Cigarettes     Quit date:      Years since quittin.4     Passive exposure: Never    Smokeless tobacco: Never

## 2023-05-31 NOTE — PROGRESS NOTES
Multilayer Compression Wrap   (Not Unna) Below the Knee    NAME:  Hari Miller  YOB: 1935  MEDICAL RECORD NUMBER:  [id number]  DATE:  5/31/2023       [x] Removed old Multilayer wrap if indicated and wash leg with mild soap/water. [x] Applied moisturizing agent to dry skin as needed. [x] Applied primary and secondary dressing as ordered   [x] Applied multilayered dressing below the knee to Bilateral lower leg(s). [x] Instructed patient/caregiver not to remove dressing and to keep it clean and dry. [x] Instructed patient/caregiver on complications to report to provider, such as pain, numbness in toes, heavy drainage, and slippage of dressing. [x] Instructed patient on purpose of compression dressing and on activity and exercise recommendations.     Electronically signed by Refugio Kenney RN on 5/31/2023 at 4:25 PM

## 2023-05-31 NOTE — DISCHARGE INSTRUCTIONS
[facility]                            Visit  Discharge Instructions / Physician Orders  DATE: 5/31/23     Home Care: None     SUPPLIES ORDERED THRU:  none          DATE LAST SUPPLIED  NA     Wound Location:  right and left lower legs      Cleanse with: keep dry and intact     Dressing Orders:  Primary dressing   ioplex, kerramax to weeping areas, zinc layer, 3 layer compression wrap to bilateral lower legs             Frequency:  Keep dry and intact for the week     Additional Orders: Increase protein to diet (meat, cheese, eggs, fish, peanut butter, nuts and beans)  Multivitamin daily  Bring daughter to next visit to see if she can learn to rewrap legs  Referral to Lymphedema clinic for short stretch wraps     OFFLOADING [] YES  TYPE:                  [] NA     Weekly wound care visits until determined otherwise. Antibiotic therapy-wound care related YES [] NO [] NA[]     MY CHART []     Smart Device  []            Your next appointment with the [address] [place] [address] is in 1 week                                                                                                   (Please note your next appointment above and if you are unable to keep, kindly give a 24 hour notice. Thank you.)  If more than 15 min late we cannot guarantee you will be seen due to clinician schedule  Per Policy, Excessive cancellation will call for dismissal from program.  If you experience any of the following, please call the [address] [place] [address] during business hours:  [phone]     * Increase in Pain  * Temperature over 101  * Increase in drainage from your wound  * Drainage with a foul odor  * Bleeding  * Increase in swelling  * Need for compression bandage changes due to slippage, breakthrough drainage. If you need medical attention outside of the business hours of the [address] [place] [address] please contact your PCP or go to the nearest emergency room.      The information contained in the

## 2023-06-05 NOTE — DISCHARGE INSTRUCTIONS
[facility]                            Visit  Discharge Instructions / Physician Orders  DATE: 6/7/23     Home Care: None     SUPPLIES ORDERED THRU:  none          DATE LAST SUPPLIED  NA     Wound Location:  right and left lower legs      Cleanse with: Antibacterial soap and water     Dressing Orders:  Primary dressing   chapin, non adherent silvercel, zinc layer, 3 layer compression wrap to bilateral lower legs             Frequency:  please change dressing this weekend (Sat or Sun)     Additional Orders: Increase protein to diet (meat, cheese, eggs, fish, peanut butter, nuts and beans)  Multivitamin daily    Referral to Lymphedema clinic for short stretch wraps     OFFLOADING [] YES  TYPE:                  [] NA     Weekly wound care visits until determined otherwise. Antibiotic therapy-wound care related YES [] NO [] NA[]     MY CHART []     Smart Device  []            Your next appointment with the [address] [place] [address] is in 1 week                                                                                                   (Please note your next appointment above and if you are unable to keep, kindly give a 24 hour notice. Thank you.)  If more than 15 min late we cannot guarantee you will be seen due to clinician schedule  Per Policy, Excessive cancellation will call for dismissal from program.  If you experience any of the following, please call the [address] [place] [address] during business hours:  [phone]     * Increase in Pain  * Temperature over 101  * Increase in drainage from your wound  * Drainage with a foul odor  * Bleeding  * Increase in swelling  * Need for compression bandage changes due to slippage, breakthrough drainage. If you need medical attention outside of the business hours of the [address] [place] [address] please contact your PCP or go to the nearest emergency room.      The information contained in the After Visit Summary has been reviewed with me,

## 2023-06-07 ENCOUNTER — HOSPITAL ENCOUNTER (OUTPATIENT)
Dept: WOUND CARE | Age: 88
Discharge: HOME | End: 2023-06-07
Payer: MEDICARE

## 2023-06-07 VITALS
RESPIRATION RATE: 24 BRPM | SYSTOLIC BLOOD PRESSURE: 125 MMHG | HEART RATE: 76 BPM | TEMPERATURE: 97.6 F | DIASTOLIC BLOOD PRESSURE: 70 MMHG

## 2023-06-07 DIAGNOSIS — L97.811 NON-PRESSURE CHRONIC ULCER OF OTHER PART OF RIGHT LOWER LEG LIMITED TO BREAKDOWN OF SKIN (HCC): Primary | ICD-10-CM

## 2023-06-07 DIAGNOSIS — L97.821 NON-PRESSURE CHRONIC ULCER OF OTHER PART OF LEFT LOWER LEG LIMITED TO BREAKDOWN OF SKIN (HCC): ICD-10-CM

## 2023-06-07 PROCEDURE — 29581 APPL MULTLAYER CMPRN SYS LEG: CPT

## 2023-06-07 RX ORDER — LIDOCAINE HYDROCHLORIDE 20 MG/ML
JELLY TOPICAL ONCE
OUTPATIENT
Start: 2023-06-07 | End: 2023-06-07

## 2023-06-07 RX ORDER — LIDOCAINE HYDROCHLORIDE 40 MG/ML
SOLUTION TOPICAL ONCE
OUTPATIENT
Start: 2023-06-07 | End: 2023-06-07

## 2023-06-07 RX ORDER — LIDOCAINE HYDROCHLORIDE 40 MG/ML
SOLUTION TOPICAL ONCE
Status: DISCONTINUED | OUTPATIENT
Start: 2023-06-07 | End: 2023-06-08 | Stop reason: HOSPADM

## 2023-06-07 NOTE — PROGRESS NOTES
Multilayer Compression Wrap   (Not Unna) Below the Knee    NAME:  Christine Nixon  YOB: 1935  MEDICAL RECORD NUMBER:  [id number]  DATE:  6/7/2023    Multilayer compression wrap: Removed old Multilayer wrap if indicated and wash leg with mild soap/water. Applied primary and secondary dressing as ordered. Applied multilayered dressing below the knee to right lower leg. Applied multilayered dressing below the knee to left lower leg. Instructed patient/caregiver not to remove dressing and to keep it clean and dry. Instructed patient/caregiver on complications to report to provider, such as pain, numbness in toes, heavy drainage, and slippage of dressing. Instructed patient on purpose of compression dressing and on activity and exercise recommendations.       Electronically signed by Elayne Hernandez RN on 6/7/2023 at 3:34 PM
Pt was evaluated and examined. Toe nails are elongated and Hypertrophic. Patient was given personalized discharge instructions. Nails 1-10 were debrided sharply in length and thickness with a nipper without incident. Proper foot hygiene and care was discussed with the pt.   Electronically signed by Daisy Antony RN, BSN,CWS,CFCS on 6/7/2023 at 3:37 PM
Smoking status: Former     Types: Cigarettes     Quit date: 5     Years since quittin.4     Passive exposure: Never    Smokeless tobacco: Never   Vaping Use    Vaping Use: Never used   Substance Use Topics    Alcohol use: Not Currently    Drug use: Never       ALLERGIES    No Known Allergies    MEDICATIONS    Current Outpatient Medications on File Prior to Encounter   Medication Sig Dispense Refill    potassium chloride (KLOR-CON) 10 MEQ extended release tablet TAKE 1 TABLET EVERY DAY 90 tablet 3    clopidogrel (PLAVIX) 75 MG tablet TAKE 1 TABLET EVERY DAY 90 tablet 1    allopurinol (ZYLOPRIM) 100 MG tablet TAKE 1 TABLET EVERY DAY 90 tablet 1    bumetanide (BUMEX) 2 MG tablet TAKE 1 TABLET TWICE DAILY 180 tablet 0    metoprolol tartrate (LOPRESSOR) 50 MG tablet TAKE 1 TABLET TWICE DAILY 180 tablet 1    albuterol (PROVENTIL) (5 MG/ML) 0.5% nebulizer solution Take 0.5 mLs by nebulization 4 times daily as needed for Wheezing 120 each 3    acetylcysteine (MUCOMYST) 20 % nebulizer solution Take 3 mLs by mouth 2 times daily for 2 doses 6 mL 0    furosemide (LASIX) 40 MG tablet Take 1 tablet by mouth 2 times daily 180 tablet 3    magnesium oxide (MAG-OX) 400 (240 Mg) MG tablet TAKE 1 TABLET TWICE DAILY 180 tablet 0    rosuvastatin (CRESTOR) 20 MG tablet TAKE 1 TABLET EVERY DAY 90 tablet 3    lisinopril (PRINIVIL;ZESTRIL) 5 MG tablet TAKE 1 TABLET EVERY DAY 90 tablet 3    apixaban (ELIQUIS) 2.5 MG TABS tablet Take 1 tablet by mouth in the morning and 1 tablet before bedtime. 180 tablet 3    Methylcobalamin (B-12) 5000 MCG TBDP Take by mouth      ascorbic acid ([employer] VITAMIN C) 500 MG tablet Take 1 tablet by mouth 2 times daily 180 tablet 1     No current facility-administered medications on file prior to encounter. REVIEW OF SYSTEMS    Review of Systems   Constitutional:  Negative for chills and fever. Skin:  Positive for wound.        Objective:      /70   Pulse 76   Temp 97.6 °F (36.4 °C)
(cm) 1.4 cm 06/07/23 1436   Post-Procedure Depth (cm) 0.4 cm 06/07/23 1436   Post-Procedure Surface Area (cm^2) 3.36 cm^2 06/07/23 1436   Post-Procedure Volume (cm^3) 1.344 cm^3 06/07/23 1436   Wound Assessment Pink/red;Slough 06/07/23 1436   Drainage Amount Moderate 06/07/23 1436   Drainage Description Yellow;Serosanguinous 06/07/23 1436   Odor Mild 06/07/23 1436   Sara-wound Assessment Blanchable erythema;Dry/flaky 06/07/23 1436   Margins Defined edges 06/07/23 1436   Wound Thickness Description not for Pressure Injury Full thickness 06/07/23 1436   Number of days: 28          Supplies Requested : May substitute with comparable supplies if needed    WOUND #: 1 & 2   PRIMARY DRESSING:  Other: nonadherent silver alginate   Cover and Secure with: Other zinc impregnated gauze wrap followed by DeWrap 3 layer compression wrap     FREQUENCY OF DRESSING CHANGES:  Once a week     *patient will need bilateral lower legs wrapped with a zinc impregnated gauze followed by a three layer wrap once per week.  A total of 8 zinc gauze wraps and 8 three layer compression wrap systems will be needed for a one month supply      ADDITIONAL ITEMS:  [] Gloves Small  [] Gloves Medium [] Gloves Large [] Gloves XLarge  [] Tape 1\" [] Tape 2\" [] Tape 3\"  [] Medipore Tape  [] Saline  [] Vashe  [] Skin Prep   [] Adhesive Remover   [] Cotton Tip Applicators   [] Other:    Patient Wound(s) Debrided: [x] Yes if yes please add date 6/7/23   [] No    Debribement Type: Mechanical     Is the patient currently on an antibiotic for their Wound(s): [] Yes if yes please add name and dose    [x] No    Patient currently being seen by Home Health: [] Yes   [x] No    Duration for needed supplies:  []15  [x]30  []60  []90 Days    Electronically signed by Rere Day RN on 6/7/2023 at 3:56 PM     Provider Information:      PROVIDER'S NAME: Bailey MADERA-[id number]

## 2023-06-07 NOTE — PLAN OF CARE
Problem: Chronic Conditions and Co-morbidities  Goal: Patient's chronic conditions and co-morbidity symptoms are monitored and maintained or improved  Outcome: Progressing     Problem: Discharge Planning  Goal: Discharge to home or other facility with appropriate resources  Outcome: Progressing     Problem: Safety - Adult  Goal: Free from fall injury  Outcome: Progressing     Problem: ABCDS Injury Assessment  Goal: Absence of physical injury  Outcome: Progressing  Flowsheets (Taken 6/7/2023 1411 by Jessica Pratt RN)  Absence of Physical Injury: Implement safety measures based on patient assessment     Problem: Wound:  Goal: Will show signs of wound healing; wound closure and no evidence of infection  Description: Will show signs of wound healing; wound closure and no evidence of infection  Outcome: Progressing     Problem: Venous:  Goal: Signs of wound healing will improve  Description: Signs of wound healing will improve  Outcome: Progressing     Problem: Falls - Risk of:  Goal: Will remain free from falls  Description: Will remain free from falls  Outcome: Progressing

## 2023-07-04 NOTE — DISCHARGE INSTRUCTIONS
[facility]                            Visit  Discharge Instructions / Physician Orders  DATE: 7/6/23     Home Care: None     SUPPLIES ORDERED THRU:  none          DATE LAST SUPPLIED  NA     Wound Location:  right and left lower legs      Cleanse with: Antibacterial soap and water     Dressing Orders:  Primary dressing   non adherent silvercel, zinc layer, 3 layer compression wrap to bilateral lower legs             Frequency:  please change dressing on Saturdays and Thursday. Additional Orders: Increase protein to diet (meat, cheese, eggs, fish, peanut butter, nuts and beans)  Multivitamin daily     Referral to Lymphedema clinic for short stretch wraps has appt 7/7/2023     OFFLOADING [] YES  TYPE:                  [] NA     Weekly wound care visits until determined otherwise. Antibiotic therapy-wound care related YES [] NO [] NA[]     MY CHART []     Smart Device  []            Your next appointment with the [address] [place] [address] is 2 weeks                                                                                                   (Please note your next appointment above and if you are unable to keep, kindly give a 24 hour notice. Thank you.)  If more than 15 min late we cannot guarantee you will be seen due to clinician schedule  Per Policy, Excessive cancellation will call for dismissal from program.  If you experience any of the following, please call the [address] [place] [address] during business hours:  [phone]     * Increase in Pain  * Temperature over 101  * Increase in drainage from your wound  * Drainage with a foul odor  * Bleeding  * Increase in swelling  * Need for compression bandage changes due to slippage, breakthrough drainage. If you need medical attention outside of the business hours of the [address] [place] [address] please contact your PCP or go to the nearest emergency room.      The information contained in the After Visit Summary has been reviewed

## 2023-07-06 ENCOUNTER — HOSPITAL ENCOUNTER (OUTPATIENT)
Dept: WOUND CARE | Age: 88
Discharge: HOME | End: 2023-07-06
Payer: MEDICARE

## 2023-07-06 VITALS
WEIGHT: 313 LBS | RESPIRATION RATE: 26 BRPM | HEIGHT: 72 IN | SYSTOLIC BLOOD PRESSURE: 123 MMHG | DIASTOLIC BLOOD PRESSURE: 56 MMHG | TEMPERATURE: 99.5 F | BODY MASS INDEX: 42.39 KG/M2 | HEART RATE: 84 BPM

## 2023-07-06 DIAGNOSIS — L97.912 NON-PRESSURE CHRONIC ULCER OF RIGHT LOWER LEG WITH FAT LAYER EXPOSED (HCC): ICD-10-CM

## 2023-07-06 DIAGNOSIS — I89.0 LYMPHEDEMA OF BOTH LOWER EXTREMITIES: ICD-10-CM

## 2023-07-06 DIAGNOSIS — L97.811 NON-PRESSURE CHRONIC ULCER OF OTHER PART OF RIGHT LOWER LEG LIMITED TO BREAKDOWN OF SKIN (HCC): Primary | ICD-10-CM

## 2023-07-06 DIAGNOSIS — L97.821 NON-PRESSURE CHRONIC ULCER OF OTHER PART OF LEFT LOWER LEG LIMITED TO BREAKDOWN OF SKIN (HCC): ICD-10-CM

## 2023-07-06 PROBLEM — L97.312 NON-PRESSURE CHRONIC ULCER OF RIGHT ANKLE WITH FAT LAYER EXPOSED (HCC): Status: ACTIVE | Noted: 2023-02-15

## 2023-07-06 PROCEDURE — 29581 APPL MULTLAYER CMPRN SYS LEG: CPT

## 2023-07-06 PROCEDURE — 11042 DBRDMT SUBQ TIS 1ST 20SQCM/<: CPT

## 2023-07-06 RX ORDER — LIDOCAINE HYDROCHLORIDE 40 MG/ML
SOLUTION TOPICAL ONCE
OUTPATIENT
Start: 2023-07-06 | End: 2023-07-06

## 2023-07-06 RX ORDER — LIDOCAINE HYDROCHLORIDE 40 MG/ML
SOLUTION TOPICAL ONCE
Status: COMPLETED | OUTPATIENT
Start: 2023-07-06 | End: 2023-07-06

## 2023-07-06 RX ORDER — LIDOCAINE HYDROCHLORIDE 20 MG/ML
JELLY TOPICAL ONCE
OUTPATIENT
Start: 2023-07-06 | End: 2023-07-06

## 2023-07-06 RX ADMIN — LIDOCAINE HYDROCHLORIDE 10 ML: 40 SOLUTION TOPICAL at 15:13

## 2023-07-06 ASSESSMENT — ENCOUNTER SYMPTOMS
VOMITING: 0
SHORTNESS OF BREATH: 0
COUGH: 0
DIARRHEA: 0
RHINORRHEA: 0
NAUSEA: 0

## 2023-07-06 ASSESSMENT — PAIN SCALES - GENERAL: PAINLEVEL_OUTOF10: 0

## 2023-07-06 NOTE — PLAN OF CARE
Problem: Chronic Conditions and Co-morbidities  Goal: Patient's chronic conditions and co-morbidity symptoms are monitored and maintained or improved  Outcome: Progressing     Problem: Discharge Planning  Goal: Discharge to home or other facility with appropriate resources  Outcome: Progressing     Problem: Pain  Goal: Verbalizes/displays adequate comfort level or baseline comfort level  Outcome: Progressing     Problem: Safety - Adult  Goal: Free from fall injury  Outcome: Progressing     Problem: Wound:  Goal: Will show signs of wound healing; wound closure and no evidence of infection  Description: Will show signs of wound healing; wound closure and no evidence of infection  Outcome: Progressing     Problem: Venous:  Goal: Signs of wound healing will improve  Description: Signs of wound healing will improve  Outcome: Progressing     Problem: Falls - Risk of:  Goal: Will remain free from falls  Description: Will remain free from falls  Outcome: Progressing

## 2023-07-07 ENCOUNTER — HOSPITAL ENCOUNTER (OUTPATIENT)
Dept: OCCUPATIONAL THERAPY | Age: 88
Setting detail: THERAPIES SERIES
Discharge: HOME | End: 2023-07-07

## 2023-07-17 NOTE — DISCHARGE INSTRUCTIONS
[facility]                            Visit  Discharge Instructions / Physician Orders  DATE: 7/19/23     Home Care: None     SUPPLIES ORDERED THRU:  none          DATE LAST SUPPLIED  NA     Wound Location:  right and left lower legs      Cleanse with: Antibacterial soap and water     Dressing Orders:  Primary dressing   non adherent silvercel, zinc layer, 3 layer compression wrap to bilateral lower legs             Frequency:  please change dressing on Saturdays and Thursday. Additional Orders: Increase protein to diet (meat, cheese, eggs, fish, peanut butter, nuts and beans)  Multivitamin daily  Compression pumps per [facility]IO [facility]-WALKER  OFFLOADING [] YES  TYPE:                  [] NA     Weekly wound care visits until determined otherwise. Antibiotic therapy-wound care related YES [] NO [] NA[]     MY CHART []     Smart Device  []            Your next appointment with the [address] [place] [address] is 2 weeks                                                                                                   (Please note your next appointment above and if you are unable to keep, kindly give a 24 hour notice. Thank you.)  If more than 15 min late we cannot guarantee you will be seen due to clinician schedule  Per Policy, Excessive cancellation will call for dismissal from program.  If you experience any of the following, please call the [address] [place] [address] during business hours:  [phone]     * Increase in Pain  * Temperature over 101  * Increase in drainage from your wound  * Drainage with a foul odor  * Bleeding  * Increase in swelling  * Need for compression bandage changes due to slippage, breakthrough drainage. If you need medical attention outside of the business hours of the [address] [place] [address] please contact your PCP or go to the nearest emergency room.      The information contained in the After Visit Summary has been reviewed with me, the patient and/or responsible

## 2023-07-19 ENCOUNTER — HOSPITAL ENCOUNTER (OUTPATIENT)
Dept: WOUND CARE | Age: 88
Discharge: HOME | End: 2023-07-19
Payer: MEDICARE

## 2023-07-19 VITALS
DIASTOLIC BLOOD PRESSURE: 85 MMHG | RESPIRATION RATE: 22 BRPM | SYSTOLIC BLOOD PRESSURE: 157 MMHG | HEART RATE: 79 BPM | TEMPERATURE: 97.3 F

## 2023-07-19 DIAGNOSIS — I89.0 LYMPHEDEMA OF BOTH LOWER EXTREMITIES: ICD-10-CM

## 2023-07-19 DIAGNOSIS — L97.912 NON-PRESSURE CHRONIC ULCER OF RIGHT LOWER LEG WITH FAT LAYER EXPOSED (HCC): Primary | ICD-10-CM

## 2023-07-19 DIAGNOSIS — L97.821 NON-PRESSURE CHRONIC ULCER OF OTHER PART OF LEFT LOWER LEG LIMITED TO BREAKDOWN OF SKIN (HCC): ICD-10-CM

## 2023-07-19 PROCEDURE — 99213 OFFICE O/P EST LOW 20 MIN: CPT | Performed by: NURSE PRACTITIONER

## 2023-07-19 PROCEDURE — 29580 STRAPPING UNNA BOOT: CPT

## 2023-07-19 RX ORDER — LIDOCAINE HYDROCHLORIDE 40 MG/ML
SOLUTION TOPICAL ONCE
OUTPATIENT
Start: 2023-07-19 | End: 2023-07-19

## 2023-07-19 RX ORDER — LIDOCAINE HYDROCHLORIDE 20 MG/ML
JELLY TOPICAL ONCE
OUTPATIENT
Start: 2023-07-19 | End: 2023-07-19

## 2023-07-19 RX ORDER — LIDOCAINE HYDROCHLORIDE 20 MG/ML
JELLY TOPICAL ONCE
Status: COMPLETED | OUTPATIENT
Start: 2023-07-19 | End: 2023-07-19

## 2023-07-19 RX ADMIN — LIDOCAINE HYDROCHLORIDE 5 ML: 20 JELLY TOPICAL at 15:01

## 2023-07-19 ASSESSMENT — ENCOUNTER SYMPTOMS
SHORTNESS OF BREATH: 0
VOMITING: 0
RHINORRHEA: 0
DIARRHEA: 0
NAUSEA: 0
COUGH: 0

## 2023-07-19 NOTE — PROGRESS NOTES
Multilayer Compression Wrap   (Not Unna) Below the Knee    NAME:  Deborah Pierce  YOB: 1935  MEDICAL RECORD NUMBER:  [id number]  DATE:  7/19/2023    Multilayer compression wrap: Removed old Multilayer wrap if indicated and wash leg with mild soap/water. Applied moisturizing agent to dry skin as needed. Applied primary and secondary dressing as ordered. Applied multilayered dressing below the knee to right lower leg. Applied multilayered dressing below the knee to left lower leg. Instructed patient/caregiver not to remove dressing and to keep it clean and dry. Instructed patient/caregiver on complications to report to provider, such as pain, numbness in toes, heavy drainage, and slippage of dressing. Instructed patient on purpose of compression dressing and on activity and exercise recommendations.   Applied per Podiatry Resident- Izabel Alanis DPM    Electronically signed by Patrick Trevino RN on 7/19/2023 at 4:00 PM
Vaping Use    Vaping Use: Never used   Substance Use Topics    Alcohol use: Not Currently    Drug use: Never       ALLERGIES    No Known Allergies    MEDICATIONS    Current Outpatient Medications on File Prior to Encounter   Medication Sig Dispense Refill    potassium chloride (KLOR-CON) 10 MEQ extended release tablet TAKE 1 TABLET EVERY DAY 90 tablet 3    clopidogrel (PLAVIX) 75 MG tablet TAKE 1 TABLET EVERY DAY 90 tablet 1    allopurinol (ZYLOPRIM) 100 MG tablet TAKE 1 TABLET EVERY DAY 90 tablet 1    bumetanide (BUMEX) 2 MG tablet TAKE 1 TABLET TWICE DAILY 180 tablet 0    metoprolol tartrate (LOPRESSOR) 50 MG tablet TAKE 1 TABLET TWICE DAILY 180 tablet 1    albuterol (PROVENTIL) (5 MG/ML) 0.5% nebulizer solution Take 0.5 mLs by nebulization 4 times daily as needed for Wheezing 120 each 3    acetylcysteine (MUCOMYST) 20 % nebulizer solution Take 3 mLs by mouth 2 times daily for 2 doses 6 mL 0    furosemide (LASIX) 40 MG tablet Take 1 tablet by mouth 2 times daily 180 tablet 3    magnesium oxide (MAG-OX) 400 (240 Mg) MG tablet TAKE 1 TABLET TWICE DAILY 180 tablet 0    rosuvastatin (CRESTOR) 20 MG tablet TAKE 1 TABLET EVERY DAY 90 tablet 3    lisinopril (PRINIVIL;ZESTRIL) 5 MG tablet TAKE 1 TABLET EVERY DAY 90 tablet 3    apixaban (ELIQUIS) 2.5 MG TABS tablet Take 1 tablet by mouth in the morning and 1 tablet before bedtime. 180 tablet 3    Methylcobalamin (B-12) 5000 MCG TBDP Take by mouth      ascorbic acid ([employer]S VITAMIN C) 500 MG tablet Take 1 tablet by mouth 2 times daily 180 tablet 1     No current facility-administered medications on file prior to encounter. REVIEW OF SYSTEMS    Review of Systems   Constitutional:  Negative for chills, fatigue and fever. HENT:  Negative for congestion and rhinorrhea. Respiratory:  Negative for cough and shortness of breath. Cardiovascular:  Negative for chest pain and leg swelling.         Obese  CHF history   Gastrointestinal:  Negative for diarrhea, nausea and

## 2023-07-30 NOTE — DISCHARGE INSTRUCTIONS
patient and/or responsible adult, by my health care provider(s). I had the opportunity to ask questions regarding this information.  I have elected to receive;      []After Visit Summary  [x]Comprehensive Discharge Instruction        Patient signature______________________________________Date:________  Electronically signed by Vanessa Hernandez RN on 8/2/2023 at 3:42 PM    Electronically signed by JUAN FRANCISCO Florentino CNP on 8/2/2023 at 3:32 PM

## 2023-08-02 ENCOUNTER — HOSPITAL ENCOUNTER (OUTPATIENT)
Dept: WOUND CARE | Age: 88
Discharge: HOME | End: 2023-08-02
Payer: MEDICARE

## 2023-08-02 VITALS
BODY MASS INDEX: 42.39 KG/M2 | RESPIRATION RATE: 20 BRPM | HEART RATE: 83 BPM | WEIGHT: 313 LBS | SYSTOLIC BLOOD PRESSURE: 145 MMHG | HEIGHT: 72 IN | DIASTOLIC BLOOD PRESSURE: 69 MMHG | TEMPERATURE: 98.9 F

## 2023-08-02 DIAGNOSIS — I89.0 LYMPHEDEMA OF BOTH LOWER EXTREMITIES: ICD-10-CM

## 2023-08-02 DIAGNOSIS — L97.912 NON-PRESSURE CHRONIC ULCER OF RIGHT LOWER LEG WITH FAT LAYER EXPOSED (HCC): Primary | ICD-10-CM

## 2023-08-02 DIAGNOSIS — L97.821 NON-PRESSURE CHRONIC ULCER OF OTHER PART OF LEFT LOWER LEG LIMITED TO BREAKDOWN OF SKIN (HCC): ICD-10-CM

## 2023-08-02 PROCEDURE — 99213 OFFICE O/P EST LOW 20 MIN: CPT | Performed by: NURSE PRACTITIONER

## 2023-08-02 PROCEDURE — 99213 OFFICE O/P EST LOW 20 MIN: CPT

## 2023-08-02 RX ORDER — LIDOCAINE HYDROCHLORIDE 20 MG/ML
JELLY TOPICAL ONCE
Status: DISCONTINUED | OUTPATIENT
Start: 2023-08-02 | End: 2023-08-03 | Stop reason: HOSPADM

## 2023-08-02 RX ORDER — LIDOCAINE HYDROCHLORIDE 40 MG/ML
SOLUTION TOPICAL ONCE
OUTPATIENT
Start: 2023-08-02 | End: 2023-08-02

## 2023-08-02 RX ORDER — LIDOCAINE HYDROCHLORIDE 40 MG/ML
SOLUTION TOPICAL ONCE
Status: COMPLETED | OUTPATIENT
Start: 2023-08-02 | End: 2023-08-02

## 2023-08-02 RX ORDER — LIDOCAINE HYDROCHLORIDE 20 MG/ML
JELLY TOPICAL ONCE
OUTPATIENT
Start: 2023-08-02 | End: 2023-08-02

## 2023-08-02 RX ADMIN — LIDOCAINE HYDROCHLORIDE 10 ML: 40 SOLUTION TOPICAL at 15:14

## 2023-08-02 ASSESSMENT — PAIN SCALES - GENERAL: PAINLEVEL_OUTOF10: 0

## 2023-08-02 ASSESSMENT — ENCOUNTER SYMPTOMS
NAUSEA: 0
DIARRHEA: 0
COUGH: 0
VOMITING: 0
RHINORRHEA: 0
SHORTNESS OF BREATH: 0

## 2023-08-02 NOTE — PROGRESS NOTES
Multilayer Compression Wrap   (Not Unna) Below the Knee    NAME:  Magdy Roth  YOB: 1935  MEDICAL RECORD NUMBER:  [id number]  DATE:  8/2/2023    Multilayer compression wrap: Removed old Multilayer wrap if indicated and wash leg with mild soap/water. Applied moisturizing agent to dry skin as needed. Applied primary and secondary dressing as ordered. Applied multilayered dressing below the knee to right lower leg. Applied multilayered dressing below the knee to left lower leg. Instructed patient/caregiver not to remove dressing and to keep it clean and dry. Instructed patient/caregiver on complications to report to provider, such as pain, numbness in toes, heavy drainage, and slippage of dressing. Instructed patient on purpose of compression dressing and on activity and exercise recommendations.   Abd to top of foot after zinc layer    Electronically signed by Randi Cho RN on 8/2/2023 at 4:03 PM
Pt was evaluated and examined. Toe nails are elongated and Hypertrophic. Patient was given personalized discharge instructions. Nails 1-10 were debrided sharply in length and thickness with a nipper without incident. Proper foot hygiene and care was discussed with the pt.
1500   Wound Depth (cm) 0.1 cm 08/02/23 1500   Wound Surface Area (cm^2) 33 cm^2 08/02/23 1500   Wound Volume (cm^3) 3.3 cm^3 08/02/23 1500   Post-Procedure Length (cm) 2 cm 08/02/23 1500   Post-Procedure Width (cm) 16.5 cm 08/02/23 1500   Post-Procedure Depth (cm) 0.1 cm 08/02/23 1500   Post-Procedure Surface Area (cm^2) 33 cm^2 08/02/23 1500   Post-Procedure Volume (cm^3) 3.3 cm^3 08/02/23 1500   Wound Assessment Pink/red;Slough; Purple/maroon 08/02/23 1500   Drainage Amount Moderate 08/02/23 1500   Drainage Description Serosanguinous 08/02/23 1500   Odor Moderate 08/02/23 1500   Margins Defined edges 08/02/23 1500   Number of days: 0            Plan:     Treatment Note please see Discharge Instructions    Written patient dismissal instructions given to patient and signed by patient or POA.            Electronically signed by JUAN FRANCISCO Garcia CNP on 8/2/2023 at 3:36 PM

## 2023-08-02 NOTE — PLAN OF CARE
Problem: Chronic Conditions and Co-morbidities  Goal: Patient's chronic conditions and co-morbidity symptoms are monitored and maintained or improved  Outcome: Progressing     Problem: Discharge Planning  Goal: Discharge to home or other facility with appropriate resources  Outcome: Progressing     Problem: Pain  Goal: Verbalizes/displays adequate comfort level or baseline comfort level  Outcome: Progressing     Problem: Safety - Adult  Goal: Free from fall injury  Outcome: Progressing     Problem: Venous:  Goal: Signs of wound healing will improve  Description: Signs of wound healing will improve  Outcome: Progressing

## 2023-08-05 NOTE — DISCHARGE INSTRUCTIONS
[facility]                            Visit  Discharge Instructions / Physician Orders  DATE: 8/10/23     Home Care: None     SUPPLIES ORDERED THRU:  none          DATE LAST SUPPLIED  NA     Wound Location:  right and left lower legs      Cleanse with: Antibacterial soap and water     Dressing Orders:  Primary dressing   non adherent silvercel, zinc layer, 3 layer compression wrap to bilateral lower legs             Frequency:  leave in place     Additional Orders: Increase protein to diet (meat, cheese, eggs, fish, peanut butter, nuts and beans)  Multivitamin daily  Compression pumps per BIO TAB-WALKER please pump 2 x per day, for 1 hour each time. OFFLOADING [] YES  TYPE:                  [] NA     Weekly wound care visits until determined otherwise. Antibiotic therapy-wound care related YES [] NO [] NA[]     MY CHART []     Smart Device  []            Your next appointment with the [address] [place] [address] is 1 week                                                                                                   (Please note your next appointment above and if you are unable to keep, kindly give a 24 hour notice. Thank you.)  If more than 15 min late we cannot guarantee you will be seen due to clinician schedule  Per Policy, Excessive cancellation will call for dismissal from program.  If you experience any of the following, please call the [address] [place] [address] during business hours:  [phone]     * Increase in Pain  * Temperature over 101  * Increase in drainage from your wound  * Drainage with a foul odor  * Bleeding  * Increase in swelling  * Need for compression bandage changes due to slippage, breakthrough drainage. If you need medical attention outside of the business hours of the [address] [place] [address] please contact your PCP or go to the nearest emergency room.      The information contained in the After Visit Summary has been reviewed with me, the patient and/or

## 2023-08-10 ENCOUNTER — HOSPITAL ENCOUNTER (OUTPATIENT)
Dept: WOUND CARE | Age: 88
Discharge: HOME | End: 2023-08-10
Payer: MEDICARE

## 2023-08-10 VITALS
WEIGHT: 313 LBS | RESPIRATION RATE: 20 BRPM | SYSTOLIC BLOOD PRESSURE: 145 MMHG | BODY MASS INDEX: 42.39 KG/M2 | DIASTOLIC BLOOD PRESSURE: 52 MMHG | HEART RATE: 85 BPM | HEIGHT: 72 IN | TEMPERATURE: 97.5 F

## 2023-08-10 DIAGNOSIS — L97.821 NON-PRESSURE CHRONIC ULCER OF OTHER PART OF LEFT LOWER LEG LIMITED TO BREAKDOWN OF SKIN (HCC): ICD-10-CM

## 2023-08-10 DIAGNOSIS — I89.0 LYMPHEDEMA OF BOTH LOWER EXTREMITIES: ICD-10-CM

## 2023-08-10 DIAGNOSIS — L97.912 NON-PRESSURE CHRONIC ULCER OF RIGHT LOWER LEG WITH FAT LAYER EXPOSED (HCC): Primary | ICD-10-CM

## 2023-08-10 PROCEDURE — 11042 DBRDMT SUBQ TIS 1ST 20SQCM/<: CPT

## 2023-08-10 RX ORDER — LIDOCAINE HYDROCHLORIDE 20 MG/ML
JELLY TOPICAL ONCE
OUTPATIENT
Start: 2023-08-10 | End: 2023-08-10

## 2023-08-10 RX ORDER — LIDOCAINE HYDROCHLORIDE 40 MG/ML
SOLUTION TOPICAL ONCE
OUTPATIENT
Start: 2023-08-10 | End: 2023-08-10

## 2023-08-10 RX ORDER — LIDOCAINE HYDROCHLORIDE 40 MG/ML
SOLUTION TOPICAL ONCE
Status: DISCONTINUED | OUTPATIENT
Start: 2023-08-10 | End: 2023-08-11 | Stop reason: HOSPADM

## 2023-08-10 ASSESSMENT — ENCOUNTER SYMPTOMS
SHORTNESS OF BREATH: 0
NAUSEA: 0
VOMITING: 0
DIARRHEA: 0
RHINORRHEA: 0
COUGH: 0

## 2023-08-10 ASSESSMENT — PAIN SCALES - GENERAL: PAINLEVEL_OUTOF10: 0

## 2023-08-10 NOTE — PLAN OF CARE
Problem: Chronic Conditions and Co-morbidities  Goal: Patient's chronic conditions and co-morbidity symptoms are monitored and maintained or improved  Outcome: Progressing     Problem: Discharge Planning  Goal: Discharge to home or other facility with appropriate resources  Outcome: Progressing     Problem: Wound:  Goal: Will show signs of wound healing; wound closure and no evidence of infection  Description: Will show signs of wound healing; wound closure and no evidence of infection  Outcome: Progressing     Problem: Venous:  Goal: Signs of wound healing will improve  Description: Signs of wound healing will improve  Outcome: Progressing     Problem: Falls - Risk of:  Goal: Will remain free from falls  Description: Will remain free from falls  Outcome: Progressing

## 2023-08-10 NOTE — PROGRESS NOTES
Multilayer Compression Wrap   (Not Unna) Below the Knee    NAME:  Katerina Loja  YOB: 1935  MEDICAL RECORD NUMBER:  [id number]  DATE:  8/10/2023    Multilayer compression wrap: Removed old Multilayer wrap if indicated and wash leg with mild soap/water. Applied moisturizing agent to dry skin as needed. Applied primary and secondary dressing as ordered. Applied multilayered dressing below the knee to right lower leg. Applied multilayered dressing below the knee to left lower leg. Instructed patient/caregiver not to remove dressing and to keep it clean and dry. Instructed patient/caregiver on complications to report to provider, such as pain, numbness in toes, heavy drainage, and slippage of dressing. Instructed patient on purpose of compression dressing and on activity and exercise recommendations.       Electronically signed by Jailene House RN on 8/10/2023 at 3:56 PM
(cm) 40 cm 08/10/23 1502   Wound Depth (cm) 0.1 cm 08/10/23 1502   Wound Surface Area (cm^2) 960 cm^2 08/10/23 1502   Change in Wound Size % (l*w) -88149.29 08/10/23 1502   Wound Volume (cm^3) 96 cm^3 08/10/23 1502   Wound Healing % -03328 08/10/23 1502   Post-Procedure Length (cm) 24 cm 08/10/23 1502   Post-Procedure Width (cm) 40 cm 08/10/23 1502   Post-Procedure Depth (cm) 0.1 cm 08/10/23 1502   Post-Procedure Surface Area (cm^2) 960 cm^2 08/10/23 1502   Post-Procedure Volume (cm^3) 96 cm^3 08/10/23 1502   Wound Assessment [place]/red;Slough 08/10/23 1502   Drainage Amount Large (50-75% saturated) 08/10/23 1502   Drainage Description Yellow;Serosanguinous 08/10/23 1502   Odor Moderate 08/10/23 1502   Sara-wound Assessment Fragile 08/10/23 1502   Margins Undefined edges 08/10/23 1502   Wound Thickness Description not for Pressure Injury Full thickness 07/06/23 1456   Number of days: 92       Wound 08/02/23 Pretibial Left;Lateral #4 (Active)   Wound Image   08/02/23 1500   Wound Etiology Pressure Stage 2 08/10/23 1502   Dressing Status New drainage noted; Old drainage noted 08/10/23 1502   Wound Cleansed Soap and water 08/10/23 1502   Wound Length (cm) 5 cm 08/10/23 1502   Wound Width (cm) 10 cm 08/10/23 1502   Wound Depth (cm) 0.1 cm 08/10/23 1502   Wound Surface Area (cm^2) 50 cm^2 08/10/23 1502   Change in Wound Size % (l*w) -51.52 08/10/23 1502   Wound Volume (cm^3) 5 cm^3 08/10/23 1502   Wound Healing % -52 08/10/23 1502   Post-Procedure Length (cm) 5 cm 08/10/23 1502   Post-Procedure Width (cm) 10 cm 08/10/23 1502   Post-Procedure Depth (cm) 0.1 cm 08/10/23 1502   Post-Procedure Surface Area (cm^2) 50 cm^2 08/10/23 1502   Post-Procedure Volume (cm^3) 5 cm^3 08/10/23 1502   Wound Assessment Pink/red;Slough; Purple/maroon 08/10/23 1502   Drainage Amount Moderate (25-50%) 08/10/23 1502   Drainage Description Serosanguinous 08/10/23 1502   Odor Moderate 08/10/23 1502   Margins Defined edges 08/10/23 1502   Number of

## 2023-08-14 NOTE — DISCHARGE INSTRUCTIONS
[facility]                            Visit  Discharge Instructions / Physician Orders  DATE: 8/17/23     Home Care: None     SUPPLIES ORDERED THRU:  none          DATE LAST SUPPLIED  NA     Wound Location:  right and left lower legs      Cleanse with: Antibacterial soap and water     Dressing Orders:  Primary dressing   non adherent silvercel, zinc layer, 3 layer compression wrap to bilateral lower legs             Frequency:  leave in place     Additional Orders: Increase protein to diet (meat, cheese, eggs, fish, peanut butter, nuts and beans)  Multivitamin daily  Compression pumps per BIO TAB-WALKER please pump 2 x per day, for 1 hour each time. OFFLOADING [] YES  TYPE:                  [] NA     Weekly wound care visits until determined otherwise. Antibiotic therapy-wound care related YES [] NO [] NA[]     MY CHART []     Smart Device  []            Your next appointment with the [address] [place] [address] is 1 week                                                                                                   (Please note your next appointment above and if you are unable to keep, kindly give a 24 hour notice. Thank you.)  If more than 15 min late we cannot guarantee you will be seen due to clinician schedule  Per Policy, Excessive cancellation will call for dismissal from program.  If you experience any of the following, please call the [address] [place] [address] during business hours:  [phone]     * Increase in Pain  * Temperature over 101  * Increase in drainage from your wound  * Drainage with a foul odor  * Bleeding  * Increase in swelling  * Need for compression bandage changes due to slippage, breakthrough drainage. If you need medical attention outside of the business hours of the [address] [place] [address] please contact your PCP or go to the nearest emergency room.      The information contained in the After Visit Summary has been reviewed with me, the patient and/or

## 2023-08-17 ENCOUNTER — HOSPITAL ENCOUNTER (OUTPATIENT)
Dept: WOUND CARE | Age: 88
Discharge: HOME | End: 2023-08-17
Payer: MEDICARE

## 2023-08-17 VITALS
RESPIRATION RATE: 20 BRPM | WEIGHT: 313 LBS | SYSTOLIC BLOOD PRESSURE: 156 MMHG | DIASTOLIC BLOOD PRESSURE: 70 MMHG | BODY MASS INDEX: 42.39 KG/M2 | TEMPERATURE: 97.6 F | HEART RATE: 86 BPM | HEIGHT: 72 IN

## 2023-08-17 DIAGNOSIS — L97.821 NON-PRESSURE CHRONIC ULCER OF OTHER PART OF LEFT LOWER LEG LIMITED TO BREAKDOWN OF SKIN (HCC): ICD-10-CM

## 2023-08-17 DIAGNOSIS — I89.0 LYMPHEDEMA OF BOTH LOWER EXTREMITIES: ICD-10-CM

## 2023-08-17 DIAGNOSIS — L97.912 NON-PRESSURE CHRONIC ULCER OF RIGHT LOWER LEG WITH FAT LAYER EXPOSED (HCC): Primary | ICD-10-CM

## 2023-08-17 PROCEDURE — 29581 APPL MULTLAYER CMPRN SYS LEG: CPT

## 2023-08-17 PROCEDURE — 99213 OFFICE O/P EST LOW 20 MIN: CPT | Performed by: NURSE PRACTITIONER

## 2023-08-17 RX ORDER — LIDOCAINE HYDROCHLORIDE 40 MG/ML
SOLUTION TOPICAL ONCE
OUTPATIENT
Start: 2023-08-17 | End: 2023-08-17

## 2023-08-17 RX ORDER — LIDOCAINE HYDROCHLORIDE 20 MG/ML
JELLY TOPICAL ONCE
OUTPATIENT
Start: 2023-08-17 | End: 2023-08-17

## 2023-08-17 RX ORDER — LIDOCAINE HYDROCHLORIDE 40 MG/ML
SOLUTION TOPICAL ONCE
Status: COMPLETED | OUTPATIENT
Start: 2023-08-17 | End: 2023-08-17

## 2023-08-17 RX ADMIN — LIDOCAINE HYDROCHLORIDE 20 ML: 40 SOLUTION TOPICAL at 15:29

## 2023-08-17 ASSESSMENT — ENCOUNTER SYMPTOMS
DIARRHEA: 0
NAUSEA: 0
COUGH: 0
RHINORRHEA: 0
SHORTNESS OF BREATH: 0
VOMITING: 0

## 2023-08-17 ASSESSMENT — PAIN SCALES - GENERAL: PAINLEVEL_OUTOF10: 0

## 2023-08-17 NOTE — PROGRESS NOTES
Multilayer Compression Wrap   (Not Unna) Below the Knee    NAME:  Diana Baires  YOB: 1935  MEDICAL RECORD NUMBER:  [id number]  DATE:  8/17/2023    Multilayer compression wrap: Removed old Multilayer wrap if indicated and wash leg with mild soap/water. Applied moisturizing agent to dry skin as needed. Applied primary and secondary dressing as ordered. Applied multilayered dressing below the knee to right lower leg. Applied multilayered dressing below the knee to left lower leg. Instructed patient/caregiver not to remove dressing and to keep it clean and dry. Instructed patient/caregiver on complications to report to provider, such as pain, numbness in toes, heavy drainage, and slippage of dressing. Instructed patient on purpose of compression dressing and on activity and exercise recommendations.       Electronically signed by Fercho Pedersen RN on 8/17/2023 at 4:04 PM
Dressing/Treatment Other (comment) 08/10/23 1553   Wound Length (cm) 8 cm 08/17/23 1525   Wound Width (cm) 8 cm 08/17/23 1525   Wound Depth (cm) 0.1 cm 08/17/23 1525   Wound Surface Area (cm^2) 64 cm^2 08/17/23 1525   Change in Wound Size % (l*w) -2460 08/17/23 1525   Wound Volume (cm^3) 6.4 cm^3 08/17/23 1525   Wound Healing % -2460 08/17/23 1525   Post-Procedure Length (cm) 8 cm 08/17/23 1525   Post-Procedure Width (cm) 8 cm 08/17/23 1525   Post-Procedure Depth (cm) 0.1 cm 08/17/23 1525   Post-Procedure Surface Area (cm^2) 64 cm^2 08/17/23 1525   Post-Procedure Volume (cm^3) 6.4 cm^3 08/17/23 1525   Wound Assessment Pink/red 08/17/23 1525   Drainage Amount Moderate (25-50%) 08/17/23 1525   Drainage Description Serosanguinous 08/17/23 1525   Odor Moderate 08/17/23 1525   Sara-wound Assessment Fragile 08/17/23 1525   Margins Undefined edges 08/17/23 1525   Wound Thickness Description not for Pressure Injury Full thickness 07/06/23 1456   Number of days: 99       Wound 05/10/23 Ankle Left;Lateral;Distal wound #1 left lower leg cluster (Active)   Wound Image     08/02/23 1500   Wound Etiology Venous 08/17/23 1525   Dressing Status New drainage noted; Old drainage noted 08/17/23 1525   Wound Cleansed Soap and water 08/17/23 1525   Dressing/Treatment Other (comment) 08/10/23 1553   Wound Length (cm) 20 cm 08/17/23 1525   Wound Width (cm) 20 cm 08/17/23 1525   Wound Depth (cm) 0.1 cm 08/17/23 1525   Wound Surface Area (cm^2) 400 cm^2 08/17/23 1525   Change in Wound Size % (l*w) -76550.30 08/17/23 1525   Wound Volume (cm^3) 40 cm^3 08/17/23 1525   Wound Healing % -24169 08/17/23 1525   Post-Procedure Length (cm) 20 cm 08/17/23 1525   Post-Procedure Width (cm) 20 cm 08/17/23 1525   Post-Procedure Depth (cm) 0.1 cm 08/17/23 1525   Post-Procedure Surface Area (cm^2) 400 cm^2 08/17/23 1525   Post-Procedure Volume (cm^3) 40 cm^3 08/17/23 1525   Wound Assessment Pink/red 08/17/23 1525   Drainage Amount Moderate (25-50%)

## 2023-08-21 NOTE — DISCHARGE INSTRUCTIONS
[facility]                            Visit  Discharge Instructions / Physician Orders  DATE: 8/23/23     Home Care: None     SUPPLIES ORDERED THRU:  none          DATE LAST SUPPLIED  NA     Wound Location:  right and left lower legs      Cleanse with: Antibacterial soap and water     Dressing Orders:  Primary dressing   non adherent silvercel, zinc layer, 3 layer compression wrap to bilateral lower legs             Frequency:  leave in place     Additional Orders: Increase protein to diet (meat, cheese, eggs, fish, peanut butter, nuts and beans)  Multivitamin daily  Compression pumps per BIO TAB-WALKER please pump 2-3 x per day, for 1 hour each time. OFFLOADING [] YES  TYPE:                  [] NA     Weekly wound care visits until determined otherwise. Antibiotic therapy-wound care related YES [] NO [] NA[]     MY CHART []     Smart Device  []            Your next appointment with the [address] [place] [address] is 1 week                                                                                                   (Please note your next appointment above and if you are unable to keep, kindly give a 24 hour notice. Thank you.)  If more than 15 min late we cannot guarantee you will be seen due to clinician schedule  Per Policy, Excessive cancellation will call for dismissal from program.  If you experience any of the following, please call the [address] [place] [address] during business hours:  [phone]     * Increase in Pain  * Temperature over 101  * Increase in drainage from your wound  * Drainage with a foul odor  * Bleeding  * Increase in swelling  * Need for compression bandage changes due to slippage, breakthrough drainage. If you need medical attention outside of the business hours of the [address] [place] [address] please contact your PCP or go to the nearest emergency room.      The information contained in the After Visit Summary has been reviewed with me, the patient and/or

## 2023-08-23 ENCOUNTER — HOSPITAL ENCOUNTER (OUTPATIENT)
Dept: WOUND CARE | Age: 88
Discharge: HOME | End: 2023-08-23
Payer: MEDICARE

## 2023-08-23 VITALS
WEIGHT: 313 LBS | HEIGHT: 72 IN | TEMPERATURE: 98.8 F | HEART RATE: 75 BPM | BODY MASS INDEX: 42.39 KG/M2 | RESPIRATION RATE: 18 BRPM | SYSTOLIC BLOOD PRESSURE: 157 MMHG | DIASTOLIC BLOOD PRESSURE: 61 MMHG

## 2023-08-23 DIAGNOSIS — I89.0 LYMPHEDEMA OF BOTH LOWER EXTREMITIES: ICD-10-CM

## 2023-08-23 DIAGNOSIS — L97.912 NON-PRESSURE CHRONIC ULCER OF RIGHT LOWER LEG WITH FAT LAYER EXPOSED (HCC): Primary | ICD-10-CM

## 2023-08-23 DIAGNOSIS — L97.821 NON-PRESSURE CHRONIC ULCER OF OTHER PART OF LEFT LOWER LEG LIMITED TO BREAKDOWN OF SKIN (HCC): ICD-10-CM

## 2023-08-23 PROCEDURE — 99213 OFFICE O/P EST LOW 20 MIN: CPT | Performed by: NURSE PRACTITIONER

## 2023-08-23 PROCEDURE — 29580 STRAPPING UNNA BOOT: CPT

## 2023-08-23 RX ORDER — LIDOCAINE HYDROCHLORIDE 20 MG/ML
JELLY TOPICAL ONCE
OUTPATIENT
Start: 2023-08-23 | End: 2023-08-23

## 2023-08-23 RX ORDER — LIDOCAINE HYDROCHLORIDE 40 MG/ML
SOLUTION TOPICAL ONCE
Status: DISCONTINUED | OUTPATIENT
Start: 2023-08-23 | End: 2023-08-24 | Stop reason: HOSPADM

## 2023-08-23 RX ORDER — LIDOCAINE HYDROCHLORIDE 40 MG/ML
SOLUTION TOPICAL ONCE
OUTPATIENT
Start: 2023-08-23 | End: 2023-08-23

## 2023-08-23 ASSESSMENT — ENCOUNTER SYMPTOMS
SHORTNESS OF BREATH: 0
COUGH: 0
NAUSEA: 0
VOMITING: 0
DIARRHEA: 0
RHINORRHEA: 0

## 2023-08-23 ASSESSMENT — PAIN SCALES - GENERAL: PAINLEVEL_OUTOF10: 0

## 2023-08-23 NOTE — PROGRESS NOTES
Pt was evaluated and examined. Nails are elongated and hypertrophic. Nails 1-10 were debrided sharply in length and thickness with a nipper without incident.  Electronically signed by Kevin Hoffman RN, BSN,CWS,CFCS on 8/23/2023 at 4:00 PM

## 2023-08-23 NOTE — PROGRESS NOTES
Multilayer Compression Wrap   (Not Unna) Below the Knee    NAME:  Gardner Gowers  YOB: 1935  MEDICAL RECORD NUMBER:  [id number]  DATE:  8/23/2023    Multilayer compression wrap: Removed old Multilayer wrap if indicated and wash leg with mild soap/water. Applied moisturizing agent to dry skin as needed. Applied primary and secondary dressing as ordered. Applied multilayered dressing below the knee to right lower leg. Applied multilayered dressing below the knee to left lower leg. Instructed patient/caregiver not to remove dressing and to keep it clean and dry. Instructed patient/caregiver on complications to report to provider, such as pain, numbness in toes, heavy drainage, and slippage of dressing. Instructed patient on purpose of compression dressing and on activity and exercise recommendations.       Electronically signed by Eileen Pacheco RN on 8/23/2023 at 4:14 PM

## 2023-08-23 NOTE — PROGRESS NOTES
[facility]   Progress Note and Procedure Note      [facility] RECORD NUMBER:  [id number]  AGE: 82 y.o. GENDER: male  : 1935  EPISODE DATE:  2023    Subjective:     Chief Complaint   Patient presents with    Wound Check     Bilateral lower legs         HISTORY of PRESENT ILLNESS HPI     Marcellus Amaya is a 82 y.o. male who presents today for wound/ulcer evaluation. History of Wound Context: presents in follow up on bilateral lower leg wounds and lymphedema. He is using pneumatic compression pumps twice daily for one hour each time. He is also getting regular exercise in his daily activities. He elevates his legs any time he is sitting. Demonstrates compliance with exercising, compression and leg elevation. Wound/Ulcer Pain Timing/Severity: none  Quality of pain: N/A  Severity:  0 / 10   Modifying Factors: None  Associated Signs/Symptoms: none    Ulcer Identification:  Ulcer Type: lymphedema  Contributing Factors: edema, lymphedema, decreased mobility, and obesity         PAST MEDICAL HISTORY        Diagnosis Date    Arthritis     CHF (congestive heart failure) (HCC)     Chronic kidney disease     History of blood transfusion     Hyperlipidemia        PAST SURGICAL HISTORY    Past Surgical History:   Procedure Laterality Date    CARDIAC SURGERY      bypass    JOINT REPLACEMENT      Left hip    PACEMAKER PLACEMENT Right     and defib       FAMILY HISTORY    History reviewed. No pertinent family history.     SOCIAL HISTORY    Social History     Tobacco Use    Smoking status: Former     Types: Cigarettes     Quit date:      Years since quittin.7     Passive exposure: Past    Smokeless tobacco: Never   Vaping Use    Vaping Use: Never used   Substance Use Topics    Alcohol use: Not Currently    Drug use: Never       ALLERGIES    No Known Allergies    MEDICATIONS    Current Outpatient Medications on File Prior to Encounter   Medication Sig

## 2023-08-28 NOTE — DISCHARGE INSTRUCTIONS
[facility]                            Visit  Discharge Instructions / Physician Orders  DATE: 8/30/23     Home Care: None     SUPPLIES ORDERED THRU:  none          DATE LAST SUPPLIED  NA     Wound Location:  right and left lower legs      Cleanse with: Antibacterial soap and water     Dressing Orders:  Primary dressing   non adherent silvercel, zinc layer, 3 layer compression wrap to bilateral lower legs             Frequency:  leave in place     Additional Orders: Increase protein to diet (meat, cheese, eggs, fish, peanut butter, nuts and beans)  Multivitamin daily  Compression pumps per BIO TAB-WALKER please pump 2-3 x per day, for 1 hour each time. OFFLOADING [] YES  TYPE:                  [] NA     Weekly wound care visits until determined otherwise. Antibiotic therapy-wound care related YES [] NO [] NA[]     MY CHART []     Smart Device  []            Your next appointment with the [address] [place] [address] is 1 week                                                                                                   (Please note your next appointment above and if you are unable to keep, kindly give a 24 hour notice. Thank you.)  If more than 15 min late we cannot guarantee you will be seen due to clinician schedule  Per Policy, Excessive cancellation will call for dismissal from program.  If you experience any of the following, please call the [address] [place] [address] during business hours:  [phone]     * Increase in Pain  * Temperature over 101  * Increase in drainage from your wound  * Drainage with a foul odor  * Bleeding  * Increase in swelling  * Need for compression bandage changes due to slippage, breakthrough drainage. If you need medical attention outside of the business hours of the [address] [place] [address] please contact your PCP or go to the nearest emergency room.      The information contained in the After Visit Summary has been reviewed with me, the patient and/or

## 2023-08-29 NOTE — PLAN OF CARE
Patient has read E advice with no response     Problem: Falls - Risk of:  Goal: Will remain free from falls  Description: Will remain free from falls  Outcome: Ongoing  Goal: Absence of physical injury  Description: Absence of physical injury  Outcome: Ongoing     Problem: OXYGENATION/RESPIRATORY FUNCTION  Goal: Patient will maintain patent airway  Outcome: Ongoing  Goal: Patient will achieve/maintain normal respiratory rate/effort  Description: Respiratory rate and effort will be within normal limits for the patient  Outcome: Ongoing     Problem: HEMODYNAMIC STATUS  Goal: Patient has stable vital signs and fluid balance  Outcome: Ongoing     Problem: FLUID AND ELECTROLYTE IMBALANCE  Goal: Fluid and electrolyte balance are achieved/maintained  Outcome: Ongoing     Problem: ACTIVITY INTOLERANCE/IMPAIRED MOBILITY  Goal: Mobility/activity is maintained at optimum level for patient  Outcome: Ongoing

## 2023-08-30 ENCOUNTER — HOSPITAL ENCOUNTER (OUTPATIENT)
Dept: WOUND CARE | Age: 88
Discharge: HOME | End: 2023-08-30

## 2023-09-01 ENCOUNTER — HOSPITAL ENCOUNTER (OUTPATIENT)
Dept: WOUND CARE | Age: 88
Discharge: HOME | End: 2023-09-01
Payer: MEDICARE

## 2023-09-01 ENCOUNTER — HOSPITAL ENCOUNTER (INPATIENT)
Age: 88
LOS: 4 days | Discharge: HOME | DRG: 603 | End: 2023-09-05
Attending: EMERGENCY MEDICINE | Admitting: FAMILY MEDICINE
Payer: MEDICARE

## 2023-09-01 VITALS
BODY MASS INDEX: 42.39 KG/M2 | RESPIRATION RATE: 20 BRPM | DIASTOLIC BLOOD PRESSURE: 63 MMHG | HEART RATE: 83 BPM | SYSTOLIC BLOOD PRESSURE: 140 MMHG | TEMPERATURE: 97.6 F | HEIGHT: 72 IN | WEIGHT: 313 LBS

## 2023-09-01 DIAGNOSIS — L97.912 NON-PRESSURE CHRONIC ULCER OF RIGHT LOWER LEG WITH FAT LAYER EXPOSED (HCC): Primary | ICD-10-CM

## 2023-09-01 DIAGNOSIS — I89.0 INFECTION ASSOCIATED WITH LYMPHEDEMA: Primary | ICD-10-CM

## 2023-09-01 DIAGNOSIS — B99.9 INFECTION ASSOCIATED WITH LYMPHEDEMA: Primary | ICD-10-CM

## 2023-09-01 DIAGNOSIS — L97.821 NON-PRESSURE CHRONIC ULCER OF OTHER PART OF LEFT LOWER LEG LIMITED TO BREAKDOWN OF SKIN (HCC): ICD-10-CM

## 2023-09-01 DIAGNOSIS — I89.0 LYMPHEDEMA OF BOTH LOWER EXTREMITIES: ICD-10-CM

## 2023-09-01 LAB
ANION GAP SERPL CALCULATED.3IONS-SCNC: 7 MMOL/L (ref 9–17)
BASOPHILS # BLD: 0.06 K/UL (ref 0–0.2)
BASOPHILS NFR BLD: 1 % (ref 0–2)
BUN SERPL-MCNC: 24 MG/DL (ref 8–23)
CALCIUM SERPL-MCNC: 8.8 MG/DL (ref 8.6–10.4)
CHLORIDE SERPL-SCNC: 98 MMOL/L (ref 98–107)
CO2 SERPL-SCNC: 33 MMOL/L (ref 20–31)
CREAT SERPL-MCNC: 1.9 MG/DL (ref 0.7–1.2)
EOSINOPHIL # BLD: 0.29 K/UL (ref 0–0.4)
EOSINOPHILS RELATIVE PERCENT: 5 % (ref 0–4)
ERYTHROCYTE [DISTWIDTH] IN BLOOD BY AUTOMATED COUNT: 20.2 % (ref 11.5–14.9)
GFR SERPL CREATININE-BSD FRML MDRD: 34 ML/MIN/1.73M2
GLUCOSE SERPL-MCNC: 111 MG/DL (ref 70–99)
HCT VFR BLD AUTO: 28.7 % (ref 41–53)
HGB BLD-MCNC: 8.2 G/DL (ref 13.5–17.5)
LYMPHOCYTES NFR BLD: 1.22 K/UL (ref 1–4.8)
LYMPHOCYTES RELATIVE PERCENT: 21 % (ref 24–44)
MCH RBC QN AUTO: 21.1 PG (ref 26–34)
MCHC RBC AUTO-ENTMCNC: 28.7 G/DL (ref 31–37)
MCV RBC AUTO: 73.3 FL (ref 80–100)
MONOCYTES NFR BLD: 0.58 K/UL (ref 0.1–1.3)
MONOCYTES NFR BLD: 10 % (ref 1–7)
MORPHOLOGY: ABNORMAL
NEUTROPHILS NFR BLD: 63 % (ref 36–66)
NEUTS SEG NFR BLD: 3.65 K/UL (ref 1.3–9.1)
PLATELET # BLD AUTO: 148 K/UL (ref 150–450)
PMV BLD AUTO: 6.8 FL (ref 6–12)
POTASSIUM SERPL-SCNC: 5.1 MMOL/L (ref 3.7–5.3)
RBC # BLD AUTO: 3.91 M/UL (ref 4.5–5.9)
SODIUM SERPL-SCNC: 138 MMOL/L (ref 135–144)
WBC OTHER # BLD: 5.8 K/UL (ref 3.5–11)

## 2023-09-01 PROCEDURE — 87075 CULTR BACTERIA EXCEPT BLOOD: CPT

## 2023-09-01 PROCEDURE — 80048 BASIC METABOLIC PNL TOTAL CA: CPT

## 2023-09-01 PROCEDURE — 2580000003 HC RX 258: Performed by: FAMILY MEDICINE

## 2023-09-01 PROCEDURE — 2580000003 HC RX 258

## 2023-09-01 PROCEDURE — 6360000002 HC RX W HCPCS: Performed by: FAMILY MEDICINE

## 2023-09-01 PROCEDURE — 87205 SMEAR GRAM STAIN: CPT

## 2023-09-01 PROCEDURE — 99285 EMERGENCY DEPT VISIT HI MDM: CPT

## 2023-09-01 PROCEDURE — 99213 OFFICE O/P EST LOW 20 MIN: CPT | Performed by: NURSE PRACTITIONER

## 2023-09-01 PROCEDURE — 85025 COMPLETE CBC W/AUTO DIFF WBC: CPT

## 2023-09-01 PROCEDURE — 86403 PARTICLE AGGLUT ANTBDY SCRN: CPT

## 2023-09-01 PROCEDURE — 36415 COLL VENOUS BLD VENIPUNCTURE: CPT

## 2023-09-01 PROCEDURE — 99213 OFFICE O/P EST LOW 20 MIN: CPT

## 2023-09-01 PROCEDURE — 6360000002 HC RX W HCPCS

## 2023-09-01 PROCEDURE — 6370000000 HC RX 637 (ALT 250 FOR IP): Performed by: FAMILY MEDICINE

## 2023-09-01 PROCEDURE — 1200000000 HC SEMI PRIVATE

## 2023-09-01 PROCEDURE — 87070 CULTURE OTHR SPECIMN AEROBIC: CPT

## 2023-09-01 RX ORDER — ONDANSETRON 2 MG/ML
4 INJECTION INTRAMUSCULAR; INTRAVENOUS EVERY 6 HOURS PRN
Status: DISCONTINUED | OUTPATIENT
Start: 2023-09-01 | End: 2023-09-01

## 2023-09-01 RX ORDER — ALLOPURINOL 100 MG/1
100 TABLET ORAL DAILY
Status: DISCONTINUED | OUTPATIENT
Start: 2023-09-02 | End: 2023-09-05 | Stop reason: HOSPADM

## 2023-09-01 RX ORDER — ASCORBIC ACID 500 MG
500 TABLET ORAL 2 TIMES DAILY
Status: DISCONTINUED | OUTPATIENT
Start: 2023-09-01 | End: 2023-09-05 | Stop reason: HOSPADM

## 2023-09-01 RX ORDER — MAGNESIUM SULFATE 1 G/100ML
1000 INJECTION INTRAVENOUS PRN
Status: DISCONTINUED | OUTPATIENT
Start: 2023-09-01 | End: 2023-09-05 | Stop reason: HOSPADM

## 2023-09-01 RX ORDER — LIDOCAINE HYDROCHLORIDE 20 MG/ML
JELLY TOPICAL ONCE
OUTPATIENT
Start: 2023-09-01 | End: 2023-09-01

## 2023-09-01 RX ORDER — FUROSEMIDE 40 MG/1
40 TABLET ORAL 2 TIMES DAILY
Status: DISCONTINUED | OUTPATIENT
Start: 2023-09-02 | End: 2023-09-05 | Stop reason: HOSPADM

## 2023-09-01 RX ORDER — METOPROLOL TARTRATE 50 MG/1
50 TABLET, FILM COATED ORAL 2 TIMES DAILY
Status: DISCONTINUED | OUTPATIENT
Start: 2023-09-01 | End: 2023-09-05 | Stop reason: HOSPADM

## 2023-09-01 RX ORDER — LIDOCAINE HYDROCHLORIDE 40 MG/ML
SOLUTION TOPICAL ONCE
OUTPATIENT
Start: 2023-09-01 | End: 2023-09-01

## 2023-09-01 RX ORDER — POTASSIUM CHLORIDE 750 MG/1
10 CAPSULE, EXTENDED RELEASE ORAL DAILY
Status: DISCONTINUED | OUTPATIENT
Start: 2023-09-02 | End: 2023-09-05 | Stop reason: HOSPADM

## 2023-09-01 RX ORDER — FUROSEMIDE 40 MG/1
40 TABLET ORAL 2 TIMES DAILY
Status: DISCONTINUED | OUTPATIENT
Start: 2023-09-01 | End: 2023-09-01

## 2023-09-01 RX ORDER — SODIUM CHLORIDE 9 MG/ML
INJECTION, SOLUTION INTRAVENOUS PRN
Status: DISCONTINUED | OUTPATIENT
Start: 2023-09-01 | End: 2023-09-05 | Stop reason: HOSPADM

## 2023-09-01 RX ORDER — ONDANSETRON 4 MG/1
4 TABLET, ORALLY DISINTEGRATING ORAL EVERY 8 HOURS PRN
Status: DISCONTINUED | OUTPATIENT
Start: 2023-09-01 | End: 2023-09-01

## 2023-09-01 RX ORDER — MECOBALAMIN 5000 MCG
1 TABLET,DISINTEGRATING ORAL DAILY
Status: DISCONTINUED | OUTPATIENT
Start: 2023-09-02 | End: 2023-09-01

## 2023-09-01 RX ORDER — POLYETHYLENE GLYCOL 3350 17 G/17G
17 POWDER, FOR SOLUTION ORAL DAILY PRN
Status: DISCONTINUED | OUTPATIENT
Start: 2023-09-01 | End: 2023-09-05 | Stop reason: HOSPADM

## 2023-09-01 RX ORDER — CLOPIDOGREL BISULFATE 75 MG/1
75 TABLET ORAL DAILY
Status: DISCONTINUED | OUTPATIENT
Start: 2023-09-02 | End: 2023-09-05 | Stop reason: HOSPADM

## 2023-09-01 RX ORDER — SODIUM CHLORIDE 0.9 % (FLUSH) 0.9 %
10 SYRINGE (ML) INJECTION PRN
Status: DISCONTINUED | OUTPATIENT
Start: 2023-09-01 | End: 2023-09-05 | Stop reason: HOSPADM

## 2023-09-01 RX ORDER — ROSUVASTATIN CALCIUM 20 MG/1
20 TABLET, COATED ORAL DAILY
Status: DISCONTINUED | OUTPATIENT
Start: 2023-09-02 | End: 2023-09-05 | Stop reason: HOSPADM

## 2023-09-01 RX ORDER — POTASSIUM CHLORIDE 7.45 MG/ML
10 INJECTION INTRAVENOUS PRN
Status: DISCONTINUED | OUTPATIENT
Start: 2023-09-01 | End: 2023-09-05 | Stop reason: HOSPADM

## 2023-09-01 RX ORDER — POTASSIUM CHLORIDE 20 MEQ/1
40 TABLET, EXTENDED RELEASE ORAL PRN
Status: DISCONTINUED | OUTPATIENT
Start: 2023-09-01 | End: 2023-09-05 | Stop reason: HOSPADM

## 2023-09-01 RX ORDER — LIDOCAINE HYDROCHLORIDE 40 MG/ML
SOLUTION TOPICAL ONCE
Status: DISCONTINUED | OUTPATIENT
Start: 2023-09-01 | End: 2023-09-02 | Stop reason: HOSPADM

## 2023-09-01 RX ORDER — LANOLIN ALCOHOL/MO/W.PET/CERES
400 CREAM (GRAM) TOPICAL 2 TIMES DAILY
Status: DISCONTINUED | OUTPATIENT
Start: 2023-09-01 | End: 2023-09-05 | Stop reason: HOSPADM

## 2023-09-01 RX ORDER — SODIUM CHLORIDE 9 MG/ML
INJECTION, SOLUTION INTRAVENOUS CONTINUOUS
Status: DISCONTINUED | OUTPATIENT
Start: 2023-09-01 | End: 2023-09-04

## 2023-09-01 RX ORDER — BUMETANIDE 1 MG/1
2 TABLET ORAL 2 TIMES DAILY
Status: DISCONTINUED | OUTPATIENT
Start: 2023-09-01 | End: 2023-09-01

## 2023-09-01 RX ORDER — ENOXAPARIN SODIUM 100 MG/ML
30 INJECTION SUBCUTANEOUS 2 TIMES DAILY
Status: DISCONTINUED | OUTPATIENT
Start: 2023-09-01 | End: 2023-09-05 | Stop reason: HOSPADM

## 2023-09-01 RX ORDER — ACETAMINOPHEN 325 MG/1
650 TABLET ORAL EVERY 6 HOURS PRN
Status: DISCONTINUED | OUTPATIENT
Start: 2023-09-01 | End: 2023-09-05 | Stop reason: HOSPADM

## 2023-09-01 RX ORDER — SODIUM CHLORIDE 0.9 % (FLUSH) 0.9 %
5-40 SYRINGE (ML) INJECTION EVERY 12 HOURS SCHEDULED
Status: DISCONTINUED | OUTPATIENT
Start: 2023-09-01 | End: 2023-09-05 | Stop reason: HOSPADM

## 2023-09-01 RX ORDER — LISINOPRIL 5 MG/1
5 TABLET ORAL DAILY
Status: DISCONTINUED | OUTPATIENT
Start: 2023-09-02 | End: 2023-09-05 | Stop reason: HOSPADM

## 2023-09-01 RX ORDER — ACETAMINOPHEN 650 MG/1
650 SUPPOSITORY RECTAL EVERY 6 HOURS PRN
Status: DISCONTINUED | OUTPATIENT
Start: 2023-09-01 | End: 2023-09-05 | Stop reason: HOSPADM

## 2023-09-01 RX ADMIN — Medication 400 MG: at 23:12

## 2023-09-01 RX ADMIN — VANCOMYCIN HYDROCHLORIDE 2000 MG: 1 INJECTION, POWDER, LYOPHILIZED, FOR SOLUTION INTRAVENOUS at 18:21

## 2023-09-01 RX ADMIN — SODIUM CHLORIDE: 9 INJECTION, SOLUTION INTRAVENOUS at 22:12

## 2023-09-01 RX ADMIN — METOPROLOL TARTRATE 50 MG: 50 TABLET, FILM COATED ORAL at 23:12

## 2023-09-01 RX ADMIN — ENOXAPARIN SODIUM 30 MG: 100 INJECTION SUBCUTANEOUS at 23:12

## 2023-09-01 RX ADMIN — OXYCODONE HYDROCHLORIDE AND ACETAMINOPHEN 500 MG: 500 TABLET ORAL at 23:12

## 2023-09-01 ASSESSMENT — ENCOUNTER SYMPTOMS
RHINORRHEA: 0
DIARRHEA: 0
NAUSEA: 0
SHORTNESS OF BREATH: 1
COUGH: 0
VOMITING: 0

## 2023-09-01 ASSESSMENT — PAIN DESCRIPTION - ORIENTATION: ORIENTATION: LEFT;RIGHT

## 2023-09-01 ASSESSMENT — PAIN SCALES - GENERAL
PAINLEVEL_OUTOF10: 0
PAINLEVEL_OUTOF10: 3

## 2023-09-01 ASSESSMENT — PAIN DESCRIPTION - PAIN TYPE: TYPE: CHRONIC PAIN

## 2023-09-01 ASSESSMENT — LIFESTYLE VARIABLES
HOW MANY STANDARD DRINKS CONTAINING ALCOHOL DO YOU HAVE ON A TYPICAL DAY: PATIENT DOES NOT DRINK
HOW OFTEN DO YOU HAVE A DRINK CONTAINING ALCOHOL: NEVER

## 2023-09-01 ASSESSMENT — PAIN DESCRIPTION - LOCATION: LOCATION: LEG

## 2023-09-01 ASSESSMENT — PAIN - FUNCTIONAL ASSESSMENT: PAIN_FUNCTIONAL_ASSESSMENT: 0-10

## 2023-09-01 NOTE — ED PROVIDER NOTES
[address] [facility],6Th Floor ED  Emergency Department Encounter  Emergency Medicine Resident     Pt Paz Omer  MRN: [id number]  [address] [facility] [place] 1935  Date of evaluation: 9/1/23  PCP:  Bruno Crawley MD  Note Started: 3:29 PM EDT      CHIEF COMPLAINT       Chief Complaint   Patient presents with    Wound Check       HISTORY OF PRESENT ILLNESS  (Location/Symptom, Timing/Onset, Context/Setting, Quality, Duration, Modifying Factors, Severity.)      Osbaldo Dumont is a 82 y.o. male who presents with left lower extremity wound after being sent from wound clinic today. Patient has history of lymphedema with significant swelling, follows with wound clinic weekly and has good home support for management of his lymphedema. Caretaker reports patient has been using lymphedema boots which have helped, but allows compression dressings to slide down. Swelling recurs and allows a large crease in his legs. Wound to left lower extremity has developed over the past week and now patient and daughter are very concerned. Patient denies any fever or chills. Denies shortness of breath or chest pain. No abdominal pain, nausea, vomiting. Patient denies any new numbness, tingling, weakness to his leg distal to this wound. Wound does not cause him any pain. Has not been bleeding. PAST MEDICAL / SURGICAL / SOCIAL / FAMILY HISTORY      has a past medical history of Arthritis, CHF (congestive heart failure) ([address]), Chronic kidney disease, History of blood transfusion, and Hyperlipidemia. has a past surgical history that includes joint replacement; Cardiac surgery (2020); and pacemaker placement (Right, 2020).       Social History     Socioeconomic History    Marital status: Single     Spouse name: Not on file    Number of children: 3    Years of education: Not on file    Highest education level: Not on file   Occupational History    Not on file   Tobacco Use    Smoking status: Former     Types: Cigarettes     Quit date:

## 2023-09-01 NOTE — PROGRESS NOTES
[facility]   Progress Note and Procedure Note      [facility] RECORD NUMBER:  [id number]  AGE: 82 y.o. GENDER: male  : 1935  EPISODE DATE:  2023    Subjective:     Chief Complaint   Patient presents with    Wound Check     Bilateral Lower Extremity Wounds         HISTORY of PRESENT ILLNESS TALYA Ovalles is a 82 y.o. male who presents today for wound/ulcer evaluation. History of Wound Context: presents with daughter in follow up on bilateral lower leg wounds that are worse. Has increased edema with weeping. He has been using lymphedema pumps. He is also short of breath today. Concern with acute CHR exacerbation / cellulitis. Recommend evaluation in ER for further evaluation. Wound/Ulcer Pain Timing/Severity: none  Quality of pain: N/A  Severity:  0 / 10   Modifying Factors: None  Associated Signs/Symptoms: none    Ulcer Identification:  Ulcer Type: lymphedema  Contributing Factors: edema, lymphedema, decreased mobility, and obesity         PAST MEDICAL HISTORY        Diagnosis Date    Arthritis     CHF (congestive heart failure) (HCC)     Chronic kidney disease     History of blood transfusion     Hyperlipidemia        PAST SURGICAL HISTORY    Past Surgical History:   Procedure Laterality Date    CARDIAC SURGERY      bypass    JOINT REPLACEMENT      Left hip    PACEMAKER PLACEMENT Right     and defib       FAMILY HISTORY    History reviewed. No pertinent family history. SOCIAL HISTORY    Social History     Tobacco Use    Smoking status: Former     Types: Cigarettes     Quit date:      Years since quittin.7     Passive exposure: Past    Smokeless tobacco: Never   Vaping Use    Vaping Use: Never used   Substance Use Topics    Alcohol use: Not Currently    Drug use: Never       ALLERGIES    No Known Allergies    MEDICATIONS    No current facility-administered medications on file prior to encounter.      Current Outpatient

## 2023-09-01 NOTE — ED TRIAGE NOTES
Mode of arrival (squad #, walk in, police, etc) : walk in         Chief complaint(s): wound check  bilat legs        Arrival Note (brief scenario, treatment PTA, etc). : seen at wound care, recommended ER for wound eval bilat legs. C= \"Have you ever felt that you should Cut down on your drinking? \"  No  A= \"Have people Annoyed you by criticizing your drinking? \"  No  G= \"Have you ever felt bad or Guilty about your drinking? \"  No  E= \"Have you ever had a drink as an Eye-opener first thing in the morning to steady your nerves or to help a hangover? \"  No      Deferred []      Reason for deferring: N/A    *If yes to two or more: probable alcohol abuse. *

## 2023-09-02 LAB
CREAT SERPL-MCNC: 1.6 MG/DL (ref 0.7–1.2)
GFR SERPL CREATININE-BSD FRML MDRD: 42 ML/MIN/1.73M2

## 2023-09-02 PROCEDURE — 99222 1ST HOSP IP/OBS MODERATE 55: CPT | Performed by: INTERNAL MEDICINE

## 2023-09-02 PROCEDURE — 82565 ASSAY OF CREATININE: CPT

## 2023-09-02 PROCEDURE — 99223 1ST HOSP IP/OBS HIGH 75: CPT | Performed by: FAMILY MEDICINE

## 2023-09-02 PROCEDURE — 86403 PARTICLE AGGLUT ANTBDY SCRN: CPT

## 2023-09-02 PROCEDURE — 1200000000 HC SEMI PRIVATE

## 2023-09-02 PROCEDURE — 36415 COLL VENOUS BLD VENIPUNCTURE: CPT

## 2023-09-02 PROCEDURE — 6360000002 HC RX W HCPCS: Performed by: FAMILY MEDICINE

## 2023-09-02 PROCEDURE — 94640 AIRWAY INHALATION TREATMENT: CPT

## 2023-09-02 PROCEDURE — 94761 N-INVAS EAR/PLS OXIMETRY MLT: CPT

## 2023-09-02 PROCEDURE — 2580000003 HC RX 258: Performed by: FAMILY MEDICINE

## 2023-09-02 PROCEDURE — 6370000000 HC RX 637 (ALT 250 FOR IP): Performed by: FAMILY MEDICINE

## 2023-09-02 PROCEDURE — 87070 CULTURE OTHR SPECIMN AEROBIC: CPT

## 2023-09-02 PROCEDURE — 94664 DEMO&/EVAL PT USE INHALER: CPT

## 2023-09-02 PROCEDURE — 87205 SMEAR GRAM STAIN: CPT

## 2023-09-02 RX ORDER — FLUTICASONE PROPIONATE 50 MCG
1 SPRAY, SUSPENSION (ML) NASAL 2 TIMES DAILY
Status: DISCONTINUED | OUTPATIENT
Start: 2023-09-02 | End: 2023-09-05 | Stop reason: HOSPADM

## 2023-09-02 RX ADMIN — ROSUVASTATIN CALCIUM 20 MG: 20 TABLET, FILM COATED ORAL at 08:06

## 2023-09-02 RX ADMIN — OXYCODONE HYDROCHLORIDE AND ACETAMINOPHEN 500 MG: 500 TABLET ORAL at 20:00

## 2023-09-02 RX ADMIN — OXYCODONE HYDROCHLORIDE AND ACETAMINOPHEN 500 MG: 500 TABLET ORAL at 08:03

## 2023-09-02 RX ADMIN — LISINOPRIL 5 MG: 5 TABLET ORAL at 08:04

## 2023-09-02 RX ADMIN — ALLOPURINOL 100 MG: 100 TABLET ORAL at 08:04

## 2023-09-02 RX ADMIN — ENOXAPARIN SODIUM 30 MG: 100 INJECTION SUBCUTANEOUS at 20:01

## 2023-09-02 RX ADMIN — Medication 400 MG: at 08:03

## 2023-09-02 RX ADMIN — CLOPIDOGREL BISULFATE 75 MG: 75 TABLET ORAL at 08:04

## 2023-09-02 RX ADMIN — VANCOMYCIN HYDROCHLORIDE 750 MG: 750 INJECTION, POWDER, LYOPHILIZED, FOR SOLUTION INTRAVENOUS at 17:46

## 2023-09-02 RX ADMIN — POTASSIUM CHLORIDE 10 MEQ: 10 CAPSULE, COATED, EXTENDED RELEASE ORAL at 08:04

## 2023-09-02 RX ADMIN — FUROSEMIDE 40 MG: 40 TABLET ORAL at 16:05

## 2023-09-02 RX ADMIN — FLUTICASONE PROPIONATE 1 SPRAY: 50 SPRAY, METERED NASAL at 22:13

## 2023-09-02 RX ADMIN — ALBUTEROL SULFATE 2.5 MG: 2.5 SOLUTION RESPIRATORY (INHALATION) at 16:06

## 2023-09-02 RX ADMIN — METOPROLOL TARTRATE 50 MG: 50 TABLET, FILM COATED ORAL at 20:00

## 2023-09-02 RX ADMIN — SODIUM CHLORIDE: 9 INJECTION, SOLUTION INTRAVENOUS at 17:45

## 2023-09-02 RX ADMIN — ENOXAPARIN SODIUM 30 MG: 100 INJECTION SUBCUTANEOUS at 08:04

## 2023-09-02 RX ADMIN — Medication 400 MG: at 20:00

## 2023-09-02 RX ADMIN — METOPROLOL TARTRATE 50 MG: 50 TABLET, FILM COATED ORAL at 08:04

## 2023-09-02 RX ADMIN — FUROSEMIDE 40 MG: 40 TABLET ORAL at 08:04

## 2023-09-02 NOTE — H&P
Family Medicine Admit Note    PCP: Carlos Lagunas MD    Date of Admission: 9/1/2023    Date of Service: Pt seen/examined on 9/2/23 and Admitted to Inpatient. Chief Complaint:  Wound Check      History Of Present Illness: The patient is a 82 y.o. male who presents to [facility] with complaints bilateral leg swelling. He has a hx of lymphedema and has been following in the wound clinic weekly. He was seen yesterday in the clinic and instructed to report to the ED for evaluation. A new wound has develop over the past week on his left lower extremity. He states that the wound does not cause much pain and he does not recall any trauma. He denies any fevers, chills, cough, congestion, chest pain, SOB, abdominal pain, N/V/D, dysuria, melena, numbness, tingling or headaches. Past Medical History:        Diagnosis Date    Arthritis     CHF (congestive heart failure) (HCC)     Chronic kidney disease     History of blood transfusion     Hyperlipidemia        Past Surgical History:        Procedure Laterality Date    CARDIAC SURGERY  2020    bypass    JOINT REPLACEMENT      Left hip    PACEMAKER PLACEMENT Right 2020    and defib       Medications Prior to Admission:    Prior to Admission medications    Medication Sig Start Date End Date Taking?  Authorizing Provider   bumetanide (BUMEX) 2 MG tablet TAKE 1 TABLET TWICE DAILY  Patient not taking: Reported on 9/1/2023 8/15/23   Carlos Lagunas MD   magnesium oxide (MAG-OX) 400 (240 Mg) MG tablet TAKE 1 TABLET TWICE DAILY 7/25/23   Lillian Nesbitt MD   furosemide (LASIX) 40 MG tablet TAKE 1 TABLET TWICE DAILY 7/19/23   Carlos Lagunas MD   potassium chloride (KLOR-CON) 10 MEQ extended release tablet TAKE 1 TABLET EVERY DAY 5/2/23   Carlos Lagunas MD   clopidogrel (PLAVIX) 75 MG tablet TAKE 1 TABLET EVERY DAY 4/12/23   Carlos Lagunas MD   allopurinol (ZYLOPRIM) 100 MG tablet TAKE 1 TABLET EVERY DAY 4/12/23   Carlos Lagunas MD   metoprolol tartrate

## 2023-09-02 NOTE — ACP (ADVANCE CARE PLANNING)
Advance Care Planning     Advance Care Planning Activator (Inpatient)  Conversation Note      Date of ACP Conversation: 9/2/2023     Conversation Conducted with: Patient with Decision Making Capacity    ACP Activator: Yojana Velásquez RN        Health Care Decision Maker:     Current Designated Health Care Decision Maker:     Primary Decision Maker: Omar Solorio - Ammy - [phone]  Click here to complete Healthcare Decision Makers including section of the Healthcare Decision Maker Relationship (ie \"Primary\")  Today we documented Decision Maker(s) consistent with Legal Next of Kin hierarchy. Care Preferences    Ventilation: \"If you were in your present state of health and suddenly became very ill and were unable to breathe on your own, what would your preference be about the use of a ventilator (breathing machine) if it were available to you? \"      Would the patient desire the use of ventilator (breathing machine)?: yes    \"If your health worsens and it becomes clear that your chance of recovery is unlikely, what would your preference be about the use of a ventilator (breathing machine) if it were available to you? \"     Would the patient desire the use of ventilator (breathing machine)?: No      Resuscitation  \"CPR works best to restart the heart when there is a sudden event, like a heart attack, in someone who is otherwise healthy. Unfortunately, CPR does not typically restart the heart for people who have serious health conditions or who are very sick. \"    \"In the event your heart stopped as a result of an underlying serious health condition, would you want attempts to be made to restart your heart (answer \"yes\" for attempt to resuscitate) or would you prefer a natural death (answer \"no\" for do not attempt to resuscitate)? \" yes       [] Yes   [] No   Educated Patient / Lendon Broad regarding differences between Advance Directives and portable DNR orders.     Length of ACP Conversation in minutes:

## 2023-09-03 LAB
CREAT SERPL-MCNC: 1.6 MG/DL (ref 0.7–1.2)
DATE LAST DOSE: NORMAL
ERYTHROCYTE [DISTWIDTH] IN BLOOD BY AUTOMATED COUNT: 19.9 % (ref 11.5–14.9)
GFR SERPL CREATININE-BSD FRML MDRD: 42 ML/MIN/1.73M2
HCT VFR BLD AUTO: 24.2 % (ref 41–53)
HGB BLD-MCNC: 7.1 G/DL (ref 13.5–17.5)
MCH RBC QN AUTO: 20.9 PG (ref 26–34)
MCHC RBC AUTO-ENTMCNC: 29.5 G/DL (ref 31–37)
MCV RBC AUTO: 70.9 FL (ref 80–100)
PLATELET # BLD AUTO: 193 K/UL (ref 150–450)
PMV BLD AUTO: 7.2 FL (ref 6–12)
RBC # BLD AUTO: 3.41 M/UL (ref 4.5–5.9)
TME LAST DOSE: 1746 H
VANCOMYCIN DOSE: 750 MG
VANCOMYCIN SERPL-MCNC: 13.7 UG/ML
WBC OTHER # BLD: 4.6 K/UL (ref 3.5–11)

## 2023-09-03 PROCEDURE — 6370000000 HC RX 637 (ALT 250 FOR IP): Performed by: FAMILY MEDICINE

## 2023-09-03 PROCEDURE — 2580000003 HC RX 258: Performed by: INTERNAL MEDICINE

## 2023-09-03 PROCEDURE — 6360000002 HC RX W HCPCS: Performed by: INTERNAL MEDICINE

## 2023-09-03 PROCEDURE — 85027 COMPLETE CBC AUTOMATED: CPT

## 2023-09-03 PROCEDURE — 80202 ASSAY OF VANCOMYCIN: CPT

## 2023-09-03 PROCEDURE — 2580000003 HC RX 258: Performed by: FAMILY MEDICINE

## 2023-09-03 PROCEDURE — 82565 ASSAY OF CREATININE: CPT

## 2023-09-03 PROCEDURE — 36415 COLL VENOUS BLD VENIPUNCTURE: CPT

## 2023-09-03 PROCEDURE — 99232 SBSQ HOSP IP/OBS MODERATE 35: CPT | Performed by: INTERNAL MEDICINE

## 2023-09-03 PROCEDURE — 6360000002 HC RX W HCPCS: Performed by: FAMILY MEDICINE

## 2023-09-03 PROCEDURE — 99232 SBSQ HOSP IP/OBS MODERATE 35: CPT | Performed by: FAMILY MEDICINE

## 2023-09-03 PROCEDURE — 1200000000 HC SEMI PRIVATE

## 2023-09-03 RX ADMIN — ENOXAPARIN SODIUM 30 MG: 100 INJECTION SUBCUTANEOUS at 21:00

## 2023-09-03 RX ADMIN — METOPROLOL TARTRATE 50 MG: 50 TABLET, FILM COATED ORAL at 08:04

## 2023-09-03 RX ADMIN — ROSUVASTATIN CALCIUM 20 MG: 20 TABLET, FILM COATED ORAL at 08:50

## 2023-09-03 RX ADMIN — POTASSIUM CHLORIDE 10 MEQ: 10 CAPSULE, COATED, EXTENDED RELEASE ORAL at 08:03

## 2023-09-03 RX ADMIN — VANCOMYCIN HYDROCHLORIDE 1000 MG: 1 INJECTION, POWDER, LYOPHILIZED, FOR SOLUTION INTRAVENOUS at 08:08

## 2023-09-03 RX ADMIN — FLUTICASONE PROPIONATE 1 SPRAY: 50 SPRAY, METERED NASAL at 21:00

## 2023-09-03 RX ADMIN — Medication 400 MG: at 21:00

## 2023-09-03 RX ADMIN — FUROSEMIDE 40 MG: 40 TABLET ORAL at 13:28

## 2023-09-03 RX ADMIN — ALLOPURINOL 100 MG: 100 TABLET ORAL at 08:04

## 2023-09-03 RX ADMIN — SODIUM CHLORIDE: 9 INJECTION, SOLUTION INTRAVENOUS at 17:45

## 2023-09-03 RX ADMIN — CEFEPIME 2000 MG: 2 INJECTION, POWDER, FOR SOLUTION INTRAVENOUS at 10:26

## 2023-09-03 RX ADMIN — OXYCODONE HYDROCHLORIDE AND ACETAMINOPHEN 500 MG: 500 TABLET ORAL at 21:00

## 2023-09-03 RX ADMIN — FUROSEMIDE 40 MG: 40 TABLET ORAL at 08:03

## 2023-09-03 RX ADMIN — ENOXAPARIN SODIUM 30 MG: 100 INJECTION SUBCUTANEOUS at 08:03

## 2023-09-03 RX ADMIN — METOPROLOL TARTRATE 50 MG: 50 TABLET, FILM COATED ORAL at 21:00

## 2023-09-03 RX ADMIN — OXYCODONE HYDROCHLORIDE AND ACETAMINOPHEN 500 MG: 500 TABLET ORAL at 08:04

## 2023-09-03 RX ADMIN — CLOPIDOGREL BISULFATE 75 MG: 75 TABLET ORAL at 08:03

## 2023-09-03 RX ADMIN — Medication 400 MG: at 08:04

## 2023-09-03 RX ADMIN — LISINOPRIL 5 MG: 5 TABLET ORAL at 08:04

## 2023-09-04 LAB
ANION GAP SERPL CALCULATED.3IONS-SCNC: 7 MMOL/L (ref 9–17)
BUN SERPL-MCNC: 22 MG/DL (ref 8–23)
CALCIUM SERPL-MCNC: 8.8 MG/DL (ref 8.6–10.4)
CHLORIDE SERPL-SCNC: 99 MMOL/L (ref 98–107)
CO2 SERPL-SCNC: 30 MMOL/L (ref 20–31)
CREAT SERPL-MCNC: 1.5 MG/DL (ref 0.7–1.2)
CREAT SERPL-MCNC: 1.5 MG/DL (ref 0.7–1.2)
DATE LAST DOSE: NORMAL
GFR SERPL CREATININE-BSD FRML MDRD: 45 ML/MIN/1.73M2
GFR SERPL CREATININE-BSD FRML MDRD: 45 ML/MIN/1.73M2
GLUCOSE SERPL-MCNC: 103 MG/DL (ref 70–99)
HCT VFR BLD AUTO: 25.8 % (ref 41–53)
HGB BLD-MCNC: 7.5 G/DL (ref 13.5–17.5)
MAGNESIUM SERPL-MCNC: 2.7 MG/DL (ref 1.6–2.6)
MICROORGANISM SPEC CULT: ABNORMAL
MICROORGANISM/AGENT SPEC: ABNORMAL
MICROORGANISM/AGENT SPEC: ABNORMAL
POTASSIUM SERPL-SCNC: 4 MMOL/L (ref 3.7–5.3)
SODIUM SERPL-SCNC: 136 MMOL/L (ref 135–144)
SPECIMEN DESCRIPTION: ABNORMAL
TME LAST DOSE: 808 H
VANCOMYCIN DOSE: 1000 MG
VANCOMYCIN SERPL-MCNC: 10.8 UG/ML

## 2023-09-04 PROCEDURE — 2580000003 HC RX 258: Performed by: FAMILY MEDICINE

## 2023-09-04 PROCEDURE — 85014 HEMATOCRIT: CPT

## 2023-09-04 PROCEDURE — 80048 BASIC METABOLIC PNL TOTAL CA: CPT

## 2023-09-04 PROCEDURE — 99232 SBSQ HOSP IP/OBS MODERATE 35: CPT | Performed by: INTERNAL MEDICINE

## 2023-09-04 PROCEDURE — 85018 HEMOGLOBIN: CPT

## 2023-09-04 PROCEDURE — 1200000000 HC SEMI PRIVATE

## 2023-09-04 PROCEDURE — 6360000002 HC RX W HCPCS: Performed by: FAMILY MEDICINE

## 2023-09-04 PROCEDURE — 99232 SBSQ HOSP IP/OBS MODERATE 35: CPT | Performed by: FAMILY MEDICINE

## 2023-09-04 PROCEDURE — 6360000002 HC RX W HCPCS: Performed by: INTERNAL MEDICINE

## 2023-09-04 PROCEDURE — 6370000000 HC RX 637 (ALT 250 FOR IP): Performed by: FAMILY MEDICINE

## 2023-09-04 PROCEDURE — 80202 ASSAY OF VANCOMYCIN: CPT

## 2023-09-04 PROCEDURE — 36415 COLL VENOUS BLD VENIPUNCTURE: CPT

## 2023-09-04 PROCEDURE — 2580000003 HC RX 258: Performed by: INTERNAL MEDICINE

## 2023-09-04 PROCEDURE — 83735 ASSAY OF MAGNESIUM: CPT

## 2023-09-04 PROCEDURE — 82565 ASSAY OF CREATININE: CPT

## 2023-09-04 RX ADMIN — CEFEPIME 2000 MG: 2 INJECTION, POWDER, FOR SOLUTION INTRAVENOUS at 10:11

## 2023-09-04 RX ADMIN — ENOXAPARIN SODIUM 30 MG: 100 INJECTION SUBCUTANEOUS at 22:36

## 2023-09-04 RX ADMIN — OXYCODONE HYDROCHLORIDE AND ACETAMINOPHEN 500 MG: 500 TABLET ORAL at 22:36

## 2023-09-04 RX ADMIN — Medication 400 MG: at 22:36

## 2023-09-04 RX ADMIN — ENOXAPARIN SODIUM 30 MG: 100 INJECTION SUBCUTANEOUS at 08:21

## 2023-09-04 RX ADMIN — CLOPIDOGREL BISULFATE 75 MG: 75 TABLET ORAL at 08:20

## 2023-09-04 RX ADMIN — ROSUVASTATIN CALCIUM 20 MG: 20 TABLET, FILM COATED ORAL at 08:26

## 2023-09-04 RX ADMIN — METOPROLOL TARTRATE 50 MG: 50 TABLET, FILM COATED ORAL at 22:36

## 2023-09-04 RX ADMIN — Medication 400 MG: at 08:21

## 2023-09-04 RX ADMIN — ALLOPURINOL 100 MG: 100 TABLET ORAL at 08:20

## 2023-09-04 RX ADMIN — METOPROLOL TARTRATE 50 MG: 50 TABLET, FILM COATED ORAL at 08:21

## 2023-09-04 RX ADMIN — SODIUM CHLORIDE: 9 INJECTION, SOLUTION INTRAVENOUS at 08:07

## 2023-09-04 RX ADMIN — VANCOMYCIN HYDROCHLORIDE 1500 MG: 1.5 INJECTION, POWDER, LYOPHILIZED, FOR SOLUTION INTRAVENOUS at 08:10

## 2023-09-04 RX ADMIN — FUROSEMIDE 40 MG: 40 TABLET ORAL at 08:21

## 2023-09-04 RX ADMIN — LISINOPRIL 5 MG: 5 TABLET ORAL at 08:21

## 2023-09-04 RX ADMIN — POTASSIUM CHLORIDE 10 MEQ: 10 CAPSULE, COATED, EXTENDED RELEASE ORAL at 08:21

## 2023-09-04 RX ADMIN — FUROSEMIDE 40 MG: 40 TABLET ORAL at 14:53

## 2023-09-04 RX ADMIN — OXYCODONE HYDROCHLORIDE AND ACETAMINOPHEN 500 MG: 500 TABLET ORAL at 08:21

## 2023-09-05 VITALS
RESPIRATION RATE: 18 BRPM | SYSTOLIC BLOOD PRESSURE: 126 MMHG | HEIGHT: 72 IN | WEIGHT: 314.38 LBS | HEART RATE: 73 BPM | BODY MASS INDEX: 42.58 KG/M2 | OXYGEN SATURATION: 97 % | TEMPERATURE: 97.5 F | DIASTOLIC BLOOD PRESSURE: 60 MMHG

## 2023-09-05 LAB
CREAT SERPL-MCNC: 1.6 MG/DL (ref 0.7–1.2)
GFR SERPL CREATININE-BSD FRML MDRD: 42 ML/MIN/1.73M2

## 2023-09-05 PROCEDURE — 6370000000 HC RX 637 (ALT 250 FOR IP): Performed by: FAMILY MEDICINE

## 2023-09-05 PROCEDURE — 82565 ASSAY OF CREATININE: CPT

## 2023-09-05 PROCEDURE — 99213 OFFICE O/P EST LOW 20 MIN: CPT

## 2023-09-05 PROCEDURE — 99239 HOSP IP/OBS DSCHRG MGMT >30: CPT | Performed by: STUDENT IN AN ORGANIZED HEALTH CARE EDUCATION/TRAINING PROGRAM

## 2023-09-05 PROCEDURE — 36415 COLL VENOUS BLD VENIPUNCTURE: CPT

## 2023-09-05 PROCEDURE — 6360000002 HC RX W HCPCS: Performed by: INTERNAL MEDICINE

## 2023-09-05 PROCEDURE — 6360000002 HC RX W HCPCS: Performed by: FAMILY MEDICINE

## 2023-09-05 PROCEDURE — 2580000003 HC RX 258: Performed by: INTERNAL MEDICINE

## 2023-09-05 RX ORDER — CEFUROXIME AXETIL 250 MG/1
250 TABLET ORAL 2 TIMES DAILY
Qty: 10 TABLET | Refills: 0 | Status: SHIPPED | OUTPATIENT
Start: 2023-09-05 | End: 2023-09-05 | Stop reason: SDUPTHER

## 2023-09-05 RX ORDER — DOXYCYCLINE HYCLATE 100 MG
100 TABLET ORAL 2 TIMES DAILY
Qty: 10 TABLET | Refills: 0 | Status: SHIPPED | OUTPATIENT
Start: 2023-09-05 | End: 2023-09-10

## 2023-09-05 RX ORDER — DOXYCYCLINE HYCLATE 100 MG
100 TABLET ORAL 2 TIMES DAILY
Qty: 10 TABLET | Refills: 0 | Status: SHIPPED | OUTPATIENT
Start: 2023-09-05 | End: 2023-09-05 | Stop reason: SDUPTHER

## 2023-09-05 RX ORDER — CEFUROXIME AXETIL 250 MG/1
250 TABLET ORAL 2 TIMES DAILY
Qty: 10 TABLET | Refills: 0 | Status: SHIPPED | OUTPATIENT
Start: 2023-09-05 | End: 2023-09-10

## 2023-09-05 RX ADMIN — ROSUVASTATIN CALCIUM 20 MG: 20 TABLET, FILM COATED ORAL at 08:04

## 2023-09-05 RX ADMIN — POTASSIUM CHLORIDE 10 MEQ: 10 CAPSULE, COATED, EXTENDED RELEASE ORAL at 07:58

## 2023-09-05 RX ADMIN — CLOPIDOGREL BISULFATE 75 MG: 75 TABLET ORAL at 08:00

## 2023-09-05 RX ADMIN — ENOXAPARIN SODIUM 30 MG: 100 INJECTION SUBCUTANEOUS at 08:03

## 2023-09-05 RX ADMIN — LISINOPRIL 5 MG: 5 TABLET ORAL at 08:02

## 2023-09-05 RX ADMIN — VANCOMYCIN HYDROCHLORIDE 1250 MG: 1.25 INJECTION, POWDER, LYOPHILIZED, FOR SOLUTION INTRAVENOUS at 10:14

## 2023-09-05 RX ADMIN — Medication 400 MG: at 08:02

## 2023-09-05 RX ADMIN — ALLOPURINOL 100 MG: 100 TABLET ORAL at 07:57

## 2023-09-05 RX ADMIN — CEFEPIME 2000 MG: 2 INJECTION, POWDER, FOR SOLUTION INTRAVENOUS at 11:54

## 2023-09-05 RX ADMIN — METOPROLOL TARTRATE 50 MG: 50 TABLET, FILM COATED ORAL at 08:01

## 2023-09-05 RX ADMIN — FUROSEMIDE 40 MG: 40 TABLET ORAL at 14:13

## 2023-09-05 RX ADMIN — FUROSEMIDE 40 MG: 40 TABLET ORAL at 08:01

## 2023-09-05 RX ADMIN — OXYCODONE HYDROCHLORIDE AND ACETAMINOPHEN 500 MG: 500 TABLET ORAL at 08:02

## 2023-09-05 NOTE — CARE COORDINATION
ONGOING DISCHARGE PLAN:     Patient is alert and oriented x4. Spoke with patient regarding discharge plan and patient confirms that plan is still to go home with dtr. Pt declines VNS. Is current at [facility] wound care. IV vanco and cefepime  ID following. Will continue to follow for additional discharge needs. If patient is discharged prior to next notation, then this note serves as note for discharge by case management.     Electronically signed by Isabel Lima RN on 9/4/2023 at 9:44 AM
ONGOING DISCHARGE PLAN:    Patient is alert and oriented x4. Spoke with patient regarding discharge plan and patient confirms that plan is still to go home with dtr. Pt declines VNS. Is current at [facility] wound care. IV vanco and cefepime  ID following. Will continue to follow for additional discharge needs. If patient is discharged prior to next notation, then this note serves as note for discharge by case management.     Electronically signed by Isabel Lima RN on 9/3/2023 at 11:15 AM
ONGOING DISCHARGE PLAN:    Patient is alert and oriented x4. Spoke with patient regarding discharge plan and patient confirms that plan is still to return to home w/ Daughter Joshua Briceno. Denies VNS. Follows at [facility]. Pt. Remains on IV Vanco/Cefepime. Per ID notes, Likely orals on DC. Denies needs. Will continue to follow for additional discharge needs. If patient is discharged prior to next notation, then this note serves as note for discharge by case management.     Electronically signed by Chantel Weems RN on 9/5/2023 at 9:27 AM
to:     Patient Representative Name:       The Patient and/or Patient Representative Agree with the Discharge Plan?       Garrison Dhillon RN  Case Management Department  Ph: [phone]

## 2023-09-05 NOTE — PLAN OF CARE
Problem: Discharge Planning  Goal: Discharge to home or other facility with appropriate resources  9/5/2023 1728 by Gregoria Jade RN  Outcome: Completed  9/5/2023 1424 by Yusuf Guzmán RN  Outcome: Progressing  9/5/2023 0353 by Dinah Panchal RN  Outcome: Progressing  Flowsheets (Taken 9/5/2023 3850)  Discharge to home or other facility with appropriate resources:   Identify barriers to discharge with patient and caregiver   Arrange for needed discharge resources and transportation as appropriate   Identify discharge learning needs (meds, wound care, etc)   Arrange for interpreters to assist at discharge as needed   Refer to discharge planning if patient needs post-hospital services based on physician order or complex needs related to functional status, cognitive ability or social support system     Problem: Safety - Adult  Goal: Free from fall injury  9/5/2023 1728 by Gregoria Jade RN  Outcome: Completed  9/5/2023 1424 by Yusuf Guzmán RN  Outcome: Progressing  9/5/2023 0353 by Dinah Panchal RN  Outcome: Progressing  Flowsheets (Taken 9/5/2023 0353)  Free From Fall Injury: Instruct family/caregiver on patient safety     Problem: Pain  Goal: Verbalizes/displays adequate comfort level or baseline comfort level  9/5/2023 1728 by Gregoria Jade RN  Outcome: Completed  9/5/2023 1424 by Yusuf Guzmán RN  Outcome: Progressing  9/5/2023 0353 by Dinah Panchal RN  Outcome: Progressing  Flowsheets (Taken 9/5/2023 0353)  Verbalizes/displays adequate comfort level or baseline comfort level:   Encourage patient to monitor pain and request assistance   Assess pain using appropriate pain scale   Administer analgesics based on type and severity of pain and evaluate response   Implement non-pharmacological measures as appropriate and evaluate response   Consider cultural and social influences on pain and pain management   Notify Licensed Independent Practitioner if interventions unsuccessful or
Problem: Discharge Planning  Goal: Discharge to home or other facility with appropriate resources  Outcome: Progressing     Problem: Safety - Adult  Goal: Free from fall injury  Outcome: Progressing     Problem: Pain  Goal: Verbalizes/displays adequate comfort level or baseline comfort level  Outcome: Progressing     Problem: ABCDS Injury Assessment  Goal: Absence of physical injury  Outcome: Progressing     Problem: Skin/Tissue Integrity  Goal: Absence of new skin breakdown  Description: 1. Monitor for areas of redness and/or skin breakdown  2. Assess vascular access sites hourly  3. Every 4-6 hours minimum:  Change oxygen saturation probe site  4. Every 4-6 hours:  If on nasal continuous positive airway pressure, respiratory therapy assess nares and determine need for appliance change or resting period.   Outcome: Progressing
Problem: Discharge Planning  Goal: Discharge to home or other facility with appropriate resources  Outcome: Progressing     Problem: Safety - Adult  Goal: Free from fall injury  Outcome: Progressing     Problem: Pain  Goal: Verbalizes/displays adequate comfort level or baseline comfort level  Outcome: Progressing     Problem: ABCDS Injury Assessment  Goal: Absence of physical injury  Outcome: Progressing     Problem: Skin/Tissue Integrity  Goal: Absence of new skin breakdown  Description: 1. Monitor for areas of redness and/or skin breakdown  2. Assess vascular access sites hourly  3. Every 4-6 hours minimum:  Change oxygen saturation probe site  4. Every 4-6 hours:  If on nasal continuous positive airway pressure, respiratory therapy assess nares and determine need for appliance change or resting period.   Outcome: Progressing     Problem: Chronic Conditions and Co-morbidities  Goal: Patient's chronic conditions and co-morbidity symptoms are monitored and maintained or improved  Outcome: Progressing     Problem: Nutrition Deficit:  Goal: Optimize nutritional status  Outcome: Progressing
Problem: Discharge Planning  Goal: Discharge to home or other facility with appropriate resources  Outcome: Progressing  Flowsheets (Taken 9/2/2023 1702)  Discharge to home or other facility with appropriate resources:   Identify barriers to discharge with patient and caregiver   Arrange for needed discharge resources and transportation as appropriate   Identify discharge learning needs (meds, wound care, etc)   Arrange for interpreters to assist at discharge as needed   Refer to discharge planning if patient needs post-hospital services based on physician order or complex needs related to functional status, cognitive ability or social support system  Note: Discharge planners following for further discharge needs      Problem: Safety - Adult  Goal: Free from fall injury  Outcome: Progressing  Flowsheets (Taken 9/2/2023 1702)  Free From Fall Injury:   Instruct family/caregiver on patient safety   Based on caregiver fall risk screen, instruct family/caregiver to ask for assistance with transferring infant if caregiver noted to have fall risk factors  Note: No falls this shift. Call light with in reach, side rails up x2, bed in lowest postion. Patients safety maintained. Problem: Pain  Goal: Verbalizes/displays adequate comfort level or baseline comfort level  Outcome: Progressing  Flowsheets (Taken 9/2/2023 1702)  Verbalizes/displays adequate comfort level or baseline comfort level:   Encourage patient to monitor pain and request assistance   Assess pain using appropriate pain scale   Administer analgesics based on type and severity of pain and evaluate response   Implement non-pharmacological measures as appropriate and evaluate response   Consider cultural and social influences on pain and pain management   Notify Licensed Independent Practitioner if interventions unsuccessful or patient reports new pain  Note: No c/o any discomfort this shift.       Problem: ABCDS Injury Assessment  Goal: Absence of
Problem: Discharge Planning  Goal: Discharge to home or other facility with appropriate resources  Outcome: Progressing  Flowsheets (Taken 9/3/2023 1733)  Discharge to home or other facility with appropriate resources:   Identify barriers to discharge with patient and caregiver   Arrange for needed discharge resources and transportation as appropriate   Identify discharge learning needs (meds, wound care, etc)   Arrange for interpreters to assist at discharge as needed   Refer to discharge planning if patient needs post-hospital services based on physician order or complex needs related to functional status, cognitive ability or social support system  Note: Discharge planners following for further discharge needs      Problem: Safety - Adult  Goal: Free from fall injury  9/3/2023 1733 by Shahbaz Atkins, RN  Outcome: Progressing  Flowsheets (Taken 9/3/2023 1733)  Free From Fall Injury:   Instruct family/caregiver on patient safety   Based on caregiver fall risk screen, instruct family/caregiver to ask for assistance with transferring infant if caregiver noted to have fall risk factors  Note: No falls this shift. Call light with in reach, side rails up x2, bed in lowest postion. Patients safety maintained.    9/3/2023 0421 by Jemal Frey RN  Outcome: Progressing  Flowsheets (Taken 9/2/2023 1702 by Shahabz Atkins, RN)  Free From Fall Injury:   Instruct family/caregiver on patient safety   Based on caregiver fall risk screen, instruct family/caregiver to ask for assistance with transferring infant if caregiver noted to have fall risk factors     Problem: Pain  Goal: Verbalizes/displays adequate comfort level or baseline comfort level  Outcome: Progressing  Flowsheets (Taken 9/3/2023 1733)  Verbalizes/displays adequate comfort level or baseline comfort level:   Encourage patient to monitor pain and request assistance   Assess pain using appropriate pain scale   Administer analgesics based on type and severity of pain and
Problem: Safety - Adult  Goal: Free from fall injury  9/3/2023 0421 by Henry Prather RN  Outcome: Progressing  Flowsheets (Taken 9/2/2023 1702 by Mariam Diaz RN)  Free From Fall Injury:   Instruct family/caregiver on patient safety   Based on caregiver fall risk screen, instruct family/caregiver to ask for assistance with transferring infant if caregiver noted to have fall risk factors     Problem: Safety - Adult  Goal: Free from fall injury  9/3/2023 0421 by Henry Prather RN  Outcome: Progressing  Flowsheets (Taken 9/2/2023 1702 by Mariam Diaz RN)  Free From Fall Injury:   Instruct family/caregiver on patient safety   Based on caregiver fall risk screen, instruct family/caregiver to ask for assistance with transferring infant if caregiver noted to have fall risk factors
Problem: Safety - Adult  Goal: Free from fall injury  9/5/2023 1424 by Eliel Alejandra RN  Outcome: Progressing, Patient remains free of incidence/ injury. Bed remains in low position. Side rails up x2. Problem: Pain  Goal: Verbalizes/displays adequate comfort level or baseline comfort level  9/5/2023 1424 by Eliel Alejandra RN  Outcome: Progressing, Patient declines pain currently.
Progressing  Flowsheets (Taken 9/5/2023 0353)  Absence of Physical Injury: Implement safety measures based on patient assessment  9/4/2023 1526 by Keagan Blanchard RN  Outcome: Progressing     Problem: Skin/Tissue Integrity  Goal: Absence of new skin breakdown  Description: 1. Monitor for areas of redness and/or skin breakdown  2. Assess vascular access sites hourly  3. Every 4-6 hours minimum:  Change oxygen saturation probe site  4. Every 4-6 hours:  If on nasal continuous positive airway pressure, respiratory therapy assess nares and determine need for appliance change or resting period.   9/5/2023 0353 by Cecil Lopez RN  Outcome: Progressing  Note: Monitoring skin  9/4/2023 1526 by Keagan Blanchard RN  Outcome: Progressing     Problem: Chronic Conditions and Co-morbidities  Goal: Patient's chronic conditions and co-morbidity symptoms are monitored and maintained or improved  9/5/2023 0353 by Cecil Lopez RN  Outcome: Progressing  Flowsheets (Taken 9/5/2023 0353)  Care Plan - Patient's Chronic Conditions and Co-Morbidity Symptoms are Monitored and Maintained or Improved:   Monitor and assess patient's chronic conditions and comorbid symptoms for stability, deterioration, or improvement   Collaborate with multidisciplinary team to address chronic and comorbid conditions and prevent exacerbation or deterioration   Update acute care plan with appropriate goals if chronic or comorbid symptoms are exacerbated and prevent overall improvement and discharge  9/4/2023 1526 by Keagan Blanchard RN  Outcome: Progressing     Problem: Nutrition Deficit:  Goal: Optimize nutritional status  9/5/2023 0353 by Cecil Lopez RN  Outcome: Progressing  Flowsheets (Taken 9/5/2023 0353)  Nutrient intake appropriate for improving, restoring, or maintaining nutritional needs:   Assess nutritional status and recommend course of action   Recommend appropriate diets, oral nutritional supplements, and

## 2023-09-05 NOTE — CONSULTS
[facility]  Consult Note       NAME:  Arcadio Vargas  MEDICAL RECORD NUMBER:  [id number]  AGE: 82 y.o. GENDER: male  : 1935  TODAY'S DATE:  2023    Subjective:      Arcadio Vargas is a 82 y.o. male with inpatient referral to [facility] for:  Bilateral leg wounds      Wound Identification:  Wound Type: venous  Contributing Factors: edema, venous stasis, and lymphedema    Wound History: Pt sent to ER on  from wound care appointment with worsening edema, weeping to bilateral legs. Chronic venous wounds, lymphedema  Current Wound Care Treatment:  silvercel, 3 layer compression wrap    Patient Goal of Care:  [x] Wound Healing  [] Odor Control  [] Palliative Care  [] Pain Control   [] Other:         PAST MEDICAL HISTORY        Diagnosis Date    Arthritis     CHF (congestive heart failure) (HCC)     Chronic kidney disease     History of blood transfusion     Hyperlipidemia        PAST SURGICAL HISTORY    Past Surgical History:   Procedure Laterality Date    CARDIAC SURGERY      bypass    JOINT REPLACEMENT      Left hip    PACEMAKER PLACEMENT Right     and defib       FAMILY HISTORY    History reviewed. No pertinent family history. SOCIAL HISTORY    Social History     Tobacco Use    Smoking status: Former     Types: Cigarettes     Quit date:      Years since quittin.7     Passive exposure: Past    Smokeless tobacco: Never   Vaping Use    Vaping Use: Never used   Substance Use Topics    Alcohol use: Not Currently    Drug use: Never         ALLERGIES    No Known Allergies    HOME MEDICATIONS  Prior to Admission medications    Medication Sig Start Date End Date Taking?  Authorizing Provider   doxycycline hyclate (VIBRA-TABS) 100 MG tablet Take 1 tablet by mouth 2 times daily for 5 days 9/5/23 9/10/23 Yes Margarita Barrow MD   cefUROXime (CEFTIN) 250 MG tablet Take 1 tablet by mouth 2 times daily for 5 days 9/5/23 9/10/23 Yes Margarita Barrow MD

## 2023-09-05 NOTE — PROGRESS NOTES
09/03/23 1405   Encounter Summary   Encounter Overview/Reason  Attempted Encounter   Service Provided For: Patient   Referral/Consult From: Rounding   Complexity of Encounter Low   Assessment/Intervention/Outcome   Assessment Unable to assess  (Pt with staff)   Intervention Prayer (assurance of)/Grant
Comprehensive Nutrition Assessment    Type and Reason for Visit:  Initial, Wound    Nutrition Recommendations/Plan:   Recommend No Added Salt diet and monitor K+  Will add Ensure Clear to all trays     Malnutrition Assessment:  Malnutrition Status: At risk for malnutrition (Comment) (09/02/23 1505)    Context:  Chronic Illness     Findings of the 6 clinical characteristics of malnutrition:  Energy Intake:  No significant decrease in energy intake  Weight Loss:  No significant weight loss     Body Fat Loss:  No significant body fat loss     Muscle Mass Loss:  No significant muscle mass loss    Fluid Accumulation:  Severe Extremities   Strength:  Not Performed    Nutrition Assessment:    Pt was admitted due to infection associated with hymphedema/BLE wounds. He is eating all food provided and states he is willing to try a  Ensure Clear. Wt has been stable. Nutrition Related Findings:    Severe BLE edema, Labs: K5.1, Meds: Reviewed, PMH: CHF, CKD Wound Type: Multiple       Current Nutrition Intake & Therapies:    Average Meal Intake: %     ADULT DIET; Regular; Low Fat/Low Chol/High Fiber/LUCIANA    Anthropometric Measures:  Height: 6' (182.9 cm)  Ideal Body Weight (IBW): 178 lbs (81 kg)    Admission Body Weight: 314 lb (142.4 kg)  Current Body Weight: 314 lb (142.4 kg),   IBW.  Weight Source: Bed Scale  Current BMI (kg/m2): 42.6  Usual Body Weight:  (302-313#)                       BMI Categories: Obese Class 3 (BMI 40.0 or greater)    Estimated Daily Nutrient Needs:  Energy Requirements Based On: Formula  Weight Used for Energy Requirements: Ideal  Energy (kcal/day): [place] x 1.3=2000  Weight Used for Protein Requirements: Ideal  Protein (g/day): 1.4g/kg= 115 g       Nutrition Diagnosis:   Increased nutrient needs related to  (healing) as evidenced by wounds    Nutrition Interventions:   Food and/or Nutrient Delivery: Modify Current Diet  Nutrition Education/Counseling: No recommendation at this
[facility] -   Infectious diseases evaluation  admission date 9/1/2023    reason for consultation:   Left lower extremity infected wound    Impression :   Current:  Left leg cellulitis/infected wound  Bilateral lower extremity edema  CHF  Chronic kidney disease  Hyperlipidemia      Recommendations   Continue IV vancomycin and cefepime  Follow wound culture and adjust antibiotics as needed  Wound care  Follow CBC and renal function  Supportive care  Likely oral on discharge    Infection Control Recommendations   [place] Precautions      Antimicrobial Stewardship Recommendations   Simplification of therapy  Targeted therapy      History of Present Illness:   Initial history:  Jannie Hussein is a 82y.o.-year-old male was sent to the hospital from wound care clinic for worsening left leg swelling associated with redness and drainage from left leg wound for around 1 week. Symptoms moderate to severe, no alleviating or aggravating factors. He denied fever or chills, denied nausea or vomiting, no significant pain. Initial WBC 5.8, creatinine 1.9    Interval changes  9/4/2023   He remains afebrile, denied significant pain, no new complaints. Creatinine 1.5 today  Wound culture grew Staph aureus, gram-negative rods and Proteus species. No data found. I have personally reviewed the past medical history, past surgical history, medications, social history, and family history, and I haveupdated the database accordingly. Allergies:   Patient has no known allergies. Review of Systems:     Review of Systems  As per history present illness, other than above 12 system review was negative. Physical Examination :       Physical Exam  Constitutional:       General: He is not in acute distress. HENT:      Head: Normocephalic and atraumatic. Right Ear: External ear normal.      Left Ear: External ear normal.   Eyes:      General: No scleral icterus.
[facility] -   Infectious diseases evaluation  admission date 9/1/2023    reason for consultation:   Left lower extremity infected wound    Impression :   Current:  Left leg cellulitis/infected wound  Bilateral lower extremity edema  CHF  Chronic kidney disease  Hyperlipidemia      Recommendations   Continue IV vancomycin and cefepime  P.o. doxycycline 100 mg twice daily and Ceftin 250 mg twice daily on discharge through 9/10/2023  Wound care  Follow CBC and renal function in 1  Supportive care  Discussed with wound care nurse  Follow-up in 1 week    Infection Control Recommendations   [place] Precautions      Antimicrobial Stewardship Recommendations   Simplification of therapy  Targeted therapy      History of Present Illness:   Initial history:  Beryl Tracey is a 82y.o.-year-old male was sent to the hospital from wound care clinic for worsening left leg swelling associated with redness and drainage from left leg wound for around 1 week. Symptoms moderate to severe, no alleviating or aggravating factors. He denied fever or chills, denied nausea or vomiting, no significant pain. Initial WBC 5.8, creatinine 1.9    Interval changes  9/5/2023   The patient remains afebrile, denied fever or chills, no other complaints  Creatinine 1.6 today  Wound culture grew Staph aureus, gram-negative rods and Proteus species. Patient Vitals for the past 8 hrs:   BP Pulse   09/05/23 1413 126/60 --   09/05/23 0801 131/66 73               I have personally reviewed the past medical history, past surgical history, medications, social history, and family history, and I haveupdated the database accordingly. Allergies:   Patient has no known allergies. Review of Systems:     Review of Systems  As per history present illness, other than above 12 system review was negative.   Physical Examination :       Physical Exam  Constitutional:       General: He is not in acute
Patient IV removed with no complications. Patient taken out by wheelchair to daughter to take patient home.
Patient discharged home with all personal belongings. Discharge instructions and prescriptions given. Patient taken down to front entrance via wheelchair.
Pharmacy Medication History Note      List of current medications patient is taking is complete. Source of information: patient, [employer] dispense report, OARRS    Changes made to medication list:  Medications flagged for removal (include reason, ex. noncompliance):  Bumetanide - the patient is uncertain if he takes this medication. He is very sure he takes furosemide 40 mg twice daily  Eliquis - last filled in August 2022 for a 90 day supply. The patient was uncertain if he takes this medication. Methylcobalamin - the patient reports he does not take this medication    Medications removed (include reason, ex. therapy complete or physician discontinued):  None     Medications added/doses adjusted:  None     Other notes (ex. Recent course of antibiotics, Coumadin dosing):  OARRS negative    Denies use of other OTC or herbal medications.       Allergies clarified    Medication list provided to the patient: no   Medication education provided to the patient: none      Electronically signed by Bushra Brothers [facility] [place] on 9/1/2023 at 7:35 PM
Vancomycin Dosing by Pharmacy - Daily Note   Vancomycin Therapy Day:  2  Indication: SSTI    Allergies:  Patient has no known allergies. Actual Weight:    Wt Readings from Last 1 Encounters:   09/02/23 (!) 314 lb 6 oz (142.6 kg)       Labs/Ancillary Data  Estimated Creatinine Clearance: 49 mL/min (A) (based on SCr of 1.6 mg/dL (H)). Recent Labs     09/01/23  1600 09/01/23  1720 09/02/23  0529   CREATININE  --  1.9* 1.6*   BUN  --  24*  --    WBC 5.8  --   --      No results found for: PROCAL    Intake/Output Summary (Last 24 hours) at 9/2/2023 0908  Last data filed at 9/2/2023 0654  Gross per 24 hour   Intake 854.67 ml   Output 1100 ml   Net -245.33 ml     Temp: 98.4    Culture Date / Source  /  Results  See micro    Recent vancomycin administrations                     vancomycin (VANCOCIN) 2,000 mg in sodium chloride 0.9 % 500 mL IVPB (mg) 2,000 mg New Bag 09/01/23 1821                    Vancomycin Concentrations:   TROUGH:  No results for input(s): VANCOTROUGH in the last 72 hours. RANDOM:  No results for input(s): VANCORANDOM in the last 72 hours. MRSA Nasal Swab: N/A. Non-respiratory infection. Marcella Milligan PLAN     Continue current dose of 750 mg q24h IV  Ensured BUN/sCr ordered at baseline and every 48 hours x at least 3 levels, then at least weekly. Repeat vancomycin concentration ordered for 9/3 @ 0600  Pharmacy will continue to monitor patient and adjust therapy as indicated      Vancomycin Target Concentration Parameters  Treatment  Population Target AUC/RAFIA Target Trough   Invasive MRSA Infection (bacteremia, pneumonia, meningitis, endocarditis, osteomyelitis)  Sepsis (undifferentiated) 400-600 N/A   Infection due to non-MRSA pathogen  Empiric treatment of non-invasive MRSA infection  (SSTI, UTI) <500 10-15 mg/L   CrCl < 29 mL/min  Rapidly fluctuating serum creatinine   DONI N/A < 15 mg/L     Renal replacement therapy is dosed by levels, per hospital protocol.   Abbreviations  * Pauc: probability that AUC
Vancomycin Dosing by Pharmacy - Daily Note   Vancomycin Therapy Day:  3  Indication: SSTI    Allergies:  Patient has no known allergies. Actual Weight:    Wt Readings from Last 1 Encounters:   09/02/23 (!) 314 lb 6 oz (142.6 kg)       Labs/Ancillary Data  Estimated Creatinine Clearance: 49 mL/min (A) (based on SCr of 1.6 mg/dL (H)). Recent Labs     09/01/23  1600 09/01/23  1720 09/02/23  0529 09/03/23  0546   CREATININE  --  1.9* 1.6* 1.6*   BUN  --  24*  --   --    WBC 5.8  --   --   --      No results found for: PROCAL    Intake/Output Summary (Last 24 hours) at 9/3/2023 8001  Last data filed at 9/2/2023 2005  Gross per 24 hour   Intake --   Output 750 ml   Net -750 ml     Temp: 98.4    Culture Date / Source  /  Results  See micro reports  Recent vancomycin administrations                     vancomycin (VANCOCIN) 750 mg in sodium chloride 0.9 % 250 mL IVPB ([id number]) (mg) 750 mg New Bag 09/02/23 1746    vancomycin (VANCOCIN) 2,000 mg in sodium chloride 0.9 % 500 mL IVPB (mg) 2,000 mg New Bag 09/01/23 1821                    Vancomycin Concentrations:   TROUGH:  No results for input(s): VANCOTROUGH in the last 72 hours. RANDOM:    Recent Labs     09/03/23  0546   VANCORANDOM 13.7       MRSA Nasal Swab: N/A. Non-respiratory infection. Chanel Pinch PLAN     Increase dose to 1000 mg q24h IV  Ensured BUN/sCr ordered at baseline and every 48 hours x at least 3 levels, then at least weekly.   Repeat vancomycin concentration ordered for 09/04/23 @ 0600   Pharmacy will continue to monitor patient and adjust therapy as indicated      Vancomycin Target Concentration Parameters  Treatment  Population Target AUC/RAFIA Target Trough   Invasive MRSA Infection (bacteremia, pneumonia, meningitis, endocarditis, osteomyelitis)  Sepsis (undifferentiated) 400-600 N/A   Infection due to non-MRSA pathogen  Empiric treatment of non-invasive MRSA infection  (SSTI, UTI) <500 10-15 mg/L   CrCl < 29 mL/min  Rapidly fluctuating serum creatinine
Vancomycin Dosing by Pharmacy - Daily Note   Vancomycin Therapy Day:  4  Indication: SSTI associated with lymphadema    Allergies:  Patient has no known allergies. Actual Weight:    Wt Readings from Last 1 Encounters:   09/02/23 (!) 314 lb 6 oz (142.6 kg)       Labs/Ancillary Data  Estimated Creatinine Clearance: 52 mL/min (A) (based on SCr of 1.5 mg/dL (H)). Recent Labs     09/01/23  1600 09/01/23  1720 09/01/23  1720 09/02/23  0529 09/03/23  0546 09/04/23  0615   CREATININE  --  1.9*   < > 1.6* 1.6* 1.5*   BUN  --  24*  --   --   --   --    WBC 5.8  --   --   --  4.6  --     < > = values in this interval not displayed. No results found for: PROCAL    Intake/Output Summary (Last 24 hours) at 9/4/2023 0727  Last data filed at 9/3/2023 2300  Gross per 24 hour   Intake 480 ml   Output 600 ml   Net -120 ml     Temp: 98.4 F    Culture Date / Source  /  Results  See micro  Recent vancomycin administrations                     vancomycin (VANCOCIN) 1,000 mg in sodium chloride 0.9 % 250 mL IVPB ([id number]) (mg) 1,000 mg New Bag 09/03/23 0808    vancomycin (VANCOCIN) 750 mg in sodium chloride 0.9 % 250 mL IVPB (Yrkc2Hyw) (mg) 750 mg New Bag 09/02/23 1746    vancomycin (VANCOCIN) 2,000 mg in sodium chloride 0.9 % 500 mL IVPB (mg) 2,000 mg New Bag 09/01/23 1821                    Vancomycin Concentrations:   TROUGH:  No results for input(s): VANCOTROUGH in the last 72 hours. RANDOM:    Recent Labs     09/03/23  0546 09/04/23  0615   VANCORANDOM 13.7 10.8       MRSA Nasal Swab: N/A. Non-respiratory infection. Isidro Number PLAN     Increase dose to 1500 mg q24h IV  Ensured BUN/sCr ordered at baseline and every 48 hours x at least 3 levels, then at least weekly. Repeat vancomycin concentration ordered for 09/06 @ 0600.    Pharmacy will continue to monitor patient and adjust therapy as indicated      Vancomycin Target Concentration Parameters  Treatment  Population Target AUC/RAFIA Target Trough   Invasive MRSA Infection (bacteremia,
Vancomycin Dosing by Pharmacy - Daily Note   Vancomycin Therapy Day:  5  Indication: ssti    Allergies:  Patient has no known allergies. Actual Weight:    Wt Readings from Last 1 Encounters:   09/02/23 (!) 314 lb 6 oz (142.6 kg)       Labs/Ancillary Data  Estimated Creatinine Clearance: 49 mL/min (A) (based on SCr of 1.6 mg/dL (H)). Recent Labs     09/03/23  0546 09/04/23  0615 09/04/23  1113 09/05/23  0549   CREATININE 1.6* 1.5* 1.5* 1.6*   BUN  --   --  22  --    WBC 4.6  --   --   --      No results found for: PROCAL    Intake/Output Summary (Last 24 hours) at 9/5/2023 0758  Last data filed at 9/5/2023 0651  Gross per 24 hour   Intake --   Output 850 ml   Net -850 ml     Temp: 97    Culture Date / Source  /  Results  See micro   Recent vancomycin administrations                     vancomycin (VANCOCIN) 1,500 mg in sodium chloride 0.9 % 250 mL IVPB ([id number]) (mg) 1,500 mg New Bag 09/04/23 0810    vancomycin (VANCOCIN) 1,000 mg in sodium chloride 0.9 % 250 mL IVPB ([id number]) (mg) 1,000 mg New Bag 09/03/23 0808    vancomycin (VANCOCIN) 750 mg in sodium chloride 0.9 % 250 mL IVPB (Pvps7Dop) (mg) 750 mg New Bag 09/02/23 1746                    Vancomycin Concentrations:   TROUGH:  No results for input(s): VANCOTROUGH in the last 72 hours. RANDOM:    Recent Labs     09/03/23  0546 09/04/23  0615   VANCORANDOM 13.7 10.8       MRSA Nasal Swab: N/A. Non-respiratory infection. Wesly [place] PLAN     Decrease dose to 1250 mg q24h IV  Ensured BUN/sCr ordered at baseline and every 48 hours x at least 3 levels, then at least weekly.   Pharmacy will continue to monitor patient and adjust therapy as indicated      Vancomycin Target Concentration Parameters  Treatment  Population Target AUC/RAFIA Target Trough   Invasive MRSA Infection (bacteremia, pneumonia, meningitis, endocarditis, osteomyelitis)  Sepsis (undifferentiated) 400-600 N/A   Infection due to non-MRSA pathogen  Empiric treatment of non-invasive MRSA infection  (SSTI, UTI)
Vancomycin Dosing by Pharmacy - Initial Note   TODAY'S DATE:  9/1/2023  Patient name, age:  Klarissa Alcala, 82 y.o. 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 Actual Weight:    Wt Readings from Last 1 Encounters:   09/01/23 (!) 312 lb (141.5 kg)     Labs/Ancillary Data  Estimated Creatinine Clearance: 41 mL/min (A) (based on SCr of 1.9 mg/dL (H)). Recent Labs     09/01/23  1600 09/01/23  1720   CREATININE  --  1.9*   BUN  --  24*   WBC 5.8  --      No results found for: PROCAL  No intake or output data in the 24 hours ending 09/01/23 2208  Temp: 98.2 F    Recent vancomycin administrations                     vancomycin (VANCOCIN) 2,000 mg in sodium chloride 0.9 % 500 mL IVPB (mg) 2,000 mg New Bag 09/01/23 1821                  Culture Date / Source  /  Results  See micro    MRSA Nasal Swab: N/A. Non-respiratory infection. Escobar Fothergill PLAN   Initial loading dose of 25mg/kg (max of 2,500 mg) = 2000  mg  x 1, then  vancomycin 750 mg IV every 24 hours. Ensured BUN/sCr ordered at baseline and every 48 hours x at least 3 levels, then at least weekly. Vancomycin level ordered for 09/03 @ 0600. Vancomycin Target Concentration Parameters  Treatment  Population Target AUC/RAFIA Target Trough   Invasive MRSA Infection (bacteremia, pneumonia, meningitis, endocarditis, osteomyelitis)  Sepsis (undifferentiated) 400-600 N/A   Infection due to non-MRSA pathogen  Empiric treatment of non-invasive MRSA infection  (SSTI, UTI) <500 10-15 mg/L   CrCl < 29 mL/min  Rapidly fluctuating serum creatinine   DONI N/A < 15 mg/L     Renal replacement therapy is dosed by levels, per hospital protocol. Abbreviations  * Pauc: probability that AUC is >400 (efficacy); Pconc: probability that Ctrough is above 20 ?g/mL (toxicity); Tox: Probability of nephrotoxicity, based on Jay et al. Clin Infect Dis 2009.     Loading dose: 2000 mg at 18:21
Writer notified Dr. Sasha Gimenez of telemetry reading Mireille Melgar, patient asymptomatic. Waiting response.
Socioeconomic History    Marital status: Single     Spouse name: Not on file    Number of children: 3    Years of education: Not on file    Highest education level: Not on file   Occupational History    Not on file   Tobacco Use    Smoking status: Former     Types: Cigarettes     Quit date: 5     Years since quittin.7     Passive exposure: Past    Smokeless tobacco: Never   Vaping Use    Vaping Use: Never used   Substance and Sexual Activity    Alcohol use: Not Currently    Drug use: Never    Sexual activity: Not Currently   Other Topics Concern    Not on file   Social History Narrative    Not on file     Social Determinants of Health     Financial Resource Strain: Low Risk     Difficulty of Paying Living Expenses: Not hard at all   Food Insecurity: No Food Insecurity    Worried About Running Out of Food in the Last Year: Never true    [address] in the Last Year: Never true   Transportation Needs: Unknown    Lack of Transportation (Medical): Not on file    Lack of Transportation (Non-Medical): No   Physical Activity: Inactive    Days of Exercise per Week: 0 days    Minutes of Exercise per Session: 0 min   Stress: Not on file   Social Connections: Not on file   Intimate Partner Violence: Not on file   Housing Stability: Unknown    Unable to Pay for Housing in the Last Year: Not on file    Number of Places Lived in the Last Year: Not on file    Unstable Housing in the Last Year: No       Family History:   History reviewed. No pertinent family history.    Medical Decision Making:   I have independently reviewed/ordered the following labs:    CBC with Differential:   Recent Labs     23  1600 23  0546   WBC 5.8 4.6   HGB 8.2* 7.1*   HCT 28.7* 24.2*   * 193   LYMPHOPCT 21*  --    MONOPCT 10*  --        BMP:  Recent Labs     23  1720 23  0529 23  0546     --   --    K 5.1  --   --    CL 98  --   --    CO2 33*  --   --    BUN 24*  --   --    CREATININE 1.9* 1.6*
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  -Bilateral LE edema - legs wrapped to facilitate healing.  -A. Fib - rate controlled. -Continue current treatments.  -Complete orders per chart.     See orders   Disposition:    Electronically signed by Saman Kaufman MD on 9/3/2023 at 8:41 AM
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  -Bilateral LE edema - legs wrapped to facilitate healing.  -A. Fib - rate controlled.  -Hgb trending down - will D/C IVFs in case it is dilutional, he has no signs of bleeding.  -Continue current treatments.  -Complete orders per chart.     See orders   Disposition:    Electronically signed by Felicitas Thomas MD on 9/4/2023 at 7:46 AM

## 2023-09-06 ENCOUNTER — CARE COORDINATION (OUTPATIENT)
Dept: CASE MANAGEMENT | Age: 88
End: 2023-09-06

## 2023-09-06 DIAGNOSIS — I89.0 INFECTION ASSOCIATED WITH LYMPHEDEMA: Primary | ICD-10-CM

## 2023-09-06 DIAGNOSIS — B99.9 INFECTION ASSOCIATED WITH LYMPHEDEMA: Primary | ICD-10-CM

## 2023-09-06 LAB
MICROORGANISM SPEC CULT: ABNORMAL
MICROORGANISM/AGENT SPEC: ABNORMAL
MICROORGANISM/AGENT SPEC: ABNORMAL
SPECIMEN DESCRIPTION: ABNORMAL

## 2023-09-06 PROCEDURE — 1111F DSCHRG MED/CURRENT MED MERGE: CPT | Performed by: FAMILY MEDICINE

## 2023-09-06 NOTE — DISCHARGE SUMMARY
[facility] Medicine      Discharge Summary      NAME:  Js Ovalles  :  1935  MRN:  [id number]    Admit date:  2023  Discharge date:  2023    Admitting Physician:  John Boyce MD    Primary Diagnosis on Admission:   Present on Admission:   Infection associated with lymphedema   Lymphedema of both lower extremities   Paroxysmal A-fib ([address])   Coronary artery disease involving native coronary artery of native heart without angina pectoris   Adult BMI 40.0-44.9 kg/sq m [facility])   Mixed hyperlipidemia      Secondary Diagnoses:  has Positive cardiac stress test on their pertinent problem list.      Admission Condition:  poor     Discharged Condition: fair      Hospital Course: The patient was admitted for the management of left leg cellulitis secondary to bilateral LE edema. Pt ahs Hx of CHF, CKD, was treated with vanc & cefepime. Pt had improvement of symptoms and was discharged on oral antibiotics, ceftin and doxy. Pt did have episode of Vtach, however he does have pacer and vtach was not sustained. The patient was seen and assessed today at bedside. No new complaints. No events overnight. Today on day of discharge pt feels better with no further complaints. Vitals and Labs are at pts baseline. All consultants involved during this admission are agreeable to d/c.     Consults:  ID, cardio    Significant Diagnostic/theraputic interventions: IV abx      Disposition:   home with home care    Instructions to Patient:      Follow up with John Boyce MD in  1-2 weeks    Discharge Medications:       Medication List        START taking these medications      cefUROXime 250 MG tablet  Commonly known as: CEFTIN  Take 1 tablet by mouth 2 times daily for 5 days     doxycycline hyclate 100 MG tablet  Commonly known as: VIBRA-TABS  Take 1 tablet by mouth 2 times daily for 5 days            CONTINUE taking these medications      acetylcysteine 20 % nebulizer solution  Commonly known as:

## 2023-09-06 NOTE — CARE COORDINATION
appropriate site of care based on symptoms and resources available to patient including: PCP  Specialist  When to call 911. The patient agrees to contact the PCP office for questions related to their healthcare. Advance Care Planning:   Does patient have an Advance Directive: patient declined education. Medication reconciliation was performed with patient, who verbalizes understanding of administration of home medications. Medications reviewed, 1111F entered: yes    Was patient discharged with a pulse oximeter? no    Non-face-to-face services provided:  Obtained and reviewed discharge summary and/or continuity of care documents    Offered patient enrollment in the Remote Patient Monitoring (RPM) program for in-home monitoring:  did not address at this outreach . Care Transitions 24 Hour Call    Do you have a copy of your discharge instructions?: Yes  Do you have all of your prescriptions and are they filled?: Yes  Have you been contacted by a [address]?: No  Have you scheduled your follow up appointment?: Yes  How are you going to get to your appointment?: Car - family or friend to transport  Do you feel like you have everything you need to keep you well at home?: Yes  Care Transitions Interventions         Discussed follow-up appointments. If no appointment was previously scheduled, appointment scheduling offered: Yes. Is follow up appointment scheduled within 7 days of discharge? Yes. Follow Up  Future Appointments   Date Time Provider [facility] [address]   9/11/2023  3:00 PM Мария Nolan MD [address] Transition Nurse provided contact information. Plan for follow-up call in 5-7 days based on severity of symptoms and risk factors.   Plan for next call: symptom management-follow up on PCP appointment and if made appointment with wound clinic  follow-up appointment-follow up on cellulitis of Lt lower leg and ask about Marget Lennox, RN

## 2023-09-08 ENCOUNTER — CARE COORDINATION (OUTPATIENT)
Dept: CASE MANAGEMENT | Age: 88
End: 2023-09-08

## 2023-09-08 NOTE — CARE COORDINATION
was previously scheduled, appointment scheduling offered: Yes. Is follow up appointment scheduled within 7 days of discharge? Scheduled wound clinic for 9/11, PCP was changed to 9/13. Follow Up  Future Appointments   Date Time Provider [address]   9/11/2023  8:15 AM Selvin Butler MD [facility] WND [place]   9/13/2023  2:30 PM Felicitas Thomas MD [address] [facility] Transition Nurse reviewed discharge instructions with patient and discussed any barriers to care and/or understanding of plan of care after discharge. Discussed appropriate site of care based on symptoms and resources available to patient including: PCP  Specialist  When to call Yany Scanlon. The patient agrees to contact the PCP office for questions related to their healthcare. Patients top risk factors for readmission:  pressure ulcers, acute CHF  Interventions to address risk factors: Scheduled appointment with Specialist-Dr Vanegas and Assessment and support for treatment adherence and medication management-[facility] for medications    Offered patient enrollment in the Remote Patient Monitoring (RPM) program for in-home monitoring:  not addressed . Care Transitions Subsequent and Final Call    Subsequent and Final Calls  Do you have any ongoing symptoms?: No  Have your medications changed?: No  Do you have any questions related to your medications?: No  Do you currently have any active services?: Yes  Are you currently active with any services?: Outpatient/Community Services  Do you have any needs or concerns that I can assist you with?: Yes  Patient-reported Needs or Concerns: unalbe to get atb filled  Identified Barriers: Other  Care Transitions Interventions  Other Interventions:             Care Transition Nurse provided contact information for future needs. Plan for follow-up call in 3-5 days based on severity of symptoms and risk factors. Plan for next call: symptom management-medications-atb received?

## 2023-09-11 ENCOUNTER — HOSPITAL ENCOUNTER (OUTPATIENT)
Dept: WOUND CARE | Age: 88
Discharge: HOME | End: 2023-09-11
Payer: MEDICARE

## 2023-09-11 VITALS
HEART RATE: 70 BPM | SYSTOLIC BLOOD PRESSURE: 130 MMHG | HEIGHT: 72 IN | BODY MASS INDEX: 42.53 KG/M2 | RESPIRATION RATE: 18 BRPM | DIASTOLIC BLOOD PRESSURE: 50 MMHG | TEMPERATURE: 97.4 F | WEIGHT: 314 LBS

## 2023-09-11 DIAGNOSIS — L97.821 NON-PRESSURE CHRONIC ULCER OF OTHER PART OF LEFT LOWER LEG LIMITED TO BREAKDOWN OF SKIN (HCC): ICD-10-CM

## 2023-09-11 DIAGNOSIS — I89.0 LYMPHEDEMA OF BOTH LOWER EXTREMITIES: ICD-10-CM

## 2023-09-11 DIAGNOSIS — L97.912 NON-PRESSURE CHRONIC ULCER OF RIGHT LOWER LEG WITH FAT LAYER EXPOSED (HCC): Primary | ICD-10-CM

## 2023-09-11 PROCEDURE — 11045 DBRDMT SUBQ TISS EACH ADDL: CPT | Performed by: INTERNAL MEDICINE

## 2023-09-11 PROCEDURE — 11042 DBRDMT SUBQ TIS 1ST 20SQCM/<: CPT

## 2023-09-11 PROCEDURE — 11045 DBRDMT SUBQ TISS EACH ADDL: CPT

## 2023-09-11 PROCEDURE — 11042 DBRDMT SUBQ TIS 1ST 20SQCM/<: CPT | Performed by: INTERNAL MEDICINE

## 2023-09-11 RX ORDER — DOXYCYCLINE HYCLATE 100 MG
100 TABLET ORAL 2 TIMES DAILY
COMMUNITY
Start: 2023-09-08 | End: 2023-09-12

## 2023-09-11 RX ORDER — LIDOCAINE HYDROCHLORIDE 40 MG/ML
SOLUTION TOPICAL ONCE
Status: COMPLETED | OUTPATIENT
Start: 2023-09-11 | End: 2023-09-11

## 2023-09-11 RX ORDER — LIDOCAINE HYDROCHLORIDE 40 MG/ML
SOLUTION TOPICAL ONCE
OUTPATIENT
Start: 2023-09-11 | End: 2023-09-11

## 2023-09-11 RX ORDER — CEFUROXIME AXETIL 250 MG/1
250 TABLET ORAL 2 TIMES DAILY
COMMUNITY
Start: 2023-09-08 | End: 2023-09-12

## 2023-09-11 RX ORDER — LIDOCAINE HYDROCHLORIDE 20 MG/ML
JELLY TOPICAL ONCE
OUTPATIENT
Start: 2023-09-11 | End: 2023-09-11

## 2023-09-11 RX ADMIN — LIDOCAINE HYDROCHLORIDE 5 ML: 40 SOLUTION TOPICAL at 08:49

## 2023-09-11 ASSESSMENT — ENCOUNTER SYMPTOMS
NAUSEA: 0
RHINORRHEA: 0
COUGH: 0
SHORTNESS OF BREATH: 1
VOMITING: 0
DIARRHEA: 0

## 2023-09-11 ASSESSMENT — PAIN SCALES - GENERAL: PAINLEVEL_OUTOF10: 0

## 2023-09-11 NOTE — DISCHARGE INSTRUCTIONS
[address] [facility]                            Visit  Discharge Instructions / Physician Orders  DATE: 9/11/23     Home Care: None     SUPPLIES ORDERED THRU:  none          DATE LAST SUPPLIED  NA     Wound Location:  right and left lower legs      Cleanse with: Antibacterial soap and water     Dressing Orders:  Primary dressing   non adherent silvercel, zinc layer, 3 layer compression wrap to bilateral lower legs             Frequency:  leave in place     Additional Orders: Increase protein to diet (meat, cheese, eggs, fish, peanut butter, nuts and beans), eat at least 1 container of yogurt daily. Multivitamin daily  Compression pumps per BIO TAB-WALKER please pump 2-3 x per day, for 1 hour each time. OFFLOADING [] YES  TYPE:                  [] NA     Weekly wound care visits until determined otherwise. Antibiotic therapy-wound care related YES [x] NO [] NA[]     MY CHART []     Smart Device  []            Your next appointment with the [address] [place] [address] is 1 week with Dr. Quan Padron. (Please note your next appointment above and if you are unable to keep, kindly give a 24 hour notice. Thank you.)  If more than 15 min late we cannot guarantee you will be seen due to clinician schedule  Per Policy, Excessive cancellation will call for dismissal from program.  If you experience any of the following, please call the [address] [place] [address] during business hours:  [phone]     * Increase in Pain  * Temperature over 101  * Increase in drainage from your wound  * Drainage with a foul odor  * Bleeding  * Increase in swelling  * Need for compression bandage changes due to slippage, breakthrough drainage. If you need medical attention outside of the business hours of the [address] [place] [address] please contact your PCP or go to the nearest emergency room.      The information contained in the After

## 2023-09-11 NOTE — PROGRESS NOTES
Multilayer Compression Wrap   (Not Unna) Below the Knee    NAME:  Yadira Aguilar  YOB: 1935  MEDICAL RECORD NUMBER:  [id number]  DATE:  9/11/2023    Multilayer compression wrap: Removed old Multilayer wrap if indicated and wash leg with mild soap/water. Applied moisturizing agent to dry skin as needed. Applied primary and secondary dressing as ordered. Applied multilayered dressing below the knee to right lower leg. Applied multilayered dressing below the knee to left lower leg. Instructed patient/caregiver not to remove dressing and to keep it clean and dry. Instructed patient/caregiver on complications to report to provider, such as pain, numbness in toes, heavy drainage, and slippage of dressing. Instructed patient on purpose of compression dressing and on activity and exercise recommendations.       Electronically signed by Winston Rinaldi RN on 9/11/2023 at 9:41 AM

## 2023-09-11 NOTE — PLAN OF CARE
Problem: Chronic Conditions and Co-morbidities  Goal: Patient's chronic conditions and co-morbidity symptoms are monitored and maintained or improved  Outcome: Progressing     Problem: Wound:  Goal: Will show signs of wound healing; wound closure and no evidence of infection  Description: Will show signs of wound healing; wound closure and no evidence of infection  Outcome: Progressing     Problem: Venous:  Goal: Signs of wound healing will improve  Description: Signs of wound healing will improve  Outcome: Progressing     Problem: Falls - Risk of:  Goal: Will remain free from falls  Description: Will remain free from falls  Outcome: Progressing

## 2023-09-11 NOTE — PROGRESS NOTES
[facility]   Progress Note and Procedure Note      [address] [facility] RECORD NUMBER:  [id number]  AGE: 82 y.o. GENDER: male  : 1935  EPISODE DATE:  2023    Subjective:     Chief Complaint   Patient presents with    Wound Check     Bilateral lower legs         HISTORY of PRESENT ILLNESS HPI     Katerina Loja is a 82 y.o. male who presents today for wound/ulcer evaluation. History of Wound Context: presents with daughter in follow up on left leg wound that is improving, no significant purulent drainage, denied significant pain, no other complaints. He was hospitalized recently at [facility] 2023 through 2023 was treated with IV antibiotics for cellulitis/infected wound. He was discharged on oral Ceftin and doxycycline that was started on 2023. Tissue culture on 2023 grew Staph aureus, Proteus species and other gram-negative organisms. Wound/Ulcer Pain Timing/Severity: none  Quality of pain: N/A  Severity:  0 / 10   Modifying Factors: None  Associated Signs/Symptoms: none    Ulcer Identification:  Ulcer Type: lymphedema  Contributing Factors: edema, lymphedema, decreased mobility, and obesity         PAST MEDICAL HISTORY        Diagnosis Date    Arthritis     CHF (congestive heart failure) (HCC)     Chronic kidney disease     History of blood transfusion     Hyperlipidemia        PAST SURGICAL HISTORY    Past Surgical History:   Procedure Laterality Date    CARDIAC SURGERY      bypass    JOINT REPLACEMENT      Left hip    PACEMAKER PLACEMENT Right     and defib       FAMILY HISTORY    History reviewed. No pertinent family history.     SOCIAL HISTORY    Social History     Tobacco Use    Smoking status: Former     Types: Cigarettes     Quit date:      Years since quittin.7     Passive exposure: Past    Smokeless tobacco: Never   Vaping Use    Vaping Use: Never used   Substance Use Topics    Alcohol use: Not Currently

## 2023-09-12 ENCOUNTER — CARE COORDINATION (OUTPATIENT)
Dept: CASE MANAGEMENT | Age: 88
End: 2023-09-12

## 2023-09-12 NOTE — CARE COORDINATION
[facility] Care Transitions Follow Up Call    Patient Current Location:  Home: [address] [place] [address]    Care Transition Nurse contacted the patient by telephone to follow up after admission on 23. Verified name and  with patient as identifiers. Patient: Betsy Ross  Patient : 1935   MRN: [id number]  Reason for Admission: infection assoc with lymphedema  Discharge Date: 23 RARS: Readmission Risk Score: 17.7      Needs to be reviewed by the provider   Additional needs identified to be addressed with provider: No  none             Method of communication with provider: none. Was able to contact Kirk Miles for [employer]. He stated that he was doing \"good\". He said that he made the wound care clinic appointment and they redressed his leg wound. He said that they told him that his wound \"looked good\". He will have it redressed next week at the clinic next week. He denied fever/chills, N/V, pain and his bowels were starting to return to normal.  He said that he obtained the 2 antibiotic and has been taking them. Reminded him of PCP appointment tomorrow and he was planning on going. Asked if he had the lympha press boots and he said that he did. Reviewed the recommendation from the ID doctor about doing the boots 2-3 time per day for an hour each use. He said that he was doing it. He had no further questions or concerns. Addressed changes since last contact:  medications-antibiotic  Dressing changes  Discussed follow-up appointments. If no appointment was previously scheduled, appointment scheduling offered: Yes. Is follow up appointment scheduled within 7 days of discharge? Yes.     Follow Up  Future Appointments   Date Time Provider [address]   2023  2:30 PM MD Óscar Sinclair  soheila 2023  8:30 AM Yolis Baez MD [address] [place] [address] Transition Nurse reviewed medical action plan with patient and discussed any barriers to

## 2023-09-13 PROBLEM — D68.69 SECONDARY HYPERCOAGULABLE STATE (HCC): Status: ACTIVE | Noted: 2023-09-13

## 2023-09-18 NOTE — DISCHARGE INSTRUCTIONS
[facility]                            Visit  Discharge Instructions / Physician Orders  DATE: 9/22/23     Home Care: None     SUPPLIES ORDERED THRU:  none          DATE LAST SUPPLIED  NA     Wound Location:  right and left lower legs      Cleanse with: Antibacterial soap and water     Dressing Orders:  Primary dressing   Ioplex foam zinc layer, 3 layer compression wrap to bilateral lower legs             Frequency:  leave in place     Additional Orders: Increase protein to diet (meat, cheese, eggs, fish, peanut butter, nuts and beans), eat at least 1 container of yogurt daily. Multivitamin daily  Compression pumps per BIO TAB-WALKER please pump 2-3 x per day, for 1 hour each time. OFFLOADING [] YES  TYPE:                  [] NA     Weekly wound care visits until determined otherwise. Antibiotic therapy-wound care related YES [x] NO [] NA[]     MY CHART []     Smart Device  []            Your next appointment with the [address] [place] [address] is 1 week with Dr. Yue Liriano. (Please note your next appointment above and if you are unable to keep, kindly give a 24 hour notice. Thank you.)  If more than 15 min late we cannot guarantee you will be seen due to clinician schedule  Per Policy, Excessive cancellation will call for dismissal from program.  If you experience any of the following, please call the [address] [place] [address] during business hours:  [phone]     * Increase in Pain  * Temperature over 101  * Increase in drainage from your wound  * Drainage with a foul odor  * Bleeding  * Increase in swelling  * Need for compression bandage changes due to slippage, breakthrough drainage. If you need medical attention outside of the business hours of the [address] [place] [address] please contact your PCP or go to the nearest emergency room.      The information contained in the After Visit

## 2023-09-19 ENCOUNTER — CARE COORDINATION (OUTPATIENT)
Dept: CASE MANAGEMENT | Age: 88
End: 2023-09-19

## 2023-09-19 NOTE — CARE COORDINATION
[facility] Care Transitions Follow Up Call      Patient: Hugh Huerta  Patient : 1935   MRN: [id number]  Reason for Admission: infection assoc with lymphedema  Discharge Date: 23 RARS: Readmission Risk Score: 17.7      Needs to be reviewed by the provider   Additional needs identified to be addressed with provider: No  none             Method of communication with provider: none. 1st attempt to reach patient for Care Transitions. [facility] requesting return call. Contact information provided.   [phone]      Follow Up  Future Appointments   Date Time Provider [address]   2023  8:30 AM Franklin Billings APRN - CNP [facility] WND [address]   2023  3:10 PM Sasha Cortes MD [facility] Transitions Subsequent and Final Call    Subsequent and Final Calls  Are you currently active with any services?: Outpatient/Community Services  Care Transitions Interventions                          Other Interventions:           Plan for next call: symptom management-follow up on wound to Lt lower leg  any s/s of infection/diarrhea    Goran Treviño RN

## 2023-09-20 ENCOUNTER — CARE COORDINATION (OUTPATIENT)
Dept: CASE MANAGEMENT | Age: 88
End: 2023-09-20

## 2023-09-20 NOTE — CARE COORDINATION
Care Transitions Outreach Attempt #2      Attempt #2 to reach patient for transitions of care follow up. Unable to reach patient. Left VM requesting call back. CTN sign off if no return call received. Patient: Lucina Guardado Patient : 1935 MRN: [id number]    Last Discharge [address]       Date Complaint Diagnosis Description Type Department Provider    23 Wound Check Infection associated with lymphedema ED to Hosp-Admission (Discharged) (ADMITTED) ARELY Nesbitt MD; Jordan Guy. .. Noted following upcoming appointments from discharge chart review:   [facility] follow up appointment(s):   Future Appointments   Date Time Provider [facility] [address]   2023  8:30 AM JUAN FRANCISCO Kendall - CNP [facility] WND CAR [place]   2023  3:10 PM Carlos Lagunas MD [address] [facility] [address] Transitions Nurse  [phone]   [email or web address]

## 2023-09-22 ENCOUNTER — HOSPITAL ENCOUNTER (OUTPATIENT)
Dept: WOUND CARE | Age: 88
Discharge: HOME | End: 2023-09-22
Payer: MEDICARE

## 2023-09-22 VITALS
BODY MASS INDEX: 42.66 KG/M2 | SYSTOLIC BLOOD PRESSURE: 150 MMHG | HEART RATE: 98 BPM | TEMPERATURE: 97.1 F | DIASTOLIC BLOOD PRESSURE: 77 MMHG | RESPIRATION RATE: 18 BRPM | HEIGHT: 72 IN | WEIGHT: 315 LBS

## 2023-09-22 DIAGNOSIS — L97.821 NON-PRESSURE CHRONIC ULCER OF OTHER PART OF LEFT LOWER LEG LIMITED TO BREAKDOWN OF SKIN (HCC): ICD-10-CM

## 2023-09-22 DIAGNOSIS — L97.922 NON-PRESSURE CHRONIC ULCER OF LEFT LOWER LEG WITH FAT LAYER EXPOSED (HCC): ICD-10-CM

## 2023-09-22 DIAGNOSIS — L97.912 NON-PRESSURE CHRONIC ULCER OF RIGHT LOWER LEG WITH FAT LAYER EXPOSED (HCC): Primary | ICD-10-CM

## 2023-09-22 DIAGNOSIS — I89.0 LYMPHEDEMA OF BOTH LOWER EXTREMITIES: ICD-10-CM

## 2023-09-22 PROCEDURE — 99213 OFFICE O/P EST LOW 20 MIN: CPT | Performed by: NURSE PRACTITIONER

## 2023-09-22 PROCEDURE — 29581 APPL MULTLAYER CMPRN SYS LEG: CPT

## 2023-09-22 RX ORDER — LIDOCAINE HYDROCHLORIDE 40 MG/ML
SOLUTION TOPICAL ONCE
Status: COMPLETED | OUTPATIENT
Start: 2023-09-22 | End: 2023-09-22

## 2023-09-22 RX ORDER — LIDOCAINE HYDROCHLORIDE 40 MG/ML
SOLUTION TOPICAL ONCE
OUTPATIENT
Start: 2023-09-22 | End: 2023-09-22

## 2023-09-22 RX ORDER — LIDOCAINE HYDROCHLORIDE 20 MG/ML
JELLY TOPICAL ONCE
OUTPATIENT
Start: 2023-09-22 | End: 2023-09-22

## 2023-09-22 RX ADMIN — LIDOCAINE HYDROCHLORIDE 15 ML: 40 SOLUTION TOPICAL at 08:53

## 2023-09-22 ASSESSMENT — ENCOUNTER SYMPTOMS
NAUSEA: 0
SHORTNESS OF BREATH: 1
DIARRHEA: 0
COUGH: 0
VOMITING: 0
RHINORRHEA: 0

## 2023-09-22 ASSESSMENT — PAIN SCALES - GENERAL: PAINLEVEL_OUTOF10: 0

## 2023-09-22 NOTE — PROGRESS NOTES
Multilayer Compression Wrap   (Not Unna) Below the Knee    NAME:  Osbaldo Dumont  YOB: 1935  MEDICAL RECORD NUMBER:  [id number]  DATE:  9/22/2023       [x] Removed old Multilayer wrap if indicated and wash leg with mild soap/water. [x] Applied moisturizing agent to dry skin as needed. [x] Applied primary and secondary dressing as ordered   [x] Applied multilayered dressing below the knee to Bilateral lower leg(s). [x] Instructed patient/caregiver not to remove dressing and to keep it clean and dry. [x] Instructed patient/caregiver on complications to report to provider, such as pain, numbness in toes, heavy drainage, and slippage of dressing. [x] Instructed patient on purpose of compression dressing and on activity and exercise recommendations.     Electronically signed by Drexel Lundborg, RN on 9/22/2023 at 9:46 AM
(FLONASE) 50 MCG/ACT nasal spray 2 sprays by Each Nostril route daily 16 g 0    bumetanide (BUMEX) 2 MG tablet TAKE 1 TABLET TWICE DAILY 180 tablet 0    magnesium oxide (MAG-OX) 400 (240 Mg) MG tablet TAKE 1 TABLET TWICE DAILY 180 tablet 3    furosemide (LASIX) 40 MG tablet TAKE 1 TABLET TWICE DAILY 180 tablet 3    potassium chloride (KLOR-CON) 10 MEQ extended release tablet TAKE 1 TABLET EVERY DAY 90 tablet 3    clopidogrel (PLAVIX) 75 MG tablet TAKE 1 TABLET EVERY DAY 90 tablet 1    allopurinol (ZYLOPRIM) 100 MG tablet TAKE 1 TABLET EVERY DAY 90 tablet 1    metoprolol tartrate (LOPRESSOR) 50 MG tablet TAKE 1 TABLET TWICE DAILY 180 tablet 1    albuterol (PROVENTIL) (5 MG/ML) 0.5% nebulizer solution Take 0.5 mLs by nebulization 4 times daily as needed for Wheezing 120 each 3    acetylcysteine (MUCOMYST) 20 % nebulizer solution Take 3 mLs by mouth 2 times daily for 2 doses 6 mL 0    rosuvastatin (CRESTOR) 20 MG tablet TAKE 1 TABLET EVERY DAY 90 tablet 3    lisinopril (PRINIVIL;ZESTRIL) 5 MG tablet TAKE 1 TABLET EVERY DAY 90 tablet 3    apixaban (ELIQUIS) 2.5 MG TABS tablet Take 1 tablet by mouth in the morning and 1 tablet before bedtime. 180 tablet 3    ascorbic acid ([employer] VITAMIN C) 500 MG tablet Take 1 tablet by mouth 2 times daily (Patient not taking: Reported on 9/13/2023) 180 tablet 1     No current facility-administered medications on file prior to encounter. REVIEW OF SYSTEMS    Review of Systems   Constitutional:  Positive for fatigue. Negative for chills and fever. HENT:  Negative for congestion and rhinorrhea. Respiratory:  Positive for shortness of breath. Negative for cough. Cardiovascular:  Positive for leg swelling (bilateral lower legs with weeping). Negative for chest pain. Obese  CHF history   Gastrointestinal:  Negative for diarrhea, nausea and vomiting. Musculoskeletal:  Positive for gait problem (uses cane). Skin:  Positive for wound (bilateral lower legs).

## 2023-09-24 NOTE — DISCHARGE INSTRUCTIONS
Summary has been reviewed with me, the patient and/or responsible adult, by my health care provider(s). I had the opportunity to ask questions regarding this information.  I have elected to receive;      []After Visit Summary  [x]Comprehensive Discharge Instruction        Patient signature______________________________________Date:________    Electronically signed by JUAN FRANCISCO Pat CNP on 9/27/2023 at 2:38 PM  Electronically signed by Alondra Norris RN on 9/27/2023 at 2:38 PM

## 2023-09-27 ENCOUNTER — HOSPITAL ENCOUNTER (OUTPATIENT)
Dept: WOUND CARE | Age: 88
Discharge: HOME | End: 2023-09-27
Payer: MEDICARE

## 2023-09-27 VITALS
BODY MASS INDEX: 42.66 KG/M2 | RESPIRATION RATE: 20 BRPM | HEART RATE: 77 BPM | SYSTOLIC BLOOD PRESSURE: 130 MMHG | DIASTOLIC BLOOD PRESSURE: 50 MMHG | HEIGHT: 72 IN | TEMPERATURE: 97.7 F | WEIGHT: 315 LBS

## 2023-09-27 DIAGNOSIS — I89.0 LYMPHEDEMA OF BOTH LOWER EXTREMITIES: ICD-10-CM

## 2023-09-27 DIAGNOSIS — L97.912 NON-PRESSURE CHRONIC ULCER OF RIGHT LOWER LEG WITH FAT LAYER EXPOSED (HCC): Primary | ICD-10-CM

## 2023-09-27 DIAGNOSIS — L97.922 NON-PRESSURE CHRONIC ULCER OF LEFT LOWER LEG WITH FAT LAYER EXPOSED (HCC): ICD-10-CM

## 2023-09-27 PROCEDURE — 29581 APPL MULTLAYER CMPRN SYS LEG: CPT

## 2023-09-27 PROCEDURE — 99213 OFFICE O/P EST LOW 20 MIN: CPT | Performed by: NURSE PRACTITIONER

## 2023-09-27 RX ORDER — LIDOCAINE HYDROCHLORIDE 40 MG/ML
SOLUTION TOPICAL ONCE
Status: COMPLETED | OUTPATIENT
Start: 2023-09-27 | End: 2023-09-27

## 2023-09-27 RX ORDER — LIDOCAINE HYDROCHLORIDE 20 MG/ML
JELLY TOPICAL ONCE
OUTPATIENT
Start: 2023-09-27 | End: 2023-09-27

## 2023-09-27 RX ORDER — LIDOCAINE HYDROCHLORIDE 40 MG/ML
SOLUTION TOPICAL ONCE
OUTPATIENT
Start: 2023-09-27 | End: 2023-09-27

## 2023-09-27 RX ADMIN — LIDOCAINE HYDROCHLORIDE 5 ML: 40 SOLUTION TOPICAL at 14:30

## 2023-09-27 ASSESSMENT — ENCOUNTER SYMPTOMS
COUGH: 0
VOMITING: 0
DIARRHEA: 0
RHINORRHEA: 0
NAUSEA: 0
SHORTNESS OF BREATH: 1

## 2023-09-27 ASSESSMENT — PAIN DESCRIPTION - FREQUENCY: FREQUENCY: INTERMITTENT

## 2023-09-27 ASSESSMENT — PAIN DESCRIPTION - ONSET: ONSET: ON-GOING

## 2023-09-27 ASSESSMENT — PAIN DESCRIPTION - PAIN TYPE: TYPE: CHRONIC PAIN

## 2023-09-27 ASSESSMENT — PAIN SCALES - GENERAL: PAINLEVEL_OUTOF10: 0

## 2023-09-27 NOTE — PLAN OF CARE
Problem: Chronic Conditions and Co-morbidities  Goal: Patient's chronic conditions and co-morbidity symptoms are monitored and maintained or improved  Outcome: Progressing     Problem: Pain  Goal: Verbalizes/displays adequate comfort level or baseline comfort level  Outcome: Progressing     Problem: ABCDS Injury Assessment  Goal: Absence of physical injury  Outcome: Progressing     Problem: Wound:  Goal: Will show signs of wound healing; wound closure and no evidence of infection  Description: Will show signs of wound healing; wound closure and no evidence of infection  Outcome: Progressing     Problem: Venous:  Goal: Signs of wound healing will improve  Description: Signs of wound healing will improve  Outcome: Progressing     Problem: Falls - Risk of:  Goal: Will remain free from falls  Description: Will remain free from falls  Outcome: Progressing

## 2023-09-27 NOTE — PROGRESS NOTES
Multilayer Compression Wrap   (Not Unna) Below the Knee    NAME:  Liliam Madrid  YOB: 1935  MEDICAL RECORD NUMBER:  [id number]  DATE:  9/27/2023    Multilayer compression wrap: Removed old Multilayer wrap if indicated and wash leg with mild soap/water. Applied moisturizing agent to dry skin as needed. Applied primary and secondary dressing as ordered. Applied multilayered dressing below the knee to right lower leg. Applied multilayered dressing below the knee to left lower leg. Instructed patient/caregiver not to remove dressing and to keep it clean and dry. Instructed patient/caregiver on complications to report to provider, such as pain, numbness in toes, heavy drainage, and slippage of dressing. Instructed patient on purpose of compression dressing and on activity and exercise recommendations.       Electronically signed by Bhargav Hammond RN on 9/27/2023 at 3:48 PM
7 cm 09/27/23 1425   Wound Width (cm) 15 cm 09/27/23 1425   Wound Depth (cm) 0.2 cm 09/27/23 1425   Wound Surface Area (cm^2) 105 cm^2 09/27/23 1425   Change in Wound Size % (l*w) -184.94 09/27/23 1425   Wound Volume (cm^3) 21 cm^3 09/27/23 1425   Wound Healing % -470 09/27/23 1425   Post-Procedure Length (cm) 7 cm 09/27/23 1425   Post-Procedure Width (cm) 15 cm 09/27/23 1425   Post-Procedure Depth (cm) 0.2 cm 09/27/23 1425   Post-Procedure Surface Area (cm^2) 105 cm^2 09/27/23 1425   Post-Procedure Volume (cm^3) 21 cm^3 09/27/23 1425   Wound Assessment Pink/red 09/27/23 1425   Drainage Amount Moderate (25-50%) 09/27/23 1425   Drainage Description Serosanguinous 09/27/23 1425   Odor None 09/27/23 1425   Sara-wound Assessment Maceration 09/27/23 1425   Margins Defined edges 09/27/23 1425   Wound Thickness Description not for Pressure Injury Full thickness 09/27/23 1425   Number of days: 25           Plan:     Treatment Note please see Discharge Instructions    Written patient dismissal instructions given to patient and signed by patient or POA.            Electronically signed by JUAN FRANCISCO Anaya CNP on 9/27/2023 at 2:39 PM

## 2023-10-02 NOTE — DISCHARGE INSTRUCTIONS
[facility]                            Visit  Discharge Instructions / Physician Orders  DATE: 10/6/23     Home Care: None     SUPPLIES ORDERED THRU:  none          DATE LAST SUPPLIED  NA     Wound Location:  right and left lower legs      Cleanse with: Antibacterial soap and water     Dressing Orders:  Primary dressing   Ioplex foam zinc layer, 3 layer compression wrap to bilateral lower legs             Frequency:  leave in place     Additional Orders: Increase protein to diet (meat, cheese, eggs, fish, peanut butter, nuts and beans), eat at least 1 container of yogurt daily. Multivitamin daily  Compression pumps per BIO TAB-WALKER please pump 2-3 x per day, for 1 hour each time. OFFLOADING [] YES  TYPE:                  [] NA     Weekly wound care visits until determined otherwise. Antibiotic therapy-wound care related YES [x] NO [] NA[]     MY CHART []     Smart Device  []            Your next appointment with the [address] [place] [address] is 1 week with Dr. Martin Rmoo. (Please note your next appointment above and if you are unable to keep, kindly give a 24 hour notice. Thank you.)  If more than 15 min late we cannot guarantee you will be seen due to clinician schedule  Per Policy, Excessive cancellation will call for dismissal from program.  If you experience any of the following, please call the [address] [place] [address] during business hours:  [phone]     * Increase in Pain  * Temperature over 101  * Increase in drainage from your wound  * Drainage with a foul odor  * Bleeding  * Increase in swelling  * Need for compression bandage changes due to slippage, breakthrough drainage. If you need medical attention outside of the business hours of the [address] [place] [address] please contact your PCP or go to the nearest emergency room.      The information contained in the After Visit

## 2023-10-06 ENCOUNTER — HOSPITAL ENCOUNTER (OUTPATIENT)
Dept: WOUND CARE | Age: 88
Discharge: HOME | End: 2023-10-06

## 2023-10-09 ENCOUNTER — HOSPITAL ENCOUNTER (INPATIENT)
Age: 88
LOS: 31 days | Discharge: NURSING, REHAB OR LONG-TERM CARE | End: 2023-11-09
Attending: EMERGENCY MEDICINE | Admitting: FAMILY MEDICINE
Payer: MEDICARE

## 2023-10-09 ENCOUNTER — HOSPITAL ENCOUNTER (OUTPATIENT)
Dept: WOUND CARE | Age: 88
Discharge: HOME | End: 2023-10-09
Payer: MEDICARE

## 2023-10-09 ENCOUNTER — APPOINTMENT (OUTPATIENT)
Dept: GENERAL RADIOLOGY | Age: 88
End: 2023-10-09
Payer: MEDICARE

## 2023-10-09 ENCOUNTER — TELEPHONE (OUTPATIENT)
Dept: WOUND CARE | Age: 88
End: 2023-10-09

## 2023-10-09 VITALS
TEMPERATURE: 97.7 F | BODY MASS INDEX: 42.66 KG/M2 | WEIGHT: 315 LBS | HEIGHT: 72 IN | DIASTOLIC BLOOD PRESSURE: 73 MMHG | HEART RATE: 104 BPM | RESPIRATION RATE: 20 BRPM | SYSTOLIC BLOOD PRESSURE: 150 MMHG

## 2023-10-09 DIAGNOSIS — I89.0 LYMPHEDEMA OF BOTH LOWER EXTREMITIES: ICD-10-CM

## 2023-10-09 DIAGNOSIS — I48.0 PAROXYSMAL A-FIB (HCC): ICD-10-CM

## 2023-10-09 DIAGNOSIS — E66.9 NOCTURNAL HYPOXEMIA DUE TO OBESITY: ICD-10-CM

## 2023-10-09 DIAGNOSIS — R60.0 LOCALIZED EDEMA: ICD-10-CM

## 2023-10-09 DIAGNOSIS — I50.9 ACUTE ON CHRONIC CONGESTIVE HEART FAILURE, UNSPECIFIED HEART FAILURE TYPE (HCC): Primary | ICD-10-CM

## 2023-10-09 DIAGNOSIS — R06.02 SOB (SHORTNESS OF BREATH): ICD-10-CM

## 2023-10-09 DIAGNOSIS — G47.36 NOCTURNAL HYPOXEMIA DUE TO OBESITY: ICD-10-CM

## 2023-10-09 DIAGNOSIS — L97.912 NON-PRESSURE CHRONIC ULCER OF RIGHT LOWER LEG WITH FAT LAYER EXPOSED (HCC): ICD-10-CM

## 2023-10-09 DIAGNOSIS — L97.922 NON-PRESSURE CHRONIC ULCER OF LEFT LOWER LEG WITH FAT LAYER EXPOSED (HCC): ICD-10-CM

## 2023-10-09 DIAGNOSIS — I50.9 ACUTE CONGESTIVE HEART FAILURE, UNSPECIFIED HEART FAILURE TYPE (HCC): ICD-10-CM

## 2023-10-09 DIAGNOSIS — L97.912 NON-PRESSURE CHRONIC ULCER OF RIGHT LOWER LEG WITH FAT LAYER EXPOSED (HCC): Primary | ICD-10-CM

## 2023-10-09 DIAGNOSIS — I50.33 ACUTE ON CHRONIC DIASTOLIC CONGESTIVE HEART FAILURE (HCC): ICD-10-CM

## 2023-10-09 DIAGNOSIS — J96.12 CHRONIC HYPERCAPNIC RESPIRATORY FAILURE (HCC): ICD-10-CM

## 2023-10-09 LAB
ALBUMIN SERPL-MCNC: 3.4 G/DL (ref 3.5–5.2)
ALP SERPL-CCNC: 59 U/L (ref 40–129)
ALT SERPL-CCNC: 7 U/L (ref 5–41)
ANION GAP SERPL CALCULATED.3IONS-SCNC: 8 MMOL/L (ref 9–17)
AST SERPL-CCNC: 14 U/L
BASOPHILS # BLD: 0.07 K/UL (ref 0–0.2)
BASOPHILS NFR BLD: 1 % (ref 0–2)
BILIRUB SERPL-MCNC: 0.2 MG/DL (ref 0.3–1.2)
BNP SERPL-MCNC: 1096 PG/ML
BUN SERPL-MCNC: 25 MG/DL (ref 8–23)
CALCIUM SERPL-MCNC: 8.9 MG/DL (ref 8.6–10.4)
CHLORIDE SERPL-SCNC: 99 MMOL/L (ref 98–107)
CO2 SERPL-SCNC: 33 MMOL/L (ref 20–31)
CREAT SERPL-MCNC: 1.5 MG/DL (ref 0.7–1.2)
EOSINOPHIL # BLD: 0.5 K/UL (ref 0–0.4)
EOSINOPHILS RELATIVE PERCENT: 7 % (ref 0–4)
ERYTHROCYTE [DISTWIDTH] IN BLOOD BY AUTOMATED COUNT: 20.5 % (ref 11.5–14.9)
GFR SERPL CREATININE-BSD FRML MDRD: 45 ML/MIN/1.73M2
GLUCOSE SERPL-MCNC: 129 MG/DL (ref 70–99)
HCT VFR BLD AUTO: 26.2 % (ref 41–53)
HGB BLD-MCNC: 7.9 G/DL (ref 13.5–17.5)
INR PPP: 1
IRON SATN MFR SERPL: 6 % (ref 20–55)
IRON SERPL-MCNC: 17 UG/DL (ref 59–158)
LIPASE SERPL-CCNC: 39 U/L (ref 13–60)
LYMPHOCYTES NFR BLD: 0.86 K/UL (ref 1–4.8)
LYMPHOCYTES RELATIVE PERCENT: 12 % (ref 24–44)
MAGNESIUM SERPL-MCNC: 2.4 MG/DL (ref 1.6–2.6)
MCH RBC QN AUTO: 21.4 PG (ref 26–34)
MCHC RBC AUTO-ENTMCNC: 30.1 G/DL (ref 31–37)
MCV RBC AUTO: 71.1 FL (ref 80–100)
MONOCYTES NFR BLD: 0.65 K/UL (ref 0.1–1.3)
MONOCYTES NFR BLD: 9 % (ref 1–7)
MORPHOLOGY: ABNORMAL
NEUTROPHILS NFR BLD: 71 % (ref 36–66)
NEUTS SEG NFR BLD: 5.12 K/UL (ref 1.3–9.1)
PLATELET # BLD AUTO: 255 K/UL (ref 150–450)
PMV BLD AUTO: 6.5 FL (ref 6–12)
POTASSIUM SERPL-SCNC: 4.7 MMOL/L (ref 3.7–5.3)
PROT SERPL-MCNC: 6.8 G/DL (ref 6.4–8.3)
PROTHROMBIN TIME: 13.9 SEC (ref 11.8–14.6)
RBC # BLD AUTO: 3.69 M/UL (ref 4.5–5.9)
SODIUM SERPL-SCNC: 140 MMOL/L (ref 135–144)
TIBC SERPL-MCNC: 280 UG/DL (ref 250–450)
TROPONIN I SERPL HS-MCNC: 26 NG/L (ref 0–22)
TROPONIN I SERPL HS-MCNC: 28 NG/L (ref 0–22)
UNSATURATED IRON BINDING CAPACITY: 263 UG/DL (ref 112–347)
WBC OTHER # BLD: 7.2 K/UL (ref 3.5–11)

## 2023-10-09 PROCEDURE — 84484 ASSAY OF TROPONIN QUANT: CPT

## 2023-10-09 PROCEDURE — 94640 AIRWAY INHALATION TREATMENT: CPT

## 2023-10-09 PROCEDURE — 80053 COMPREHEN METABOLIC PANEL: CPT

## 2023-10-09 PROCEDURE — 6370000000 HC RX 637 (ALT 250 FOR IP): Performed by: EMERGENCY MEDICINE

## 2023-10-09 PROCEDURE — 85025 COMPLETE CBC W/AUTO DIFF WBC: CPT

## 2023-10-09 PROCEDURE — 94664 DEMO&/EVAL PT USE INHALER: CPT

## 2023-10-09 PROCEDURE — 93005 ELECTROCARDIOGRAM TRACING: CPT | Performed by: EMERGENCY MEDICINE

## 2023-10-09 PROCEDURE — 83880 ASSAY OF NATRIURETIC PEPTIDE: CPT

## 2023-10-09 PROCEDURE — 83540 ASSAY OF IRON: CPT

## 2023-10-09 PROCEDURE — 96374 THER/PROPH/DIAG INJ IV PUSH: CPT

## 2023-10-09 PROCEDURE — 1200000000 HC SEMI PRIVATE

## 2023-10-09 PROCEDURE — 99285 EMERGENCY DEPT VISIT HI MDM: CPT

## 2023-10-09 PROCEDURE — 85610 PROTHROMBIN TIME: CPT

## 2023-10-09 PROCEDURE — 6370000000 HC RX 637 (ALT 250 FOR IP): Performed by: FAMILY MEDICINE

## 2023-10-09 PROCEDURE — 83550 IRON BINDING TEST: CPT

## 2023-10-09 PROCEDURE — 83690 ASSAY OF LIPASE: CPT

## 2023-10-09 PROCEDURE — 71045 X-RAY EXAM CHEST 1 VIEW: CPT

## 2023-10-09 PROCEDURE — 2500000003 HC RX 250 WO HCPCS: Performed by: EMERGENCY MEDICINE

## 2023-10-09 PROCEDURE — 11042 DBRDMT SUBQ TIS 1ST 20SQCM/<: CPT

## 2023-10-09 PROCEDURE — 36415 COLL VENOUS BLD VENIPUNCTURE: CPT

## 2023-10-09 PROCEDURE — 11045 DBRDMT SUBQ TISS EACH ADDL: CPT

## 2023-10-09 PROCEDURE — 83735 ASSAY OF MAGNESIUM: CPT

## 2023-10-09 PROCEDURE — 94761 N-INVAS EAR/PLS OXIMETRY MLT: CPT

## 2023-10-09 RX ORDER — ALLOPURINOL 100 MG/1
100 TABLET ORAL DAILY
Status: DISCONTINUED | OUTPATIENT
Start: 2023-10-09 | End: 2023-10-23

## 2023-10-09 RX ORDER — SODIUM CHLORIDE 9 MG/ML
INJECTION, SOLUTION INTRAVENOUS PRN
Status: DISCONTINUED | OUTPATIENT
Start: 2023-10-09 | End: 2023-11-09 | Stop reason: HOSPADM

## 2023-10-09 RX ORDER — ROSUVASTATIN CALCIUM 20 MG/1
20 TABLET, COATED ORAL DAILY
Status: DISCONTINUED | OUTPATIENT
Start: 2023-10-09 | End: 2023-11-09 | Stop reason: HOSPADM

## 2023-10-09 RX ORDER — LANOLIN ALCOHOL/MO/W.PET/CERES
400 CREAM (GRAM) TOPICAL 2 TIMES DAILY
Status: DISCONTINUED | OUTPATIENT
Start: 2023-10-09 | End: 2023-10-14

## 2023-10-09 RX ORDER — LIDOCAINE HYDROCHLORIDE 20 MG/ML
JELLY TOPICAL ONCE
OUTPATIENT
Start: 2023-10-09 | End: 2023-10-09

## 2023-10-09 RX ORDER — ALBUTEROL SULFATE 2.5 MG/3ML
2.5 SOLUTION RESPIRATORY (INHALATION) 4 TIMES DAILY PRN
Status: DISCONTINUED | OUTPATIENT
Start: 2023-10-09 | End: 2023-10-13

## 2023-10-09 RX ORDER — BUMETANIDE 0.25 MG/ML
1 INJECTION INTRAMUSCULAR; INTRAVENOUS ONCE
Status: COMPLETED | OUTPATIENT
Start: 2023-10-09 | End: 2023-10-09

## 2023-10-09 RX ORDER — METOPROLOL TARTRATE 50 MG/1
50 TABLET, FILM COATED ORAL 2 TIMES DAILY
Status: DISCONTINUED | OUTPATIENT
Start: 2023-10-09 | End: 2023-10-11

## 2023-10-09 RX ORDER — BUMETANIDE 1 MG/1
2 TABLET ORAL 2 TIMES DAILY
Status: DISCONTINUED | OUTPATIENT
Start: 2023-10-09 | End: 2023-10-11

## 2023-10-09 RX ORDER — LIDOCAINE HYDROCHLORIDE 40 MG/ML
SOLUTION TOPICAL ONCE
Status: COMPLETED | OUTPATIENT
Start: 2023-10-09 | End: 2023-10-09

## 2023-10-09 RX ORDER — IPRATROPIUM BROMIDE AND ALBUTEROL SULFATE 2.5; .5 MG/3ML; MG/3ML
1 SOLUTION RESPIRATORY (INHALATION) EVERY 4 HOURS PRN
Status: DISCONTINUED | OUTPATIENT
Start: 2023-10-09 | End: 2023-10-21

## 2023-10-09 RX ORDER — LIDOCAINE HYDROCHLORIDE 40 MG/ML
SOLUTION TOPICAL ONCE
OUTPATIENT
Start: 2023-10-09 | End: 2023-10-09

## 2023-10-09 RX ORDER — POTASSIUM CHLORIDE 7.45 MG/ML
10 INJECTION INTRAVENOUS PRN
Status: DISCONTINUED | OUTPATIENT
Start: 2023-10-09 | End: 2023-11-09 | Stop reason: HOSPADM

## 2023-10-09 RX ORDER — ACETAMINOPHEN 325 MG/1
650 TABLET ORAL EVERY 6 HOURS PRN
Status: DISCONTINUED | OUTPATIENT
Start: 2023-10-09 | End: 2023-11-09 | Stop reason: HOSPADM

## 2023-10-09 RX ORDER — POLYETHYLENE GLYCOL 3350 17 G/17G
17 POWDER, FOR SOLUTION ORAL DAILY PRN
Status: DISCONTINUED | OUTPATIENT
Start: 2023-10-09 | End: 2023-11-09 | Stop reason: HOSPADM

## 2023-10-09 RX ORDER — POTASSIUM CHLORIDE 750 MG/1
10 CAPSULE, EXTENDED RELEASE ORAL DAILY
Status: DISCONTINUED | OUTPATIENT
Start: 2023-10-09 | End: 2023-10-18

## 2023-10-09 RX ORDER — FUROSEMIDE 40 MG/1
40 TABLET ORAL 2 TIMES DAILY
Status: DISCONTINUED | OUTPATIENT
Start: 2023-10-09 | End: 2023-10-11

## 2023-10-09 RX ORDER — ENOXAPARIN SODIUM 100 MG/ML
30 INJECTION SUBCUTANEOUS 2 TIMES DAILY
Status: DISCONTINUED | OUTPATIENT
Start: 2023-10-09 | End: 2023-10-09 | Stop reason: SDUPTHER

## 2023-10-09 RX ORDER — POTASSIUM CHLORIDE 20 MEQ/1
40 TABLET, EXTENDED RELEASE ORAL PRN
Status: DISCONTINUED | OUTPATIENT
Start: 2023-10-09 | End: 2023-11-09 | Stop reason: HOSPADM

## 2023-10-09 RX ORDER — ONDANSETRON 2 MG/ML
4 INJECTION INTRAMUSCULAR; INTRAVENOUS EVERY 6 HOURS PRN
Status: DISCONTINUED | OUTPATIENT
Start: 2023-10-09 | End: 2023-11-09 | Stop reason: HOSPADM

## 2023-10-09 RX ORDER — LISINOPRIL 5 MG/1
5 TABLET ORAL DAILY
Status: DISCONTINUED | OUTPATIENT
Start: 2023-10-09 | End: 2023-10-11

## 2023-10-09 RX ORDER — FLUTICASONE PROPIONATE 50 MCG
2 SPRAY, SUSPENSION (ML) NASAL DAILY
Status: DISCONTINUED | OUTPATIENT
Start: 2023-10-09 | End: 2023-11-09 | Stop reason: HOSPADM

## 2023-10-09 RX ORDER — SODIUM CHLORIDE 0.9 % (FLUSH) 0.9 %
5-40 SYRINGE (ML) INJECTION EVERY 12 HOURS SCHEDULED
Status: DISCONTINUED | OUTPATIENT
Start: 2023-10-09 | End: 2023-11-09 | Stop reason: HOSPADM

## 2023-10-09 RX ORDER — ACETAMINOPHEN 650 MG/1
650 SUPPOSITORY RECTAL EVERY 6 HOURS PRN
Status: DISCONTINUED | OUTPATIENT
Start: 2023-10-09 | End: 2023-11-09 | Stop reason: HOSPADM

## 2023-10-09 RX ORDER — CLOPIDOGREL BISULFATE 75 MG/1
75 TABLET ORAL DAILY
Status: DISCONTINUED | OUTPATIENT
Start: 2023-10-09 | End: 2023-10-12

## 2023-10-09 RX ORDER — FUROSEMIDE 10 MG/ML
40 INJECTION INTRAMUSCULAR; INTRAVENOUS ONCE
Status: DISCONTINUED | OUTPATIENT
Start: 2023-10-09 | End: 2023-10-09

## 2023-10-09 RX ORDER — ONDANSETRON 4 MG/1
4 TABLET, ORALLY DISINTEGRATING ORAL EVERY 8 HOURS PRN
Status: DISCONTINUED | OUTPATIENT
Start: 2023-10-09 | End: 2023-11-09 | Stop reason: HOSPADM

## 2023-10-09 RX ORDER — SODIUM CHLORIDE 0.9 % (FLUSH) 0.9 %
10 SYRINGE (ML) INJECTION PRN
Status: DISCONTINUED | OUTPATIENT
Start: 2023-10-09 | End: 2023-11-09 | Stop reason: HOSPADM

## 2023-10-09 RX ORDER — MAGNESIUM SULFATE HEPTAHYDRATE 40 MG/ML
2000 INJECTION, SOLUTION INTRAVENOUS PRN
Status: DISCONTINUED | OUTPATIENT
Start: 2023-10-09 | End: 2023-11-09 | Stop reason: HOSPADM

## 2023-10-09 RX ADMIN — METOPROLOL TARTRATE 50 MG: 50 TABLET ORAL at 21:32

## 2023-10-09 RX ADMIN — POTASSIUM CHLORIDE 10 MEQ: 10 CAPSULE, COATED, EXTENDED RELEASE ORAL at 18:29

## 2023-10-09 RX ADMIN — CLOPIDOGREL BISULFATE 75 MG: 75 TABLET ORAL at 18:28

## 2023-10-09 RX ADMIN — IPRATROPIUM BROMIDE AND ALBUTEROL SULFATE 1 DOSE: 2.5; .5 SOLUTION RESPIRATORY (INHALATION) at 13:24

## 2023-10-09 RX ADMIN — BUMETANIDE 2 MG: 1 TABLET ORAL at 21:32

## 2023-10-09 RX ADMIN — APIXABAN 2.5 MG: 2.5 TABLET, FILM COATED ORAL at 21:32

## 2023-10-09 RX ADMIN — BUMETANIDE 1 MG: 0.25 INJECTION INTRAMUSCULAR; INTRAVENOUS at 13:43

## 2023-10-09 RX ADMIN — ROSUVASTATIN CALCIUM 20 MG: 20 TABLET, FILM COATED ORAL at 18:28

## 2023-10-09 RX ADMIN — ALLOPURINOL 100 MG: 100 TABLET ORAL at 18:28

## 2023-10-09 RX ADMIN — LISINOPRIL 5 MG: 5 TABLET ORAL at 18:29

## 2023-10-09 RX ADMIN — LIDOCAINE HYDROCHLORIDE 30 ML: 40 SOLUTION TOPICAL at 09:04

## 2023-10-09 RX ADMIN — Medication 400 MG: at 21:33

## 2023-10-09 RX ADMIN — FLUTICASONE PROPIONATE 2 SPRAY: 50 SPRAY, METERED NASAL at 18:28

## 2023-10-09 RX ADMIN — FUROSEMIDE 40 MG: 40 TABLET ORAL at 21:33

## 2023-10-09 ASSESSMENT — ENCOUNTER SYMPTOMS
RHINORRHEA: 0
BACK PAIN: 0
COUGH: 0
VOMITING: 0
NAUSEA: 0
ABDOMINAL PAIN: 0
SHORTNESS OF BREATH: 1
EYE PAIN: 0
SHORTNESS OF BREATH: 1
DIARRHEA: 0
COLOR CHANGE: 0

## 2023-10-09 ASSESSMENT — PAIN SCALES - GENERAL: PAINLEVEL_OUTOF10: 0

## 2023-10-09 ASSESSMENT — PATIENT HEALTH QUESTIONNAIRE - PHQ9
SUM OF ALL RESPONSES TO PHQ QUESTIONS 1-9: 0
2. FEELING DOWN, DEPRESSED OR HOPELESS: 0
SUM OF ALL RESPONSES TO PHQ QUESTIONS 1-9: 0
SUM OF ALL RESPONSES TO PHQ9 QUESTIONS 1 & 2: 0
1. LITTLE INTEREST OR PLEASURE IN DOING THINGS: 0
SUM OF ALL RESPONSES TO PHQ QUESTIONS 1-9: 0
SUM OF ALL RESPONSES TO PHQ QUESTIONS 1-9: 0

## 2023-10-09 ASSESSMENT — PAIN - FUNCTIONAL ASSESSMENT: PAIN_FUNCTIONAL_ASSESSMENT: NONE - DENIES PAIN

## 2023-10-09 NOTE — DISCHARGE INSTRUCTIONS
has been reviewed with me, the patient and/or responsible adult, by my health care provider(s). I had the opportunity to ask questions regarding this information.  I have elected to receive;      []After Visit Summary  [x]Comprehensive Discharge Instruction        Patient signature______________________________________Date:________  Electronically signed by Brigido Skiff, RN on 10/9/2023 at 9:12 AM  Electronically signed by Dominic Hu MD on 10/9/2023 at 9:20 AM

## 2023-10-09 NOTE — TELEPHONE ENCOUNTER
Call to Er Spoke with Renetta. Report given on Mr. Key Deng patient presented today with increased swelling and shortness of breath. Patient does have hx of CHF in the past. Daughter left for appointment. Will attempt to call her and let her know where her dad is.

## 2023-10-09 NOTE — ED PROVIDER NOTES
EMERGENCY DEPARTMENT ENCOUNTER    Pt Name: Remo Siemens  MRN: [id number]  [address] [place] 1935  Date of evaluation: 10/9/23  CHIEF COMPLAINT       Chief Complaint   Patient presents with    Shortness of Breath     Started yesterday     HISTORY OF PRESENT ILLNESS   32-year-old male presents for complaints of shortness of breath. Patient has a history of CHF, reports has been feeling more short of breath since yesterday and reported worsening lower extremity swelling. Reports has been taking his diuretics as prescribed urinating normally. Denies any chest pain. He reports that shortness of breath macular degeneration. Denies any cough or recent illness denies any fevers or chills    The history is provided by the patient. REVIEW OF SYSTEMS     Review of Systems   Constitutional:  Negative for chills and fever. HENT:  Negative for congestion and ear pain. Eyes:  Negative for pain. Respiratory:  Positive for shortness of breath. Cardiovascular:  Positive for leg swelling. Negative for chest pain and palpitations. Gastrointestinal:  Negative for abdominal pain. Genitourinary:  Negative for dysuria and flank pain. Musculoskeletal:  Negative for back pain. Skin:  Negative for color change. Neurological:  Negative for numbness and headaches. Psychiatric/Behavioral:  Negative for confusion. All other systems reviewed and are negative.     PASTMEDICAL HISTORY     Past Medical History:   Diagnosis Date    Arthritis     CHF (congestive heart failure) ([facility])     Chronic kidney disease     History of blood transfusion     Hyperlipidemia      Past Problem List  Patient Active Problem List   Diagnosis Code    Paroxysmal A-fib ([facility]) I48.0    Acute on chronic congestive heart failure ([facility]) I50.9    Coronary artery disease involving native coronary artery of native heart without angina pectoris I25.10    Heart failure exacerbated by sotalol ([facility]) I50.9    Adult BMI 40.0-44.9 kg/sq m ([address]) Z68.41

## 2023-10-09 NOTE — PLAN OF CARE
Problem: Safety - Adult  Goal: Free from fall injury  Outcome: Progressing, Patient remains free of incidence/ injury. Bed remains in low position. Side rails up x2. Problem: Skin/Tissue Integrity  Goal: Absence of new skin breakdown  Description: 1. Monitor for areas of redness and/or skin breakdown  2. Assess vascular access sites hourly  3. Every 4-6 hours minimum:  Change oxygen saturation probe site  4. Every 4-6 hours:  If on nasal continuous positive airway pressure, respiratory therapy assess nares and determine need for appliance change or resting period. Outcome: Progressing, Pt educated on the importance of turning and alternating position. BLE elevated. Foam Dressing applied to coccyx.

## 2023-10-09 NOTE — ED TRIAGE NOTES
Mode of arrival (squad #, walk in, police, etc) : Wound Care        Chief complaint(s): Shortness of breath        Arrival Note (brief scenario, treatment PTA, etc). : Patient has inspiratory and expiratory wheezing noted to anterior fields. Patient has not had a breathing treatment since last hospitalization, which patient reports was several months ago. Patient oriented to room and nurse call-light. C= \"Have you ever felt that you should Cut down on your drinking? \"  No  A= \"Have people Annoyed you by criticizing your drinking? \"  No  G= \"Have you ever felt bad or Guilty about your drinking? \"  No  E= \"Have you ever had a drink as an Eye-opener first thing in the morning to steady your nerves or to help a hangover? \"  No      Deferred []      Reason for deferring: N/A    *If yes to two or more: probable alcohol abuse. *

## 2023-10-09 NOTE — PROGRESS NOTES
10/09/23 1324   Treatment   Treatment Type N   $Treatment Type $Inhaled Therapy/Meds   Medications Albuterol/Ipratropium   Pre-Tx Pulse 96   Pre-Tx Resps 18   Breath Sounds Pre-Tx ESPERANZA Diminished   Breath Sounds Pre-Tx LLL Diminished   Breath Sounds Pre-Tx RUL Diminished   Breath Sounds Pre-Tx RML Diminished   Breath Sounds Pre-Tx RLL Diminished   Breath Sounds Post-Tx ESPERANZA Diminished   Breath Sounds Post-Tx LLL Diminished   Breath Sounds Post-Tx RUL Diminished   Breath Sounds Post-Tx RML Diminished   Breath Sounds Post-Tx RLL Diminished   Post-Tx Pulse 100   Post-Tx Resps 16   Delivery Source Oxygen   Position Semi-Cornelius's   Treatment Tolerance Well   Is patient on O2?  N   Oxygen Therapy/Pulse Ox   O2 Therapy Room air   O2 Device None (Room air)   Pulse 97   Respirations 23   SpO2 93 %     Duoneb given

## 2023-10-10 ENCOUNTER — APPOINTMENT (OUTPATIENT)
Dept: GENERAL RADIOLOGY | Age: 88
End: 2023-10-10
Attending: FAMILY MEDICINE
Payer: MEDICARE

## 2023-10-10 PROBLEM — I10 PRIMARY HYPERTENSION: Status: ACTIVE | Noted: 2023-10-10

## 2023-10-10 LAB
ANION GAP SERPL CALCULATED.3IONS-SCNC: 7 MMOL/L (ref 9–17)
BNP SERPL-MCNC: 1675 PG/ML
BUN SERPL-MCNC: 22 MG/DL (ref 8–23)
CALCIUM SERPL-MCNC: 8.4 MG/DL (ref 8.6–10.4)
CHLORIDE SERPL-SCNC: 101 MMOL/L (ref 98–107)
CHOLEST SERPL-MCNC: 94 MG/DL
CHOLESTEROL/HDL RATIO: 2.1
CO2 SERPL-SCNC: 31 MMOL/L (ref 20–31)
CREAT SERPL-MCNC: 1.4 MG/DL (ref 0.7–1.2)
EKG ATRIAL RATE: 101 BPM
EKG P AXIS: 76 DEGREES
EKG Q-T INTERVAL: 406 MS
EKG QRS DURATION: 136 MS
EKG QTC CALCULATION (BAZETT): 526 MS
EKG R AXIS: 34 DEGREES
EKG T AXIS: 44 DEGREES
EKG VENTRICULAR RATE: 101 BPM
GFR SERPL CREATININE-BSD FRML MDRD: 49 ML/MIN/1.73M2
GLUCOSE SERPL-MCNC: 107 MG/DL (ref 70–99)
HDLC SERPL-MCNC: 45 MG/DL
LDLC SERPL CALC-MCNC: 38 MG/DL (ref 0–130)
MAGNESIUM SERPL-MCNC: 2.4 MG/DL (ref 1.6–2.6)
POTASSIUM SERPL-SCNC: 4.6 MMOL/L (ref 3.7–5.3)
SODIUM SERPL-SCNC: 139 MMOL/L (ref 135–144)
TRIGL SERPL-MCNC: 57 MG/DL
TSH SERPL DL<=0.05 MIU/L-ACNC: 3.91 UIU/ML (ref 0.3–5)

## 2023-10-10 PROCEDURE — 83880 ASSAY OF NATRIURETIC PEPTIDE: CPT

## 2023-10-10 PROCEDURE — 80061 LIPID PANEL: CPT

## 2023-10-10 PROCEDURE — 2580000003 HC RX 258: Performed by: FAMILY MEDICINE

## 2023-10-10 PROCEDURE — 93010 ELECTROCARDIOGRAM REPORT: CPT | Performed by: INTERNAL MEDICINE

## 2023-10-10 PROCEDURE — 94640 AIRWAY INHALATION TREATMENT: CPT

## 2023-10-10 PROCEDURE — 36415 COLL VENOUS BLD VENIPUNCTURE: CPT

## 2023-10-10 PROCEDURE — 83735 ASSAY OF MAGNESIUM: CPT

## 2023-10-10 PROCEDURE — 1200000000 HC SEMI PRIVATE

## 2023-10-10 PROCEDURE — 6370000000 HC RX 637 (ALT 250 FOR IP): Performed by: FAMILY MEDICINE

## 2023-10-10 PROCEDURE — 99223 1ST HOSP IP/OBS HIGH 75: CPT | Performed by: FAMILY MEDICINE

## 2023-10-10 PROCEDURE — 80048 BASIC METABOLIC PNL TOTAL CA: CPT

## 2023-10-10 PROCEDURE — 97530 THERAPEUTIC ACTIVITIES: CPT

## 2023-10-10 PROCEDURE — 6370000000 HC RX 637 (ALT 250 FOR IP): Performed by: EMERGENCY MEDICINE

## 2023-10-10 PROCEDURE — 71046 X-RAY EXAM CHEST 2 VIEWS: CPT

## 2023-10-10 PROCEDURE — 84443 ASSAY THYROID STIM HORMONE: CPT

## 2023-10-10 PROCEDURE — 97166 OT EVAL MOD COMPLEX 45 MIN: CPT

## 2023-10-10 RX ADMIN — Medication 400 MG: at 09:27

## 2023-10-10 RX ADMIN — FUROSEMIDE 40 MG: 40 TABLET ORAL at 09:28

## 2023-10-10 RX ADMIN — FLUTICASONE PROPIONATE 2 SPRAY: 50 SPRAY, METERED NASAL at 09:27

## 2023-10-10 RX ADMIN — LISINOPRIL 5 MG: 5 TABLET ORAL at 09:28

## 2023-10-10 RX ADMIN — APIXABAN 2.5 MG: 2.5 TABLET, FILM COATED ORAL at 19:48

## 2023-10-10 RX ADMIN — METOPROLOL TARTRATE 50 MG: 50 TABLET ORAL at 09:27

## 2023-10-10 RX ADMIN — IPRATROPIUM BROMIDE AND ALBUTEROL SULFATE 1 DOSE: 2.5; .5 SOLUTION RESPIRATORY (INHALATION) at 19:09

## 2023-10-10 RX ADMIN — APIXABAN 2.5 MG: 2.5 TABLET, FILM COATED ORAL at 09:28

## 2023-10-10 RX ADMIN — BUMETANIDE 2 MG: 1 TABLET ORAL at 09:27

## 2023-10-10 RX ADMIN — SODIUM CHLORIDE, PRESERVATIVE FREE 10 ML: 5 INJECTION INTRAVENOUS at 09:29

## 2023-10-10 RX ADMIN — SODIUM CHLORIDE, PRESERVATIVE FREE 10 ML: 5 INJECTION INTRAVENOUS at 00:43

## 2023-10-10 RX ADMIN — POTASSIUM CHLORIDE 10 MEQ: 10 CAPSULE, COATED, EXTENDED RELEASE ORAL at 09:28

## 2023-10-10 RX ADMIN — ROSUVASTATIN CALCIUM 20 MG: 20 TABLET, FILM COATED ORAL at 09:27

## 2023-10-10 RX ADMIN — ALLOPURINOL 100 MG: 100 TABLET ORAL at 09:28

## 2023-10-10 RX ADMIN — CLOPIDOGREL BISULFATE 75 MG: 75 TABLET ORAL at 09:28

## 2023-10-10 RX ADMIN — SODIUM CHLORIDE, PRESERVATIVE FREE 10 ML: 5 INJECTION INTRAVENOUS at 09:27

## 2023-10-10 RX ADMIN — FUROSEMIDE 40 MG: 40 TABLET ORAL at 19:48

## 2023-10-10 RX ADMIN — BUMETANIDE 2 MG: 1 TABLET ORAL at 19:48

## 2023-10-10 RX ADMIN — SODIUM CHLORIDE, PRESERVATIVE FREE 10 ML: 5 INJECTION INTRAVENOUS at 19:54

## 2023-10-10 RX ADMIN — METOPROLOL TARTRATE 50 MG: 50 TABLET ORAL at 19:47

## 2023-10-10 RX ADMIN — Medication 400 MG: at 19:48

## 2023-10-10 NOTE — PROGRESS NOTES
10/10/23 1632   Encounter Summary   Encounter Overview/Reason  Spiritual/Emotional Needs   Service Provided For: Patient   Referral/Consult From: Rounding   Complexity of Encounter Low   Spiritual/Emotional needs   Type Spiritual Support   Assessment/Intervention/Outcome   Assessment Unable to assess   Intervention Prayer (assurance of)/[place]

## 2023-10-10 NOTE — PROGRESS NOTES
Nutrition Note      Multiple attempts made to educate pt on Low Na diet- pt was sleeping very soundly. Handouts from Nutrition Care Manual Left at bedside with name and phone number in case of future questions.         Electronically signed by Marycarmen Bishop RD, PITO on 10/10/23 at 3:57 PM EDT    Contact: [phone]

## 2023-10-10 NOTE — PLAN OF CARE
Problem: Safety - Adult  Goal: Free from fall injury  Outcome: Progressing, Patient remains free of incidence/ injury. Bed remains in low position. Side rails up x2. Problem: Skin/Tissue Integrity  Goal: Absence of new skin breakdown  Description: 1. Monitor for areas of redness and/or skin breakdown  2. Assess vascular access sites hourly  3. Every 4-6 hours minimum:  Change oxygen saturation probe site  4. Every 4-6 hours:  If on nasal continuous positive airway pressure, respiratory therapy assess nares and determine need for appliance change or resting period. Outcome: Progressing, Protection dressings in place. Educated on the importance of turning and alternating position.

## 2023-10-10 NOTE — PROGRESS NOTES
[facility]   OCCUPATIONAL THERAPY MISSED TREATMENT NOTE   INPATIENT   Date: 10/10/23  Patient Name: Trina Huntley       Room: 6444/4833-24  MRN: [id number]   Account #: [de-identified]    : 1935  (82 y.o.)  Gender: male                 REASON FOR MISSED TREATMENT:  Patient declined   -    Pt politely declined as he just received his lunch tray. Writer introduced self and explained role of occupational therapy, in which patient continued to decline, requesting therapy to re-attempt later. OT will continue to follow.    [facility]        Electronically signed by MINDY Polk/L on 10/10/23 at 11:46 AM EDT

## 2023-10-10 NOTE — PROGRESS NOTES
RN called RT for PRN breathing tx. Pt in distress. RR in 40's. SpO2 86% on room air. Exp wheeze, diminished bases. Accessory muscle use noted. Administered duoneb aerosol. Informed RN of concern for further deterioration. No change with tx. Placed on 3lpm NC post tx.

## 2023-10-10 NOTE — CARE COORDINATION
Return to Present Housing: Yes  Other Identified Issues/Barriers to RETURNING to current housing: no  Potential Assistance needed at discharge: [address] (Pt is interested in [facility] if it is covered by insurance. Notified Tiara from [facility] and referral sent.) Discussed CHF Clinic, pt is not agreeable at this time, but order placed for physician to sign at discharge in case he changes his mind. Potential DME:  n/a  Patient expects to discharge to: [address] [place] [address] for transportation at discharge: Self    Financial    Payor: Paul Caruso / Plan: Rod Perez / Product Type: *No Product type* /     Does insurance require precert for SNF: Yes    Potential assistance Purchasing Medications: No  Meds-to-Beds request: Not Assessed      [id number] Cl[facility] - [phone] [facility], [address] [place], [place] [address] [address]  [address] [place] [address]  Phone: [phone] Fax: [phone]    [facility] [address] [address]  Phone: [phone] Fax: [phone]      Notes:    Factors facilitating achievement of predicted outcomes: Family support, Motivated, Cooperative, and Pleasant    Barriers to discharge: Medical complications    Additional Case Management Notes: From home with daughter, Edna Day, independent and drives. DME: cane, walker, [place], GB, stair lift, neb, lympopress boots. Would like [facility] if covered by insurance. Follows at [address]. Refused CHF Clinic, however order saved for physician to sign at d/néstor. Eliquis PTA. The Plan for Transition of Care is related to the following treatment goals of Acute congestive heart failure, unspecified heart failure type ([address]) [S36.6]    IF APPLICABLE: The Patient and/or patient representative Brooke Quinones and his family were provided with a choice of provider and agrees with the discharge plan.  Freedom of choice list with basic dialogue that supports the patient's individualized plan of care/goals and shares the quality data associated with the providers was provided to: Patient   Patient Representative Name:       The Patient and/or Patient Representative Agree with the Discharge Plan? Yes    Kenya Padilla RN  Case Management Department  Ph: [phone] Fax: [phone]                       Post Acute Facility/Agency List     Provided patient with the following list, the list includes the overall star ratings obtained from CMS per the Medicare Web site (www.Medicare.gov):     [] [address]  [] Acute Inpatient Rehabilitation Facilities  [] Skilled Nursing Facilities  [x] Home Care    Provided verbal instructions on how to utilize the QR Code to obtain additional detailed star ratings from www. Medicare. gov     offered to print and provide the detailed list:    [x]Accepted   []Declined    The following printed detailed lists were provided:     [facility]    Pt would like [facility]. Notified Tiara from [facility] and referral sent. Informed Ambar Diamond that pt would like to make sure [facility] is covered by his insurance before he is agreeable. Electronically signed by Kenya Padilla RN on 10/10/2023 at 11:33 AM    Tiara states [facility] is covered by [employer]. She spoke with patient and he is agreeable.     Electronically signed by Kenya Padilla RN on 10/10/2023 at 12:28 PM

## 2023-10-10 NOTE — H&P
right 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 [facility]) [L97.912] 02/15/2023     Priority: Medium    Non-pressure chronic ulcer of left 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 ([address]) [L97.922] 02/15/2023     Priority: Medium    Lymphedema of both lower extremities [I89.0] 02/09/2023     Priority: Medium    Primary hypertension [I10] 10/10/2023    Acute congestive heart failure, unspecified heart failure type ([address]) [I50.9] 10/09/2023    Secondary hypercoagulable state ([address]) [D68.69] 09/13/2023    Adult BMI 40.0-44.9 kg/sq m ([address]) [Z68.41] 04/02/2022    Mixed hyperlipidemia [E78.2] 04/02/2022    Paroxysmal A-fib ([address]) [I48.0] 08/18/2021    Coronary artery disease involving native coronary artery of native heart without angina pectoris [I25.10] 08/18/2021         ASSESSMENT/PLAN:  Patient admitted with Acute on Chronic CHF. Co-morbidities as above. -Patient on Lasix and Bumex BID - will check an ECHO and consult Cardiology.  -Paroxysmal A. Fib. - rate controlled, on Eliquis. -HTN - adequate control but not optimal.  -Lymphedema with chronic leg ulcers - consulted wound care.  -Home medications reviewed & reconciled.  -Complete orders per chart. DVT Prophylaxis: Eliquis  Diet: ADULT DIET; Regular; No Added Salt (3-4 gm)  Code Status: Full Code      Dispo - admitted to Med/Surg.        @[facility]@

## 2023-10-10 NOTE — PROGRESS NOTES
[facility]   Occupational Therapy Evaluation  Date: 10/10/23  Patient Name: Beryl Tracey       Room: 7978/5364-43  MRN: [id number]  Account: [de-identified]   : 1935  (82 y.o.) Gender: male     Discharge Recommendations:  Further Occupational Therapy is recommended upon facility discharge. OT Equipment Recommendations  Other: TBD    Referring Practitioner: Ana Reeves MD  Diagnosis: Acute CHF Additional Pertinent Hx: Per ED Note: 82-year-old male presents for complaints of shortness of breath. Patient has a history of CHF, reports has been feeling more short of breath since yesterday and reported worsening lower extremity swelling. Reports has been taking his diuretics as prescribed urinating normally. Treatment Diagnosis: Impaired self-care status    Past Medical History:  has a past medical history of Arthritis, CHF (congestive heart failure) ([address]), Chronic kidney disease, History of blood transfusion, and Hyperlipidemia. Past Surgical History:   has a past surgical history that includes joint replacement; Cardiac surgery (); and pacemaker placement (Right, ).     Restrictions  Restrictions/Precautions  Restrictions/Precautions: Fall Risk;General Precautions  Required Braces or Orthoses?: No  Implants present? : Metal implants (R MAYCO)      Vitals  Vitals  Pulse: 64  BP: (!) 135/58  MAP (Calculated): 84  Respirations: 18  SpO2: 90 %  O2 Device: None (Room air)     Subjective  Subjective: Pt is pleasant and agreeable for therapy  Comments: Okay for OT eval and treat per GRISEL Mario  Subjective  Pain: Pt denied pain      Social/Functional History  Social/Functional History  Lives With: Daughter  Type of Home: House  Home Layout: Multi-level (Split level home- bedroom on main floor, bathrom with walk-in shower on second floor)  Home Access: Stairs to enter with rails (Chair lift for entrance and stairs inside home)  Entrance Stairs - Number of

## 2023-10-11 ENCOUNTER — APPOINTMENT (OUTPATIENT)
Dept: GENERAL RADIOLOGY | Age: 88
End: 2023-10-11
Attending: INTERNAL MEDICINE
Payer: MEDICARE

## 2023-10-11 ENCOUNTER — APPOINTMENT (OUTPATIENT)
Age: 88
End: 2023-10-11
Attending: FAMILY MEDICINE
Payer: MEDICARE

## 2023-10-11 PROBLEM — N17.9 AKI (ACUTE KIDNEY INJURY) (HCC): Status: ACTIVE | Noted: 2023-10-11

## 2023-10-11 LAB
ANION GAP SERPL CALCULATED.3IONS-SCNC: 11 MMOL/L (ref 9–17)
ARTERIAL PATENCY WRIST A: ABNORMAL
BDY SITE: ABNORMAL
BODY TEMPERATURE: 37
BUN SERPL-MCNC: 27 MG/DL (ref 8–23)
CALCIUM SERPL-MCNC: 8.8 MG/DL (ref 8.6–10.4)
CHLORIDE SERPL-SCNC: 101 MMOL/L (ref 98–107)
CO2 SERPL-SCNC: 31 MMOL/L (ref 20–31)
COHGB MFR BLD: 1.8 % (ref 0–5)
CREAT SERPL-MCNC: 1.8 MG/DL (ref 0.7–1.2)
ECHO AO ROOT DIAM: 3.2 CM
ECHO AO ROOT INDEX: 1.23 CM/M2
ECHO AV AREA PEAK VELOCITY: 1.3 CM2
ECHO AV AREA VTI: 1.1 CM2
ECHO AV AREA/BSA PEAK VELOCITY: 0.5 CM2/M2
ECHO AV AREA/BSA VTI: 0.4 CM2/M2
ECHO AV MEAN GRADIENT: 2 MMHG
ECHO AV MEAN VELOCITY: 0.7 M/S
ECHO AV PEAK GRADIENT: 4 MMHG
ECHO AV PEAK VELOCITY: 1 M/S
ECHO AV VELOCITY RATIO: 0.5
ECHO AV VTI: 19.7 CM
ECHO BSA: 2.73 M2
ECHO EST RA PRESSURE: 8 MMHG
ECHO LA AREA 4C: 24.2 CM2
ECHO LA DIAMETER INDEX: 1.54 CM/M2
ECHO LA DIAMETER: 4 CM
ECHO LA MAJOR AXIS: 6.2 CM
ECHO LA TO AORTIC ROOT RATIO: 1.25
ECHO LA VOL A-L A4C: 74 ML (ref 18–58)
ECHO LA VOLUME INDEX A-L A4C: 28 ML/M2 (ref 16–34)
ECHO LV FRACTIONAL SHORTENING: 22 % (ref 28–44)
ECHO LV INTERNAL DIMENSION DIASTOLE INDEX: 1.77 CM/M2
ECHO LV INTERNAL DIMENSION DIASTOLIC: 4.6 CM (ref 4.2–5.9)
ECHO LV INTERNAL DIMENSION SYSTOLIC INDEX: 1.38 CM/M2
ECHO LV INTERNAL DIMENSION SYSTOLIC: 3.6 CM
ECHO LV IVSD: 1.2 CM (ref 0.6–1)
ECHO LV MASS 2D: 205 G (ref 88–224)
ECHO LV MASS INDEX 2D: 78.8 G/M2 (ref 49–115)
ECHO LV POSTERIOR WALL DIASTOLIC: 1.2 CM (ref 0.6–1)
ECHO LV RELATIVE WALL THICKNESS RATIO: 0.52
ECHO LVOT AREA: 2.8 CM2
ECHO LVOT AV VTI INDEX: 0.4
ECHO LVOT DIAM: 1.9 CM
ECHO LVOT MEAN GRADIENT: 1 MMHG
ECHO LVOT PEAK GRADIENT: 1 MMHG
ECHO LVOT PEAK VELOCITY: 0.5 M/S
ECHO LVOT STROKE VOLUME INDEX: 8.6 ML/M2
ECHO LVOT SV: 22.4 ML
ECHO LVOT VTI: 7.9 CM
ECHO MV A VELOCITY: 0.58 M/S
ECHO MV AREA VTI: 0.9 CM2
ECHO MV E DECELERATION TIME (DT): 199 MS
ECHO MV E VELOCITY: 0.71 M/S
ECHO MV E/A RATIO: 1.22
ECHO MV LVOT VTI INDEX: 3
ECHO MV MAX VELOCITY: 0.7 M/S
ECHO MV MEAN GRADIENT: 1 MMHG
ECHO MV MEAN VELOCITY: 0.4 M/S
ECHO MV PEAK GRADIENT: 2 MMHG
ECHO MV VTI: 23.7 CM
ECHO RA AREA 4C: 18.7 CM2
ECHO RA END SYSTOLIC VOLUME APICAL 4 CHAMBER INDEX BSA: 20 ML/M2
ECHO RA VOLUME: 52 ML
ECHO RIGHT VENTRICULAR SYSTOLIC PRESSURE (RVSP): 29 MMHG
ECHO TV REGURGITANT MAX VELOCITY: 2.27 M/S
ECHO TV REGURGITANT PEAK GRADIENT: 21 MMHG
FIO2 ON VENT: 32 %
GAS FLOW.O2 O2 DELIVERY SYS: ABNORMAL L/MIN
GFR SERPL CREATININE-BSD FRML MDRD: 36 ML/MIN/1.73M2
GLUCOSE SERPL-MCNC: 108 MG/DL (ref 70–99)
HCO3 ARTERIAL: 33 MMOL/L (ref 22–26)
MAGNESIUM SERPL-MCNC: 2.4 MG/DL (ref 1.6–2.6)
METHEMOGLOBIN: 1.1 % (ref 0–1.9)
O2 SAT, ARTERIAL: 96.1 % (ref 95–98)
PCO2 ARTERIAL: 45.3 MMHG (ref 35–45)
PH ARTERIAL: 7.47 (ref 7.35–7.45)
PO2 ARTERIAL: 100 MMHG (ref 80–100)
POSITIVE BASE EXCESS, ART: 9.3 MMOL/L (ref 0–2)
POTASSIUM SERPL-SCNC: 4.6 MMOL/L (ref 3.7–5.3)
PT. POSITION: ABNORMAL
RESPIRATORY RATE: 38
SODIUM SERPL-SCNC: 143 MMOL/L (ref 135–144)
TEXT FOR RESPIRATORY: ABNORMAL

## 2023-10-11 PROCEDURE — 6370000000 HC RX 637 (ALT 250 FOR IP): Performed by: FAMILY MEDICINE

## 2023-10-11 PROCEDURE — 6370000000 HC RX 637 (ALT 250 FOR IP): Performed by: INTERNAL MEDICINE

## 2023-10-11 PROCEDURE — 83735 ASSAY OF MAGNESIUM: CPT

## 2023-10-11 PROCEDURE — 2700000000 HC OXYGEN THERAPY PER DAY

## 2023-10-11 PROCEDURE — 99233 SBSQ HOSP IP/OBS HIGH 50: CPT | Performed by: FAMILY MEDICINE

## 2023-10-11 PROCEDURE — 2580000003 HC RX 258: Performed by: FAMILY MEDICINE

## 2023-10-11 PROCEDURE — 94640 AIRWAY INHALATION TREATMENT: CPT

## 2023-10-11 PROCEDURE — 82805 BLOOD GASES W/O2 SATURATION: CPT

## 2023-10-11 PROCEDURE — 93306 TTE W/DOPPLER COMPLETE: CPT | Performed by: INTERNAL MEDICINE

## 2023-10-11 PROCEDURE — 94761 N-INVAS EAR/PLS OXIMETRY MLT: CPT

## 2023-10-11 PROCEDURE — 6370000000 HC RX 637 (ALT 250 FOR IP): Performed by: EMERGENCY MEDICINE

## 2023-10-11 PROCEDURE — 36415 COLL VENOUS BLD VENIPUNCTURE: CPT

## 2023-10-11 PROCEDURE — 36600 WITHDRAWAL OF ARTERIAL BLOOD: CPT

## 2023-10-11 PROCEDURE — C8929 TTE W OR WO FOL WCON,DOPPLER: HCPCS

## 2023-10-11 PROCEDURE — 2060000000 HC ICU INTERMEDIATE R&B

## 2023-10-11 PROCEDURE — 71045 X-RAY EXAM CHEST 1 VIEW: CPT

## 2023-10-11 PROCEDURE — 94660 CPAP INITIATION&MGMT: CPT

## 2023-10-11 PROCEDURE — 2500000003 HC RX 250 WO HCPCS: Performed by: INTERNAL MEDICINE

## 2023-10-11 PROCEDURE — 99223 1ST HOSP IP/OBS HIGH 75: CPT | Performed by: INTERNAL MEDICINE

## 2023-10-11 PROCEDURE — 80048 BASIC METABOLIC PNL TOTAL CA: CPT

## 2023-10-11 PROCEDURE — 5A09557 ASSISTANCE WITH RESPIRATORY VENTILATION, GREATER THAN 96 CONSECUTIVE HOURS, CONTINUOUS POSITIVE AIRWAY PRESSURE: ICD-10-PCS | Performed by: FAMILY MEDICINE

## 2023-10-11 PROCEDURE — 99213 OFFICE O/P EST LOW 20 MIN: CPT

## 2023-10-11 RX ORDER — BUMETANIDE 0.25 MG/ML
2 INJECTION INTRAMUSCULAR; INTRAVENOUS ONCE
Status: COMPLETED | OUTPATIENT
Start: 2023-10-11 | End: 2023-10-11

## 2023-10-11 RX ADMIN — SODIUM CHLORIDE, PRESERVATIVE FREE 10 ML: 5 INJECTION INTRAVENOUS at 10:22

## 2023-10-11 RX ADMIN — SODIUM CHLORIDE, PRESERVATIVE FREE 10 ML: 5 INJECTION INTRAVENOUS at 12:02

## 2023-10-11 RX ADMIN — APIXABAN 2.5 MG: 2.5 TABLET, FILM COATED ORAL at 20:09

## 2023-10-11 RX ADMIN — APIXABAN 2.5 MG: 2.5 TABLET, FILM COATED ORAL at 11:11

## 2023-10-11 RX ADMIN — Medication 400 MG: at 11:11

## 2023-10-11 RX ADMIN — IPRATROPIUM BROMIDE AND ALBUTEROL SULFATE 1 DOSE: 2.5; .5 SOLUTION RESPIRATORY (INHALATION) at 19:11

## 2023-10-11 RX ADMIN — BUMETANIDE 2 MG: 0.25 INJECTION INTRAMUSCULAR; INTRAVENOUS at 12:02

## 2023-10-11 RX ADMIN — Medication 400 MG: at 20:09

## 2023-10-11 RX ADMIN — POTASSIUM CHLORIDE 10 MEQ: 10 CAPSULE, COATED, EXTENDED RELEASE ORAL at 11:10

## 2023-10-11 RX ADMIN — IPRATROPIUM BROMIDE AND ALBUTEROL SULFATE 1 DOSE: 2.5; .5 SOLUTION RESPIRATORY (INHALATION) at 15:08

## 2023-10-11 RX ADMIN — ROSUVASTATIN CALCIUM 20 MG: 20 TABLET, FILM COATED ORAL at 11:10

## 2023-10-11 RX ADMIN — CLOPIDOGREL BISULFATE 75 MG: 75 TABLET ORAL at 11:10

## 2023-10-11 RX ADMIN — METOPROLOL TARTRATE 25 MG: 25 TABLET, FILM COATED ORAL at 20:09

## 2023-10-11 RX ADMIN — SODIUM CHLORIDE, PRESERVATIVE FREE 10 ML: 5 INJECTION INTRAVENOUS at 20:09

## 2023-10-11 RX ADMIN — SODIUM CHLORIDE, PRESERVATIVE FREE 10 ML: 5 INJECTION INTRAVENOUS at 10:23

## 2023-10-11 RX ADMIN — FLUTICASONE PROPIONATE 2 SPRAY: 50 SPRAY, METERED NASAL at 11:10

## 2023-10-11 RX ADMIN — ALLOPURINOL 100 MG: 100 TABLET ORAL at 11:10

## 2023-10-11 NOTE — PROGRESS NOTES
Orders obtained from Dr Angeline Ayala to place pt on bipap for the night. Dr Angeline Ayala states she will reassess in the morning. Pt WOB improved once placed on 16/8 bipap. Will continue to monitor.

## 2023-10-11 NOTE — CONSULTS
[facility]  Consult Note       NAME:  Hugh Huerta  MEDICAL RECORD NUMBER:  [id number]  AGE: 82 y.o. GENDER: male  : 1935  TODAY'S DATE:  10/11/2023    Subjective:      Hugh Huerta is a 82 y.o. male with inpatient referral to [facility] for:  Bilateral leg wounds, buttock wound      Wound Identification:  Wound Type: venous and skin tear  Contributing Factors: edema, venous stasis, obesity, and decreased tissue oxygenation    Wound History: pt previously admitted last month for worsening edema and weeping to bilateral legs, he was admitted again on 10/9 from his wound care appointment with increasing shortness of breath and leg swelling  Current Wound Care Treatment:  opticell ag, dry dressing, ace wraps    Patient Goal of Care:  [x] Wound Healing  [] Odor Control  [] Palliative Care  [] Pain Control   [] Other:         PAST MEDICAL HISTORY        Diagnosis Date    Arthritis     CHF (congestive heart failure) ([address])     Chronic kidney disease     History of blood transfusion     Hyperlipidemia        PAST SURGICAL HISTORY    Past Surgical History:   Procedure Laterality Date    CARDIAC SURGERY      bypass    JOINT REPLACEMENT      Left hip    PACEMAKER PLACEMENT Right     and defib       FAMILY HISTORY    History reviewed. No pertinent family history. SOCIAL HISTORY    Social History     Tobacco Use    Smoking status: Former     Types: Cigarettes     Quit date:      Years since quittin.8     Passive exposure: Past    Smokeless tobacco: Never   Vaping Use    Vaping Use: Never used   Substance Use Topics    Alcohol use: Not Currently    Drug use: Never         ALLERGIES    No Known Allergies    HOME MEDICATIONS  Prior to Admission medications    Medication Sig Start Date End Date Taking?  Authorizing Provider   fluticasone (FLONASE) 50 MCG/ACT nasal spray 2 sprays by Each Nostril route daily 23   MD gary Ferrari : Yes   [] Low Air Loss   [x] Pressure Redistribution  [] Fluid Immersion  [] Bariatric  [] Total Pressure Relief  [] Other:     Current Diet: ADULT DIET; Regular; No Added Salt (3-4 gm)  ADULT ORAL NUTRITION SUPPLEMENT; Dinner;  Low Calorie/High Protein Oral Supplement  Dietician consult:  Yes    Discharge Plan:  Placement for patient upon discharge: home with support   Patient appropriate for Outpatient [address]: Yes    Patient/Caregiver Teaching:  Level of patientunderstanding able to:     [x] Indicates understanding       [] Needs reinforcement  [] Unsuccessful      [x] Verbal Understanding  [] Demonstrated understanding       [] No evidence of learning  [] Refused teaching         [] N/A       Electronically signed by Lyndall Eisenmenger, RN on  10/11/2023 at 5:45 PM

## 2023-10-11 NOTE — CONSULTS
[facility]   Consult Note                 Date:   10/11/2023  Date of admission:  10/9/2023  9:43 AM  MRN:   [id number]  YOB: 1935    Reason for Consult: Cardiac evaluation    HISTORY OF PRESENT ILLNESS:    The patient is a 82 y.o.  male who is admitted to the hospital for shortness of breath and worsening ankle swellings and legs  At this time patient is on a BiPAP patient is saying that worsening of shortness of breath over time and swelling  Patient denies any chest pain pressure heart fluttering racing skipping  Patient has extensive history of CAD CABG last heart cath done as below 3 grafts were patent  History of hypertension CHF, history of CRT-D AICD  Patient was given 40 of Lasix this morning patient is at home Bumex 20 mg  At present is hard to get how much patient was peeing or drinking fluids. Patient short of breath patient is on BiPAP has moved from the floor to the ICU  Past Medical History:   has a past medical history of Arthritis, CHF (congestive heart failure) ([address]), Chronic kidney disease, History of blood transfusion, and Hyperlipidemia. Past Surgical History:   has a past surgical history that includes joint replacement; Cardiac surgery (2020); and pacemaker placement (Right, 2020). Home Medications:    Prior to Admission medications    Medication Sig Start Date End Date Taking?  Authorizing Provider   fluticasone (FLONASE) 50 MCG/ACT nasal spray 2 sprays by Each Nostril route daily 9/13/23   Willis Raymundo MD   bumetanide (BUMEX) 2 MG tablet TAKE 1 TABLET TWICE DAILY 8/15/23   Willis Raymundo MD   magnesium oxide (MAG-OX) 400 (240 Mg) MG tablet TAKE 1 TABLET TWICE DAILY 7/25/23   Sheyla Luke MD   furosemide (LASIX) 40 MG tablet TAKE 1 TABLET TWICE DAILY 7/19/23   Willis Raymundo MD   potassium chloride (KLOR-CON) 10 MEQ extended release tablet TAKE 1 TABLET EVERY DAY 5/2/23   Willis Raymundo MD   clopidogrel (PLAVIX) 75 MG tablet pericardial effusion is seen. Stress Test 4/4/22:   Patient refusal of prone images limits evaluation. Fixed inferior wall and mostly fixed partially reversible lateral wall   perfusion defects as discussed above. Findings indicative of infarct in the   mid and basal inferior wall as well as in the mid and inferolateral wall. Small amount of reversibility in the lateral wall would be compatible with   gonzalez-infarct ischemia. LVEF 45%       Risk stratification: Intermediate risk. Cath:4/5/22    Findings:     LMCA: Diffuse irregularities 30-40%. LAD: Diffuse irregularities 30-40% and Single stenosis. with patent LIMA-LAD  and SVG-Diagonal     LCx: Diffuse irregularities 20-30%. RCA: Single stenosis. with patent SVG-RPDA     Cardiac Grafts      -  There is a LIMA graft that originates at the LIMA and attaches to the      Mid LAD. Patent with 0% stenosis      -  There is a Vein graft that originates at the Aorta Left and attaches to      the 1st Diag. Patent with luminal irregularities of 20-30%      -  There is a Vein graft that originates at the Aorta Right and attaches      to the R PDA. Patent luminal irregularities of 20-30%     Ventriculography Findings:  LV was not done        Estimated Blood Loss: Less than 25 mL     Conclusions:   Multi-vessel Coronary Artery Disease. All 3 grafts are patent. Patent LIMA-LAD, SVG-Diagonal and SVG to RPDA   LV was not done. Labs:     CBC:   Recent Labs     10/09/23  1054   WBC 7.2   HGB 7.9*   HCT 26.2*        BMP:   Recent Labs     10/10/23  0619 10/11/23  0658    143   K 4.6 4.6   CO2 31 31   BUN 22 27*   CREATININE 1.4* 1.8*   LABGLOM 49* 36*   GLUCOSE 107* 108*     BNP: No results for input(s): \"BNP\" in the last 72 hours. PT/INR:   Recent Labs     10/09/23  1054   PROTIME 13.9   INR 1.0     APTT:No results for input(s): \"APTT\" in the last 72 hours.   CARDIAC ENZYMES:No results for input(s): \"CKTOTAL\", \"CKMB\", \"CKMBINDEX\",

## 2023-10-11 NOTE — PLAN OF CARE
Problem: Discharge Planning  Goal: Discharge to home or other facility with appropriate resources  Outcome: Progressing  Flowsheets (Taken 10/11/2023 1005)  Discharge to home or other facility with appropriate resources: Identify barriers to discharge with patient and caregiver     Problem: Safety - Adult  Goal: Free from fall injury  10/11/2023 1744 by Dorothy Jhaveri RN  Outcome: Progressing  Flowsheets (Taken 10/11/2023 1741)  Free From Fall Injury: Instruct family/caregiver on patient safety     Problem: Skin/Tissue Integrity  Goal: Absence of new skin breakdown  Description: 1. Monitor for areas of redness and/or skin breakdown  2. Assess vascular access sites hourly  3. Every 4-6 hours minimum:  Change oxygen saturation probe site  4. Every 4-6 hours:  If on nasal continuous positive airway pressure, respiratory therapy assess nares and determine need for appliance change or resting period.   10/11/2023 1744 by Dorothy Jhaveri RN  Outcome: Progressing     Problem: ABCDS Injury Assessment  Goal: Absence of physical injury  Outcome: Progressing  Flowsheets (Taken 10/11/2023 1741)  Absence of Physical Injury: Implement safety measures based on patient assessment     Problem: Chronic Conditions and Co-morbidities  Goal: Patient's chronic conditions and co-morbidity symptoms are monitored and maintained or improved  Outcome: Progressing  Flowsheets (Taken 10/11/2023 1005)  Care Plan - Patient's Chronic Conditions and Co-Morbidity Symptoms are Monitored and Maintained or Improved: Monitor and assess patient's chronic conditions and comorbid symptoms for stability, deterioration, or improvement

## 2023-10-11 NOTE — PROGRESS NOTES
[address] [facility]   OCCUPATIONAL THERAPY MISSED TREATMENT NOTE   INPATIENT   Date: 10/11/23  Patient Name: Humble Humphreys       Room:   MRN: [id number]   Account #: [de-identified]    : 1935  (82 y.o.)  Gender: male   Referring Practitioner: Elizabeth Mcrae MD  Diagnosis: Acute CHF             REASON FOR MISSED TREATMENT:  Patient unable to participate   -    Pt OOR, according to chart, pt transferred to ICU, getting STAT CXR. Will continue to follow.             Electronically signed by GILBERT Abebe on 10/11/23 at 11:04 AM EDT

## 2023-10-11 NOTE — PROGRESS NOTES
Checked in on pt to see if any improvement since last conversation with RN. RN states cardiology was consulted. Pt SpO2 96% on 3lpm. RR still in the 40's with audible wheeze and intermittent crackle. Discussed concern of pts WOB with RN. RN going to contact MD again. Suggested ABG.

## 2023-10-11 NOTE — DISCHARGE INSTR - COC
Continuity of Care Form    Patient Name: Serafin Porter   :  1935  MRN:  [id number]    Admit date:  10/9/2023  Discharge date:  2023    Code Status Order: Full Code   Advance Directives:     Admitting Physician:  Lisa Zeng MD  PCP: Lisa Zeng MD    Discharging Nurse: [facility] Unit/Room#:   Discharging Unit Phone Number: [phone]    Emergency Contact:   Extended Emergency Contact Information  Primary Emergency Contact: Analy Patel  Mobile Phone: [phone]  Relation: Child  Secondary Emergency Contact:  [address] Phone: [phone]  Relation: Child    Past Surgical History:  Past Surgical History:   Procedure Laterality Date    CARDIAC SURGERY      bypass    JOINT REPLACEMENT      Left hip    PACEMAKER PLACEMENT Right     and defib       Immunization History:   Immunization History   Administered Date(s) Administered    COVID-19, MODERNA Booster BLUE border, (age 18y+), IM, 50mcg/0.25mL 2020, 2021       Active Problems:  Patient Active Problem List   Diagnosis Code    Paroxysmal A-fib ([address]) I48.0    Acute on chronic congestive heart failure ([address]) I50.9    Coronary artery disease involving native coronary artery of native heart without angina pectoris I25.10    Heart failure exacerbated by sotalol ([address]) I50.9    Adult BMI 40.0-44.9 kg/sq m ([address]) Z68.41    Mixed hyperlipidemia E78.2    Positive cardiac stress test R94.39    Lymphedema of both lower extremities I89.0    Localized edema R60.0    Non-pressure chronic ulcer of right 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 ([address]) L97.912    Non-pressure chronic ulcer of left 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 ([address]) K53.983    Infection associated with lymphedema B99.9, I89.0    Secondary hypercoagulable state ([address]) D68.69    SOB (shortness of breath) R06.02    Acute congestive heart failure, unspecified heart failure type ([address]) I50.9    Primary hypertension I10    DONI (acute kidney all that are sent with patient):  None    RN SIGNATURE:  Electronically signed by Tracy Perez RN on 11/8/23 at 10:53 AM EST    CASE MANAGEMENT/SOCIAL WORK SECTION    Inpatient Status Date: 10/9/23    Readmission Risk Assessment Score:  Readmission Risk              Risk of Unplanned Readmission:  17           Discharging to Facility/ [address]  [facility]. [place], [address] [place] [address]  Phone  [phone]  Fax  [phone]    Dialysis Facility (if applicable)   Name:  Address:  Dialysis Schedule:  Phone:  Fax:    / signature: Electronically signed by Norah Dior RN on 10/24/23 at 8:28 AM EDT    PHYSICIAN SECTION    Prognosis: Guarded    Condition at Discharge: Stable    Rehab Potential (if transferring to Rehab): Guarded    Recommended Labs or Other Treatments After Discharge:     Physician Certification: I certify the above information and transfer of Beryl Tracey  is necessary for the continuing treatment of the diagnosis listed and that he requires [address] for less 30 days.      Update Admission H&P: No change in H&P    PHYSICIAN SIGNATURE:  Electronically signed by Ana Reeves MD on 10/25/23 at 10:15 AM EDT

## 2023-10-11 NOTE — PROGRESS NOTES
10/11/23 1425   Encounter Summary   Encounter Overview/Reason  Attempted Encounter   Service Provided For: Patient   Referral/Consult From: Rounding   Complexity of Encounter Low   Assessment/Intervention/Outcome   Assessment Unable to assess   Intervention Prayer (assurance of)/[place]

## 2023-10-11 NOTE — PROGRESS NOTES
Writer spoke to Dr. Cecille Ayers regarding new consult and continuous BiPAP, orders received for STAT CXR and transfer to intermediate ICU.

## 2023-10-11 NOTE — CONSULTS
[facility]   CONSULT NOTE:      DATE OF CONSULT 10/11/2023    REASON FOR CONSULTATION:  Need for BiPAP      PCP Maria G Campo MD     CHIEF COMPLAINT: I was having problems breathing overnight    HISTORY OF PRESENT ILLNESS:     Very pleasant 70-year-old male. History of increasing shortness of breath lower extremity swelling over the past few days. He has a history of CHF lymphedema lower extremity ulcers and CKD. Patient was admitted. I was informed that the patient was requiring BiPAP overnight. We were asked to help assist in the patient's management. I saw the patient this morning, the patient denied any fever chills sweats no sputum production no hemoptysis. Chest x-ray to my eyes revealed low lung volumes. .    No obvious infiltrates appreciated. Lab work revealed a BUN/creatinine 27/1.8 there was a proBNP yesterday 1675. ABG 7.47- 45,-100.         ALLERGIES:  No Known Allergies    HOME MEDICATIONS:  Medications Prior to Admission: fluticasone (FLONASE) 50 MCG/ACT nasal spray, 2 sprays by Each Nostril route daily  bumetanide (BUMEX) 2 MG tablet, TAKE 1 TABLET TWICE DAILY  magnesium oxide (MAG-OX) 400 (240 Mg) MG tablet, TAKE 1 TABLET TWICE DAILY  furosemide (LASIX) 40 MG tablet, TAKE 1 TABLET TWICE DAILY  potassium chloride (KLOR-CON) 10 MEQ extended release tablet, TAKE 1 TABLET EVERY DAY  clopidogrel (PLAVIX) 75 MG tablet, TAKE 1 TABLET EVERY DAY  allopurinol (ZYLOPRIM) 100 MG tablet, TAKE 1 TABLET EVERY DAY  metoprolol tartrate (LOPRESSOR) 50 MG tablet, TAKE 1 TABLET TWICE DAILY  albuterol (PROVENTIL) (5 MG/ML) 0.5% nebulizer solution, Take 0.5 mLs by nebulization 4 times daily as needed for Wheezing  acetylcysteine (MUCOMYST) 20 % nebulizer solution, Take 3 mLs by mouth 2 times daily for 2 doses  rosuvastatin (CRESTOR) 20 MG tablet, TAKE 1 TABLET EVERY DAY  lisinopril (PRINIVIL;ZESTRIL) 5 MG tablet, TAKE 1 TABLET EVERY DAY  apixaban (ELIQUIS) 2.5 MG

## 2023-10-11 NOTE — PLAN OF CARE
Problem: Safety - Adult  Goal: Free from fall injury  10/11/2023 0415 by Estefania Bangura RN  Outcome: Progressing     Problem: Skin/Tissue Integrity  Goal: Absence of new skin breakdown  Description: 1. Monitor for areas of redness and/or skin breakdown  2. Assess vascular access sites hourly  3. Every 4-6 hours minimum:  Change oxygen saturation probe site  4. Every 4-6 hours:  If on nasal continuous positive airway pressure, respiratory therapy assess nares and determine need for appliance change or resting period.   10/11/2023 0415 by Estefania Bangura RN  Outcome: Progressing

## 2023-10-12 LAB
ANION GAP SERPL CALCULATED.3IONS-SCNC: 7 MMOL/L (ref 9–17)
BNP SERPL-MCNC: 2998 PG/ML
BUN SERPL-MCNC: 34 MG/DL (ref 8–23)
CALCIUM SERPL-MCNC: 8.4 MG/DL (ref 8.6–10.4)
CHLORIDE SERPL-SCNC: 102 MMOL/L (ref 98–107)
CO2 SERPL-SCNC: 30 MMOL/L (ref 20–31)
CREAT SERPL-MCNC: 1.7 MG/DL (ref 0.7–1.2)
GFR SERPL CREATININE-BSD FRML MDRD: 39 ML/MIN/1.73M2
GLUCOSE SERPL-MCNC: 117 MG/DL (ref 70–99)
MAGNESIUM SERPL-MCNC: 2.4 MG/DL (ref 1.6–2.6)
POTASSIUM SERPL-SCNC: 4.1 MMOL/L (ref 3.7–5.3)
SODIUM SERPL-SCNC: 139 MMOL/L (ref 135–144)

## 2023-10-12 PROCEDURE — 2700000000 HC OXYGEN THERAPY PER DAY

## 2023-10-12 PROCEDURE — 97162 PT EVAL MOD COMPLEX 30 MIN: CPT

## 2023-10-12 PROCEDURE — 36415 COLL VENOUS BLD VENIPUNCTURE: CPT

## 2023-10-12 PROCEDURE — 6370000000 HC RX 637 (ALT 250 FOR IP): Performed by: INTERNAL MEDICINE

## 2023-10-12 PROCEDURE — 94660 CPAP INITIATION&MGMT: CPT

## 2023-10-12 PROCEDURE — 99232 SBSQ HOSP IP/OBS MODERATE 35: CPT | Performed by: FAMILY MEDICINE

## 2023-10-12 PROCEDURE — 99233 SBSQ HOSP IP/OBS HIGH 50: CPT | Performed by: INTERNAL MEDICINE

## 2023-10-12 PROCEDURE — 83735 ASSAY OF MAGNESIUM: CPT

## 2023-10-12 PROCEDURE — 6360000002 HC RX W HCPCS

## 2023-10-12 PROCEDURE — 2060000000 HC ICU INTERMEDIATE R&B

## 2023-10-12 PROCEDURE — 2500000003 HC RX 250 WO HCPCS: Performed by: INTERNAL MEDICINE

## 2023-10-12 PROCEDURE — 83880 ASSAY OF NATRIURETIC PEPTIDE: CPT

## 2023-10-12 PROCEDURE — 51798 US URINE CAPACITY MEASURE: CPT

## 2023-10-12 PROCEDURE — 94761 N-INVAS EAR/PLS OXIMETRY MLT: CPT

## 2023-10-12 PROCEDURE — 97530 THERAPEUTIC ACTIVITIES: CPT

## 2023-10-12 PROCEDURE — 2580000003 HC RX 258: Performed by: FAMILY MEDICINE

## 2023-10-12 PROCEDURE — 6370000000 HC RX 637 (ALT 250 FOR IP): Performed by: FAMILY MEDICINE

## 2023-10-12 PROCEDURE — 80048 BASIC METABOLIC PNL TOTAL CA: CPT

## 2023-10-12 RX ORDER — BUMETANIDE 0.25 MG/ML
1 INJECTION INTRAMUSCULAR; INTRAVENOUS EVERY 12 HOURS
Status: DISCONTINUED | OUTPATIENT
Start: 2023-10-12 | End: 2023-10-14

## 2023-10-12 RX ADMIN — Medication 400 MG: at 21:21

## 2023-10-12 RX ADMIN — METOPROLOL TARTRATE 25 MG: 25 TABLET, FILM COATED ORAL at 07:43

## 2023-10-12 RX ADMIN — FLUTICASONE PROPIONATE 2 SPRAY: 50 SPRAY, METERED NASAL at 07:44

## 2023-10-12 RX ADMIN — METOPROLOL TARTRATE 25 MG: 25 TABLET, FILM COATED ORAL at 21:21

## 2023-10-12 RX ADMIN — CLOPIDOGREL BISULFATE 75 MG: 75 TABLET ORAL at 07:43

## 2023-10-12 RX ADMIN — ROSUVASTATIN CALCIUM 20 MG: 20 TABLET, FILM COATED ORAL at 07:43

## 2023-10-12 RX ADMIN — SODIUM CHLORIDE, PRESERVATIVE FREE 10 ML: 5 INJECTION INTRAVENOUS at 07:44

## 2023-10-12 RX ADMIN — POTASSIUM CHLORIDE 10 MEQ: 10 CAPSULE, COATED, EXTENDED RELEASE ORAL at 07:43

## 2023-10-12 RX ADMIN — Medication 400 MG: at 07:43

## 2023-10-12 RX ADMIN — BUMETANIDE 1 MG: 0.25 INJECTION INTRAMUSCULAR; INTRAVENOUS at 21:21

## 2023-10-12 RX ADMIN — SODIUM CHLORIDE, PRESERVATIVE FREE 10 ML: 5 INJECTION INTRAVENOUS at 21:21

## 2023-10-12 RX ADMIN — APIXABAN 2.5 MG: 2.5 TABLET, FILM COATED ORAL at 07:43

## 2023-10-12 RX ADMIN — APIXABAN 2.5 MG: 2.5 TABLET, FILM COATED ORAL at 21:21

## 2023-10-12 RX ADMIN — ALLOPURINOL 100 MG: 100 TABLET ORAL at 07:43

## 2023-10-12 RX ADMIN — IRON SUCROSE 100 MG: 20 INJECTION, SOLUTION INTRAVENOUS at 15:40

## 2023-10-12 RX ADMIN — BUMETANIDE 1 MG: 0.25 INJECTION INTRAMUSCULAR; INTRAVENOUS at 11:54

## 2023-10-12 ASSESSMENT — PAIN SCALES - GENERAL: PAINLEVEL_OUTOF10: 0

## 2023-10-12 NOTE — CONSULTS
Department of Internal Medicine  Nephrology Juan Mera MD Consult Note      SUBJECTIVE:   This is a 82 y.o. male with a significant past medical history of CHF with EF of 50%, has AICD, CAD status post CABG, CKD stage IIIa, lymphedema, paroxysmal A-fib on Eliquis, hypertension, who presented with complaints of worsening shortness of breath  and leg swelling. patient denied any chest pain or fever or cough on presentation, hemodynamically was stable, was admitted to the hospital for acute CHF exacerbation nephrology were consulted for the management of diuresis taken into consideration the history of CKD, baseline creatinine of 1.5-1.7, normally has good urine output. Patient was on Lasix 40 mg twice daily and Bumex 2 mg twice daily from home meds, on lisinopril for hypertension and metoprolol for coronary artery disease and heart failure. On exam today patient states shortness of breath is significantly improved, denies any chest pain or shortness of breath this morning, denies any fever or chills, patient was not on any O2 at home, currently on 2 L O2 NC satting 97%. Patient has lymphedema, dressing for chronic lower limbs ulcers appreciated.  ml overnight. Lost 11 pounds since admission. Overall negative fluid balance . Patient has no known allergies.     Past Medical History:   Diagnosis Date    Arthritis     CHF (congestive heart failure) (HCC)     Chronic kidney disease     History of blood transfusion     Hyperlipidemia        Scheduled Meds:   metoprolol tartrate  25 mg Oral BID    apixaban  2.5 mg Oral BID    rosuvastatin  20 mg Oral Daily    clopidogrel  75 mg Oral Daily    allopurinol  100 mg Oral Daily    potassium chloride  10 mEq Oral Daily    magnesium oxide  400 mg Oral BID    fluticasone  2 spray Each Nostril Daily    sodium chloride flush  5-40 mL IntraVENous 2 times per day     Continuous Infusions:   sodium chloride       PRN Meds:.ipratropium 0.5 mg-albuterol 2.5 mg, sodium obvious abnormality, atraumatic     Cardiovascular/Edema:  Normal S1,S2, regular rate and rhythm, bilateral lower limb swelling, pitting edema could not be appreciated      Respiratory: Lungs: Crackles and Rhonchi appreciated of the lungs bilaterally , on 2L NC    Abdomen:  Distended, non tender    Back: negative, symmetric, no curvature. ROM normal. No CVA tenderness. Extremities:  edema, ulcer (chronic ) with dressing appreciated     Neuro:  Grossly normal      CBC:   Recent Labs     10/09/23  1054   WBC 7.2   HGB 7.9*        BMP:    Recent Labs     10/10/23  0619 10/11/23  0658 10/12/23  0433    143 139   K 4.6 4.6 4.1    101 102   CO2 31 31 30   BUN 22 27* 34*   CREATININE 1.4* 1.8* 1.7*   GLUCOSE 107* 108* 117*       Lab Results   Component Value Date/Time    NITRU NEGATIVE 04/05/2022 05:34 PM    COLORU Yellow 04/05/2022 05:34 PM    PHUR 8.0 04/05/2022 05:34 PM    SPECGRAV 1.007 04/05/2022 05:34 PM    LEUKOCYTESUR NEGATIVE 04/05/2022 05:34 PM    UROBILINOGEN Normal 04/05/2022 05:34 PM    BILIRUBINUR NEGATIVE 04/05/2022 05:34 PM    GLUCOSEU NEGATIVE 04/05/2022 05:34 PM    KETUA NEGATIVE 04/05/2022 05:34 PM     Urine Sodium:     Lab Results   Component Value Date/Time    SHANIA 78 04/05/2022 05:34 PM     Urine Potassium:  No results found for: \"KUR\"  Urine Chloride:  No results found for: \"CLUR\"  Urine Osmolarity: No results found for: \"OSMOU\"  Urine Protein:   No results found for: \"TPU\"  Urine Creatinine:     Lab Results   Component Value Date/Time    LABCREA 37.1 04/05/2022 05:34 PM     Urine Eosinophils:  No components found for: \"UEOS\"      IMPRESSION/RECOMMENDATIONS:      - Acute on Chronic CHF: Echo on this admission EF of 50%, presented for SOB and fluid overload, negative fluid balance. Bumex 1mg BID  Strict daily wt and  I/Os    - CKD IIIa, Scr baseline of 1.5-1.6, Cr 1.8 on presentation , 500mL UO overnight. BUN 34.    Bumex 1mg BID   Bladder scan    - Anemia, likely of chronic

## 2023-10-12 NOTE — PROGRESS NOTES
Pvr > than 250. Patietn also incontinent of urine. Hedrick placed for fluid management of critically ill patient.

## 2023-10-12 NOTE — CARE COORDINATION
ONGOING DISCHARGE PLAN:    Patient is alert and oriented x4. Spoke with patient regarding discharge plan and patient confirms that plan is still to return to home w/ Daughter Jennifer Bazzi. Pt. Will have VNS- [facility] on DC. Pt currently sating, 96-98% on 2LNC. Bipap at HS. Pt. Does NOT have Oxygen at home. Pulmonary on board. BNP today 2998, Remains on IV Bumex, 1 MG, BID,  Eliquis PTA, Cardio on board. Cr today 1.7, BUN 34, Nephro continues to follow. Will continue to follow for additional discharge needs. If patient is discharged prior to next notation, then this note serves as note for discharge by case management.     Electronically signed by Staci Ramírez RN on 10/12/2023 at 12:58 PM

## 2023-10-12 NOTE — PROGRESS NOTES
Physician Progress Note      PATIENT:               Jacqueline Hernandez  [facility] #:                  [id number]  :                       1935  ADMIT DATE:       10/9/2023 9:43 AM  DISCH DATE:  RESPONDING  PROVIDER #:        Rikki Aviles MD        QUERY TEXT:    Stage of Chronic Kidney Disease: Please provide further specificity, if known. Clinical indicators include: ckd, chronic kidney disease, bun, creatinine, bnp  Options provided:  -- Chronic kidney disease stage 1  -- Chronic kidney disease stage 2  -- Chronic kidney disease stage 3  -- Chronic kidney disease stage 3a  -- Chronic kidney disease stage 3b  -- Chronic kidney disease stage 4  -- Chronic kidney disease stage 5  -- Chronic kidney disease stage 5, requiring dialysis  -- End stage renal disease  -- Other - I will add my own diagnosis  -- Disagree - Not applicable / Not valid  -- Disagree - Clinically Unable to determine / Unknown        PROVIDER RESPONSE TEXT:    The patient has chronic kidney disease stage 3a. QUERY TEXT:    Pt admitted with Acute on chronic CHF documented. If possible, please document   in progress notes and discharge summary further specificity regarding the   type of CHF:    The medical record reflects the following:  Risk Factors: Hx HTN CHF CKD  Clinical Indicators: per 22 ECHO-->estimated EF > 55%; BNP 1096-->1675;   CXR-->Low lung volumes and bibasilar atelectasis, small bilateral pleural   effusion; per H/P--> extremities 2+ edema bilaterally;  Treatment: IV Bumex, BNP level q other day, new ECHO pending, CXR, daily   weights, I/O Q8H, cardio consult  Options provided:  -- Acute on Chronic Diastolic CHF/HFpEF  -- Other - I will add my own diagnosis  -- Disagree - Not applicable / Not valid  -- Disagree - Clinically unable to determine / Unknown  -- Refer to Clinical Documentation Reviewer    PROVIDER RESPONSE TEXT:    This patient is in acute on chronic diastolic CHF/HFpEF.     Query created by: Darwyn Denver,

## 2023-10-12 NOTE — PROGRESS NOTES
Patient bladder scanned . Notified dr Jenn Quezada he wants strict I/o. Discussed that this is impossible bc patient does not know when he has to go to the bathroom. Instructed to do another pvr and If greater than 250 to place valadez.

## 2023-10-12 NOTE — PROGRESS NOTES
Physical Therapy  Facility/Department: [facility] ICU  Physical Therapy Initial Assessment    Name: Magdy Roth  : 1935  MRN: [id number]  Date of Service: 10/12/2023    Discharge Recommendations:  Patient would benefit from continued therapy after discharge          Patient Diagnosis(es): The primary encounter diagnosis was Acute on chronic congestive heart failure, unspecified heart failure type ([address]). Diagnoses of Acute congestive heart failure, unspecified heart failure type ([address]), SOB (shortness of breath), Paroxysmal A-fib ([address]), and Lymphedema of both lower extremities were also pertinent to this visit. Past Medical History:  has a past medical history of Arthritis, CHF (congestive heart failure) ([address]), Chronic kidney disease, History of blood transfusion, and Hyperlipidemia. Past Surgical History:  has a past surgical history that includes joint replacement; Cardiac surgery (); and pacemaker placement (Right, ). Assessment   Body Structures, Functions, Activity Limitations Requiring Skilled Therapeutic Intervention: Decreased functional mobility ; Decreased endurance;Decreased posture;Decreased balance;Decreased safe awareness  Assessment: Pt presents to ER with c/o worsening SOB and LE edema. During PT evaluation, pt demo's impaired endurance and elevated HR with activity, balance deficits, needs 2 assist with mobility and is a high fall risk. Pt will benefit from acute PT services to meet goals per POC and prepare for d/c. Treatment Diagnosis: impaired mobility and decreased IND d/t endurance deficits  Therapy Prognosis: Fair  Decision Making: Medium Complexity  History: The patient is a 82 y.o. male who presents to [facility] with complaints of increasing shortness of breath and worsening lower extremity swelling for the past few days.  He states that he has been taking his diuretics as prescribed and urinating per usual. He has multiple co-morbid conditions including CHF, Lymphedema

## 2023-10-12 NOTE — PROGRESS NOTES
[id number] [facility]   INPATIENT OCCUPATIONAL THERAPY  PROGRESS NOTE  Date: 10/12/2023  Patient Name: Jannie Hussein       Room:   MRN: [id number]    : 1935  (82 y.o.)  Gender: male   Referring Practitioner: Luis Armando Cheng MD  Diagnosis: Acute CHF    Discharge Recommendations:  Further Occupational Therapy is recommended upon facility discharge. OT Equipment Recommendations  Other: TBD    Restrictions/Precautions  Restrictions/Precautions  Restrictions/Precautions: Fall Risk;General Precautions; Bed Alarm; Other (comment) (up with assist)  Required Braces or Orthoses?: No  Implants present? : Metal implants; Pacemaker ([employer], pacermaker, h/o CABG)  Position Activity Restriction  Other position/activity restrictions: telemetry, O2 at 2L, valadez catheter    Vitals  Pulse:  (HR  bpm; increases with activity)  Heart Rate Source: Monitor  BP:  (/73 prior to mobility, 156/61 after mobility when pt back in bed.)  BP Location: Right upper arm  BP Method: Automatic  SpO2:  (94-97% on 2L O2)  O2 Device: Nasal cannula    Subjective  Subjective  Subjective: \"I want to take a nap\" Pt is plesanat and agreeable for therapy  Subjective  Pain: Pt denied pain  Comments: Okay for OT treat per RN Sabiha    Objective  Orientation  Overall Orientation Status: Within Functional Limits  Cognition  Overall Cognitive Status: Exceptions  Arousal/Alertness: Appropriate responses to stimuli  Following Commands:  Follows one step commands with repetition (K[place])  Attention Span: Appears intact  Safety Judgement: Decreased awareness of need for safety  Problem Solving: Assistance required to generate solutions;Assistance required to identify errors made;Assistance required to implement solutions  Insights: Decreased awareness of deficits  Initiation: Requires cues for some  Sequencing: Requires cues for some    Activities of Daily Living  ADL  Feeding: Setup  Grooming: Setup  UE Bathing: Stand by

## 2023-10-13 ENCOUNTER — APPOINTMENT (OUTPATIENT)
Dept: GENERAL RADIOLOGY | Age: 88
End: 2023-10-13
Payer: MEDICARE

## 2023-10-13 LAB
ANION GAP SERPL CALCULATED.3IONS-SCNC: 8 MMOL/L (ref 9–17)
B PARAP IS1001 DNA NPH QL NAA+NON-PROBE: NOT DETECTED
B PERT DNA SPEC QL NAA+PROBE: NOT DETECTED
BUN SERPL-MCNC: 49 MG/DL (ref 8–23)
C PNEUM DNA NPH QL NAA+NON-PROBE: NOT DETECTED
CALCIUM SERPL-MCNC: 8.5 MG/DL (ref 8.6–10.4)
CHLORIDE SERPL-SCNC: 98 MMOL/L (ref 98–107)
CO2 SERPL-SCNC: 30 MMOL/L (ref 20–31)
CREAT SERPL-MCNC: 1.8 MG/DL (ref 0.7–1.2)
ERYTHROCYTE [DISTWIDTH] IN BLOOD BY AUTOMATED COUNT: 20.6 % (ref 11.5–14.9)
FLUAV RNA NPH QL NAA+NON-PROBE: NOT DETECTED
FLUBV RNA NPH QL NAA+NON-PROBE: NOT DETECTED
GFR SERPL CREATININE-BSD FRML MDRD: 36 ML/MIN/1.73M2
GLUCOSE SERPL-MCNC: 127 MG/DL (ref 70–99)
HADV DNA NPH QL NAA+NON-PROBE: NOT DETECTED
HCOV 229E RNA NPH QL NAA+NON-PROBE: NOT DETECTED
HCOV HKU1 RNA NPH QL NAA+NON-PROBE: NOT DETECTED
HCOV NL63 RNA NPH QL NAA+NON-PROBE: NOT DETECTED
HCOV OC43 RNA NPH QL NAA+NON-PROBE: NOT DETECTED
HCT VFR BLD AUTO: 25.2 % (ref 41–53)
HGB BLD-MCNC: 7.3 G/DL (ref 13.5–17.5)
HMPV RNA NPH QL NAA+NON-PROBE: NOT DETECTED
HPIV1 RNA NPH QL NAA+NON-PROBE: NOT DETECTED
HPIV2 RNA NPH QL NAA+NON-PROBE: NOT DETECTED
HPIV3 RNA NPH QL NAA+NON-PROBE: NOT DETECTED
HPIV4 RNA NPH QL NAA+NON-PROBE: NOT DETECTED
M PNEUMO DNA NPH QL NAA+NON-PROBE: NOT DETECTED
MAGNESIUM SERPL-MCNC: 2.5 MG/DL (ref 1.6–2.6)
MCH RBC QN AUTO: 21 PG (ref 26–34)
MCHC RBC AUTO-ENTMCNC: 29.2 G/DL (ref 31–37)
MCV RBC AUTO: 72 FL (ref 80–100)
PLATELET # BLD AUTO: 210 K/UL (ref 150–450)
PMV BLD AUTO: 6.8 FL (ref 6–12)
POTASSIUM SERPL-SCNC: 3.7 MMOL/L (ref 3.7–5.3)
RBC # BLD AUTO: 3.49 M/UL (ref 4.5–5.9)
RSV RNA NPH QL NAA+NON-PROBE: NOT DETECTED
RV+EV RNA NPH QL NAA+NON-PROBE: DETECTED
SARS-COV-2 RNA NPH QL NAA+NON-PROBE: NOT DETECTED
SODIUM SERPL-SCNC: 136 MMOL/L (ref 135–144)
SPECIMEN DESCRIPTION: ABNORMAL
WBC OTHER # BLD: 12.1 K/UL (ref 3.5–11)

## 2023-10-13 PROCEDURE — 97530 THERAPEUTIC ACTIVITIES: CPT

## 2023-10-13 PROCEDURE — 99232 SBSQ HOSP IP/OBS MODERATE 35: CPT | Performed by: FAMILY MEDICINE

## 2023-10-13 PROCEDURE — 36415 COLL VENOUS BLD VENIPUNCTURE: CPT

## 2023-10-13 PROCEDURE — 6370000000 HC RX 637 (ALT 250 FOR IP): Performed by: FAMILY MEDICINE

## 2023-10-13 PROCEDURE — 6360000002 HC RX W HCPCS: Performed by: NURSE PRACTITIONER

## 2023-10-13 PROCEDURE — 99233 SBSQ HOSP IP/OBS HIGH 50: CPT | Performed by: INTERNAL MEDICINE

## 2023-10-13 PROCEDURE — 6370000000 HC RX 637 (ALT 250 FOR IP): Performed by: INTERNAL MEDICINE

## 2023-10-13 PROCEDURE — 2580000003 HC RX 258: Performed by: FAMILY MEDICINE

## 2023-10-13 PROCEDURE — 2700000000 HC OXYGEN THERAPY PER DAY

## 2023-10-13 PROCEDURE — 2500000003 HC RX 250 WO HCPCS: Performed by: INTERNAL MEDICINE

## 2023-10-13 PROCEDURE — 71045 X-RAY EXAM CHEST 1 VIEW: CPT

## 2023-10-13 PROCEDURE — 94660 CPAP INITIATION&MGMT: CPT

## 2023-10-13 PROCEDURE — 94761 N-INVAS EAR/PLS OXIMETRY MLT: CPT

## 2023-10-13 PROCEDURE — 0202U NFCT DS 22 TRGT SARS-COV-2: CPT

## 2023-10-13 PROCEDURE — 83735 ASSAY OF MAGNESIUM: CPT

## 2023-10-13 PROCEDURE — 2060000000 HC ICU INTERMEDIATE R&B

## 2023-10-13 PROCEDURE — 94640 AIRWAY INHALATION TREATMENT: CPT

## 2023-10-13 PROCEDURE — 80048 BASIC METABOLIC PNL TOTAL CA: CPT

## 2023-10-13 PROCEDURE — 85027 COMPLETE CBC AUTOMATED: CPT

## 2023-10-13 RX ORDER — ALBUTEROL SULFATE 2.5 MG/3ML
2.5 SOLUTION RESPIRATORY (INHALATION)
Status: DISCONTINUED | OUTPATIENT
Start: 2023-10-13 | End: 2023-10-21

## 2023-10-13 RX ADMIN — Medication 400 MG: at 08:30

## 2023-10-13 RX ADMIN — POTASSIUM CHLORIDE 10 MEQ: 10 CAPSULE, COATED, EXTENDED RELEASE ORAL at 08:30

## 2023-10-13 RX ADMIN — SODIUM CHLORIDE, PRESERVATIVE FREE 10 ML: 5 INJECTION INTRAVENOUS at 08:31

## 2023-10-13 RX ADMIN — ALLOPURINOL 100 MG: 100 TABLET ORAL at 08:30

## 2023-10-13 RX ADMIN — FLUTICASONE PROPIONATE 2 SPRAY: 50 SPRAY, METERED NASAL at 08:36

## 2023-10-13 RX ADMIN — APIXABAN 2.5 MG: 2.5 TABLET, FILM COATED ORAL at 21:09

## 2023-10-13 RX ADMIN — SODIUM CHLORIDE, PRESERVATIVE FREE 10 ML: 5 INJECTION INTRAVENOUS at 21:09

## 2023-10-13 RX ADMIN — Medication 400 MG: at 21:10

## 2023-10-13 RX ADMIN — BUMETANIDE 1 MG: 0.25 INJECTION INTRAMUSCULAR; INTRAVENOUS at 21:09

## 2023-10-13 RX ADMIN — ALBUTEROL SULFATE 2.5 MG: 2.5 SOLUTION RESPIRATORY (INHALATION) at 14:05

## 2023-10-13 RX ADMIN — ROSUVASTATIN CALCIUM 20 MG: 20 TABLET, FILM COATED ORAL at 09:05

## 2023-10-13 RX ADMIN — METOPROLOL TARTRATE 25 MG: 25 TABLET, FILM COATED ORAL at 08:30

## 2023-10-13 RX ADMIN — APIXABAN 2.5 MG: 2.5 TABLET, FILM COATED ORAL at 08:30

## 2023-10-13 RX ADMIN — BUMETANIDE 1 MG: 0.25 INJECTION INTRAMUSCULAR; INTRAVENOUS at 08:30

## 2023-10-13 NOTE — CARE COORDINATION
ONGOING DISCHARGE PLAN:    Pt transferred from ICU today. Patient is alert and oriented x4. Spoke with patient regarding discharge plan and patient confirms that plan is still  to return to home w/ Daughter Anabella Delgado. Pt. Will have [facility]NS- [facility] on DC. Pt currently sating, 92% on 2LNC. Bipap at HS. Pt. Does NOT have Oxygen at home. CXR ordered for today. Pulmonary on board. Remains on IV Bumex, Eliquis PTA, Cardio continues to follow. Will continue to follow for additional discharge needs. If patient is discharged prior to next notation, then this note serves as note for discharge by case management.     Electronically signed by Henry Arredondo RN on 10/13/2023 at 3:28 PM

## 2023-10-13 NOTE — PROGRESS NOTES
Spoke with Dr. Samantha Dupree, pt had PVR >250mL x 2 previously which is why valadez was placed. MD wants to monitor OP closely due to diuretics and high OP. Maintain valadez for today, will reassess tomorrow. If valadez is removed will need to do PVR bladder scan qshift and if retention >300 may need to replace valadez. Also if valadez is removed will need external so that I/O are accurate.  As of writing no changes are to be made per MD.

## 2023-10-13 NOTE — PROGRESS NOTES
[id number] [facility]   INPATIENT OCCUPATIONAL THERAPY  PROGRESS NOTE  Date: 10/13/2023  Patient Name: Edwina Ronquillo       Room:   MRN: [id number]    : 1935  (82 y.o.)  Gender: male   Referring Practitioner: Suzie Fournier MD  Diagnosis: Acute CHF      Discharge Recommendations:  Further Occupational Therapy is recommended upon facility discharge. OT Equipment Recommendations  Other: TBD    Restrictions/Precautions  Restrictions/Precautions  Restrictions/Precautions: Fall Risk;General Precautions; Bed Alarm; Other (comment)  Required Braces or Orthoses?: No  Implants present? : Metal implants; Pacemaker  Position Activity Restriction  Other position/activity restrictions: telemetry, O2 at 2L, valadez catheter         Subjective  Subjective  Subjective: \" I want to sit in a chair \" Pt is pleasant and cooperative  Subjective  Pain: Pt denied pain  Comments: RN ok'd therapy this date    Objective  Orientation  Overall Orientation Status: Within Functional Limits  Orientation Level: Oriented X4  Cognition  Overall Cognitive Status: Exceptions  Arousal/Alertness: Appropriate responses to stimuli  Following Commands: Follows one step commands with repetition  Attention Span: Appears intact  Problem Solving: Assistance required to generate solutions;Assistance required to identify errors made;Assistance required to implement solutions  Insights: Decreased awareness of deficits  Initiation: Requires cues for some  Sequencing: Requires cues for some    Activities of Daily Living  ADL  Feeding: Setup  Grooming: Setup  UE Bathing: Stand by assistance  LE Bathing: Moderate assistance  UE Dressing: Stand by assistance  LE Dressing: Moderate assistance  Toileting: Dependent/Total  Additional Comments: ADL scores based on skilled observation and clinical reasoning, unless otherwise noted. Pt is primarily limited due to SOB, decreased balance, and decreased strength.     Balance  Balance  Sitting Balance: Stand by assistance (seated EOB and in recliner)  Standing Balance: Minimal assistance (min-CGA)  Standing Balance  Time: 1 min  Activity: functional transfer from EOB to bed side chair    Transfers/Mobility  Bed mobility  Supine to Sit: Minimal assistance (for advancing trunk to upright)  Transfers  Sit to stand: Contact guard assistance (from EOB)  Stand to sit: Contact guard assistance  Transfer Comments: completed from EOB to bed side chair    Functional Mobility  Functional - Mobility Device:  (refused device completing short distance from EOB to bed side chair)  Assist Level: Minimal assistance  Functional Mobility Comments: No LOB noted      OT Exercises  Exercise Treatment: Pt completed BUE exercises no weight to increase endurance, cues for deep breathing throughout.     Patient Education  Patient Education  Education Given To: Patient  Education Provided: Role of Therapy, Plan of Care  Education Method: Verbal  Barriers to Learning: Hearing  Education Outcome: Verbalized understanding, Demonstrated understanding, Continued education needed    Goals  Short Term Goals  Time Frame for Short Term Goals: By discharge, pt will  Short Term Goal 1: perform BADLs mod I and Good safety  Short Term Goal 2: V/D use of adaptive techniques/equipment/DME to increase IND during self care tasks  Short Term Goal 3: perform functional transfers/mobility mod I, using least restrictive device  Short Term Goal 4: tolerate standing for 4+ minutes mod I during functional activity of choice  Short Term Goal 5: actively participate in 15+ minutes of therapeutic exercise/functional activity to promote safety and independence with self care and mobility  Occupational Therapy Plan  Times Per Week: 3-5  Current Treatment Recommendations: Strengthening, Balance training, Functional mobility training, Endurance training, Safety education & training, Patient/Caregiver education & training, Equipment evaluation, education, &

## 2023-10-13 NOTE — PROGRESS NOTES
Department of Internal Medicine  Nephrology Maurilio Kaur MD progress note    Mary Huang history. Patient seen and examined, not in acute distress  Patient still have wheezing and rhonchi's. Edema present  Urine output 2.3 L urine production yesterday in 24 hours with IV Bumex  Creatinine 1.8 mg/dL    SUBJECTIVE:   This is a 82 y.o. male with a significant past medical history of CHF with EF of 50%, has AICD, CAD status post CABG, CKD stage IIIa, lymphedema, paroxysmal A-fib on Eliquis, hypertension, who presented with complaints of worsening shortness of breath  and leg swelling. patient denied any chest pain or fever or cough on presentation, hemodynamically was stable, was admitted to the hospital for acute CHF exacerbation nephrology were consulted for the management of diuresis taken into consideration the history of CKD, baseline creatinine of 1.5-1.7, normally has good urine output. Patient was on Lasix 40 mg twice daily and Bumex 2 mg twice daily from home meds, on lisinopril for hypertension and metoprolol for coronary artery disease and heart failure. On exam today patient states shortness of breath is significantly improved, denies any chest pain or shortness of breath this morning, denies any fever or chills, patient was not on any O2 at home, currently on 2 L O2 NC satting 97%. Patient has lymphedema, dressing for chronic lower limbs ulcers appreciated.  ml overnight. Lost 11 pounds since admission. Overall negative fluid balance . Patient has no known allergies.     Past Medical History:   Diagnosis Date    Arthritis     CHF (congestive heart failure) (HCC)     Chronic kidney disease     History of blood transfusion     Hyperlipidemia        Scheduled Meds:   albuterol  2.5 mg Nebulization 4x Daily RT    bumetanide  1 mg IntraVENous Q12H    iron sucrose  100 mg IntraVENous Q48H    metoprolol tartrate  25 mg Oral BID    apixaban  2.5 mg Oral BID    rosuvastatin  20 mg Oral Daily

## 2023-10-13 NOTE — PROGRESS NOTES
Patient transferred to [facility]. Called and gave [facility] Transferred patent with laverne

## 2023-10-14 ENCOUNTER — APPOINTMENT (OUTPATIENT)
Dept: GENERAL RADIOLOGY | Age: 88
End: 2023-10-14
Payer: MEDICARE

## 2023-10-14 LAB
ANION GAP SERPL CALCULATED.3IONS-SCNC: 11 MMOL/L (ref 9–17)
ARTERIAL PATENCY WRIST A: ABNORMAL
BDY SITE: ABNORMAL
BNP SERPL-MCNC: 4804 PG/ML
BUN SERPL-MCNC: 60 MG/DL (ref 8–23)
CALCIUM SERPL-MCNC: 8.6 MG/DL (ref 8.6–10.4)
CHLORIDE SERPL-SCNC: 97 MMOL/L (ref 98–107)
CO2 SERPL-SCNC: 30 MMOL/L (ref 20–31)
COHGB MFR BLD: 1.3 % (ref 0–5)
CREAT SERPL-MCNC: 2 MG/DL (ref 0.7–1.2)
ERYTHROCYTE [DISTWIDTH] IN BLOOD BY AUTOMATED COUNT: 21 % (ref 11.5–14.9)
GAS FLOW.O2 O2 DELIVERY SYS: ABNORMAL L/MIN
GFR SERPL CREATININE-BSD FRML MDRD: 32 ML/MIN/1.73M2
GLUCOSE SERPL-MCNC: 122 MG/DL (ref 70–99)
HCO3 ARTERIAL: 31.2 MMOL/L (ref 22–26)
HCT VFR BLD AUTO: 24.4 % (ref 41–53)
HGB BLD-MCNC: 7.2 G/DL (ref 13.5–17.5)
LACTATE BLDV-SCNC: 1.5 MMOL/L (ref 0.5–2.2)
MAGNESIUM SERPL-MCNC: 2.7 MG/DL (ref 1.6–2.6)
MCH RBC QN AUTO: 21 PG (ref 26–34)
MCHC RBC AUTO-ENTMCNC: 29.4 G/DL (ref 31–37)
MCV RBC AUTO: 71.3 FL (ref 80–100)
METHEMOGLOBIN: 1.2 % (ref 0–1.9)
O2 SAT, ARTERIAL: 95.8 % (ref 95–98)
PCO2 ARTERIAL: 55.2 MMHG (ref 35–45)
PH ARTERIAL: 7.36 (ref 7.35–7.45)
PLATELET # BLD AUTO: 264 K/UL (ref 150–450)
PMV BLD AUTO: 7.2 FL (ref 6–12)
PO2 ARTERIAL: 103 MMHG (ref 80–100)
POSITIVE BASE EXCESS, ART: 5.7 MMOL/L (ref 0–2)
POTASSIUM SERPL-SCNC: 3.5 MMOL/L (ref 3.7–5.3)
PROCALCITONIN SERPL-MCNC: 0.55 NG/ML
PT. POSITION: ABNORMAL
RBC # BLD AUTO: 3.42 M/UL (ref 4.5–5.9)
RESPIRATORY RATE: 36
SODIUM SERPL-SCNC: 138 MMOL/L (ref 135–144)
WBC OTHER # BLD: 18.3 K/UL (ref 3.5–11)

## 2023-10-14 PROCEDURE — 6360000002 HC RX W HCPCS: Performed by: INTERNAL MEDICINE

## 2023-10-14 PROCEDURE — 82805 BLOOD GASES W/O2 SATURATION: CPT

## 2023-10-14 PROCEDURE — 2700000000 HC OXYGEN THERAPY PER DAY

## 2023-10-14 PROCEDURE — 2580000003 HC RX 258: Performed by: FAMILY MEDICINE

## 2023-10-14 PROCEDURE — 2500000003 HC RX 250 WO HCPCS: Performed by: INTERNAL MEDICINE

## 2023-10-14 PROCEDURE — 99232 SBSQ HOSP IP/OBS MODERATE 35: CPT | Performed by: FAMILY MEDICINE

## 2023-10-14 PROCEDURE — 85027 COMPLETE CBC AUTOMATED: CPT

## 2023-10-14 PROCEDURE — 83605 ASSAY OF LACTIC ACID: CPT

## 2023-10-14 PROCEDURE — 71045 X-RAY EXAM CHEST 1 VIEW: CPT

## 2023-10-14 PROCEDURE — 2580000003 HC RX 258: Performed by: INTERNAL MEDICINE

## 2023-10-14 PROCEDURE — 80048 BASIC METABOLIC PNL TOTAL CA: CPT

## 2023-10-14 PROCEDURE — 84145 PROCALCITONIN (PCT): CPT

## 2023-10-14 PROCEDURE — 83735 ASSAY OF MAGNESIUM: CPT

## 2023-10-14 PROCEDURE — 2580000003 HC RX 258

## 2023-10-14 PROCEDURE — 94660 CPAP INITIATION&MGMT: CPT

## 2023-10-14 PROCEDURE — 94761 N-INVAS EAR/PLS OXIMETRY MLT: CPT

## 2023-10-14 PROCEDURE — 2000000000 HC ICU R&B

## 2023-10-14 PROCEDURE — 6370000000 HC RX 637 (ALT 250 FOR IP): Performed by: FAMILY MEDICINE

## 2023-10-14 PROCEDURE — 36415 COLL VENOUS BLD VENIPUNCTURE: CPT

## 2023-10-14 PROCEDURE — 87040 BLOOD CULTURE FOR BACTERIA: CPT

## 2023-10-14 PROCEDURE — 6360000002 HC RX W HCPCS

## 2023-10-14 PROCEDURE — 83880 ASSAY OF NATRIURETIC PEPTIDE: CPT

## 2023-10-14 PROCEDURE — 94640 AIRWAY INHALATION TREATMENT: CPT

## 2023-10-14 PROCEDURE — 36600 WITHDRAWAL OF ARTERIAL BLOOD: CPT

## 2023-10-14 PROCEDURE — 6360000002 HC RX W HCPCS: Performed by: NURSE PRACTITIONER

## 2023-10-14 PROCEDURE — 6370000000 HC RX 637 (ALT 250 FOR IP): Performed by: INTERNAL MEDICINE

## 2023-10-14 RX ORDER — BUMETANIDE 0.25 MG/ML
2 INJECTION INTRAMUSCULAR; INTRAVENOUS 2 TIMES DAILY
Status: DISCONTINUED | OUTPATIENT
Start: 2023-10-14 | End: 2023-10-20

## 2023-10-14 RX ORDER — 0.9 % SODIUM CHLORIDE 0.9 %
500 INTRAVENOUS SOLUTION INTRAVENOUS ONCE
Status: COMPLETED | OUTPATIENT
Start: 2023-10-14 | End: 2023-10-14

## 2023-10-14 RX ORDER — LINEZOLID 2 MG/ML
600 INJECTION, SOLUTION INTRAVENOUS EVERY 12 HOURS
Status: DISCONTINUED | OUTPATIENT
Start: 2023-10-14 | End: 2023-10-14

## 2023-10-14 RX ORDER — LINEZOLID 2 MG/ML
600 INJECTION, SOLUTION INTRAVENOUS EVERY 12 HOURS
Status: COMPLETED | OUTPATIENT
Start: 2023-10-15 | End: 2023-10-21

## 2023-10-14 RX ORDER — NOREPINEPHRINE BITARTRATE 0.06 MG/ML
1-30 INJECTION, SOLUTION INTRAVENOUS CONTINUOUS
Status: DISCONTINUED | OUTPATIENT
Start: 2023-10-14 | End: 2023-10-17

## 2023-10-14 RX ADMIN — ACETAMINOPHEN 650 MG: 325 TABLET ORAL at 15:05

## 2023-10-14 RX ADMIN — ALBUTEROL SULFATE 2.5 MG: 2.5 SOLUTION RESPIRATORY (INHALATION) at 15:03

## 2023-10-14 RX ADMIN — CEFEPIME 2000 MG: 2 INJECTION, POWDER, FOR SOLUTION INTRAVENOUS at 17:51

## 2023-10-14 RX ADMIN — IRON SUCROSE 100 MG: 20 INJECTION, SOLUTION INTRAVENOUS at 13:37

## 2023-10-14 RX ADMIN — SODIUM CHLORIDE 500 ML: 9 INJECTION, SOLUTION INTRAVENOUS at 21:12

## 2023-10-14 RX ADMIN — ROSUVASTATIN CALCIUM 20 MG: 20 TABLET, FILM COATED ORAL at 11:23

## 2023-10-14 RX ADMIN — SODIUM CHLORIDE: 9 INJECTION, SOLUTION INTRAVENOUS at 13:31

## 2023-10-14 RX ADMIN — LINEZOLID 600 MG: 600 INJECTION, SOLUTION INTRAVENOUS at 17:52

## 2023-10-14 RX ADMIN — METOPROLOL TARTRATE 25 MG: 25 TABLET, FILM COATED ORAL at 09:32

## 2023-10-14 RX ADMIN — ALBUTEROL SULFATE 2.5 MG: 2.5 SOLUTION RESPIRATORY (INHALATION) at 19:51

## 2023-10-14 RX ADMIN — BUMETANIDE 1 MG: 0.25 INJECTION INTRAMUSCULAR; INTRAVENOUS at 09:32

## 2023-10-14 RX ADMIN — SODIUM CHLORIDE, PRESERVATIVE FREE 10 ML: 5 INJECTION INTRAVENOUS at 09:33

## 2023-10-14 RX ADMIN — FLUTICASONE PROPIONATE 2 SPRAY: 50 SPRAY, METERED NASAL at 09:33

## 2023-10-14 RX ADMIN — ALBUTEROL SULFATE 2.5 MG: 2.5 SOLUTION RESPIRATORY (INHALATION) at 07:02

## 2023-10-14 RX ADMIN — ALLOPURINOL 100 MG: 100 TABLET ORAL at 09:32

## 2023-10-14 RX ADMIN — ALBUTEROL SULFATE 2.5 MG: 2.5 SOLUTION RESPIRATORY (INHALATION) at 11:11

## 2023-10-14 RX ADMIN — SODIUM CHLORIDE 500 ML: 9 INJECTION, SOLUTION INTRAVENOUS at 17:53

## 2023-10-14 RX ADMIN — APIXABAN 2.5 MG: 2.5 TABLET, FILM COATED ORAL at 09:32

## 2023-10-14 RX ADMIN — POTASSIUM CHLORIDE 10 MEQ: 10 CAPSULE, COATED, EXTENDED RELEASE ORAL at 09:32

## 2023-10-14 ASSESSMENT — PAIN SCALES - GENERAL: PAINLEVEL_OUTOF10: 0

## 2023-10-14 NOTE — PLAN OF CARE
Problem: Discharge Planning  Goal: Discharge to home or other facility with appropriate resources  10/14/2023 1746 by Adriaan Adair RN  Outcome: Progressing     Problem: Safety - Adult  Goal: Free from fall injury  10/14/2023 1746 by Teena Duncan RN  Outcome: Progressing     Problem: Skin/Tissue Integrity  Goal: Absence of new skin breakdown  Description: 1. Monitor for areas of redness and/or skin breakdown  2. Assess vascular access sites hourly  3. Every 4-6 hours minimum:  Change oxygen saturation probe site  4. Every 4-6 hours:  If on nasal continuous positive airway pressure, respiratory therapy assess nares and determine need for appliance change or resting period.   10/14/2023 1746 by Adriana Adair RN  Outcome: Progressing     Problem: ABCDS Injury Assessment  Goal: Absence of physical injury  10/14/2023 1746 by Adriana Adair RN  Outcome: Progressing

## 2023-10-14 NOTE — PROGRESS NOTES
Pulmonary critical care follow-up. I was asked to see the patient by Deepak Su CNP. She received a phone call that the patient was having some issues with respiratory difficulty. There was a temperature up to 102. 3. Lab results reveal white count elevated 18.3k     There was a respiratory viral panel antigen positive for rhinovirus. When I saw the patient in progressive, the patient was on BiPAP. He was actually answering questions completing without difficulty. They did an ABG 7.36-55.2-103.0-31.2. I asked for stat transfer to ICU. This occurred without difficulty. Chest x-ray reveals low lung volumes. Of note, there is evidence of a right thigh cellulitis appreciated. Blood cultures x2 were obtained. I did start the patient on Maxipime and Zyvox pharmacy to dose. I did not use vancomycin given the patient's renal insufficiency. Pharmacy to dose. He is a full code. We will recheck chest x-ray in morning. Following closely in ICU. CC time 35 minutes.     Penny Grove MD

## 2023-10-14 NOTE — PROGRESS NOTES
BRONCHOSPASM/BRONCHOCONSTRICTION     [x]         IMPROVE AERATION/BREATH SOUNDS  [x]   ADMINISTER BRONCHODILATOR THERAPY AS APPROPRIATE  [x]   ASSESS BREATH SOUNDS  []   IMPLEMENT AEROSOL/MDI PROTOCOL  [x]   PATIENT EDUCATION AS NEEDED   PROVIDE ADEQUATE OXYGENATION WITH ACCEPTABLE SP02/ABG'S    [x]  IDENTIFY APPROPRIATE OXYGEN THERAPY  [x]   MONITOR SP02/ABG'S AS NEEDED   [x]   PATIENT EDUCATION AS NEEDED   NONINVASIVE VENTILATION    PROVIDE OPTIMAL VENTILATION/ACCEPTABLE SPO2   IMPLEMENT NONINVASIVE VENTILATION PROTOCOL   MAINTAIN ACCEPTABLE SPO2   ASSESS SKIN INTEGRITY/BREAKDOWN SCORE   PATIENT EDUCATION AS NEEDED   BIPAP AS NEEDED       Pt refused bipap overnight, however after assessing pt I encourage him to wear. Pt is currently on bipap 16/6 40% . Tolerating at this time.

## 2023-10-14 NOTE — PROGRESS NOTES
Deepak Su NP notified of increased lethargy and confusion, fever, increased respirations and redness on right thigh. Orders received and Dr. Dhillon Outlaw to bedside.

## 2023-10-14 NOTE — PROGRESS NOTES
Pharmacy Consult      Alexsandra Singleton is a 82 y.o. male for whom pharmacy has been consulted to dose Cefepime and Zyvox. Patient Active Problem List   Diagnosis    Paroxysmal A-fib (HCC)    Acute on chronic congestive heart failure (HCC)    Coronary artery disease involving native coronary artery of native heart without angina pectoris    Heart failure exacerbated by sotalol (HCC)    Adult BMI 40.0-44.9 kg/sq m (HCC)    Mixed hyperlipidemia    Positive cardiac stress test    Lymphedema of both lower extremities    Localized edema    Non-pressure chronic ulcer of right 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 ([address])    Non-pressure chronic ulcer of left 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 ([address])    Infection associated with lymphedema    Secondary hypercoagulable state ([address])    SOB (shortness of breath)    Acute congestive heart failure, unspecified heart failure type ([address])    Primary hypertension    DONI (acute kidney injury) ([address])       Allergies:  Patient has no known allergies. Recent Labs     10/13/23  0442 10/14/23  0630   CREATININE 1.8* 2.0*       Ht/Wt:   Ht Readings from Last 1 Encounters:   10/09/23 6' 1\" (1.854 m)        Wt Readings from Last 1 Encounters:   10/12/23 (!) 309 lb (140.2 kg)         Estimated Creatinine Clearance: 39 mL/min (A) (based on SCr of 2 mg/dL (H)). Assessment/Plan: For sepsis of unknown etiology: Zyvox 600 mg q12 h. Cefepime 2000 mg x1 followed by 2000 mg q12h for CrCl between 30-59 ml/min. Thank you for the consult. Will continue to follow.     Franck Nesbitt PharmD  PGY-1 Pharmacy Resident    10/14/2023   5:05 PM

## 2023-10-14 NOTE — PROGRESS NOTES
Department of Internal Medicine  Nephrology Jerry Monaco MD progress note    Dot Walden history. Patient seen and examined, patient is respiratory distress on BIPAP  Patient still have wheezing and rhonchi's. Edema present  Urine output 1.0 L urine production yesterday in 24 hours with IV Bumex  Creatinine 2.0 mg/dL, bun 88    SUBJECTIVE:   This is a 82 y.o. male with a significant past medical history of CHF with EF of 50%, has AICD, CAD status post CABG, CKD stage IIIa, lymphedema, paroxysmal A-fib on Eliquis, hypertension, who presented with complaints of worsening shortness of breath  and leg swelling. patient denied any chest pain or fever or cough on presentation, hemodynamically was stable, was admitted to the hospital for acute CHF exacerbation nephrology were consulted for the management of diuresis taken into consideration the history of CKD, baseline creatinine of 1.5-1.7, normally has good urine output. Patient was on Lasix 40 mg twice daily and Bumex 2 mg twice daily from home meds, on lisinopril for hypertension and metoprolol for coronary artery disease and heart failure. On exam today patient states shortness of breath is significantly improved, denies any chest pain or shortness of breath this morning, denies any fever or chills, patient was not on any O2 at home, currently on 2 L O2 NC satting 97%. Patient has lymphedema, dressing for chronic lower limbs ulcers appreciated.  ml overnight. Lost 11 pounds since admission. Overall negative fluid balance . Patient has no known allergies.     Past Medical History:   Diagnosis Date    Arthritis     CHF (congestive heart failure) (HCC)     Chronic kidney disease     History of blood transfusion     Hyperlipidemia        Scheduled Meds:   iron sucrose  100 mg IntraVENous Q48H    bumetanide  2 mg IntraVENous BID    linezolid  600 mg IntraVENous Q12H    cefepime  2,000 mg IntraVENous Once    Followed by    [START ON 10/15/2023] cefepime

## 2023-10-14 NOTE — PROGRESS NOTES
RN called to room by nursing student as patient spiked fever 101.1. Tylenol given. Patient also appeared more lethargic, respiratory put bi-pap back on. RN also noted redness to right thigh that was not on previous shift documentation. Dr. Esmer Marquez notified and orders received for CBC, lactate, blood cultures and infectious disease consult.

## 2023-10-14 NOTE — PROGRESS NOTES
Patient taken to ICU on monitor and bi-pap with RT and with belongings, including cane, glasses and dentures.

## 2023-10-15 ENCOUNTER — APPOINTMENT (OUTPATIENT)
Dept: GENERAL RADIOLOGY | Age: 88
End: 2023-10-15
Payer: MEDICARE

## 2023-10-15 LAB
ABSOLUTE BANDS #: 3.44 K/UL (ref 0–1)
ANION GAP SERPL CALCULATED.3IONS-SCNC: 11 MMOL/L (ref 9–17)
ARTERIAL PATENCY WRIST A: ABNORMAL
BANDS: 21 % (ref 0–10)
BASOPHILS # BLD: 0 K/UL (ref 0–0.2)
BASOPHILS NFR BLD: 0 % (ref 0–2)
BODY TEMPERATURE: 37
BUN SERPL-MCNC: 66 MG/DL (ref 8–23)
CALCIUM SERPL-MCNC: 8.2 MG/DL (ref 8.6–10.4)
CHLORIDE SERPL-SCNC: 95 MMOL/L (ref 98–107)
CO2 SERPL-SCNC: 27 MMOL/L (ref 20–31)
COHGB MFR BLD: 5.3 % (ref 0–5)
CREAT SERPL-MCNC: 2.3 MG/DL (ref 0.7–1.2)
EOSINOPHIL # BLD: 0 K/UL (ref 0–0.4)
EOSINOPHILS RELATIVE PERCENT: 0 % (ref 0–4)
ERYTHROCYTE [DISTWIDTH] IN BLOOD BY AUTOMATED COUNT: 21.4 % (ref 11.5–14.9)
GAS FLOW.O2 O2 DELIVERY SYS: ABNORMAL L/MIN
GFR SERPL CREATININE-BSD FRML MDRD: 27 ML/MIN/1.73M2
GLUCOSE SERPL-MCNC: 127 MG/DL (ref 70–99)
HCO3 VENOUS: 28.5 MMOL/L (ref 24–30)
HCT VFR BLD AUTO: 22.8 % (ref 41–53)
HGB BLD-MCNC: 7.1 G/DL (ref 13.5–17.5)
LYMPHOCYTES NFR BLD: 0.33 K/UL (ref 1–4.8)
LYMPHOCYTES RELATIVE PERCENT: 2 % (ref 24–44)
MCH RBC QN AUTO: 22.3 PG (ref 26–34)
MCHC RBC AUTO-ENTMCNC: 31.1 G/DL (ref 31–37)
MCV RBC AUTO: 71.6 FL (ref 80–100)
METHEMOGLOBIN: 0.5 % (ref 0–1.9)
MONOCYTES NFR BLD: 0.16 K/UL (ref 0.1–1.3)
MONOCYTES NFR BLD: 1 % (ref 1–7)
MORPHOLOGY: ABNORMAL
NEUTROPHILS NFR BLD: 76 % (ref 36–66)
NEUTS SEG NFR BLD: 12.47 K/UL (ref 1.3–9.1)
O2 SAT, VEN: 82.3 % (ref 60–85)
PCO2, VEN: 44.3 MM HG (ref 39–55)
PH VENOUS: 7.42 (ref 7.32–7.42)
PLATELET # BLD AUTO: 218 K/UL (ref 150–450)
PMV BLD AUTO: 7.5 FL (ref 6–12)
PO2, VEN: 45.4 MM HG (ref 30–50)
POSITIVE BASE EXCESS, VEN: 4 MMOL/L (ref 0–2)
POTASSIUM SERPL-SCNC: 3.1 MMOL/L (ref 3.7–5.3)
PT. POSITION: ABNORMAL
RBC # BLD AUTO: 3.19 M/UL (ref 4.5–5.9)
SODIUM SERPL-SCNC: 133 MMOL/L (ref 135–144)
TEXT FOR RESPIRATORY: ABNORMAL
VENTILATION MODE VENT: ABNORMAL
WBC OTHER # BLD: 16.4 K/UL (ref 3.5–11)

## 2023-10-15 PROCEDURE — 6360000002 HC RX W HCPCS: Performed by: INTERNAL MEDICINE

## 2023-10-15 PROCEDURE — 05HY33Z INSERTION OF INFUSION DEVICE INTO UPPER VEIN, PERCUTANEOUS APPROACH: ICD-10-PCS | Performed by: FAMILY MEDICINE

## 2023-10-15 PROCEDURE — 99232 SBSQ HOSP IP/OBS MODERATE 35: CPT | Performed by: FAMILY MEDICINE

## 2023-10-15 PROCEDURE — 6370000000 HC RX 637 (ALT 250 FOR IP): Performed by: INTERNAL MEDICINE

## 2023-10-15 PROCEDURE — 2000000000 HC ICU R&B

## 2023-10-15 PROCEDURE — 2580000003 HC RX 258: Performed by: INTERNAL MEDICINE

## 2023-10-15 PROCEDURE — 85025 COMPLETE CBC W/AUTO DIFF WBC: CPT

## 2023-10-15 PROCEDURE — 97110 THERAPEUTIC EXERCISES: CPT

## 2023-10-15 PROCEDURE — 94660 CPAP INITIATION&MGMT: CPT

## 2023-10-15 PROCEDURE — 2500000003 HC RX 250 WO HCPCS: Performed by: INTERNAL MEDICINE

## 2023-10-15 PROCEDURE — 3E033XZ INTRODUCTION OF VASOPRESSOR INTO PERIPHERAL VEIN, PERCUTANEOUS APPROACH: ICD-10-PCS | Performed by: FAMILY MEDICINE

## 2023-10-15 PROCEDURE — 80048 BASIC METABOLIC PNL TOTAL CA: CPT

## 2023-10-15 PROCEDURE — 2700000000 HC OXYGEN THERAPY PER DAY

## 2023-10-15 PROCEDURE — 99223 1ST HOSP IP/OBS HIGH 75: CPT | Performed by: INTERNAL MEDICINE

## 2023-10-15 PROCEDURE — 2580000003 HC RX 258: Performed by: FAMILY MEDICINE

## 2023-10-15 PROCEDURE — 82800 BLOOD PH: CPT

## 2023-10-15 PROCEDURE — 94640 AIRWAY INHALATION TREATMENT: CPT

## 2023-10-15 PROCEDURE — 94761 N-INVAS EAR/PLS OXIMETRY MLT: CPT

## 2023-10-15 PROCEDURE — 71045 X-RAY EXAM CHEST 1 VIEW: CPT

## 2023-10-15 PROCEDURE — 6360000002 HC RX W HCPCS: Performed by: NURSE PRACTITIONER

## 2023-10-15 PROCEDURE — 82805 BLOOD GASES W/O2 SATURATION: CPT

## 2023-10-15 PROCEDURE — 36415 COLL VENOUS BLD VENIPUNCTURE: CPT

## 2023-10-15 PROCEDURE — 6370000000 HC RX 637 (ALT 250 FOR IP): Performed by: FAMILY MEDICINE

## 2023-10-15 RX ADMIN — CEFEPIME 2000 MG: 2 INJECTION, POWDER, FOR SOLUTION INTRAVENOUS at 05:01

## 2023-10-15 RX ADMIN — ALLOPURINOL 100 MG: 100 TABLET ORAL at 08:33

## 2023-10-15 RX ADMIN — CEFEPIME 2000 MG: 2 INJECTION, POWDER, FOR SOLUTION INTRAVENOUS at 17:32

## 2023-10-15 RX ADMIN — METOPROLOL TARTRATE 25 MG: 25 TABLET, FILM COATED ORAL at 21:41

## 2023-10-15 RX ADMIN — APIXABAN 2.5 MG: 2.5 TABLET, FILM COATED ORAL at 21:00

## 2023-10-15 RX ADMIN — BUMETANIDE 2 MG: 0.25 INJECTION INTRAMUSCULAR; INTRAVENOUS at 08:34

## 2023-10-15 RX ADMIN — ALBUTEROL SULFATE 2.5 MG: 2.5 SOLUTION RESPIRATORY (INHALATION) at 15:37

## 2023-10-15 RX ADMIN — APIXABAN 2.5 MG: 2.5 TABLET, FILM COATED ORAL at 08:31

## 2023-10-15 RX ADMIN — Medication 5 MCG/MIN: at 10:10

## 2023-10-15 RX ADMIN — LINEZOLID 600 MG: 600 INJECTION, SOLUTION INTRAVENOUS at 17:32

## 2023-10-15 RX ADMIN — METOPROLOL TARTRATE 25 MG: 25 TABLET, FILM COATED ORAL at 08:31

## 2023-10-15 RX ADMIN — ROSUVASTATIN CALCIUM 20 MG: 20 TABLET, FILM COATED ORAL at 08:30

## 2023-10-15 RX ADMIN — POTASSIUM BICARBONATE 40 MEQ: 782 TABLET, EFFERVESCENT ORAL at 05:44

## 2023-10-15 RX ADMIN — ACETAMINOPHEN 650 MG: 325 TABLET ORAL at 08:30

## 2023-10-15 RX ADMIN — SODIUM CHLORIDE, PRESERVATIVE FREE 10 ML: 5 INJECTION INTRAVENOUS at 08:51

## 2023-10-15 RX ADMIN — POTASSIUM CHLORIDE 10 MEQ: 10 CAPSULE, COATED, EXTENDED RELEASE ORAL at 08:30

## 2023-10-15 RX ADMIN — LINEZOLID 600 MG: 600 INJECTION, SOLUTION INTRAVENOUS at 05:07

## 2023-10-15 RX ADMIN — ALBUTEROL SULFATE 2.5 MG: 2.5 SOLUTION RESPIRATORY (INHALATION) at 11:26

## 2023-10-15 RX ADMIN — BUMETANIDE 2 MG: 0.25 INJECTION INTRAMUSCULAR; INTRAVENOUS at 19:51

## 2023-10-15 RX ADMIN — ALBUTEROL SULFATE 2.5 MG: 2.5 SOLUTION RESPIRATORY (INHALATION) at 07:20

## 2023-10-15 ASSESSMENT — PAIN SCALES - GENERAL: PAINLEVEL_OUTOF10: 0

## 2023-10-15 NOTE — PROGRESS NOTES
Department of Internal Medicine  Nephrology Justin Lazaro MD progress note    Paul Harris history. Patient seen and examined, patient is  comfortable off of BiPAP, using BiPAP periodically  Patient still have wheezing and rhonchi's. Edema present  Urine output  900mL urine production yesterday in 24 hours with IV Bumex  Creatinine 2.03mg/dL, bun 66    SUBJECTIVE:   This is a 82 y.o. male with a significant past medical history of CHF with EF of 50%, has AICD, CAD status post CABG, CKD stage IIIa, lymphedema, paroxysmal A-fib on Eliquis, hypertension, who presented with complaints of worsening shortness of breath  and leg swelling. patient denied any chest pain or fever or cough on presentation, hemodynamically was stable, was admitted to the hospital for acute CHF exacerbation nephrology were consulted for the management of diuresis taken into consideration the history of CKD, baseline creatinine of 1.5-1.7, normally has good urine output. Patient was on Lasix 40 mg twice daily and Bumex 2 mg twice daily from home meds, on lisinopril for hypertension and metoprolol for coronary artery disease and heart failure. On exam today patient states shortness of breath is significantly improved, denies any chest pain or shortness of breath this morning, denies any fever or chills, patient was not on any O2 at home, currently on 2 L O2 NC satting 97%. Patient has lymphedema, dressing for chronic lower limbs ulcers appreciated.  ml overnight. Lost 11 pounds since admission. Overall negative fluid balance . Patient has no known allergies.     Past Medical History:   Diagnosis Date    Arthritis     CHF (congestive heart failure) (HCC)     Chronic kidney disease     History of blood transfusion     Hyperlipidemia        Scheduled Meds:   iron sucrose  100 mg IntraVENous Q48H    bumetanide  2 mg IntraVENous BID    linezolid  600 mg IntraVENous Q12H    cefepime  2,000 mg IntraVENous Q12H    albuterol  2.5 mg

## 2023-10-15 NOTE — PROGRESS NOTES
10/15/23 1400   Encounter Summary   Encounter Overview/Reason  Spiritual/Emotional Needs   Service Provided For: Patient   Referral/Consult From: Rounding   Complexity of Encounter Low   Spiritual/Emotional needs   Type Spiritual Support   Assessment/Intervention/Outcome   Assessment Unable to assess   Intervention Prayer (assurance of)/Lamont

## 2023-10-15 NOTE — PLAN OF CARE
Problem: Discharge Planning  Goal: Discharge to home or other facility with appropriate resources  10/15/2023 0002 by Alley Welch RN  Outcome: Progressing  10/14/2023 1746 by Vimal Delgado RN  Outcome: Progressing     Problem: Safety - Adult  Goal: Free from fall injury  10/15/2023 0002 by Alley Welch RN  Outcome: Progressing  10/14/2023 1746 by Vimal Delgado RN  Outcome: Progressing     Problem: Skin/Tissue Integrity  Goal: Absence of new skin breakdown  Description: 1. Monitor for areas of redness and/or skin breakdown  2. Assess vascular access sites hourly  3. Every 4-6 hours minimum:  Change oxygen saturation probe site  4. Every 4-6 hours:  If on nasal continuous positive airway pressure, respiratory therapy assess nares and determine need for appliance change or resting period.   10/15/2023 0002 by Alley Welch RN  Outcome: Progressing  10/14/2023 1746 by Vimal Delgado RN  Outcome: Progressing     Problem: ABCDS Injury Assessment  Goal: Absence of physical injury  10/15/2023 0002 by Alley Welch RN  Outcome: Progressing  10/14/2023 1746 by Vimal Delgado RN  Outcome: Progressing

## 2023-10-15 NOTE — PROGRESS NOTES
Access RN was called for a PICC line placement. ICU and Nephrology provider are aware. RN educated family about PICC line insertion. Consent was given by dtr. Consent was done with Access RN and RN. No further questions at this time.

## 2023-10-15 NOTE — PROGRESS NOTES
Physical Therapy  Facility/Department: [facility] ICU      NAME: Yadira Aguilar  : 1935 (82 y.o.)  MRN: [id number]  CODE STATUS: Full Code    Date of Service: 10/15/23      Past Medical History:   Diagnosis Date    Arthritis     CHF (congestive heart failure) ([address])     Chronic kidney disease     History of blood transfusion     Hyperlipidemia      Past Surgical History:   Procedure Laterality Date    CARDIAC SURGERY      bypass    JOINT REPLACEMENT      Left hip    PACEMAKER PLACEMENT Right     and defib       Chart Reviewed: Yes  Additional Pertinent Hx: CHF exacerbation  Family / Caregiver Present: Yes (daughter and granddaughter)  Referral Date : 10/09/23  Diagnosis: acute CHF    Restrictions:  Restrictions/Precautions: Fall Risk;General Precautions; Bed Alarm; Other (comment)  Position Activity Restriction  Other position/activity restrictions: NIV, valadez, IV's, telemetry     SUBJECTIVE  Subjective: Per Rn pt on Levophed for BP and ok for bed exercises only this date. Pt resting in bed on NIV and agreeable to supine exercises; pt c/o pain in RLE but did not formally rate pain      OBJECTIVE      Functional Mobility  Bed mobility  Rolling to Left: Maximum Aasistance;2 Person assistance  Rolling to Right: Maximum assistance;2 Person assistance  Supine to Sit:  (for advancing trunk to upright)  Bed Mobility Comments: MaxA x 2 to roll for gonzalez care         PT Exercises  A/AROM Exercises: Supine AAROM BUE and LLE in all joints and planes x 10; pt declined RLE ROM d/t pain    ASSESSMENT       Activity Tolerance  Activity Tolerance: Treatment limited secondary to medical complications  Activity Tolerance Comments: Ok for exercises in bed only d/t Levophed for BP per RN    Assessment  Assessment: Pt able to participate with ROM exercises in bed this date.  Unable to mobilize per RN d/t Levophed  History: The patient is a 82 y.o. male who presents to [facility] with complaints of increasing shortness of breath

## 2023-10-16 ENCOUNTER — APPOINTMENT (OUTPATIENT)
Dept: VASCULAR LAB | Age: 88
End: 2023-10-16
Attending: INTERNAL MEDICINE
Payer: MEDICARE

## 2023-10-16 PROBLEM — E87.6 HYPOKALEMIA: Status: ACTIVE | Noted: 2023-10-16

## 2023-10-16 PROBLEM — K92.2 ACUTE GI BLEEDING: Status: ACTIVE | Noted: 2023-10-16

## 2023-10-16 PROBLEM — J96.01 ACUTE RESPIRATORY FAILURE WITH HYPOXIA (HCC): Status: ACTIVE | Noted: 2023-10-16

## 2023-10-16 LAB
ABSOLUTE BANDS #: 8.53 K/UL (ref 0–1)
ALBUMIN SERPL-MCNC: 1.9 G/DL (ref 3.5–5.2)
ALP SERPL-CCNC: 89 U/L (ref 40–129)
ALT SERPL-CCNC: 36 U/L (ref 5–41)
ANION GAP SERPL CALCULATED.3IONS-SCNC: 14 MMOL/L (ref 9–17)
AST SERPL-CCNC: 67 U/L
BACTERIA URNS QL MICRO: ABNORMAL
BANDS: 34 % (ref 0–10)
BASOPHILS # BLD: 0 K/UL (ref 0–0.2)
BASOPHILS NFR BLD: 0 % (ref 0–2)
BILIRUB SERPL-MCNC: 0.3 MG/DL (ref 0.3–1.2)
BILIRUB UR QL STRIP: NEGATIVE
BUN SERPL-MCNC: 66 MG/DL (ref 8–23)
CALCIUM SERPL-MCNC: 8 MG/DL (ref 8.6–10.4)
CASTS #/AREA URNS LPF: ABNORMAL /LPF
CHLORIDE SERPL-SCNC: 95 MMOL/L (ref 98–107)
CLARITY UR: ABNORMAL
CO2 SERPL-SCNC: 22 MMOL/L (ref 20–31)
COLOR UR: YELLOW
CREAT SERPL-MCNC: 2.4 MG/DL (ref 0.7–1.2)
DATE, STOOL #1: ABNORMAL
EOSINOPHIL # BLD: 0 K/UL (ref 0–0.4)
EOSINOPHILS RELATIVE PERCENT: 0 % (ref 0–4)
EPI CELLS #/AREA URNS HPF: ABNORMAL /HPF
ERYTHROCYTE [DISTWIDTH] IN BLOOD BY AUTOMATED COUNT: 20.6 % (ref 11.5–14.9)
GFR SERPL CREATININE-BSD FRML MDRD: 26 ML/MIN/1.73M2
GLUCOSE SERPL-MCNC: 119 MG/DL (ref 70–99)
GLUCOSE UR STRIP-MCNC: NEGATIVE MG/DL
HCT VFR BLD AUTO: 23.9 % (ref 41–53)
HCT VFR BLD AUTO: 25.8 % (ref 41–53)
HEMOCCULT SP1 STL QL: POSITIVE
HGB BLD-MCNC: 6.9 G/DL (ref 13.5–17.5)
HGB BLD-MCNC: 7.7 G/DL (ref 13.5–17.5)
HGB UR QL STRIP.AUTO: ABNORMAL
KETONES UR STRIP-MCNC: NEGATIVE MG/DL
LACTATE BLDV-SCNC: 2.7 MMOL/L (ref 0.5–2.2)
LEUKOCYTE ESTERASE UR QL STRIP: ABNORMAL
LYMPHOCYTES NFR BLD: 0.5 K/UL (ref 1–4.8)
LYMPHOCYTES RELATIVE PERCENT: 2 % (ref 24–44)
MAGNESIUM SERPL-MCNC: 2.5 MG/DL (ref 1.6–2.6)
MCH RBC QN AUTO: 20.7 PG (ref 26–34)
MCHC RBC AUTO-ENTMCNC: 28.8 G/DL (ref 31–37)
MCV RBC AUTO: 72 FL (ref 80–100)
METAMYELOCYTES ABSOLUTE COUNT: 0.25 K/UL
METAMYELOCYTES: 1 %
MONOCYTES NFR BLD: 0.5 K/UL (ref 0.1–1.3)
MONOCYTES NFR BLD: 2 % (ref 1–7)
MORPHOLOGY: ABNORMAL
NEUTROPHILS NFR BLD: 61 % (ref 36–66)
NEUTS SEG NFR BLD: 15.32 K/UL (ref 1.3–9.1)
NITRITE UR QL STRIP: NEGATIVE
PH UR STRIP: 6.5 [PH] (ref 5–8)
PLATELET # BLD AUTO: 264 K/UL (ref 150–450)
PMV BLD AUTO: 7.4 FL (ref 6–12)
POTASSIUM SERPL-SCNC: 3.3 MMOL/L (ref 3.7–5.3)
PROCALCITONIN SERPL-MCNC: 2.2 NG/ML
PROT SERPL-MCNC: 5.9 G/DL (ref 6.4–8.3)
PROT UR STRIP-MCNC: ABNORMAL MG/DL
RBC # BLD AUTO: 3.32 M/UL (ref 4.5–5.9)
RBC #/AREA URNS HPF: ABNORMAL /HPF
SODIUM SERPL-SCNC: 131 MMOL/L (ref 135–144)
SP GR UR STRIP: 1.01 (ref 1–1.03)
TIME, STOOL #1: ABNORMAL
UROBILINOGEN UR STRIP-ACNC: NORMAL EU/DL (ref 0–1)
WBC #/AREA URNS HPF: ABNORMAL /HPF
WBC OTHER # BLD: 25.1 K/UL (ref 3.5–11)

## 2023-10-16 PROCEDURE — 97110 THERAPEUTIC EXERCISES: CPT

## 2023-10-16 PROCEDURE — 99233 SBSQ HOSP IP/OBS HIGH 50: CPT | Performed by: INTERNAL MEDICINE

## 2023-10-16 PROCEDURE — 2580000003 HC RX 258

## 2023-10-16 PROCEDURE — 94761 N-INVAS EAR/PLS OXIMETRY MLT: CPT

## 2023-10-16 PROCEDURE — 2700000000 HC OXYGEN THERAPY PER DAY

## 2023-10-16 PROCEDURE — 94640 AIRWAY INHALATION TREATMENT: CPT

## 2023-10-16 PROCEDURE — 86900 BLOOD TYPING SEROLOGIC ABO: CPT

## 2023-10-16 PROCEDURE — 2500000003 HC RX 250 WO HCPCS: Performed by: INTERNAL MEDICINE

## 2023-10-16 PROCEDURE — 93970 EXTREMITY STUDY: CPT

## 2023-10-16 PROCEDURE — 85018 HEMOGLOBIN: CPT

## 2023-10-16 PROCEDURE — 36415 COLL VENOUS BLD VENIPUNCTURE: CPT

## 2023-10-16 PROCEDURE — 83735 ASSAY OF MAGNESIUM: CPT

## 2023-10-16 PROCEDURE — 6360000002 HC RX W HCPCS

## 2023-10-16 PROCEDURE — 94660 CPAP INITIATION&MGMT: CPT

## 2023-10-16 PROCEDURE — 93970 EXTREMITY STUDY: CPT | Performed by: SURGERY

## 2023-10-16 PROCEDURE — 99233 SBSQ HOSP IP/OBS HIGH 50: CPT | Performed by: STUDENT IN AN ORGANIZED HEALTH CARE EDUCATION/TRAINING PROGRAM

## 2023-10-16 PROCEDURE — 83605 ASSAY OF LACTIC ACID: CPT

## 2023-10-16 PROCEDURE — P9016 RBC LEUKOCYTES REDUCED: HCPCS

## 2023-10-16 PROCEDURE — 86850 RBC ANTIBODY SCREEN: CPT

## 2023-10-16 PROCEDURE — 6370000000 HC RX 637 (ALT 250 FOR IP): Performed by: STUDENT IN AN ORGANIZED HEALTH CARE EDUCATION/TRAINING PROGRAM

## 2023-10-16 PROCEDURE — 36430 TRANSFUSION BLD/BLD COMPNT: CPT

## 2023-10-16 PROCEDURE — 6370000000 HC RX 637 (ALT 250 FOR IP): Performed by: INTERNAL MEDICINE

## 2023-10-16 PROCEDURE — 80053 COMPREHEN METABOLIC PANEL: CPT

## 2023-10-16 PROCEDURE — 6360000002 HC RX W HCPCS: Performed by: INTERNAL MEDICINE

## 2023-10-16 PROCEDURE — 85014 HEMATOCRIT: CPT

## 2023-10-16 PROCEDURE — 86901 BLOOD TYPING SEROLOGIC RH(D): CPT

## 2023-10-16 PROCEDURE — 84145 PROCALCITONIN (PCT): CPT

## 2023-10-16 PROCEDURE — 2000000000 HC ICU R&B

## 2023-10-16 PROCEDURE — 82270 OCCULT BLOOD FECES: CPT

## 2023-10-16 PROCEDURE — 85025 COMPLETE CBC W/AUTO DIFF WBC: CPT

## 2023-10-16 PROCEDURE — 6360000002 HC RX W HCPCS: Performed by: NURSE PRACTITIONER

## 2023-10-16 PROCEDURE — 6370000000 HC RX 637 (ALT 250 FOR IP): Performed by: FAMILY MEDICINE

## 2023-10-16 PROCEDURE — 30233N1 TRANSFUSION OF NONAUTOLOGOUS RED BLOOD CELLS INTO PERIPHERAL VEIN, PERCUTANEOUS APPROACH: ICD-10-PCS | Performed by: FAMILY MEDICINE

## 2023-10-16 PROCEDURE — 86920 COMPATIBILITY TEST SPIN: CPT

## 2023-10-16 PROCEDURE — 81001 URINALYSIS AUTO W/SCOPE: CPT

## 2023-10-16 PROCEDURE — 2580000003 HC RX 258: Performed by: INTERNAL MEDICINE

## 2023-10-16 RX ORDER — SODIUM CHLORIDE 9 MG/ML
INJECTION, SOLUTION INTRAVENOUS PRN
Status: DISCONTINUED | OUTPATIENT
Start: 2023-10-16 | End: 2023-11-09 | Stop reason: HOSPADM

## 2023-10-16 RX ADMIN — ALBUTEROL SULFATE 2.5 MG: 2.5 SOLUTION RESPIRATORY (INHALATION) at 15:47

## 2023-10-16 RX ADMIN — LINEZOLID 600 MG: 600 INJECTION, SOLUTION INTRAVENOUS at 17:30

## 2023-10-16 RX ADMIN — APIXABAN 2.5 MG: 2.5 TABLET, FILM COATED ORAL at 20:41

## 2023-10-16 RX ADMIN — BUMETANIDE 2 MG: 0.25 INJECTION INTRAMUSCULAR; INTRAVENOUS at 20:41

## 2023-10-16 RX ADMIN — CEFEPIME 2000 MG: 2 INJECTION, POWDER, FOR SOLUTION INTRAVENOUS at 16:53

## 2023-10-16 RX ADMIN — ALBUTEROL SULFATE 2.5 MG: 2.5 SOLUTION RESPIRATORY (INHALATION) at 19:59

## 2023-10-16 RX ADMIN — ALBUTEROL SULFATE 2.5 MG: 2.5 SOLUTION RESPIRATORY (INHALATION) at 08:08

## 2023-10-16 RX ADMIN — LINEZOLID 600 MG: 600 INJECTION, SOLUTION INTRAVENOUS at 06:08

## 2023-10-16 RX ADMIN — ALLOPURINOL 100 MG: 100 TABLET ORAL at 07:32

## 2023-10-16 RX ADMIN — ROSUVASTATIN CALCIUM 20 MG: 20 TABLET, FILM COATED ORAL at 07:32

## 2023-10-16 RX ADMIN — METOPROLOL TARTRATE 12.5 MG: 25 TABLET, FILM COATED ORAL at 20:41

## 2023-10-16 RX ADMIN — FLUTICASONE PROPIONATE 2 SPRAY: 50 SPRAY, METERED NASAL at 07:17

## 2023-10-16 RX ADMIN — ALBUTEROL SULFATE 2.5 MG: 2.5 SOLUTION RESPIRATORY (INHALATION) at 12:06

## 2023-10-16 RX ADMIN — IRON SUCROSE 100 MG: 20 INJECTION, SOLUTION INTRAVENOUS at 12:42

## 2023-10-16 RX ADMIN — POTASSIUM CHLORIDE 10 MEQ: 10 CAPSULE, COATED, EXTENDED RELEASE ORAL at 07:32

## 2023-10-16 RX ADMIN — BUMETANIDE 2 MG: 0.25 INJECTION INTRAMUSCULAR; INTRAVENOUS at 07:32

## 2023-10-16 RX ADMIN — POTASSIUM BICARBONATE 40 MEQ: 782 TABLET, EFFERVESCENT ORAL at 06:09

## 2023-10-16 RX ADMIN — METOPROLOL TARTRATE 25 MG: 25 TABLET, FILM COATED ORAL at 07:32

## 2023-10-16 RX ADMIN — CEFEPIME 2000 MG: 2 INJECTION, POWDER, FOR SOLUTION INTRAVENOUS at 05:33

## 2023-10-16 ASSESSMENT — ENCOUNTER SYMPTOMS
ABDOMINAL PAIN: 1
SHORTNESS OF BREATH: 1

## 2023-10-16 ASSESSMENT — PAIN SCALES - GENERAL: PAINLEVEL_OUTOF10: 0

## 2023-10-16 NOTE — PROGRESS NOTES
[facility] -   Infectious diseases evaluation  admission date 10/9/2023    reason for consultation:   Right thigh cellulitis    Impression :   Current: Worsening right thigh cellulitis  Distal lower extremity venous stasis ulcers with surrounding cellulitis improving  Worsening leukocytosis  Rhinovirus infection  Ileus   Bilateral lower extremity lymphedema  Anemia  CHF  Acute hypoxic respiratory failure   Acute on chronic renal failure  Coronary artery disease status post CABG  Obesity      Recommendations   IV Zyvox and cefepime   Lower extremity venous Doppler  UA/reflex culture  Stool occult blood pending  Follow procalcitonin level lactic acid  Follow blood cultures from 10/14/2023  Follow CBC renal function  Continue supportive care  Discussed with nursing staff    Infection Control Recommendations   [place] Precautions  Droplet isolation    Antimicrobial Stewardship Recommendations   Simplification of therapy  Targeted therapy    History of Present Illness:   Initial history:  Laura Riggins is a 82y.o.-year-old male presented to the hospital on 10/9/23 with worsening shortness of breath associated with lower extremity edema for 1 day. -Symptoms moderate to severe, worse with exertion, no alleviating or aggravating factors. He had a chronic lower extremity lymphedema with venous stasis ulcers with surrounding redness. The patient was treated with diuretics for CHF. On 10/14/2023 he developed fever with a temperature max of 102.3. WBC increased to 18.3, procalcitonin level 0.55  Respiratory panel with COVID-19 PCR was positive for rhinovirus on 10/13/2023. Started on IV Zyvox and cefepime on 10/14/2023  Interval changes  10/16/2023   He is hypotensive on 7 mcg/min of Levophed, awake and oriented, on oxygen per nasal cannula. He denied increased cough or shortness of breath.   Afebrile more than 24 hours  WBC increased to 25.1, creatinine 2.4, hemoglobin

## 2023-10-16 NOTE — PROGRESS NOTES
[address] [facility]   INPATIENT OCCUPATIONAL THERAPY  PROGRESS NOTE  Date: 10/16/2023  Patient Name: Bryon Lesches       Room:   MRN: [id number]    : 1935  (82 y.o.)  Gender: male   Referring Practitioner: Thalia Boone MD  Diagnosis: Acute CHF      Discharge Recommendations:  Further Occupational Therapy is recommended upon facility discharge. OT Equipment Recommendations  Other: TBD    Restrictions/Precautions  Restrictions/Precautions  Restrictions/Precautions: Fall Risk;General Precautions; Bed Alarm; Other (comment)  Required Braces or Orthoses?: No  Implants present? : Metal implants; Pacemaker  Position Activity Restriction  Other position/activity restrictions: NIV, valadez, IV's, telemetry         Subjective  Subjective  Subjective: \"I want to try to get to the edge of the bed. \"  Comments: GRISEL pradhan this date    Objective  Orientation  Overall Orientation Status: Within Functional Limits  Orientation Level: Oriented X4  Cognition  Overall Cognitive Status: Exceptions    Activities of Daily Living  ADL  Feeding: Setup  Grooming: Minimal assistance  UE Bathing: Dependent/Total  LE Bathing: Dependent/Total  UE Dressing: Dependent/Total  LE Dressing: Dependent/Total  Toileting: Dependent/Total  Additional Comments: ADL scores based on skilled observation and clinical reasoning, unless otherwise noted. Pt is primarily limited due to SOB, decreased balance, and decreased strength. Transfers/Mobility  Bed mobility  Bed Mobility Comments: KANE pt requires 2 assist for bed mobility this date, 2nd assist not currently available will reattempt later date when more assist available           OT Exercises  Exercise Treatment: Pt completed BUE exercises no weight to increase endurance, cues for deep breathing throughout. Attempted to grade up UB Ex with orange theraband. D/c UB EX d/t O2 dropping with exertion.     Patient Education  Patient Education  Education Given

## 2023-10-16 NOTE — PLAN OF CARE
Problem: Discharge Planning  Goal: Discharge to home or other facility with appropriate resources  Outcome: Progressing  Flowsheets (Taken 10/16/2023 0800)  Discharge to home or other facility with appropriate resources: Identify barriers to discharge with patient and caregiver     Problem: Safety - Adult  Goal: Free from fall injury  Outcome: Progressing     Problem: Skin/Tissue Integrity  Goal: Absence of new skin breakdown  Description: 1. Monitor for areas of redness and/or skin breakdown  2. Assess vascular access sites hourly  3. Every 4-6 hours minimum:  Change oxygen saturation probe site  4. Every 4-6 hours:  If on nasal continuous positive airway pressure, respiratory therapy assess nares and determine need for appliance change or resting period.   Outcome: Progressing     Problem: ABCDS Injury Assessment  Goal: Absence of physical injury  Outcome: Progressing     Problem: Chronic Conditions and Co-morbidities  Goal: Patient's chronic conditions and co-morbidity symptoms are monitored and maintained or improved  Outcome: Progressing  Flowsheets (Taken 10/16/2023 0800)  Care Plan - Patient's Chronic Conditions and Co-Morbidity Symptoms are Monitored and Maintained or Improved: Monitor and assess patient's chronic conditions and comorbid symptoms for stability, deterioration, or improvement

## 2023-10-16 NOTE — PLAN OF CARE
Problem: Discharge Planning  Goal: Discharge to home or other facility with appropriate resources  10/16/2023 0043 by Chun Barger RN  Outcome: Progressing  10/15/2023 1755 by Nitesh Cabrera RN  Outcome: Progressing     Problem: Safety - Adult  Goal: Free from fall injury  10/16/2023 0043 by Chun Barger RN  Outcome: Progressing  10/15/2023 1755 by Nitesh Cabrera RN  Outcome: Progressing     Problem: Skin/Tissue Integrity  Goal: Absence of new skin breakdown  Description: 1. Monitor for areas of redness and/or skin breakdown  2. Assess vascular access sites hourly  3. Every 4-6 hours minimum:  Change oxygen saturation probe site  4. Every 4-6 hours:  If on nasal continuous positive airway pressure, respiratory therapy assess nares and determine need for appliance change or resting period.   10/16/2023 0043 by Chun Barger RN  Outcome: Progressing  10/15/2023 1755 by Nitesh Cabrera RN  Outcome: Progressing     Problem: ABCDS Injury Assessment  Goal: Absence of physical injury  10/16/2023 0043 by Chun Barger RN  Outcome: Progressing  10/15/2023 1755 by Nitesh Cabrera RN  Outcome: Progressing

## 2023-10-16 NOTE — CARE COORDINATION
ONGOING DISCHARGE PLAN:    Bipap  +rhinovirus  IV cefepime/zyvox per ID, procal 2.20    Nephro following UOP and kidney function, may need HD, IV bumex 2mg twice daily    hGB 6.9- 1 unit PRBCs given    Levophed    Palliative Care consult    Will continue to follow for additional discharge needs. If patient is discharged prior to next notation, then this note serves as note for discharge by case management.     Electronically signed by Morales Escobedo RN on 10/16/2023 at 6:05 PM

## 2023-10-16 NOTE — PROGRESS NOTES
Patent had expressed to writer that he was not sure how he was going to get any better. Writer had in depth discussion with patient. Writer also had asked patient that if his heart were to stop if he would like CPR,  shocks and possible intubation if needed. Patient told writer that he did not want to be intubated. Writer educated patient on code status and patient's options. Writer to relay information to Dr. Cassidy Frost.

## 2023-10-17 PROBLEM — D64.9 SEVERE ANEMIA: Status: ACTIVE | Noted: 2023-10-17

## 2023-10-17 LAB
ABO/RH: NORMAL
ALBUMIN SERPL-MCNC: 2.3 G/DL (ref 3.5–5.2)
ALP SERPL-CCNC: 125 U/L (ref 40–129)
ALT SERPL-CCNC: 37 U/L (ref 5–41)
ANION GAP SERPL CALCULATED.3IONS-SCNC: 11 MMOL/L (ref 9–17)
ANTIBODY SCREEN: NEGATIVE
ARM BAND NUMBER: NORMAL
AST SERPL-CCNC: 72 U/L
BASOPHILS # BLD: 0 K/UL (ref 0–0.2)
BASOPHILS NFR BLD: 0 % (ref 0–2)
BILIRUB SERPL-MCNC: 0.3 MG/DL (ref 0.3–1.2)
BLOOD BANK BLOOD PRODUCT EXPIRATION DATE: NORMAL
BLOOD BANK DISPENSE STATUS: NORMAL
BLOOD BANK ISBT PRODUCT BLOOD TYPE: 6200
BLOOD BANK PRODUCT CODE: NORMAL
BLOOD BANK SAMPLE EXPIRATION: NORMAL
BLOOD BANK UNIT TYPE AND RH: NORMAL
BPU ID: NORMAL
BUN SERPL-MCNC: 72 MG/DL (ref 8–23)
CALCIUM SERPL-MCNC: 8.1 MG/DL (ref 8.6–10.4)
CHLORIDE SERPL-SCNC: 94 MMOL/L (ref 98–107)
CO2 SERPL-SCNC: 25 MMOL/L (ref 20–31)
COMPONENT: NORMAL
CREAT SERPL-MCNC: 2.2 MG/DL (ref 0.7–1.2)
CROSSMATCH RESULT: NORMAL
ECHO BSA: 2.73 M2
EOSINOPHIL # BLD: 0 K/UL (ref 0–0.4)
EOSINOPHILS RELATIVE PERCENT: 0 % (ref 0–4)
ERYTHROCYTE [DISTWIDTH] IN BLOOD BY AUTOMATED COUNT: 21.6 % (ref 11.5–14.9)
GFR SERPL CREATININE-BSD FRML MDRD: 28 ML/MIN/1.73M2
GLUCOSE SERPL-MCNC: 122 MG/DL (ref 70–99)
HCT VFR BLD AUTO: 23.2 % (ref 41–53)
HGB BLD-MCNC: 7.2 G/DL (ref 13.5–17.5)
LYMPHOCYTES NFR BLD: 0.93 K/UL (ref 1–4.8)
LYMPHOCYTES RELATIVE PERCENT: 4 % (ref 24–44)
MAGNESIUM SERPL-MCNC: 2.4 MG/DL (ref 1.6–2.6)
MCH RBC QN AUTO: 22.1 PG (ref 26–34)
MCHC RBC AUTO-ENTMCNC: 31.2 G/DL (ref 31–37)
MCV RBC AUTO: 70.6 FL (ref 80–100)
MONOCYTES NFR BLD: 0.46 K/UL (ref 0.1–1.3)
MONOCYTES NFR BLD: 2 % (ref 1–7)
MORPHOLOGY: ABNORMAL
NEUTROPHILS NFR BLD: 94 % (ref 36–66)
NEUTS SEG NFR BLD: 21.81 K/UL (ref 1.3–9.1)
PLATELET # BLD AUTO: 263 K/UL (ref 150–450)
PMV BLD AUTO: 7.3 FL (ref 6–12)
POTASSIUM SERPL-SCNC: 2.8 MMOL/L (ref 3.7–5.3)
POTASSIUM SERPL-SCNC: 3.2 MMOL/L (ref 3.7–5.3)
PROT SERPL-MCNC: 5.8 G/DL (ref 6.4–8.3)
RBC # BLD AUTO: 3.28 M/UL (ref 4.5–5.9)
SODIUM SERPL-SCNC: 130 MMOL/L (ref 135–144)
TRANSFUSION STATUS: NORMAL
UNIT DIVISION: 0
UNIT ISSUE DATE/TIME: NORMAL
WBC OTHER # BLD: 23.2 K/UL (ref 3.5–11)

## 2023-10-17 PROCEDURE — 6360000002 HC RX W HCPCS: Performed by: INTERNAL MEDICINE

## 2023-10-17 PROCEDURE — 6370000000 HC RX 637 (ALT 250 FOR IP): Performed by: INTERNAL MEDICINE

## 2023-10-17 PROCEDURE — 6360000002 HC RX W HCPCS: Performed by: NURSE PRACTITIONER

## 2023-10-17 PROCEDURE — 99233 SBSQ HOSP IP/OBS HIGH 50: CPT | Performed by: INTERNAL MEDICINE

## 2023-10-17 PROCEDURE — 80053 COMPREHEN METABOLIC PANEL: CPT

## 2023-10-17 PROCEDURE — 6360000002 HC RX W HCPCS: Performed by: STUDENT IN AN ORGANIZED HEALTH CARE EDUCATION/TRAINING PROGRAM

## 2023-10-17 PROCEDURE — 2700000000 HC OXYGEN THERAPY PER DAY

## 2023-10-17 PROCEDURE — 2500000003 HC RX 250 WO HCPCS: Performed by: INTERNAL MEDICINE

## 2023-10-17 PROCEDURE — 83735 ASSAY OF MAGNESIUM: CPT

## 2023-10-17 PROCEDURE — 85025 COMPLETE CBC W/AUTO DIFF WBC: CPT

## 2023-10-17 PROCEDURE — 36415 COLL VENOUS BLD VENIPUNCTURE: CPT

## 2023-10-17 PROCEDURE — 2000000000 HC ICU R&B

## 2023-10-17 PROCEDURE — C9113 INJ PANTOPRAZOLE SODIUM, VIA: HCPCS | Performed by: STUDENT IN AN ORGANIZED HEALTH CARE EDUCATION/TRAINING PROGRAM

## 2023-10-17 PROCEDURE — 99223 1ST HOSP IP/OBS HIGH 75: CPT | Performed by: INTERNAL MEDICINE

## 2023-10-17 PROCEDURE — 2580000003 HC RX 258: Performed by: INTERNAL MEDICINE

## 2023-10-17 PROCEDURE — 6370000000 HC RX 637 (ALT 250 FOR IP): Performed by: FAMILY MEDICINE

## 2023-10-17 PROCEDURE — 94660 CPAP INITIATION&MGMT: CPT

## 2023-10-17 PROCEDURE — 94640 AIRWAY INHALATION TREATMENT: CPT

## 2023-10-17 PROCEDURE — A4216 STERILE WATER/SALINE, 10 ML: HCPCS | Performed by: STUDENT IN AN ORGANIZED HEALTH CARE EDUCATION/TRAINING PROGRAM

## 2023-10-17 PROCEDURE — 99233 SBSQ HOSP IP/OBS HIGH 50: CPT | Performed by: STUDENT IN AN ORGANIZED HEALTH CARE EDUCATION/TRAINING PROGRAM

## 2023-10-17 PROCEDURE — 94761 N-INVAS EAR/PLS OXIMETRY MLT: CPT

## 2023-10-17 PROCEDURE — 2500000003 HC RX 250 WO HCPCS: Performed by: STUDENT IN AN ORGANIZED HEALTH CARE EDUCATION/TRAINING PROGRAM

## 2023-10-17 PROCEDURE — 84132 ASSAY OF SERUM POTASSIUM: CPT

## 2023-10-17 PROCEDURE — 6360000002 HC RX W HCPCS: Performed by: FAMILY MEDICINE

## 2023-10-17 PROCEDURE — 99222 1ST HOSP IP/OBS MODERATE 55: CPT | Performed by: NURSE PRACTITIONER

## 2023-10-17 PROCEDURE — 2580000003 HC RX 258: Performed by: STUDENT IN AN ORGANIZED HEALTH CARE EDUCATION/TRAINING PROGRAM

## 2023-10-17 PROCEDURE — 2580000003 HC RX 258: Performed by: FAMILY MEDICINE

## 2023-10-17 PROCEDURE — 97530 THERAPEUTIC ACTIVITIES: CPT

## 2023-10-17 RX ORDER — MIDODRINE HYDROCHLORIDE 5 MG/1
5 TABLET ORAL
Status: DISCONTINUED | OUTPATIENT
Start: 2023-10-17 | End: 2023-10-19

## 2023-10-17 RX ORDER — NOREPINEPHRINE BITARTRATE 0.06 MG/ML
1-30 INJECTION, SOLUTION INTRAVENOUS CONTINUOUS
Status: DISCONTINUED | OUTPATIENT
Start: 2023-10-17 | End: 2023-10-19

## 2023-10-17 RX ADMIN — ANTI-FUNGAL POWDER MICONAZOLE NITRATE TALC FREE: 1.42 POWDER TOPICAL at 20:44

## 2023-10-17 RX ADMIN — MIDODRINE HYDROCHLORIDE 5 MG: 5 TABLET ORAL at 17:51

## 2023-10-17 RX ADMIN — ROSUVASTATIN CALCIUM 20 MG: 20 TABLET, FILM COATED ORAL at 09:01

## 2023-10-17 RX ADMIN — FLUTICASONE PROPIONATE 2 SPRAY: 50 SPRAY, METERED NASAL at 08:53

## 2023-10-17 RX ADMIN — Medication 4 MCG/MIN: at 17:49

## 2023-10-17 RX ADMIN — Medication 6 MCG/MIN: at 02:24

## 2023-10-17 RX ADMIN — SODIUM CHLORIDE, PRESERVATIVE FREE 40 MG: 5 INJECTION INTRAVENOUS at 20:45

## 2023-10-17 RX ADMIN — ALBUTEROL SULFATE 2.5 MG: 2.5 SOLUTION RESPIRATORY (INHALATION) at 11:34

## 2023-10-17 RX ADMIN — SODIUM CHLORIDE, PRESERVATIVE FREE 10 ML: 5 INJECTION INTRAVENOUS at 08:54

## 2023-10-17 RX ADMIN — LINEZOLID 600 MG: 600 INJECTION, SOLUTION INTRAVENOUS at 04:59

## 2023-10-17 RX ADMIN — POTASSIUM CHLORIDE 10 MEQ: 7.46 INJECTION, SOLUTION INTRAVENOUS at 12:05

## 2023-10-17 RX ADMIN — SODIUM CHLORIDE, PRESERVATIVE FREE 40 MG: 5 INJECTION INTRAVENOUS at 08:52

## 2023-10-17 RX ADMIN — POTASSIUM CHLORIDE 10 MEQ: 7.46 INJECTION, SOLUTION INTRAVENOUS at 13:44

## 2023-10-17 RX ADMIN — SODIUM CHLORIDE, PRESERVATIVE FREE 40 MG: 5 INJECTION INTRAVENOUS at 00:05

## 2023-10-17 RX ADMIN — ALBUTEROL SULFATE 2.5 MG: 2.5 SOLUTION RESPIRATORY (INHALATION) at 19:37

## 2023-10-17 RX ADMIN — POTASSIUM CHLORIDE 10 MEQ: 7.46 INJECTION, SOLUTION INTRAVENOUS at 09:19

## 2023-10-17 RX ADMIN — POTASSIUM CHLORIDE 10 MEQ: 7.46 INJECTION, SOLUTION INTRAVENOUS at 10:31

## 2023-10-17 RX ADMIN — ALLOPURINOL 100 MG: 100 TABLET ORAL at 08:52

## 2023-10-17 RX ADMIN — POTASSIUM CHLORIDE 10 MEQ: 10 CAPSULE, COATED, EXTENDED RELEASE ORAL at 10:27

## 2023-10-17 RX ADMIN — CEFEPIME 2000 MG: 2 INJECTION, POWDER, FOR SOLUTION INTRAVENOUS at 05:00

## 2023-10-17 RX ADMIN — ANTI-FUNGAL POWDER MICONAZOLE NITRATE TALC FREE: 1.42 POWDER TOPICAL at 17:54

## 2023-10-17 RX ADMIN — ALBUTEROL SULFATE 2.5 MG: 2.5 SOLUTION RESPIRATORY (INHALATION) at 15:13

## 2023-10-17 RX ADMIN — POTASSIUM CHLORIDE 10 MEQ: 10 CAPSULE, COATED, EXTENDED RELEASE ORAL at 08:53

## 2023-10-17 RX ADMIN — ALBUTEROL SULFATE 2.5 MG: 2.5 SOLUTION RESPIRATORY (INHALATION) at 07:36

## 2023-10-17 RX ADMIN — BUMETANIDE 2 MG: 0.25 INJECTION INTRAMUSCULAR; INTRAVENOUS at 08:52

## 2023-10-17 RX ADMIN — POTASSIUM CHLORIDE 40 MEQ: 1500 TABLET, EXTENDED RELEASE ORAL at 18:09

## 2023-10-17 RX ADMIN — POTASSIUM CHLORIDE 20 MEQ: 1500 TABLET, EXTENDED RELEASE ORAL at 10:28

## 2023-10-17 RX ADMIN — LINEZOLID 600 MG: 600 INJECTION, SOLUTION INTRAVENOUS at 17:41

## 2023-10-17 ASSESSMENT — PAIN SCALES - GENERAL
PAINLEVEL_OUTOF10: 0

## 2023-10-17 ASSESSMENT — ENCOUNTER SYMPTOMS
ABDOMINAL PAIN: 0
SHORTNESS OF BREATH: 1

## 2023-10-17 NOTE — PROGRESS NOTES
Physical Therapy  Facility/Department: [facility] ICU  Daily Treatment Note  NAME: Yadira Aguilar  : 1935  MRN: [id number]    Date of Service: 10/17/2023    Discharge Recommendations:  Patient would benefit from continued therapy after discharge, Continue to assess pending progress        Patient Diagnosis(es): The primary encounter diagnosis was Acute on chronic congestive heart failure, unspecified heart failure type ([address]). Diagnoses of Acute congestive heart failure, unspecified heart failure type (HCC), SOB (shortness of breath), Paroxysmal A-fib ([address]), Lymphedema of both lower extremities, Localized edema, and Non-pressure chronic ulcer of right 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 ([address]) were also pertinent to this visit. Assessment   Activity Tolerance: Patient limited by fatigue (lethragy)     Plan    Physcial Therapy Plan  General Plan: 5-7 times per week  Specific Instructions for Next Treatment: Mobilize as able  Current Treatment Recommendations: Balance training;Functional mobility training;Transfer training;Gait training;Equipment evaluation, education, & procurement; Therapeutic activities; Positioning; Safety education & training     Restrictions  Restrictions/Precautions  Restrictions/Precautions: Fall Risk, General Precautions, Bed Alarm, Other (comment)  Required Braces or Orthoses?: No  Implants present? : Metal implants, Pacemaker  Position Activity Restriction  Other position/activity restrictions: rory MALDONADO IV's, telemetry     RN Sulema Estrada approves treatment , reports droplet precaution and bilateral LE cellulitis .   Pain: Pt denies  Orientation  Overall Orientation Status: Within Functional Limits        Bed Mobility Training  Bed Mobility Training: No  Transfer Training  Transfer Training: No     PT Exercises  A/AROM Exercises: Supine bilateral LE x 10 AAROM (Pt falling asleep during treatment)             Goals  Short Term Goals  Time Frame for Short Term Goals: 7 days  Short Term Goal 1: Pt will

## 2023-10-17 NOTE — PROGRESS NOTES
10/17/23 1422   Encounter Summary   Encounter Overview/Reason  Spiritual/Emotional Needs   Service Provided For: Patient   Referral/Consult From: Palliative Care   Last Encounter  10/17/23   Complexity of Encounter Low   Spiritual/Emotional needs   Type Spiritual Support   Assessment/Intervention/Outcome   Assessment Unable to assess  (patient sleeping)   Intervention Prayer (assurance of)/[place]

## 2023-10-17 NOTE — CARE COORDINATION
ONGOING DISCHARGE PLAN:    Meets LTACH criteria    Bipap 100%/94%3L    +rhinovirus  IV cefepime/zyvox per ID    New GI consult-anemia, may need EGD  Hgb 7.2    Nephrology-hold bumex, added midodrine, levophed, manage electrolytes    Palliative Care     PTOT    Will continue to follow for additional discharge needs. If patient is discharged prior to next notation, then this note serves as note for discharge by case management.     Electronically signed by Erin Montoya RN on 10/17/2023 at 5:08 PM

## 2023-10-17 NOTE — CONSULTS
diagnosis/treatment? yes  Does caregiver understand diagnosis/treatment? yes      Assessment        REVIEW OF SYSTEMS  Constitutional: Alert and orient x4 answers questions and follows commands   Eyes: no eye pain or blurred vision  ENT: no hearing loss, congestion, or difficulty swallowing   Respiratory: Lungs diminished oxygen per NC SOB with exertion   Cardiovascular: no chest pain or pressure, no palpitations, no diaphoresis generalized edema   Gastrointestinal: no nausea, vomiting,abdominal pain, diarrhea or constipation, no melena   Genitourinary: no dysuria, frequency,hematuria , or nocturia   Musculoskeletal: generalized weakness   Skin: wounds on LE   Neurological: Alert and orient x4 answers questions and follows commands     PHYSICAL ASSESSMENT:  Constitutional: Alert and orient x4 answers questions and follows commands   Head: Normocephalic and atraumatic. Eyes: EOM are normal. Pupils are equal, round   Neck: Normal range of motion. Neck supple. No tracheal deviation present. Cardiovascular: Normal rate and regular rhythm, S1, S2, no murmur   Pulmonary/Chest: Lungs diminished oxygen per NC SOB with exertion  Abdomen:Rounded no nausea or vomiting Musculoskeletal: Normal range of motion. No edema lower ext. Neurological: CN II-XII grossly intact, no focal neurological deficits   Skin: wounds on LE     Palliative Performance Scale:  ___60%  Ambulation reduced; Significant disease; Can't do hobbies/housework; intake normal or reduced; occasional assist; LOC full/confusion  ___50%  Mainly sit/lie; Extensive disease; Can't do any work; Considerable assist; intake normal or reduced; LOC full/confusion  _x__40%  Mainly in bed; Extensive disease; Mainly assist; intake normal or reduced; LOC full/confusion   ___30%  Bed Bound; Extensive disease; Total care; intake reduced; LOCfull/confusion  ___20%  Bed Bound; Extensive disease; Total care; intake minimal; Drowsy/coma  ___10%  Bed Bound;  Extensive disease;

## 2023-10-17 NOTE — PROGRESS NOTES
Writer went to pt's room to fulfill a consult. Pt wants his daughter, Daniela Jensen to be his HCPOA and told writer that he had discussed his wishes with her. Writer explained the [employer] with pt. Pt also informed writer that he does not want CPR or to be put on a ventilator. 10/17/23 1242   Encounter Summary   Encounter Overview/Reason  Advance Care Planning   Service Provided For: Patient   Referral/Consult From: Nurse   Support System Children   Last Encounter  10/17/23   Complexity of Encounter Moderate   Spiritual/Emotional needs   Type Spiritual Support   Advance Care Planning   Type Completed AD/ACP document(s)   Assessment/Intervention/Outcome   Assessment Calm;Coping; Impaired resilience; Powerlessness   Intervention Active listening;Discussed death, afterlife; End of Life Care;Prayer (assurance of)/[place];Sustaining Presence/Ministry of presence   Outcome Comfort;Coping;Engaged in conversation;Expressed feelings, needs, and concerns;Expressed Gratitude;Receptive

## 2023-10-17 NOTE — CONSULTS
GI Consult Note:    Name: Meeta Motta  MRN: [id number]     [address]: [de-identified]  Room: 2008/2008-01    Admit Date: 10/9/2023  PCP: Arturo Shirley MD    Physician Requesting Consult: Kate Nunez MD     Reason for Consult:    Severe anemia  Morbid obesity  Weakness and fatigue  Shortness of breath      Chief Complaint:     Chief Complaint   Patient presents with    Shortness of Breath     Started yesterday       History Obtained From:     Patient and EMR    History of Present Illness:      Meeta Motta is a  82 y.o.  male who presents with Shortness of Breath (Started yesterday)    This elderly gentleman is has been hospitalized at [facility] intensive care unit with significant shortness of breath  History for multiple chronic medical issues  He has been complaining some weakness fatigue and tiredness  No overt bleeding  Found to be anemic  Found to be occult blood positive  Not sure if he ever had a GI work-up done before  Obesity  Currently on BiPAP  Has forgetfulness  Significant bilateral cellulitis lower extremity and edema  His medical charts labs were all reviewed  Care was discussed with nursing staff in ICU  Has been evaluated by palliative care today  Symptoms:  Onset:  Location:  abdomen  Duration:  day(s)  Severity:  mild  Quality:  intermittent      Past Medical History:     Past Medical History:   Diagnosis Date    Arthritis     CHF (congestive heart failure) ([address])     Chronic kidney disease     History of blood transfusion     Hyperlipidemia         Past Surgical History:     Past Surgical History:   Procedure Laterality Date    CARDIAC SURGERY  2020    bypass    JOINT REPLACEMENT      Left hip    PACEMAKER PLACEMENT Right 2020    and defib        Medications Prior to Admission:       Prior to Admission medications    Medication Sig Start Date End Date Taking?  Authorizing Provider   fluticasone (FLONASE) 50 MCG/ACT nasal spray 2 sprays by Each Nostril route daily 9/13/23 10/17/2023 04:52 AM    CO2 25 10/17/2023 04:52 AM    BUN 72 10/17/2023 04:52 AM    LABALBU 2.3 10/17/2023 04:52 AM    CREATININE 2.2 10/17/2023 04:52 AM    CALCIUM 8.1 10/17/2023 04:52 AM    GFRAA 57 04/07/2022 03:28 AM    LABGLOM 28 10/17/2023 04:52 AM    GLUCOSE 122 10/17/2023 04:52 AM     PT/INR:    Lab Results   Component Value Date/Time    PROTIME 13.9 10/09/2023 10:54 AM    INR 1.0 10/09/2023 10:54 AM     PTT:    Lab Results   Component Value Date/Time    APTT 31.9 04/06/2022 10:17 AM   [APTT}    Assesment:     Primary Problem  Acute congestive heart failure, unspecified heart failure type [facility])    Active Hospital Problems    Diagnosis Date Noted    Non-pressure chronic ulcer of right 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 ([address]) [L97.912] 02/15/2023     Priority: Medium    Non-pressure chronic ulcer of left 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 ([address]) [L97.922] 02/15/2023     Priority: Medium    Lymphedema of both lower extremities [I89.0] 02/09/2023     Priority: Medium    Acute GI bleeding [K92.2] 10/16/2023    Hypokalemia [E87.6] 10/16/2023    Acute respiratory failure with hypoxia ([address]) [J96.01] 10/16/2023    DONI (acute kidney injury) ([address]) [N17.9] 10/11/2023    Primary hypertension [I10] 10/10/2023    Acute congestive heart failure, unspecified heart failure type ([address]) [I50.9] 10/09/2023    Secondary hypercoagulable state ([address]) [D68.69] 09/13/2023    Adult BMI 40.0-44.9 kg/sq m ([address]) [Z68.41] 04/02/2022    Mixed hyperlipidemia [E78.2] 04/02/2022    Paroxysmal A-fib ([address]) [I48.0] 08/18/2021    Coronary artery disease involving native coronary artery of native heart without angina pectoris [I25.10] 08/18/2021     Severe anemia  Morbid obesity  Weakness and fatigue  Shortness of breath  Plan:     Continue supportive care  Treatment of breathing issues  Follow-up hemoglobin hematocrit  PPI  Transfuse if hemoglobin drops below 7  IV iron  Not sure if he is a candidate for any major invasive procedures at this time  May

## 2023-10-17 NOTE — CONSENT
Informed Consent for Blood Component Transfusion Note    I have discussed with the patient the rationale for blood component transfusion; its benefits in treating or preventing fatigue, organ damage, or death; and its risk which includes mild transfusion reactions, rare risk of blood borne infection, or more serious but rare reactions. I have discussed the alternatives to transfusion, including the risk and consequences of not receiving transfusion. The patient had an opportunity to ask questions and had agreed to proceed with transfusion of blood components.     Electronically signed by Michelle Farrell MD on 10/16/23 at 9:10 PM EDT

## 2023-10-17 NOTE — ACP (ADVANCE CARE PLANNING)
Advance Care Planning     Advance Care Planning Activator (Inpatient)  Conversation Note      Date of ACP Conversation: 10/10/2023     Conversation Conducted with: Patient with Decision Making Capacity    ACP Activator: Jamin Benavides RN      Health Care Decision Maker:     Current Designated Health Care Decision Maker:     Primary Decision Maker: Georgina Meza - Child - [phone]  Click here to complete Healthcare Decision Makers including section of the Healthcare Decision Maker Relationship (ie \"Primary\")  Today we documented Decision Maker(s) consistent with Legal Next of Kin hierarchy. Care Preferences    Ventilation: \"If you were in your present state of health and suddenly became very ill and were unable to breathe on your own, what would your preference be about the use of a ventilator (breathing machine) if it were available to you? \"      Would the patient desire the use of ventilator (breathing machine)?: yes    \"If your health worsens and it becomes clear that your chance of recovery is unlikely, what would your preference be about the use of a ventilator (breathing machine) if it were available to you? \"     Would the patient desire the use of ventilator (breathing machine)?: No      Resuscitation  \"CPR works best to restart the heart when there is a sudden event, like a heart attack, in someone who is otherwise healthy. Unfortunately, CPR does not typically restart the heart for people who have serious health conditions or who are very sick. \"    \"In the event your heart stopped as a result of an underlying serious health condition, would you want attempts to be made to restart your heart (answer \"yes\" for attempt to resuscitate) or would you prefer a natural death (answer \"no\" for do not attempt to resuscitate)? \" yes       [] Yes   [] No   Educated Patient / Chilo Bullockr regarding differences between Advance Directives and portable DNR orders.     Length of ACP Conversation in minutes:
Advance Care Planning     Advance Care Planning Inpatient Note  Spiritual Care Department    Today's Date: 10/17/2023  Unit: [address] ICU    Received request from [facility] Provider. Upon review of chart and communication with care team, patient's decision making abilities are not in question. . Patient was/were present in the room during visit. Goals of ACP Conversation:  Discuss advance care planning documents    Health Care Decision Makers:       Primary Decision Maker: Lyubov Coffman Child - [phone]  Summary:  Completed [facility]  Advance Care Planning Documents     (Patient Wishes):  Healthcare Power of /Advance Directive Appointment of Health Care Agent  Living Will/Advance Directive     Assessment:  See writer's progress note dated today. Interventions:  Provided education on documents for clarity and greater understanding  Discussed and provided education on state decision maker hierarchy  Assisted in the completion of documents according to patient's wishes at this time    Care Preferences Communicated:     Hospitalization:  If the patient's health worsens and it becomes clear that the chance of recovery is unlikely,     the patient prefers comfort-focused treatment without hospitalization. Ventilation:   If the patient, in their present state of health, suddenly became very ill and unable to breathe on their own,     the patient would NOT desire the use of a ventilator (breathing machine). If their health worsens and it becomes clear that the change of recovery is unlikely,     the patient would NOT desire the use of a ventilator (breathing machine). Resuscitation:  In the event the patient's heart stopped as a result of an underlying serious health condition, the patient communicates a preference for      a natural death (no CPR). Outcomes/Plan:  New advance directive completed.   Returned original document(s) to patient, as well as
would use [place] law and the majority of the biological children would determine Plan of care if patient could not make decisions for himself. I Placed and order for spiritual care to complete POA paperwork. We discussed code status levels and I discussed each level patient changed code status to [facility] no intubation yesterday. Patient lives with his daughter Evert Jung and is willing to go to SNF if needed at discharge for strengthening. I also discussed Palliative care and he is thinking about having them follow on discharge for symptom management. I called Evert Jung and updated her on above conversation and she is coming in today. I notified spiritual care for needs of [facility] [place] [place]. POA completion. Palliative care will continue to follow.     Length of Voluntary ACP Conversation in minutes:  16 minutes    JUAN FRANCISCO Valencia - NP

## 2023-10-17 NOTE — PROGRESS NOTES
RN spoke to Dr. Main Brochure in regards to patients DBP being so low and levophed order being titrated for MAP>65. Orders received to switch titrations for SBP>100. Add midodrine 5mg TIB  Hold Bumex if potassium is still low and replace per sliding scale.

## 2023-10-17 NOTE — DISCHARGE INSTRUCTIONS
[facility]                            Visit  Discharge Instructions / Physician Orders  DATE: 10/18/23     Home Care: None     SUPPLIES ORDERED THRU:  none          DATE LAST SUPPLIED  NA     Wound Location:  right and left lower legs      Cleanse with: Antibacterial soap and water     Dressing Orders: HOLD WRAPS   start silvercel abd and kerlix then ace wraps     Frequency:  Change Daily     Additional Orders: Increase protein to diet (meat, cheese, eggs, fish, peanut butter, nuts and beans), eat at least 1 container of yogurt daily. Multivitamin daily  Compression pumps per BIO TAB-WALKER please pump 2-3 x per day, for 1 hour each time. OFFLOADING [] YES  TYPE:                  [] NA   GO TO ER for Shortness of Breath/Wheezing  Weekly wound care visits until determined otherwise. Antibiotic therapy-wound care related YES [x] NO [] NA[]     MY CHART []     Smart Device  []            Your next appointment with the [address] [place] [address] is 1 week with Dr. Pantera Delgadillo. (Please note your next appointment above and if you are unable to keep, kindly give a 24 hour notice. Thank you.)  If more than 15 min late we cannot guarantee you will be seen due to clinician schedule  Per Policy, Excessive cancellation will call for dismissal from program.  If you experience any of the following, please call the [address] [place] [address] during business hours:  [phone]     * Increase in Pain  * Temperature over 101  * Increase in drainage from your wound  * Drainage with a foul odor  * Bleeding  * Increase in swelling  * Need for compression bandage changes due to slippage, breakthrough drainage. If you need medical attention outside of the business hours of the [address] [place] [address] please contact your PCP or go to the nearest emergency room.      The information contained in the After Visit Summary

## 2023-10-18 ENCOUNTER — HOSPITAL ENCOUNTER (OUTPATIENT)
Dept: WOUND CARE | Age: 88
Discharge: HOME | End: 2023-10-18

## 2023-10-18 LAB
ABSOLUTE BANDS #: 3.76 K/UL (ref 0–1)
ALBUMIN SERPL-MCNC: 2.3 G/DL (ref 3.5–5.2)
ALP SERPL-CCNC: 153 U/L (ref 40–129)
ALT SERPL-CCNC: 43 U/L (ref 5–41)
ANION GAP SERPL CALCULATED.3IONS-SCNC: 12 MMOL/L (ref 9–17)
ANION GAP SERPL CALCULATED.3IONS-SCNC: 12 MMOL/L (ref 9–17)
AST SERPL-CCNC: 87 U/L
BANDS: 20 % (ref 0–10)
BASOPHILS # BLD: 0 K/UL (ref 0–0.2)
BASOPHILS NFR BLD: 0 % (ref 0–2)
BILIRUB SERPL-MCNC: 0.3 MG/DL (ref 0.3–1.2)
BUN SERPL-MCNC: 72 MG/DL (ref 8–23)
BUN SERPL-MCNC: 74 MG/DL (ref 8–23)
CALCIUM SERPL-MCNC: 8.2 MG/DL (ref 8.6–10.4)
CALCIUM SERPL-MCNC: 8.4 MG/DL (ref 8.6–10.4)
CHLORIDE SERPL-SCNC: 94 MMOL/L (ref 98–107)
CHLORIDE SERPL-SCNC: 95 MMOL/L (ref 98–107)
CO2 SERPL-SCNC: 24 MMOL/L (ref 20–31)
CO2 SERPL-SCNC: 24 MMOL/L (ref 20–31)
CREAT SERPL-MCNC: 2.4 MG/DL (ref 0.7–1.2)
CREAT SERPL-MCNC: 2.5 MG/DL (ref 0.7–1.2)
EOSINOPHIL # BLD: 0 K/UL (ref 0–0.4)
EOSINOPHILS RELATIVE PERCENT: 0 % (ref 0–4)
ERYTHROCYTE [DISTWIDTH] IN BLOOD BY AUTOMATED COUNT: 22.2 % (ref 11.5–14.9)
GFR SERPL CREATININE-BSD FRML MDRD: 24 ML/MIN/1.73M2
GFR SERPL CREATININE-BSD FRML MDRD: 26 ML/MIN/1.73M2
GLUCOSE SERPL-MCNC: 119 MG/DL (ref 70–99)
GLUCOSE SERPL-MCNC: 166 MG/DL (ref 70–99)
HCT VFR BLD AUTO: 24.1 % (ref 41–53)
HGB BLD-MCNC: 7.2 G/DL (ref 13.5–17.5)
LYMPHOCYTES NFR BLD: 1.13 K/UL (ref 1–4.8)
LYMPHOCYTES RELATIVE PERCENT: 6 % (ref 24–44)
MAGNESIUM SERPL-MCNC: 2.5 MG/DL (ref 1.6–2.6)
MCH RBC QN AUTO: 21.3 PG (ref 26–34)
MCHC RBC AUTO-ENTMCNC: 29.8 G/DL (ref 31–37)
MCV RBC AUTO: 71.4 FL (ref 80–100)
MONOCYTES NFR BLD: 0.19 K/UL (ref 0.1–1.3)
MONOCYTES NFR BLD: 1 % (ref 1–7)
MORPHOLOGY: ABNORMAL
NEUTROPHILS NFR BLD: 73 % (ref 36–66)
NEUTS SEG NFR BLD: 13.72 K/UL (ref 1.3–9.1)
PLATELET # BLD AUTO: 275 K/UL (ref 150–450)
PMV BLD AUTO: 7.5 FL (ref 6–12)
POTASSIUM SERPL-SCNC: 3.3 MMOL/L (ref 3.7–5.3)
POTASSIUM SERPL-SCNC: 3.4 MMOL/L (ref 3.7–5.3)
PROT SERPL-MCNC: 6.1 G/DL (ref 6.4–8.3)
RBC # BLD AUTO: 3.37 M/UL (ref 4.5–5.9)
SODIUM SERPL-SCNC: 130 MMOL/L (ref 135–144)
SODIUM SERPL-SCNC: 131 MMOL/L (ref 135–144)
WBC OTHER # BLD: 18.8 K/UL (ref 3.5–11)

## 2023-10-18 PROCEDURE — 80053 COMPREHEN METABOLIC PANEL: CPT

## 2023-10-18 PROCEDURE — 2580000003 HC RX 258

## 2023-10-18 PROCEDURE — 2580000003 HC RX 258: Performed by: FAMILY MEDICINE

## 2023-10-18 PROCEDURE — C9113 INJ PANTOPRAZOLE SODIUM, VIA: HCPCS | Performed by: STUDENT IN AN ORGANIZED HEALTH CARE EDUCATION/TRAINING PROGRAM

## 2023-10-18 PROCEDURE — 99233 SBSQ HOSP IP/OBS HIGH 50: CPT | Performed by: INTERNAL MEDICINE

## 2023-10-18 PROCEDURE — 2580000003 HC RX 258: Performed by: INTERNAL MEDICINE

## 2023-10-18 PROCEDURE — 2580000003 HC RX 258: Performed by: STUDENT IN AN ORGANIZED HEALTH CARE EDUCATION/TRAINING PROGRAM

## 2023-10-18 PROCEDURE — 6360000002 HC RX W HCPCS

## 2023-10-18 PROCEDURE — 99232 SBSQ HOSP IP/OBS MODERATE 35: CPT | Performed by: STUDENT IN AN ORGANIZED HEALTH CARE EDUCATION/TRAINING PROGRAM

## 2023-10-18 PROCEDURE — 6360000002 HC RX W HCPCS: Performed by: INTERNAL MEDICINE

## 2023-10-18 PROCEDURE — APPSS30 APP SPLIT SHARED TIME 16-30 MINUTES: Performed by: NURSE PRACTITIONER

## 2023-10-18 PROCEDURE — 6360000002 HC RX W HCPCS: Performed by: NURSE PRACTITIONER

## 2023-10-18 PROCEDURE — 97110 THERAPEUTIC EXERCISES: CPT

## 2023-10-18 PROCEDURE — 36415 COLL VENOUS BLD VENIPUNCTURE: CPT

## 2023-10-18 PROCEDURE — 94660 CPAP INITIATION&MGMT: CPT

## 2023-10-18 PROCEDURE — 6360000002 HC RX W HCPCS: Performed by: STUDENT IN AN ORGANIZED HEALTH CARE EDUCATION/TRAINING PROGRAM

## 2023-10-18 PROCEDURE — 99231 SBSQ HOSP IP/OBS SF/LOW 25: CPT | Performed by: INTERNAL MEDICINE

## 2023-10-18 PROCEDURE — 6370000000 HC RX 637 (ALT 250 FOR IP): Performed by: FAMILY MEDICINE

## 2023-10-18 PROCEDURE — 6370000000 HC RX 637 (ALT 250 FOR IP): Performed by: STUDENT IN AN ORGANIZED HEALTH CARE EDUCATION/TRAINING PROGRAM

## 2023-10-18 PROCEDURE — 2700000000 HC OXYGEN THERAPY PER DAY

## 2023-10-18 PROCEDURE — 94761 N-INVAS EAR/PLS OXIMETRY MLT: CPT

## 2023-10-18 PROCEDURE — 94640 AIRWAY INHALATION TREATMENT: CPT

## 2023-10-18 PROCEDURE — 2060000000 HC ICU INTERMEDIATE R&B

## 2023-10-18 PROCEDURE — 83735 ASSAY OF MAGNESIUM: CPT

## 2023-10-18 PROCEDURE — 2500000003 HC RX 250 WO HCPCS: Performed by: INTERNAL MEDICINE

## 2023-10-18 PROCEDURE — 85025 COMPLETE CBC W/AUTO DIFF WBC: CPT

## 2023-10-18 PROCEDURE — 80048 BASIC METABOLIC PNL TOTAL CA: CPT

## 2023-10-18 PROCEDURE — 6370000000 HC RX 637 (ALT 250 FOR IP): Performed by: INTERNAL MEDICINE

## 2023-10-18 RX ORDER — POTASSIUM CHLORIDE 20 MEQ/1
10 TABLET, EXTENDED RELEASE ORAL ONCE
Status: COMPLETED | OUTPATIENT
Start: 2023-10-18 | End: 2023-10-18

## 2023-10-18 RX ORDER — POTASSIUM CHLORIDE 750 MG/1
20 CAPSULE, EXTENDED RELEASE ORAL 2 TIMES DAILY
Status: DISCONTINUED | OUTPATIENT
Start: 2023-10-18 | End: 2023-11-05

## 2023-10-18 RX ADMIN — POTASSIUM CHLORIDE 40 MEQ: 1500 TABLET, EXTENDED RELEASE ORAL at 08:55

## 2023-10-18 RX ADMIN — LINEZOLID 600 MG: 600 INJECTION, SOLUTION INTRAVENOUS at 05:31

## 2023-10-18 RX ADMIN — ALBUTEROL SULFATE 2.5 MG: 2.5 SOLUTION RESPIRATORY (INHALATION) at 19:41

## 2023-10-18 RX ADMIN — ANTI-FUNGAL POWDER MICONAZOLE NITRATE TALC FREE: 1.42 POWDER TOPICAL at 20:27

## 2023-10-18 RX ADMIN — MIDODRINE HYDROCHLORIDE 5 MG: 5 TABLET ORAL at 17:48

## 2023-10-18 RX ADMIN — CEFEPIME 2000 MG: 2 INJECTION, POWDER, FOR SOLUTION INTRAVENOUS at 05:30

## 2023-10-18 RX ADMIN — MIDODRINE HYDROCHLORIDE 5 MG: 5 TABLET ORAL at 08:55

## 2023-10-18 RX ADMIN — SODIUM CHLORIDE, PRESERVATIVE FREE 40 MG: 5 INJECTION INTRAVENOUS at 20:26

## 2023-10-18 RX ADMIN — POTASSIUM CHLORIDE 10 MEQ: 1500 TABLET, EXTENDED RELEASE ORAL at 11:13

## 2023-10-18 RX ADMIN — ALLOPURINOL 100 MG: 100 TABLET ORAL at 08:55

## 2023-10-18 RX ADMIN — SODIUM CHLORIDE, PRESERVATIVE FREE 10 ML: 5 INJECTION INTRAVENOUS at 20:27

## 2023-10-18 RX ADMIN — ALBUTEROL SULFATE 2.5 MG: 2.5 SOLUTION RESPIRATORY (INHALATION) at 10:54

## 2023-10-18 RX ADMIN — IRON SUCROSE 100 MG: 20 INJECTION, SOLUTION INTRAVENOUS at 13:50

## 2023-10-18 RX ADMIN — CEFEPIME 2000 MG: 2 INJECTION, POWDER, FOR SOLUTION INTRAVENOUS at 17:44

## 2023-10-18 RX ADMIN — SODIUM CHLORIDE, PRESERVATIVE FREE 10 ML: 5 INJECTION INTRAVENOUS at 08:55

## 2023-10-18 RX ADMIN — ALTEPLASE 1 MG: 2.2 INJECTION, POWDER, LYOPHILIZED, FOR SOLUTION INTRAVENOUS at 11:35

## 2023-10-18 RX ADMIN — ALBUTEROL SULFATE 2.5 MG: 2.5 SOLUTION RESPIRATORY (INHALATION) at 07:10

## 2023-10-18 RX ADMIN — POTASSIUM CHLORIDE 20 MEQ: 10 CAPSULE, COATED, EXTENDED RELEASE ORAL at 20:25

## 2023-10-18 RX ADMIN — BUMETANIDE 2 MG: 0.25 INJECTION INTRAMUSCULAR; INTRAVENOUS at 20:26

## 2023-10-18 RX ADMIN — ROSUVASTATIN CALCIUM 20 MG: 20 TABLET, FILM COATED ORAL at 08:56

## 2023-10-18 RX ADMIN — POTASSIUM CHLORIDE 10 MEQ: 10 CAPSULE, COATED, EXTENDED RELEASE ORAL at 08:55

## 2023-10-18 RX ADMIN — METOPROLOL TARTRATE 12.5 MG: 25 TABLET, FILM COATED ORAL at 20:36

## 2023-10-18 RX ADMIN — LINEZOLID 600 MG: 600 INJECTION, SOLUTION INTRAVENOUS at 17:48

## 2023-10-18 RX ADMIN — ANTI-FUNGAL POWDER MICONAZOLE NITRATE TALC FREE: 1.42 POWDER TOPICAL at 08:54

## 2023-10-18 RX ADMIN — BUMETANIDE 2 MG: 0.25 INJECTION INTRAMUSCULAR; INTRAVENOUS at 10:18

## 2023-10-18 RX ADMIN — MIDODRINE HYDROCHLORIDE 5 MG: 5 TABLET ORAL at 11:13

## 2023-10-18 RX ADMIN — FLUTICASONE PROPIONATE 2 SPRAY: 50 SPRAY, METERED NASAL at 09:04

## 2023-10-18 RX ADMIN — SODIUM CHLORIDE, PRESERVATIVE FREE 40 MG: 5 INJECTION INTRAVENOUS at 08:54

## 2023-10-18 RX ADMIN — ALBUTEROL SULFATE 2.5 MG: 2.5 SOLUTION RESPIRATORY (INHALATION) at 16:04

## 2023-10-18 ASSESSMENT — PAIN SCALES - GENERAL
PAINLEVEL_OUTOF10: 0

## 2023-10-18 ASSESSMENT — ENCOUNTER SYMPTOMS
ABDOMINAL PAIN: 0
SHORTNESS OF BREATH: 0

## 2023-10-18 NOTE — PROGRESS NOTES
Comprehensive Nutrition Assessment    Type and Reason for Visit:  Initial    Nutrition Recommendations/Plan:   Continue diet and supplements as ordered. Malnutrition Assessment:  Malnutrition Status:  No malnutrition (10/18/23 1639)    Context:  Acute Illness     Findings of the 6 clinical characteristics of malnutrition:  Energy Intake:  No significant decrease in energy intake  Weight Loss:  No significant weight loss     Body Fat Loss:  No significant body fat loss     Muscle Mass Loss:  No significant muscle mass loss    Fluid Accumulation:  Moderate to Severe Extremities    Nutrition Assessment:    Pt being seen for length of stay. Pt to ED with shortness of breath with known history of CHF. Admission Pro-BNP was 1,096 then on 10/14 was 4,804. Nurse reports pt is eating well no issues noted. Nutrition Related Findings:    Edema: +2 BUE's, +3 pitting BLE's. Hx: CHF, CKD. Labs & meds reviewed. Wound Type: Skin Tears, Venous Stasis       Current Nutrition Intake & Therapies:    Average Meal Intake: %, 51-75%  Average Supplements Intake: Unable to assess  ADULT DIET; Regular; No Added Salt (3-4 gm)  ADULT ORAL NUTRITION SUPPLEMENT; Dinner; Low Calorie/High Protein Oral Supplement    Anthropometric Measures:  Height: 185.4 cm (6' 1\")  Ideal Body Weight (IBW): 184 lbs (84 kg)    Admission Body Weight: 142 kg (313 lb)  Current Body Weight: 144.9 kg (319 lb 7.1 oz),   IBW.  Weight Source: Bed Scale  Current BMI (kg/m2): 42.2                               Estimated Daily Nutrient Needs:  Energy Requirements Based On: Kcal/kg  Weight Used for Energy Requirements: Admission  Energy (kcal/day): 1694-8473 kcals based on 13-14 kcals/kg  Weight Used for Protein Requirements: Ideal  Protein (g/day): 109-117 gm protein based 1.3-1.4 gm/kg     Fluid (ml/day):  per MD    Nutrition Diagnosis:   Altered nutrition-related lab values related to cardiac dysfunction as evidenced by lab values    Nutrition Interventions: Food and/or Nutrient Delivery: Continue Current Diet, Continue Oral Nutrition Supplement  Nutrition Education/Counseling: No recommendation at this time  Coordination of Nutrition Care: Continue to monitor while inpatient       Goals:     Goals: PO intake 75% or greater       Nutrition Monitoring and Evaluation:      Food/Nutrient Intake Outcomes: Food and Nutrient Intake  Physical Signs/Symptoms Outcomes: Biochemical Data, Nutrition Focused Physical Findings, Fluid Status or Edema, Skin, Weight    Discharge Planning:    Continue current diet, Continue Oral Nutrition [address], [id number] [place] [address]

## 2023-10-18 NOTE — PROGRESS NOTES
[facility] -   Infectious diseases evaluation  admission date 10/9/2023    reason for consultation:   Right thigh cellulitis    Impression :   Current:  Right thigh cellulitis  Distal lower extremity venous stasis ulcers with surrounding cellulitis improving  Leukocytosis  Rhinovirus infection  Ileus   Bilateral lower extremity lymphedema  Anemia  CHF  Acute hypoxic respiratory failure   Acute on chronic renal failure  Coronary artery disease status post CABG  Obesity      Recommendations   IV Zyvox and cefepime   Lower extremity venous Doppler negative  UA 10/16 showed 3-5 WBC, negative nitrate, negative leukocyte esterase  Stool occult blood positive  Procalcitonin level 2.2 and lactic acid 2.7 on 10/16  Follow blood cultures from 10/14/2023  Follow CBC renal function  Continue supportive care  Discussed with Dr. Perez Foot    Infection Control Recommendations   [place] Precautions  Droplet isolation    Antimicrobial Stewardship Recommendations   Simplification of therapy  Targeted therapy    History of Present Illness:   Initial history:  Jacklyn Bateman is a 82y.o.-year-old male presented to the hospital on 10/9/23 with worsening shortness of breath associated with lower extremity edema for 1 day. -Symptoms moderate to severe, worse with exertion, no alleviating or aggravating factors. He had a chronic lower extremity lymphedema with venous stasis ulcers with surrounding redness. The patient was treated with diuretics for CHF. On 10/14/2023 he developed fever with a temperature max of 102.3. WBC increased to 18.3, procalcitonin level 0.55  Respiratory panel with COVID-19 PCR was positive for rhinovirus on 10/13/2023.   Started on IV Zyvox and cefepime on 10/14/2023  Interval changes  10/18/2023   He is afebrile, was weaned off Levophed, tolerating oral, on oxygen by nasal cannula  WBC 18  Hedrick catheter and PICC line in place  Patient Vitals for the past 8

## 2023-10-18 NOTE — PROGRESS NOTES
10/18/23 1700   Encounter Summary   Encounter Overview/Reason  Spiritual/Emotional Needs   Service Provided For: Patient   Referral/Consult From: Palliative Care   Last Encounter  10/18/23   Complexity of Encounter Low   Spiritual/Emotional needs   Type Spiritual Support   Assessment/Intervention/Outcome   Assessment Unable to assess  (patient sleeping)   Intervention Prayer (assurance of)/[place]

## 2023-10-18 NOTE — PROGRESS NOTES
[address], Susan. \Hospital Has a New Name and Outlook.\""      Progress Note      Date:   10/18/2023  Patient name:  Yadira Aguilar  Date of admission:  10/9/2023  9:43 AM  MRN:   [id number]  YOB: 1935        Brief HPI    Pt admitted for acute CHF, respiratory failure    ASSESSMENT/PLAN     Hospital Problems             Last Modified POA    * (Principal) Acute congestive heart failure, unspecified heart failure type ([address]) 10/9/2023 Yes    Lymphedema of both lower extremities 10/10/2023 Yes    Non-pressure chronic ulcer of right 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 ([address]) 10/10/2023 Yes    Non-pressure chronic ulcer of left 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 ([address]) 10/10/2023 Yes    Paroxysmal A-fib ([address]) 10/12/2023 Yes    Coronary artery disease involving native coronary artery of native heart without angina pectoris 10/10/2023 Yes    Adult BMI 40.0-44.9 kg/sq m ([address]) 10/10/2023 Yes    Mixed hyperlipidemia 10/10/2023 Yes    Secondary hypercoagulable state ([address]) 10/10/2023 Yes    Primary hypertension 10/10/2023 Yes    DONI (acute kidney injury) ([address]) 10/11/2023 Yes    Acute GI bleeding 10/16/2023 Yes    Hypokalemia 10/16/2023 Yes    Acute respiratory failure with hypoxia ([address]) 10/16/2023 Yes    Severe anemia 10/17/2023 Yes       Renal function is stable, pt may not need dialysis, nonoliguric. Case was discussed with Dr. Luisito Noyola  Hgb is 7.2  today, GI on board, continue protonix 40 IV BID  Pt is off levophed, hold metoprolol unless tachycardic  Continue BiPAP, pulm managing  Continue cefepime & linezolid, ID on board, case was discussed with Dr. Yue Liriano, will continue current regimen cellulitis is improving  potassium replaced. DNR-CCA   ADULT DIET; Regular; No Added Salt (3-4 gm)  ADULT ORAL NUTRITION SUPPLEMENT; Dinner; Low Calorie/High Protein Oral Supplement   DVT:Holding eliquis due to gi bleed, start SCD  Dispo: ICU management    SUBJECTIVE:     Patient was seen and examined at bedside. Case discussed ID & Nephrology.  Pt

## 2023-10-18 NOTE — PROGRESS NOTES
Musculoskeletal:      Cervical back: Neck supple. No rigidity. Right lower leg: Edema present. Left lower leg: Edema present. Skin:     Coloration: Skin is not jaundiced. Findings: Erythema present. Comments: Bilateral lower extremity edema, venous stasis ulcer with surrounding erythema.   Right upper thigh erythema, warmth with no fluctuation or crepitance         Past Medical History:     Past Medical History:   Diagnosis Date    Arthritis     CHF (congestive heart failure) (HCC)     Chronic kidney disease     History of blood transfusion     Hyperlipidemia        Past Surgical  History:     Past Surgical History:   Procedure Laterality Date    CARDIAC SURGERY      bypass    JOINT REPLACEMENT      Left hip    PACEMAKER PLACEMENT Right     and defib       Medications:      [START ON 10/18/2023] cefepime  2,000 mg IntraVENous Q24H    miconazole   Topical BID    midodrine  5 mg Oral TID WC    metoprolol tartrate  12.5 mg Oral BID    pantoprazole (PROTONIX) 40 mg in sodium chloride (PF) 0.9 % 10 mL injection  40 mg IntraVENous Q12H    iron sucrose  100 mg IntraVENous Q48H    bumetanide  2 mg IntraVENous BID    linezolid  600 mg IntraVENous Q12H    albuterol  2.5 mg Nebulization 4x Daily RT    [Held by provider] apixaban  2.5 mg Oral BID    rosuvastatin  20 mg Oral Daily    allopurinol  100 mg Oral Daily    potassium chloride  10 mEq Oral Daily    fluticasone  2 spray Each Nostril Daily    sodium chloride flush  5-40 mL IntraVENous 2 times per day       Social History:     Social History     Socioeconomic History    Marital status: Single     Spouse name: Not on file    Number of children: 3    Years of education: Not on file    Highest education level: Not on file   Occupational History    Not on file   Tobacco Use    Smoking status: Former     Types: Cigarettes     Quit date: 1967     Years since quittin.8     Passive exposure: Past    Smokeless tobacco: Never   Vaping Use Vaping Use: Never used   Substance and Sexual Activity    Alcohol use: Not Currently    Drug use: Never    Sexual activity: Not Currently   Other Topics Concern    Not on file   Social History Narrative    Not on file     Social Determinants of Health     Financial Resource Strain: Low Risk  (2/2/2023)    Overall Financial Resource Strain (CARDIA)     Difficulty of Paying Living Expenses: Not hard at all   Food Insecurity: No Food Insecurity (2/2/2023)    Hunger Vital Sign     Worried About Running Out of Food in the Last Year: Never true     [address] in the Last Year: Never true   Transportation Needs: Unknown (2/2/2023)    PRAPARE - Transportation     Lack of Transportation (Medical): Not on file     Lack of Transportation (Non-Medical): No   Physical Activity: Inactive (5/10/2023)    Exercise Vital Sign     Days of Exercise per Week: 0 days     Minutes of Exercise per Session: 0 min   Stress: Not on file   Social Connections: Not on file   Intimate Partner Violence: Not on file   Housing Stability: Unknown (2/2/2023)    Housing Stability Vital Sign     Unable to Pay for Housing in the Last Year: Not on file     Number of [address] in the Last Year: Not on file     Unstable Housing in the Last Year: No       Family History:   History reviewed. No pertinent family history.    Medical Decision Making:   I have independently reviewed/ordered the following labs:    CBC with Differential:   Recent Labs     10/16/23  0432 10/16/23  1443 10/17/23  0452   WBC 25.1*  --  23.2*   HGB 6.9* 7.7* 7.2*   HCT 23.9* 25.8* 23.2*     --  263   LYMPHOPCT 2*  --  4*   MONOPCT 2  --  2       BMP:  Recent Labs     10/16/23  0432 10/17/23  0452 10/17/23  1616   * 130*  --    K 3.3* 2.8* 3.2*   CL 95* 94*  --    CO2 22 25  --    BUN 66* 72*  --    CREATININE 2.4* 2.2*  --    MG 2.5 2.4  --        Hepatic Function Panel:   Recent Labs     10/16/23  0432 10/17/23  0452   PROT 5.9* 5.8*   LABALBU 1.9* 2.3*   BILITOT

## 2023-10-18 NOTE — PROGRESS NOTES
RN spoke to Dr. Dionna Knowles this morning     Orders received;   Un hold Bumex  Give total 60 meq potassium oral to replace potassium today  BMP q 12  Starting tonight increase 10 meq potassium daily oral to 20 meq BID

## 2023-10-18 NOTE — PROGRESS NOTES
[address] [facility] - [place], [place]   OCCUPATIONAL THERAPY MISSED TREATMENT NOTE   INPATIENT   Date: 10/18/23  Patient Name: Ya Greene       Room:   MRN: [id number]   Account #: [de-identified]    : 1935  (82 y.o.)  Gender: male   Referring Practitioner: Rikki Aviles MD  Diagnosis: Acute CHF             REASON FOR MISSED TREATMENT:  Patient with another ancillary department   -   pastoral care in room with pt at this time. Will continue to follow for OT needs.         Electronically signed by GILBERT Velazco on 10/18/23 at 3:31 PM EDT

## 2023-10-18 NOTE — CARE COORDINATION
ONGOING DISCHARGE PLAN:    Patient is alert and oriented x4. Spoke with patient and daughter Beckie Jefferson  regarding discharge plan and patient confirms that plan is agreeable to [facility]. Beckie Jefferson states she is familiar with [facility]. She would also like referral sent to [facility], where she works once patient gets out of [facility] in [place]. IV cefepime/zyvox-per ID     IV venofer-Hgb 7.2    IV bumex 2 mg twice daily  98% on 1L, Bipap PRN/HS    Levophed started HS and stopped in the morning      Will continue to follow for additional discharge needs. If patient is discharged prior to next notation, then this note serves as note for discharge by case management.     Electronically signed by Natali Cardozo RN on 10/18/2023 at 4:38 PM

## 2023-10-18 NOTE — PLAN OF CARE
Problem: Discharge Planning  Goal: Discharge to home or other facility with appropriate resources  Outcome: Progressing  Flowsheets  Taken 10/18/2023 1600  Discharge to home or other facility with appropriate resources: Refer to discharge planning if patient needs post-hospital services based on physician order or complex needs related to functional status, cognitive ability or social support system  Taken 10/18/2023 1200  Discharge to home or other facility with appropriate resources: Refer to discharge planning if patient needs post-hospital services based on physician order or complex needs related to functional status, cognitive ability or social support system  Taken 10/18/2023 0800  Discharge to home or other facility with appropriate resources: Refer to discharge planning if patient needs post-hospital services based on physician order or complex needs related to functional status, cognitive ability or social support system     Problem: Chronic Conditions and Co-morbidities  Goal: Patient's chronic conditions and co-morbidity symptoms are monitored and maintained or improved  Outcome: Progressing  Flowsheets  Taken 10/18/2023 1600  Care Plan - Patient's Chronic Conditions and Co-Morbidity Symptoms are Monitored and Maintained or Improved:   Collaborate with multidisciplinary team to address chronic and comorbid conditions and prevent exacerbation or deterioration   Monitor and assess patient's chronic conditions and comorbid symptoms for stability, deterioration, or improvement  Taken 10/18/2023 1200  Care Plan - Patient's Chronic Conditions and Co-Morbidity Symptoms are Monitored and Maintained or Improved:   Monitor and assess patient's chronic conditions and comorbid symptoms for stability, deterioration, or improvement   Collaborate with multidisciplinary team to address chronic and comorbid conditions and prevent exacerbation or deterioration  Taken 10/18/2023 0800  Care Plan - Patient's Chronic Conditions and Co-Morbidity Symptoms are Monitored and Maintained or Improved:   Monitor and assess patient's chronic conditions and comorbid symptoms for stability, deterioration, or improvement   Collaborate with multidisciplinary team to address chronic and comorbid conditions and prevent exacerbation or deterioration     Problem: Pain  Goal: Verbalizes/displays adequate comfort level or baseline comfort level  Outcome: Progressing  Flowsheets  Taken 10/18/2023 1245  Verbalizes/displays adequate comfort level or baseline comfort level:   Encourage patient to monitor pain and request assistance   Administer analgesics based on type and severity of pain and evaluate response   Assess pain using appropriate pain scale  Taken 10/18/2023 0851  Verbalizes/displays adequate comfort level or baseline comfort level:   Encourage patient to monitor pain and request assistance   Administer analgesics based on type and severity of pain and evaluate response   Assess pain using appropriate pain scale

## 2023-10-18 NOTE — PROGRESS NOTES
Physical Therapy  Facility/Department: [facility] ICU  Daily Treatment Note  NAME: Magdy Roth  : 1935  MRN: [id number]    Date of Service: 10/18/2023    Discharge Recommendations:  Patient would benefit from continued therapy after discharge, Continue to assess pending progress        Patient Diagnosis(es): The primary encounter diagnosis was Acute on chronic congestive heart failure, unspecified heart failure type ([address]). Diagnoses of Acute congestive heart failure, unspecified heart failure type (HCC), SOB (shortness of breath), Paroxysmal A-fib ([address]), Lymphedema of both lower extremities, Localized edema, and Non-pressure chronic ulcer of right 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 ([address]) were also pertinent to this visit. Activity Tolerance: Patient limited by fatigue;Patient limited by endurance     Plan    Physcial Therapy Plan  General Plan: 5-7 times per week  Specific Instructions for Next Treatment: Mobilize as able  Current Treatment Recommendations: Balance training;Functional mobility training;Transfer training;Gait training;Equipment evaluation, education, & procurement; Therapeutic activities; Positioning; Safety education & training     Restrictions  Restrictions/Precautions  Restrictions/Precautions: Fall Risk, General Precautions, Bed Alarm, Other (comment)  Required Braces or Orthoses?: No  Implants present? : Metal implants, Pacemaker  Position Activity Restriction  Other position/activity restrictions: NIV, valadez, IV's, telemetry     Subjective    Pt lying in bed upon entering room    Pain: Pt denies    Cognition  Overall Cognitive Status: Exceptions  Arousal/Alertness: Appropriate responses to stimuli  Following Commands:  Follows one step commands with repetition  Attention Span: Appears intact  Safety Judgement: Decreased awareness of need for safety  Problem Solving: Assistance required to generate solutions;Assistance required to identify errors made;Assistance required to implement

## 2023-10-19 ENCOUNTER — APPOINTMENT (OUTPATIENT)
Dept: CT IMAGING | Age: 88
End: 2023-10-19
Payer: MEDICARE

## 2023-10-19 LAB
ABSOLUTE BANDS #: 1.57 K/UL (ref 0–1)
AMMONIA PLAS-SCNC: 22 UMOL/L (ref 16–60)
ANION GAP SERPL CALCULATED.3IONS-SCNC: 12 MMOL/L (ref 9–17)
ARTERIAL PATENCY WRIST A: ABNORMAL
BANDS: 11 % (ref 0–10)
BASOPHILS # BLD: 0 K/UL (ref 0–0.2)
BASOPHILS NFR BLD: 0 % (ref 0–2)
BODY TEMPERATURE: 37
BUN SERPL-MCNC: 70 MG/DL (ref 8–23)
CALCIUM SERPL-MCNC: 8.1 MG/DL (ref 8.6–10.4)
CHLORIDE SERPL-SCNC: 98 MMOL/L (ref 98–107)
CO2 SERPL-SCNC: 24 MMOL/L (ref 20–31)
COHGB MFR BLD: 3.1 % (ref 0–5)
CREAT SERPL-MCNC: 2.3 MG/DL (ref 0.7–1.2)
EOSINOPHIL # BLD: 0 K/UL (ref 0–0.4)
EOSINOPHILS RELATIVE PERCENT: 0 % (ref 0–4)
ERYTHROCYTE [DISTWIDTH] IN BLOOD BY AUTOMATED COUNT: 22.5 % (ref 11.5–14.9)
GAS FLOW.O2 O2 DELIVERY SYS: ABNORMAL L/MIN
GFR SERPL CREATININE-BSD FRML MDRD: 27 ML/MIN/1.73M2
GLUCOSE SERPL-MCNC: 130 MG/DL (ref 70–99)
HCO3 VENOUS: 25.9 MMOL/L (ref 24–30)
HCT VFR BLD AUTO: 23 % (ref 41–53)
HCT VFR BLD AUTO: 24.8 % (ref 41–53)
HGB BLD-MCNC: 7 G/DL (ref 13.5–17.5)
HGB BLD-MCNC: 7.4 G/DL (ref 13.5–17.5)
LYMPHOCYTES NFR BLD: 0.57 K/UL (ref 1–4.8)
LYMPHOCYTES RELATIVE PERCENT: 4 % (ref 24–44)
MCH RBC QN AUTO: 21.7 PG (ref 26–34)
MCHC RBC AUTO-ENTMCNC: 30.3 G/DL (ref 31–37)
MCV RBC AUTO: 71.7 FL (ref 80–100)
METHEMOGLOBIN: 0.5 % (ref 0–1.9)
MICROORGANISM SPEC CULT: NORMAL
MICROORGANISM SPEC CULT: NORMAL
MONOCYTES NFR BLD: 0.29 K/UL (ref 0.1–1.3)
MONOCYTES NFR BLD: 2 % (ref 1–7)
MORPHOLOGY: ABNORMAL
NEUTROPHILS NFR BLD: 83 % (ref 36–66)
NEUTS SEG NFR BLD: 11.87 K/UL (ref 1.3–9.1)
O2 SAT, VEN: 84.2 % (ref 60–85)
PCO2, VEN: 47.6 MM HG (ref 39–55)
PH VENOUS: 7.34 (ref 7.32–7.42)
PLATELET # BLD AUTO: 265 K/UL (ref 150–450)
PMV BLD AUTO: 7.2 FL (ref 6–12)
PO2, VEN: 51.4 MM HG (ref 30–50)
POSITIVE BASE EXCESS, VEN: 0.1 MMOL/L (ref 0–2)
POTASSIUM SERPL-SCNC: 3.3 MMOL/L (ref 3.7–5.3)
PT. POSITION: ABNORMAL
RBC # BLD AUTO: 3.21 M/UL (ref 4.5–5.9)
RESPIRATORY RATE: 30
SERVICE CMNT-IMP: NORMAL
SERVICE CMNT-IMP: NORMAL
SODIUM SERPL-SCNC: 134 MMOL/L (ref 135–144)
SPECIMEN DESCRIPTION: NORMAL
SPECIMEN DESCRIPTION: NORMAL
TEXT FOR RESPIRATORY: ABNORMAL
WBC OTHER # BLD: 14.3 K/UL (ref 3.5–11)

## 2023-10-19 PROCEDURE — 6360000002 HC RX W HCPCS: Performed by: INTERNAL MEDICINE

## 2023-10-19 PROCEDURE — 6360000002 HC RX W HCPCS: Performed by: STUDENT IN AN ORGANIZED HEALTH CARE EDUCATION/TRAINING PROGRAM

## 2023-10-19 PROCEDURE — 99233 SBSQ HOSP IP/OBS HIGH 50: CPT | Performed by: SURGERY

## 2023-10-19 PROCEDURE — 2060000000 HC ICU INTERMEDIATE R&B

## 2023-10-19 PROCEDURE — 99233 SBSQ HOSP IP/OBS HIGH 50: CPT | Performed by: STUDENT IN AN ORGANIZED HEALTH CARE EDUCATION/TRAINING PROGRAM

## 2023-10-19 PROCEDURE — 2700000000 HC OXYGEN THERAPY PER DAY

## 2023-10-19 PROCEDURE — 82140 ASSAY OF AMMONIA: CPT

## 2023-10-19 PROCEDURE — 2500000003 HC RX 250 WO HCPCS: Performed by: INTERNAL MEDICINE

## 2023-10-19 PROCEDURE — 2580000003 HC RX 258: Performed by: INTERNAL MEDICINE

## 2023-10-19 PROCEDURE — 82805 BLOOD GASES W/O2 SATURATION: CPT

## 2023-10-19 PROCEDURE — 2580000003 HC RX 258: Performed by: FAMILY MEDICINE

## 2023-10-19 PROCEDURE — 70450 CT HEAD/BRAIN W/O DYE: CPT

## 2023-10-19 PROCEDURE — 94761 N-INVAS EAR/PLS OXIMETRY MLT: CPT

## 2023-10-19 PROCEDURE — 94660 CPAP INITIATION&MGMT: CPT

## 2023-10-19 PROCEDURE — 94640 AIRWAY INHALATION TREATMENT: CPT

## 2023-10-19 PROCEDURE — 99233 SBSQ HOSP IP/OBS HIGH 50: CPT | Performed by: INTERNAL MEDICINE

## 2023-10-19 PROCEDURE — 6370000000 HC RX 637 (ALT 250 FOR IP): Performed by: FAMILY MEDICINE

## 2023-10-19 PROCEDURE — 6370000000 HC RX 637 (ALT 250 FOR IP): Performed by: INTERNAL MEDICINE

## 2023-10-19 PROCEDURE — 51702 INSERT TEMP BLADDER CATH: CPT

## 2023-10-19 PROCEDURE — 6360000002 HC RX W HCPCS: Performed by: NURSE PRACTITIONER

## 2023-10-19 PROCEDURE — 80048 BASIC METABOLIC PNL TOTAL CA: CPT

## 2023-10-19 PROCEDURE — 2580000003 HC RX 258: Performed by: STUDENT IN AN ORGANIZED HEALTH CARE EDUCATION/TRAINING PROGRAM

## 2023-10-19 PROCEDURE — A4216 STERILE WATER/SALINE, 10 ML: HCPCS | Performed by: STUDENT IN AN ORGANIZED HEALTH CARE EDUCATION/TRAINING PROGRAM

## 2023-10-19 PROCEDURE — 85014 HEMATOCRIT: CPT

## 2023-10-19 PROCEDURE — 99231 SBSQ HOSP IP/OBS SF/LOW 25: CPT | Performed by: INTERNAL MEDICINE

## 2023-10-19 PROCEDURE — 82800 BLOOD PH: CPT

## 2023-10-19 PROCEDURE — C9113 INJ PANTOPRAZOLE SODIUM, VIA: HCPCS | Performed by: STUDENT IN AN ORGANIZED HEALTH CARE EDUCATION/TRAINING PROGRAM

## 2023-10-19 PROCEDURE — 85025 COMPLETE CBC W/AUTO DIFF WBC: CPT

## 2023-10-19 PROCEDURE — 36415 COLL VENOUS BLD VENIPUNCTURE: CPT

## 2023-10-19 PROCEDURE — 2000000000 HC ICU R&B

## 2023-10-19 PROCEDURE — 85018 HEMOGLOBIN: CPT

## 2023-10-19 RX ORDER — MIDODRINE HYDROCHLORIDE 10 MG/1
10 TABLET ORAL
Status: DISCONTINUED | OUTPATIENT
Start: 2023-10-19 | End: 2023-10-20

## 2023-10-19 RX ORDER — MIDODRINE HYDROCHLORIDE 5 MG/1
5 TABLET ORAL ONCE
Status: COMPLETED | OUTPATIENT
Start: 2023-10-19 | End: 2023-10-19

## 2023-10-19 RX ORDER — NOREPINEPHRINE BITARTRATE 0.06 MG/ML
1-30 INJECTION, SOLUTION INTRAVENOUS CONTINUOUS
Status: DISCONTINUED | OUTPATIENT
Start: 2023-10-19 | End: 2023-10-22

## 2023-10-19 RX ADMIN — Medication 5 MCG/MIN: at 17:27

## 2023-10-19 RX ADMIN — ALBUTEROL SULFATE 2.5 MG: 2.5 SOLUTION RESPIRATORY (INHALATION) at 11:17

## 2023-10-19 RX ADMIN — MIDODRINE HYDROCHLORIDE 10 MG: 10 TABLET ORAL at 13:18

## 2023-10-19 RX ADMIN — ALBUTEROL SULFATE 2.5 MG: 2.5 SOLUTION RESPIRATORY (INHALATION) at 07:46

## 2023-10-19 RX ADMIN — ANTI-FUNGAL POWDER MICONAZOLE NITRATE TALC FREE: 1.42 POWDER TOPICAL at 08:51

## 2023-10-19 RX ADMIN — MIDODRINE HYDROCHLORIDE 5 MG: 5 TABLET ORAL at 08:40

## 2023-10-19 RX ADMIN — ROSUVASTATIN CALCIUM 20 MG: 20 TABLET, FILM COATED ORAL at 08:50

## 2023-10-19 RX ADMIN — ANTI-FUNGAL POWDER MICONAZOLE NITRATE TALC FREE: 1.42 POWDER TOPICAL at 21:21

## 2023-10-19 RX ADMIN — POTASSIUM CHLORIDE 40 MEQ: 1500 TABLET, EXTENDED RELEASE ORAL at 06:21

## 2023-10-19 RX ADMIN — SODIUM CHLORIDE, PRESERVATIVE FREE 10 ML: 5 INJECTION INTRAVENOUS at 08:45

## 2023-10-19 RX ADMIN — MIDODRINE HYDROCHLORIDE 5 MG: 5 TABLET ORAL at 09:48

## 2023-10-19 RX ADMIN — IRON SUCROSE 100 MG: 20 INJECTION, SOLUTION INTRAVENOUS at 13:24

## 2023-10-19 RX ADMIN — ALBUTEROL SULFATE 2.5 MG: 2.5 SOLUTION RESPIRATORY (INHALATION) at 21:31

## 2023-10-19 RX ADMIN — BUMETANIDE 2 MG: 0.25 INJECTION INTRAMUSCULAR; INTRAVENOUS at 21:20

## 2023-10-19 RX ADMIN — ALBUTEROL SULFATE 2.5 MG: 2.5 SOLUTION RESPIRATORY (INHALATION) at 15:40

## 2023-10-19 RX ADMIN — CEFEPIME 2000 MG: 2 INJECTION, POWDER, FOR SOLUTION INTRAVENOUS at 05:20

## 2023-10-19 RX ADMIN — SODIUM CHLORIDE, PRESERVATIVE FREE 40 MG: 5 INJECTION INTRAVENOUS at 08:40

## 2023-10-19 RX ADMIN — POTASSIUM CHLORIDE 20 MEQ: 10 CAPSULE, COATED, EXTENDED RELEASE ORAL at 08:40

## 2023-10-19 RX ADMIN — CEFEPIME 2000 MG: 2 INJECTION, POWDER, FOR SOLUTION INTRAVENOUS at 17:30

## 2023-10-19 RX ADMIN — FLUTICASONE PROPIONATE 2 SPRAY: 50 SPRAY, METERED NASAL at 08:51

## 2023-10-19 RX ADMIN — SODIUM CHLORIDE, PRESERVATIVE FREE 10 ML: 5 INJECTION INTRAVENOUS at 21:21

## 2023-10-19 RX ADMIN — SODIUM CHLORIDE, PRESERVATIVE FREE 40 MG: 5 INJECTION INTRAVENOUS at 21:20

## 2023-10-19 RX ADMIN — LINEZOLID 600 MG: 600 INJECTION, SOLUTION INTRAVENOUS at 18:22

## 2023-10-19 RX ADMIN — POTASSIUM CHLORIDE 20 MEQ: 10 CAPSULE, COATED, EXTENDED RELEASE ORAL at 21:20

## 2023-10-19 RX ADMIN — ALLOPURINOL 100 MG: 100 TABLET ORAL at 08:40

## 2023-10-19 RX ADMIN — BUMETANIDE 2 MG: 0.25 INJECTION INTRAMUSCULAR; INTRAVENOUS at 09:50

## 2023-10-19 RX ADMIN — MIDODRINE HYDROCHLORIDE 10 MG: 10 TABLET ORAL at 17:13

## 2023-10-19 RX ADMIN — ALTEPLASE 2 MG: 2.2 INJECTION, POWDER, LYOPHILIZED, FOR SOLUTION INTRAVENOUS at 11:04

## 2023-10-19 RX ADMIN — Medication 5 MCG/MIN: at 22:48

## 2023-10-19 RX ADMIN — LINEZOLID 600 MG: 600 INJECTION, SOLUTION INTRAVENOUS at 05:31

## 2023-10-19 ASSESSMENT — PAIN SCALES - GENERAL
PAINLEVEL_OUTOF10: 0
PAINLEVEL_OUTOF10: 0

## 2023-10-19 NOTE — PROGRESS NOTES
[address] [facility]   OCCUPATIONAL THERAPY MISSED TREATMENT NOTE   INPATIENT   Date: 10/19/23  Patient Name: Lucina Guardado       Room:   MRN: [id number]   Account #: [de-identified]    : 1935  (82 y.o.)  Gender: male   Referring Practitioner: Carlos Lagunas MD  Diagnosis: Acute CHF             REASON FOR MISSED TREATMENT:  Patient declined   -    OT/PT attempts X2 this date. First time at [phone], pt with family and request therapy to come back later. Second attempt, [phone], pt still eating lunch and declining therapy this date. Will continue to follow.          Electronically signed by GILBERT Castaneda on 10/19/23 at 2:15 PM EDT

## 2023-10-19 NOTE — PROGRESS NOTES
[facility]   Progress Note                   Date:   10/19/2023  Patient name: Remo Siemens  Date of admission:  10/9/2023  9:43 AM  MRN:   [id number]  YOB: 1935  PCP: Marcia Bowman MD    Reason for Admission: Acute congestive heart failure, unspecified heart failure type ([address]) [I50.9]    Subjective:       Clinical Changes / Abnormalities:re consulted for cardiac management concern     Patient seen and examined with nurse in room . Intermittent confusion over night but alert and oriented  for me  Denies chest pain . Tele/vitals/labs reviewed . no chest pain or dyspnea. On BIPAP last night.  HGB dropped to 7.0 , off levo , on midodrine     V paced rhythm      Medications:   Scheduled Meds:   iron sucrose  100 mg IntraVENous Q24H    epoetin azeb-epbx  3,000 Units SubCUTAneous Once per day on Mon Wed Fri    midodrine  10 mg Oral TID WC    cefepime  2,000 mg IntraVENous Q12H    potassium chloride  20 mEq Oral BID    miconazole   Topical BID    metoprolol tartrate  12.5 mg Oral BID    pantoprazole (PROTONIX) 40 mg in sodium chloride (PF) 0.9 % 10 mL injection  40 mg IntraVENous Q12H    bumetanide  2 mg IntraVENous BID    linezolid  600 mg IntraVENous Q12H    albuterol  2.5 mg Nebulization 4x Daily RT    [Held by provider] apixaban  2.5 mg Oral BID    rosuvastatin  20 mg Oral Daily    allopurinol  100 mg Oral Daily    fluticasone  2 spray Each Nostril Daily    sodium chloride flush  5-40 mL IntraVENous 2 times per day     Continuous Infusions:   norepinephrine Stopped (10/18/23 0509)    sodium chloride      sodium chloride Stopped (10/14/23 1456)     CBC:   Recent Labs     10/17/23  0452 10/18/23  0515 10/19/23  0444   WBC 23.2* 18.8* 14.3*   HGB 7.2* 7.2* 7.0*    275 265       BMP:    Recent Labs     10/18/23  0515 10/18/23  1835 10/19/23  0444   * 130* 134*   K 3.3* 3.4* 3.3*   CL 95* 94* 98   CO2 24 24 24   BUN 74* 72* 70*   CREATININE 2.5* 2.4* 2.3*   GLUCOSE 119*

## 2023-10-19 NOTE — PROGRESS NOTES
sounds bilaterally  Abdomen: Full but soft with normal active bowel sounds. Extremities: 2-3+ bilateral pitting pedal edema. CNS: No acute focal deficits    CBC:   Recent Labs     10/17/23  0452 10/18/23  0515 10/19/23  0444   WBC 23.2* 18.8* 14.3*   HGB 7.2* 7.2* 7.0*    275 265     BMP:    Recent Labs     10/18/23  0515 10/18/23  1835 10/19/23  0444   * 130* 134*   K 3.3* 3.4* 3.3*   CL 95* 94* 98   CO2 24 24 24   BUN 74* 72* 70*   CREATININE 2.5* 2.4* 2.3*   GLUCOSE 119* 166* 130*       Lab Results   Component Value Date/Time    NITRU NEGATIVE 10/16/2023 02:15 PM    COLORU Yellow 10/16/2023 02:15 PM    PHUR 6.5 10/16/2023 02:15 PM    WBCUA 3 to 5 10/16/2023 02:15 PM    RBCUA 6 TO 9 10/16/2023 02:15 PM    BACTERIA None 10/16/2023 02:15 PM    SPECGRAV 1.014 10/16/2023 02:15 PM    LEUKOCYTESUR TRACE 10/16/2023 02:15 PM    UROBILINOGEN Normal 10/16/2023 02:15 PM    BILIRUBINUR NEGATIVE 10/16/2023 02:15 PM    GLUCOSEU NEGATIVE 10/16/2023 02:15 PM    KETUA NEGATIVE 10/16/2023 02:15 PM     Urine Sodium:     Lab Results   Component Value Date/Time    SHANIA 78 04/05/2022 05:34 PM     Urine Creatinine:     Lab Results   Component Value Date/Time    LABCREA 37.1 04/05/2022 05:34 PM     IMPRESSION/RECOMMENDATIONS:      1. Acute kidney injury with underlying chronic kidney disease stage IIIb - consistent with prerenal azotemia/cardiorenal syndrome from diuretic use. Patient has indications for continuation of diuretics at this time. Plan: Continue IV Bumex 2 mg twice daily. Increase midodrine to 10 mg p.o. 3 times daily. Keep mean arterial pressure greater than 65 mmHg. Strict urine output documentation. Basic metabolic profile daily. 2.  Acute decompensated systolic heart failure - continue aggressive diuretic regimen. Daily weights. 3.  Hypotension -increase midodrine to 10 mg p.o. 3 times daily    4. Hypokalemia - secondary to loop diuretic. We will replace.     5.  Hyponatremia - hypervolemic and improving with loop diuretics. 6.  Microcytic anemia - consistent with iron deficiency with iron saturation 6%. He had positive stooloccult blood. Patient is currently receiving loading dose of IV iron and will start erythropoiesis stimulating agents once iron replete.     Prognosis is guarded    Britt Mancera  Attending Clinical Nephrologist

## 2023-10-19 NOTE — PROGRESS NOTES
[address] [facility]      Progress Note      Date:   10/19/2023  Patient name:  Sarita Wharton  Date of admission:  10/9/2023  9:43 AM  MRN:   [id number]  YOB: 1935        Brief HPI    Pt admitted for acute CHF, respiratory failure    ASSESSMENT/PLAN     Hospital Problems             Last Modified POA    * (Principal) Acute congestive heart failure, unspecified heart failure type ([address]) 10/9/2023 Yes    Lymphedema of both lower extremities 10/10/2023 Yes    Non-pressure chronic ulcer of right 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 ([address]) 10/10/2023 Yes    Non-pressure chronic ulcer of left 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 ([address]) 10/10/2023 Yes    Paroxysmal A-fib ([address]) 10/12/2023 Yes    Coronary artery disease involving native coronary artery of native heart without angina pectoris 10/10/2023 Yes    Adult BMI 40.0-44.9 kg/sq m ([address]) 10/10/2023 Yes    Mixed hyperlipidemia 10/10/2023 Yes    Secondary hypercoagulable state ([address]) 10/10/2023 Yes    Primary hypertension 10/10/2023 Yes    DONI (acute kidney injury) ([address]) 10/11/2023 Yes    Acute GI bleeding 10/16/2023 Yes    Hypokalemia 10/16/2023 Yes    Acute respiratory failure with hypoxia ([address]) 10/16/2023 Yes    Severe anemia 10/17/2023 Yes     Pt was off levophed this AM however BP dropped again, will re-start the drip. Continue midodrine. Hgb is stable, GI on board, continue protonix 40 IV BID  Cardio consulted, case was discussed with NP, hold metoprolol unless tachycardic, OK to hold eliquis  Continue BiPAP, pulm managing  Continue cefepime & linezolid, ID on board, cellulitis is improving  potassium replaced. Renal function stable. Case was discussed with Dr. Ngoc Wagner, will continue bumex. Pt acutely confused this AM and through afternoon. CT head due to afib and no anticoag, scan is negative. Likely acute delirium in the absence of findings. DNR-CCA   ADULT DIET; Regular;  No Added Salt (3-4 gm)  ADULT ORAL NUTRITION SUPPLEMENT;

## 2023-10-19 NOTE — PROGRESS NOTES
DATE: 10/19/2023    NAME: Rachel Harmon  MRN: [id number]   : 1935    Patient not seen this date for Physical Therapy due to:      [] Cancel by RN or physician due to:    [] Hemodialysis    [] Critical Lab Value Level     [] Blood transfusion in progress    [] Acute or unstable cardiovascular status   _MAP < 55 or more than >115  _HR < 40 or > 130    [] Acute or unstable pulmonary status   -FiO2 > 60%   _RR < 5 or >40    _O2 sats < 85%    [] Strict Bedrest    [] Off Unit for surgery or procedure    [] Off Unit for testing       [] Pending imaging to R/O fracture    [x] Refusal by Patient  X 2 attempts, 1st attempt pt requested we check back in one hour, 2nd attempt patient eating lunch, request we check tomorrow, will continue to pursue as able. [] Other      [] PT being discontinued at this time. Patient independent. No further needs. [] PT being discontinued at this time as the patient has been transferred to hospice care. No further needs.       Kaya Woo, PTA

## 2023-10-19 NOTE — PLAN OF CARE
Problem: Discharge Planning  Goal: Discharge to home or other facility with appropriate resources  Outcome: Progressing     Problem: Safety - Adult  Goal: Free from fall injury  10/19/2023 1435 by Tasia Duran RN  Outcome: Progressing  10/19/2023 0112 by Judson Christopehr RN  Outcome: Progressing  Note: Bed alarm on, bed locked, side rails up, gripper socks on, call-light within reach and able to use appropriately. Will continue to monitor this shift. Problem: Skin/Tissue Integrity  Goal: Absence of new skin breakdown  Description: 1. Monitor for areas of redness and/or skin breakdown  2. Assess vascular access sites hourly  3. Every 4-6 hours minimum:  Change oxygen saturation probe site  4. Every 4-6 hours:  If on nasal continuous positive airway pressure, respiratory therapy assess nares and determine need for appliance change or resting period. 10/19/2023 1435 by Tasia Duran RN  Outcome: Progressing  10/19/2023 0112 by Judson Christopher RN  Outcome: Progressing  Note: Repositioning pt every 2 hours. Pt able to use call light appropriately for any assistance. Will continue to monitor this shift. Problem: ABCDS Injury Assessment  Goal: Absence of physical injury  10/19/2023 1435 by Tasia Duran RN  Outcome: Progressing  10/19/2023 0112 by Judson Christopher RN  Outcome: Progressing  Note: Pt is encouraged to reposition and assessed for any injuries. Will continue to monitor this shift.        Problem: Chronic Conditions and Co-morbidities  Goal: Patient's chronic conditions and co-morbidity symptoms are monitored and maintained or improved  10/19/2023 1435 by Tasia Duran RN  Outcome: Progressing  Flowsheets (Taken 10/19/2023 0112 by Judson Christopher RN)  Care Plan - Patient's Chronic Conditions and Co-Morbidity Symptoms are Monitored and Maintained or Improved: Monitor and assess patient's chronic conditions and comorbid symptoms for stability, deterioration, or

## 2023-10-19 NOTE — PROGRESS NOTES
[facility] -   Infectious diseases evaluation  admission date 10/9/2023    reason for consultation:   Right thigh cellulitis    Impression :   Current:  Right thigh cellulitis  Distal lower extremity venous stasis ulcers with surrounding cellulitis improving  Leukocytosis  Rhinovirus infection  Ileus   Bilateral lower extremity lymphedema  Anemia  CHF  Acute hypoxic respiratory failure   Acute on chronic renal failure  Coronary artery disease status post CABG  Obesity      Recommendations   IV Zyvox and cefepime   Lower extremity venous Doppler negative  UA 10/16 showed 3-5 WBC, negative nitrate, negative leukocyte esterase  Stool occult blood positive  Procalcitonin level 2.2 and lactic acid 2.7 on 10/16  Follow blood cultures from 10/14/2023  Follow CBC renal function  Continue supportive care      Infection Control Recommendations   [place] Precautions  Droplet isolation    Antimicrobial Stewardship Recommendations   Simplification of therapy  Targeted therapy    History of Present Illness:   Initial history:  Dia Fang is a 82y.o.-year-old male presented to the hospital on 10/9/23 with worsening shortness of breath associated with lower extremity edema for 1 day. -Symptoms moderate to severe, worse with exertion, no alleviating or aggravating factors. He had a chronic lower extremity lymphedema with venous stasis ulcers with surrounding redness. The patient was treated with diuretics for CHF. On 10/14/2023 he developed fever with a temperature max of 102.3. WBC increased to 18.3, procalcitonin level 0.55  Respiratory panel with COVID-19 PCR was positive for rhinovirus on 10/13/2023. Started on IV Zyvox and cefepime on 10/14/2023  Interval changes  10/19/2023   He is afebrile, off Levophed, remains on oxygen by nasal cannula, right thigh swelling and redness improving, no new events.   Fecal management system in place with loose stool  WBC 14.3  [place] History of blood transfusion     Hyperlipidemia        Past Surgical  History:     Past Surgical History:   Procedure Laterality Date    CARDIAC SURGERY      bypass    JOINT REPLACEMENT      Left hip    PACEMAKER PLACEMENT Right 2020    and defib       Medications:      iron sucrose  100 mg IntraVENous Q24H    [START ON 10/20/2023] epoetin azeb-epbx  3,000 Units SubCUTAneous Once per day on     midodrine  10 mg Oral TID WC    cefepime  2,000 mg IntraVENous Q12H    potassium chloride  20 mEq Oral BID    miconazole   Topical BID    metoprolol tartrate  12.5 mg Oral BID    pantoprazole (PROTONIX) 40 mg in sodium chloride (PF) 0.9 % 10 mL injection  40 mg IntraVENous Q12H    bumetanide  2 mg IntraVENous BID    linezolid  600 mg IntraVENous Q12H    albuterol  2.5 mg Nebulization 4x Daily RT    [Held by provider] apixaban  2.5 mg Oral BID    rosuvastatin  20 mg Oral Daily    allopurinol  100 mg Oral Daily    fluticasone  2 spray Each Nostril Daily    sodium chloride flush  5-40 mL IntraVENous 2 times per day       Social History:     Social History     Socioeconomic History    Marital status: Single     Spouse name: Not on file    Number of children: 3    Years of education: Not on file    Highest education level: Not on file   Occupational History    Not on file   Tobacco Use    Smoking status: Former     Types: Cigarettes     Quit date: 1967     Years since quittin.8     Passive exposure: Past    Smokeless tobacco: Never   Vaping Use    Vaping Use: Never used   Substance and Sexual Activity    Alcohol use: Not Currently    Drug use: Never    Sexual activity: Not Currently   Other Topics Concern    Not on file   Social History Narrative    Not on file     Social Determinants of Health     Financial Resource Strain: Low Risk  (2023)    Overall Financial Resource Strain (CARDIA)     Difficulty of Paying Living Expenses: Not hard at all   Food Insecurity: No Food Insecurity (2023)    Hunger

## 2023-10-19 NOTE — CARE COORDINATION
ONGOING DISCHARGE PLAN:    Patient was on BIPAP, at the time of Visit. Pulmonary on board. Per Nursing, Pt. Was a little more confused today. Spoke with patient 's Daughter, Joshua Briceno, at the bedside,regarding discharge plan and she confirms that plan is still to DC to [facility] in [place]. Referral was also sent to [facility]. Eliza Homans is following if needed. Writer informed Butch Gallardo, from [facility], to start Pre-Cert today. HGB today, 7.0. Getting transfusion. BP has been running low. Cardio following. Remains on IV Cefepime/Zyvox/Bumex. PT/OT on board. Pt will have [employer], when he returns to home. Will continue to follow for additional discharge needs. If patient is discharged prior to next notation, then this note serves as note for discharge by case management.     Electronically signed by Chantel Weems RN on 10/19/2023 at 3:09 PM

## 2023-10-19 NOTE — PLAN OF CARE
Problem: Safety - Adult  Goal: Free from fall injury  10/19/2023 0112 by Faisal Pepper RN  Outcome: Progressing  Note: Bed alarm on, bed locked, side rails up, gripper socks on, call-light within reach and able to use appropriately. Will continue to monitor this shift. Problem: Skin/Tissue Integrity  Goal: Absence of new skin breakdown  Description: 1. Monitor for areas of redness and/or skin breakdown  2. Assess vascular access sites hourly  3. Every 4-6 hours minimum:  Change oxygen saturation probe site  4. Every 4-6 hours:  If on nasal continuous positive airway pressure, respiratory therapy assess nares and determine need for appliance change or resting period. 10/19/2023 0112 by Faisal Pepper RN  Outcome: Progressing  Note: Repositioning pt every 2 hours. Pt able to use call light appropriately for any assistance. Will continue to monitor this shift. Problem: ABCDS Injury Assessment  Goal: Absence of physical injury  10/19/2023 0112 by Faisal Pepper RN  Outcome: Progressing  Note: Pt is encouraged to reposition and assessed for any injuries. Will continue to monitor this shift.        Problem: Chronic Conditions and Co-morbidities  Goal: Patient's chronic conditions and co-morbidity symptoms are monitored and maintained or improved  10/19/2023 0112 by Faisal Pepper RN  Outcome: Progressing  Flowsheets  Taken 10/19/2023 0112  Care Plan - Patient's Chronic Conditions and Co-Morbidity Symptoms are Monitored and Maintained or Improved: Monitor and assess patient's chronic conditions and comorbid symptoms for stability, deterioration, or improvement  Taken 10/18/2023 2000  Care Plan - Patient's Chronic Conditions and Co-Morbidity Symptoms are Monitored and Maintained or Improved: Collaborate with multidisciplinary team to address chronic and comorbid conditions and prevent exacerbation or deterioration     Problem: Pain  Goal: Verbalizes/displays adequate comfort level or baseline

## 2023-10-20 ENCOUNTER — APPOINTMENT (OUTPATIENT)
Dept: GENERAL RADIOLOGY | Age: 88
End: 2023-10-20
Payer: MEDICARE

## 2023-10-20 PROBLEM — Z71.89 GOALS OF CARE, COUNSELING/DISCUSSION: Status: ACTIVE | Noted: 2023-10-20

## 2023-10-20 PROBLEM — Z51.5 ENCOUNTER FOR PALLIATIVE CARE: Status: ACTIVE | Noted: 2023-10-20

## 2023-10-20 LAB
ABSOLUTE BANDS #: 0.49 K/UL (ref 0–1)
ALBUMIN SERPL-MCNC: 2.1 G/DL (ref 3.5–5.2)
ALBUMIN SERPL-MCNC: 2.1 G/DL (ref 3.5–5.2)
ALP SERPL-CCNC: 176 U/L (ref 40–129)
ALP SERPL-CCNC: 187 U/L (ref 40–129)
ALT SERPL-CCNC: 45 U/L (ref 5–41)
ALT SERPL-CCNC: 48 U/L (ref 5–41)
ANION GAP SERPL CALCULATED.3IONS-SCNC: 10 MMOL/L (ref 9–17)
ANION GAP SERPL CALCULATED.3IONS-SCNC: 11 MMOL/L (ref 9–17)
AST SERPL-CCNC: 65 U/L
AST SERPL-CCNC: 78 U/L
BANDS: 4 % (ref 0–10)
BASOPHILS # BLD: 0 K/UL (ref 0–0.2)
BASOPHILS NFR BLD: 0 % (ref 0–2)
BILIRUB SERPL-MCNC: 0.4 MG/DL (ref 0.3–1.2)
BILIRUB SERPL-MCNC: 0.4 MG/DL (ref 0.3–1.2)
BUN SERPL-MCNC: 59 MG/DL (ref 8–23)
BUN SERPL-MCNC: 61 MG/DL (ref 8–23)
CALCIUM SERPL-MCNC: 8.3 MG/DL (ref 8.6–10.4)
CALCIUM SERPL-MCNC: 8.3 MG/DL (ref 8.6–10.4)
CHLORIDE SERPL-SCNC: 100 MMOL/L (ref 98–107)
CHLORIDE SERPL-SCNC: 103 MMOL/L (ref 98–107)
CO2 SERPL-SCNC: 26 MMOL/L (ref 20–31)
CO2 SERPL-SCNC: 26 MMOL/L (ref 20–31)
CREAT SERPL-MCNC: 2 MG/DL (ref 0.7–1.2)
CREAT SERPL-MCNC: 2.1 MG/DL (ref 0.7–1.2)
EOSINOPHIL # BLD: 0 K/UL (ref 0–0.4)
EOSINOPHILS RELATIVE PERCENT: 0 % (ref 0–4)
ERYTHROCYTE [DISTWIDTH] IN BLOOD BY AUTOMATED COUNT: 23 % (ref 11.5–14.9)
ERYTHROCYTE [DISTWIDTH] IN BLOOD BY AUTOMATED COUNT: NORMAL % (ref 11.5–14.9)
GFR SERPL CREATININE-BSD FRML MDRD: 30 ML/MIN/1.73M2
GFR SERPL CREATININE-BSD FRML MDRD: 32 ML/MIN/1.73M2
GLUCOSE SERPL-MCNC: 132 MG/DL (ref 70–99)
GLUCOSE SERPL-MCNC: 144 MG/DL (ref 70–99)
HCT VFR BLD AUTO: 25 % (ref 41–53)
HCT VFR BLD AUTO: NORMAL % (ref 41–53)
HGB BLD-MCNC: 7.4 G/DL (ref 13.5–17.5)
HGB BLD-MCNC: NORMAL G/DL (ref 13.5–17.5)
LYMPHOCYTES NFR BLD: 0.37 K/UL (ref 1–4.8)
LYMPHOCYTES RELATIVE PERCENT: 3 % (ref 24–44)
MAGNESIUM SERPL-MCNC: 2.1 MG/DL (ref 1.6–2.6)
MAGNESIUM SERPL-MCNC: 2.2 MG/DL (ref 1.6–2.6)
MCH RBC QN AUTO: 21.4 PG (ref 26–34)
MCH RBC QN AUTO: NORMAL PG (ref 26–34)
MCHC RBC AUTO-ENTMCNC: 29.8 G/DL (ref 31–37)
MCHC RBC AUTO-ENTMCNC: NORMAL G/DL (ref 31–37)
MCV RBC AUTO: 72 FL (ref 80–100)
MCV RBC AUTO: NORMAL FL (ref 80–100)
METAMYELOCYTES ABSOLUTE COUNT: 0.12 K/UL
METAMYELOCYTES: 1 %
MONOCYTES NFR BLD: 0.61 K/UL (ref 0.1–1.3)
MONOCYTES NFR BLD: 5 % (ref 1–7)
MORPHOLOGY: ABNORMAL
NEUTROPHILS NFR BLD: 87 % (ref 36–66)
NEUTS SEG NFR BLD: 10.61 K/UL (ref 1.3–9.1)
PLATELET # BLD AUTO: 337 K/UL (ref 150–450)
PLATELET # BLD AUTO: NORMAL K/UL (ref 150–450)
PMV BLD AUTO: 7 FL (ref 6–12)
PMV BLD AUTO: NORMAL FL (ref 6–12)
POTASSIUM SERPL-SCNC: 3.2 MMOL/L (ref 3.7–5.3)
POTASSIUM SERPL-SCNC: 3.3 MMOL/L (ref 3.7–5.3)
PROT SERPL-MCNC: 6.4 G/DL (ref 6.4–8.3)
PROT SERPL-MCNC: 6.6 G/DL (ref 6.4–8.3)
RBC # BLD AUTO: 3.47 M/UL (ref 4.5–5.9)
RBC # BLD AUTO: NORMAL M/UL (ref 4.5–5.9)
SODIUM SERPL-SCNC: 137 MMOL/L (ref 135–144)
SODIUM SERPL-SCNC: 139 MMOL/L (ref 135–144)
WBC OTHER # BLD: 12.2 K/UL (ref 3.5–11)
WBC OTHER # BLD: NORMAL K/UL (ref 3.5–11)

## 2023-10-20 PROCEDURE — A4216 STERILE WATER/SALINE, 10 ML: HCPCS | Performed by: STUDENT IN AN ORGANIZED HEALTH CARE EDUCATION/TRAINING PROGRAM

## 2023-10-20 PROCEDURE — 36415 COLL VENOUS BLD VENIPUNCTURE: CPT

## 2023-10-20 PROCEDURE — 94761 N-INVAS EAR/PLS OXIMETRY MLT: CPT

## 2023-10-20 PROCEDURE — 6360000002 HC RX W HCPCS: Performed by: INTERNAL MEDICINE

## 2023-10-20 PROCEDURE — 2000000000 HC ICU R&B

## 2023-10-20 PROCEDURE — 99231 SBSQ HOSP IP/OBS SF/LOW 25: CPT | Performed by: INTERNAL MEDICINE

## 2023-10-20 PROCEDURE — 99233 SBSQ HOSP IP/OBS HIGH 50: CPT | Performed by: INTERNAL MEDICINE

## 2023-10-20 PROCEDURE — 99231 SBSQ HOSP IP/OBS SF/LOW 25: CPT

## 2023-10-20 PROCEDURE — 85025 COMPLETE CBC W/AUTO DIFF WBC: CPT

## 2023-10-20 PROCEDURE — 6370000000 HC RX 637 (ALT 250 FOR IP): Performed by: INTERNAL MEDICINE

## 2023-10-20 PROCEDURE — 6360000002 HC RX W HCPCS: Performed by: NURSE PRACTITIONER

## 2023-10-20 PROCEDURE — 94660 CPAP INITIATION&MGMT: CPT

## 2023-10-20 PROCEDURE — 71045 X-RAY EXAM CHEST 1 VIEW: CPT

## 2023-10-20 PROCEDURE — 80053 COMPREHEN METABOLIC PANEL: CPT

## 2023-10-20 PROCEDURE — 2700000000 HC OXYGEN THERAPY PER DAY

## 2023-10-20 PROCEDURE — 2580000003 HC RX 258: Performed by: STUDENT IN AN ORGANIZED HEALTH CARE EDUCATION/TRAINING PROGRAM

## 2023-10-20 PROCEDURE — 94640 AIRWAY INHALATION TREATMENT: CPT

## 2023-10-20 PROCEDURE — 6370000000 HC RX 637 (ALT 250 FOR IP): Performed by: FAMILY MEDICINE

## 2023-10-20 PROCEDURE — 83735 ASSAY OF MAGNESIUM: CPT

## 2023-10-20 PROCEDURE — 99233 SBSQ HOSP IP/OBS HIGH 50: CPT | Performed by: SURGERY

## 2023-10-20 PROCEDURE — 2580000003 HC RX 258: Performed by: INTERNAL MEDICINE

## 2023-10-20 PROCEDURE — 6360000002 HC RX W HCPCS: Performed by: STUDENT IN AN ORGANIZED HEALTH CARE EDUCATION/TRAINING PROGRAM

## 2023-10-20 PROCEDURE — C9113 INJ PANTOPRAZOLE SODIUM, VIA: HCPCS | Performed by: STUDENT IN AN ORGANIZED HEALTH CARE EDUCATION/TRAINING PROGRAM

## 2023-10-20 PROCEDURE — APPSS30 APP SPLIT SHARED TIME 16-30 MINUTES: Performed by: NURSE PRACTITIONER

## 2023-10-20 PROCEDURE — 99233 SBSQ HOSP IP/OBS HIGH 50: CPT | Performed by: STUDENT IN AN ORGANIZED HEALTH CARE EDUCATION/TRAINING PROGRAM

## 2023-10-20 RX ORDER — MIDODRINE HYDROCHLORIDE 10 MG/1
10 TABLET ORAL 4 TIMES DAILY PRN
Status: DISCONTINUED | OUTPATIENT
Start: 2023-10-20 | End: 2023-11-05

## 2023-10-20 RX ADMIN — ROSUVASTATIN CALCIUM 20 MG: 20 TABLET, FILM COATED ORAL at 09:18

## 2023-10-20 RX ADMIN — ALLOPURINOL 100 MG: 100 TABLET ORAL at 09:19

## 2023-10-20 RX ADMIN — POTASSIUM CHLORIDE 20 MEQ: 10 CAPSULE, COATED, EXTENDED RELEASE ORAL at 19:26

## 2023-10-20 RX ADMIN — POTASSIUM CHLORIDE 20 MEQ: 10 CAPSULE, COATED, EXTENDED RELEASE ORAL at 09:19

## 2023-10-20 RX ADMIN — CEFEPIME 2000 MG: 2 INJECTION, POWDER, FOR SOLUTION INTRAVENOUS at 17:17

## 2023-10-20 RX ADMIN — SODIUM CHLORIDE, PRESERVATIVE FREE 40 MG: 5 INJECTION INTRAVENOUS at 21:23

## 2023-10-20 RX ADMIN — ALBUTEROL SULFATE 2.5 MG: 2.5 SOLUTION RESPIRATORY (INHALATION) at 11:33

## 2023-10-20 RX ADMIN — ALBUTEROL SULFATE 2.5 MG: 2.5 SOLUTION RESPIRATORY (INHALATION) at 16:32

## 2023-10-20 RX ADMIN — ANTI-FUNGAL POWDER MICONAZOLE NITRATE TALC FREE: 1.42 POWDER TOPICAL at 19:27

## 2023-10-20 RX ADMIN — MIDODRINE HYDROCHLORIDE 10 MG: 10 TABLET ORAL at 17:21

## 2023-10-20 RX ADMIN — POTASSIUM BICARBONATE 40 MEQ: 782 TABLET, EFFERVESCENT ORAL at 09:44

## 2023-10-20 RX ADMIN — LINEZOLID 600 MG: 600 INJECTION, SOLUTION INTRAVENOUS at 05:35

## 2023-10-20 RX ADMIN — ALBUTEROL SULFATE 2.5 MG: 2.5 SOLUTION RESPIRATORY (INHALATION) at 08:00

## 2023-10-20 RX ADMIN — MIDODRINE HYDROCHLORIDE 10 MG: 10 TABLET ORAL at 09:19

## 2023-10-20 RX ADMIN — SODIUM CHLORIDE, PRESERVATIVE FREE 40 MG: 5 INJECTION INTRAVENOUS at 09:16

## 2023-10-20 RX ADMIN — LINEZOLID 600 MG: 600 INJECTION, SOLUTION INTRAVENOUS at 18:13

## 2023-10-20 RX ADMIN — ANTI-FUNGAL POWDER MICONAZOLE NITRATE TALC FREE: 1.42 POWDER TOPICAL at 09:17

## 2023-10-20 RX ADMIN — IRON SUCROSE 100 MG: 20 INJECTION, SOLUTION INTRAVENOUS at 13:01

## 2023-10-20 RX ADMIN — FLUTICASONE PROPIONATE 2 SPRAY: 50 SPRAY, METERED NASAL at 09:19

## 2023-10-20 RX ADMIN — CEFEPIME 2000 MG: 2 INJECTION, POWDER, FOR SOLUTION INTRAVENOUS at 05:17

## 2023-10-20 RX ADMIN — EPOETIN ALFA-EPBX 3000 UNITS: 3000 INJECTION, SOLUTION INTRAVENOUS; SUBCUTANEOUS at 16:41

## 2023-10-20 ASSESSMENT — PAIN SCALES - GENERAL
PAINLEVEL_OUTOF10: 0

## 2023-10-20 NOTE — PROGRESS NOTES
Writer attempted to give PRN Midodrine. Patient RR increased and was getting more tired. Writer put BiPAP mask back on patient. Writer will reattempt midodrine when patient is more awake.

## 2023-10-20 NOTE — PROGRESS NOTES
Levo at [facility]. /28. PRN Midodrine given. Patient still sleepy, but remained awake and alert for writer to give oral pill. Patient tolerated well.

## 2023-10-20 NOTE — PROGRESS NOTES
Writer got a call from lab that Hgb recheck was 7.6. Since the earlier blood labs were not accurate, lab is going to redraw CBC and BMP.

## 2023-10-20 NOTE — PROGRESS NOTES
End Of Shift Note  [place] [facility]  Summary of shift: Levophed was adjusted up and down throughout shift. Patient remained in BiPAP for majority of shift. Patient was alert and oriented but remains lethargic. Tolerating oral intake. Levo at 5mcg at end of shift. Vitals:    Vitals:    10/20/23 1730 10/20/23 1745 10/20/23 1800 10/20/23 1815   BP: (!) 98/33 (!) 111/35 119/82 119/82   Pulse: 71 70 71 70   Resp: 24 18 29 29   Temp:       TempSrc:       SpO2: 99% 98% 96% 96%   Weight:       Height:            I&O:   Intake/Output Summary (Last 24 hours) at 10/20/2023 1838  Last data filed at 10/20/2023 1644  Gross per 24 hour   Intake 1591.35 ml   Output 2800 ml   Net -1208.65 ml       Resp Status: 1-2L NC, BiPAP at night and as needed, coarse breath sounds.      Ventilator Settings:     /Vt (Set, mL): 500 mL/ /FiO2 : 30 %    Critical Care IV infusions:   norepinephrine 5 mcg/min (10/20/23 1814)    sodium chloride      sodium chloride Stopped (10/14/23 1456)        LDA:   PICC Double Lumen 10/15/23 Right Brachial (Active)   Number of days: 5       Urinary Catheter 10/12/23 (Active)   Number of days: 8       Wound 10/09/23 Buttocks Right (Active)   Number of days: 11       Wound 10/11/23 Leg Left;Posterior (Active)   Number of days: 9

## 2023-10-20 NOTE — PROGRESS NOTES
[facility] -   Infectious diseases evaluation  admission date 10/9/2023    reason for consultation:   Right thigh cellulitis    Impression :   Current:  Right thigh cellulitis  Distal lower extremity venous stasis ulcers with surrounding cellulitis improving  Leukocytosis  Rhinovirus infection  Ileus   Bilateral lower extremity lymphedema  Anemia  CHF  Acute hypoxic respiratory failure   Acute on chronic renal failure  Coronary artery disease status post CABG  Obesity      Recommendations   IV Zyvox and cefepime   Lower extremity venous Doppler negative  Follow blood cultures from 10/14/2023 ,no growth  Follow CBC renal function  Continue supportive care      Infection Control Recommendations   [place] Precautions  Droplet isolation    Antimicrobial Stewardship Recommendations   Simplification of therapy  Targeted therapy    History of Present Illness:   Initial history:  Js Ovalles is a 82y.o.-year-old male presented to the hospital on 10/9/23 with worsening shortness of breath associated with lower extremity edema for 1 day. -Symptoms moderate to severe, worse with exertion, no alleviating or aggravating factors. He had a chronic lower extremity lymphedema with venous stasis ulcers with surrounding redness. The patient was treated with diuretics for CHF. On 10/14/2023 he developed fever with a temperature max of 102.3. WBC increased to 18.3, procalcitonin level 0.55  Respiratory panel with COVID-19 PCR was positive for rhinovirus on 10/13/2023. Started on IV Zyvox and cefepime on 10/14/2023  UA 10/16 showed 3-5 WBC, negative nitrate, negative leukocyte esterase  Stool occult blood positive  Procalcitonin level 2.2 and lactic acid 2.7 on 10/16  Interval changes  10/20/2023   He is afebrile, comfortable on oxygen per nasal cannula, on Levophed. Hemoglobin 7.7 this morning, GI following.   Eliquis on hold  Fecal management system in place with loose education: Not on file    Highest education level: Not on file   Occupational History    Not on file   Tobacco Use    Smoking status: Former     Types: Cigarettes     Quit date: 5     Years since quittin.8     Passive exposure: Past    Smokeless tobacco: Never   Vaping Use    Vaping Use: Never used   Substance and Sexual Activity    Alcohol use: Not Currently    Drug use: Never    Sexual activity: Not Currently   Other Topics Concern    Not on file   Social History Narrative    Not on file     Social Determinants of Health     Financial Resource Strain: Low Risk  (2023)    Overall Financial Resource Strain (CARDIA)     Difficulty of Paying Living Expenses: Not hard at all   Food Insecurity: No Food Insecurity (2023)    Hunger Vital Sign     Worried About Running Out of Food in the Last Year: Never true     [address] in the Last Year: Never true   Transportation Needs: Unknown (2023)    PRAPARE - Transportation     Lack of Transportation (Medical): Not on file     Lack of Transportation (Non-Medical): No   Physical Activity: Inactive (5/10/2023)    Exercise Vital Sign     Days of Exercise per Week: 0 days     Minutes of Exercise per Session: 0 min   Stress: Not on file   Social Connections: Not on file   Intimate Partner Violence: Not on file   Housing Stability: Unknown (2023)    Housing Stability Vital Sign     Unable to Pay for Housing in the Last Year: Not on file     Number of [address] in the Last Year: Not on file     Unstable Housing in the Last Year: No       Family History:   History reviewed. No pertinent family history. Medical Decision Making:   I have independently reviewed/ordered the following labs:    CBC with Differential:   Recent Labs     10/19/23  0444 10/19/23  1834 10/20/23  0440 10/20/23  0918   WBC 14.3*  --  PLEASE DISREGARD RESULTS. SPECIMEN CONTAMINATED. 12.2*   HGB 7.0*   < > PLEASE DISREGARD RESULTS.   SPECIMEN CONTAMINATED. 7.4*   HCT 23.0*   < >

## 2023-10-20 NOTE — CARE COORDINATION
ONGOING DISCHARGE PLAN:    Patient is currently sound asleep. Currently satin 97% on 3LNC. Spoke with patient's Daughter, Jaison Cruz, [facility] discharge plan and she confirms that plan is still to follow for [facility]. Writer was notifed, by [facility], from Joe, that [employer] was started, Yesterday, However, [employer] is now offering a Peer to Peer. This needs to be done, by Monday at 12:45. Writer did inform Dr. Lu Perez of this & he is willing to do this today. Writer gave him information:    Call: [phone] By [phone] on 10/23/23. Reference #, [id number]    Policy # [id number]. : 1935. Writer notified [facility], of above. She informed writer if approved, [facility] will notify her. Per Jaison Cruz, if pt. Is Denied, she wants him to go to [facility]. Writer notified, [facility], [address] [place] [address], to continue to follow. Pt. Remains on IV Zyvox/Cefepime, ID on board. WBC today 12.2. PT/OT on board. Will continue to follow for additional discharge needs. If patient is discharged prior to next notation, then this note serves as note for discharge by case management.     Electronically signed by Thai Britton RN on 10/20/2023 at 3:31 PM

## 2023-10-20 NOTE — PROGRESS NOTES
Physical Therapy        Physical Therapy Cancel Note      DATE: 10/20/2023    NAME: Katerina Loja  MRN: [id number]   : 1935      Patient not seen this date for Physical Therapy due to:    Patient Declined: Attempted to see pt at 1004. Pt eating from breakfast tray at this time, and refuses therapy at this time. Pt had eaten very little from breakfast tray but reports not feeling well, feeling \"beat\". Very little to no responses to questioning. Declines therapy at this time.  Pt had Hgb of 7.4 this AM      Electronically signed by Tatyana Leija PTA on 10/20/2023 at 10:17 AM

## 2023-10-20 NOTE — PLAN OF CARE
Conditions and Co-morbidities  Goal: Patient's chronic conditions and co-morbidity symptoms are monitored and maintained or improved  10/20/2023 1449 by Evon Lucas RN  Outcome: Progressing  Flowsheets (Taken 10/20/2023 0800)  Care Plan - Patient's Chronic Conditions and Co-Morbidity Symptoms are Monitored and Maintained or Improved: Monitor and assess patient's chronic conditions and comorbid symptoms for stability, deterioration, or improvement  10/20/2023 0518 by Joy Rosa RN  Outcome: Progressing  Flowsheets (Taken 10/20/2023 0518)  Care Plan - Patient's Chronic Conditions and Co-Morbidity Symptoms are Monitored and Maintained or Improved:   Monitor and assess patient's chronic conditions and comorbid symptoms for stability, deterioration, or improvement   Collaborate with multidisciplinary team to address chronic and comorbid conditions and prevent exacerbation or deterioration     Problem: Pain  Goal: Verbalizes/displays adequate comfort level or baseline comfort level  10/20/2023 1449 by Evon Lucas RN  Outcome: Progressing  10/20/2023 0518 by Joy Rosa RN  Outcome: Progressing  Flowsheets (Taken 10/20/2023 0518)  Verbalizes/displays adequate comfort level or baseline comfort level:   Encourage patient to monitor pain and request assistance   Assess pain using appropriate pain scale   Implement non-pharmacological measures as appropriate and evaluate response     Problem: Nutrition Deficit:  Goal: Optimize nutritional status  10/20/2023 1449 by Evon Lucas RN  Outcome: Progressing  10/20/2023 0518 by Joy Rosa RN  Outcome: Progressing  Flowsheets (Taken 10/20/2023 0518)  Nutrient intake appropriate for improving, restoring, or maintaining nutritional needs: Monitor oral intake, labs, and treatment plans

## 2023-10-20 NOTE — PROGRESS NOTES
GI Progress notes    10/20/2023   11:44 AM    Name:  Ya Greene  MRN:    [id number]     [address]:     [de-identified]   Room:  2008/2008-01  IP Day: 6     Admit Date: 10/9/2023  9:43 AM  PCP: Rikki Aviles MD    Subjective:     C/C:   Chief Complaint   Patient presents with    Shortness of Breath     Started yesterday       Interval History: Status: not changed. Patient seen and examined. No acute events overnight  Hgb stable  No signs of bleeding  Started on IV iron  Plans for erythropoiesis stimulating agent  Remains on pressors    ROS:  Constitutional: negative for chills, fevers and sweats  Gastrointestinal: negative for abdominal pain, constipation, diarrhea, nausea and vomiting  Neurological: negative for dizziness and headaches    Medications:      Allergies: No Known Allergies    Current Meds: midodrine (PROAMATINE) tablet 10 mg, 4x Daily PRN  iron sucrose (VENOFER) 100 mg in sodium chloride 0.9 % 100 mL IVPB, Q24H  epoetin azeb-epbx (RETACRIT) injection 3,000 Units, Once per day on Mon Wed Fri  norepinephrine (LEVOPHED) 16 mg in sodium chloride 0.9 % 250 mL infusion, Continuous  ceFEPIme (MAXIPIME) 2,000 mg in sodium chloride 0.9 % 100 mL IVPB (mini-bag), Q12H  potassium chloride (MICRO-K) extended release capsule 20 mEq, BID  miconazole (MICOTIN) 2 % powder, BID  0.9 % sodium chloride infusion, PRN  pantoprazole (PROTONIX) 40 mg in sodium chloride (PF) 0.9 % 10 mL injection, Q12H  linezolid (ZYVOX) IVPB 600 mg, Q12H  albuterol (PROVENTIL) (2.5 MG/3ML) 0.083% nebulizer solution 2.5 mg, 4x Daily RT  ipratropium 0.5 mg-albuterol 2.5 mg (DUONEB) nebulizer solution 1 Dose, Q4H PRN  [Held by provider] apixaban (ELIQUIS) tablet 2.5 mg, BID  rosuvastatin (CRESTOR) tablet 20 mg, Daily  allopurinol (ZYLOPRIM) tablet 100 mg, Daily  fluticasone (FLONASE) 50 MCG/ACT nasal spray 2 spray, Daily  sodium chloride flush 0.9 % injection 5-40 mL, 2 times per day  sodium chloride flush 0.9 % injection 10 mL, PRN  0.9 % ulcer of right 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 ([address]) [L97.912] 02/15/2023     Priority: Medium    Non-pressure chronic ulcer of left 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 ([address]) [L97.922] 02/15/2023     Priority: Medium    Lymphedema of both lower extremities [I89.0] 02/09/2023     Priority: Medium    Severe anemia [D64.9] 10/17/2023    Acute GI bleeding [K92.2] 10/16/2023    Hypokalemia [E87.6] 10/16/2023    Acute respiratory failure with hypoxia (HCC) [J96.01] 10/16/2023    DONI (acute kidney injury) ([address]) [N17.9] 10/11/2023    Primary hypertension [I10] 10/10/2023    Acute congestive heart failure, unspecified heart failure type ([address]) [I50.9] 10/09/2023    Secondary hypercoagulable state ([address]) [D68.69] 09/13/2023    Adult BMI 40.0-44.9 kg/sq m ([address]) [Z68.41] 04/02/2022    Mixed hyperlipidemia [E78.2] 04/02/2022    Paroxysmal A-fib ([address]) [I48.0] 08/18/2021    Coronary artery disease involving native coronary artery of native heart without angina pectoris [I25.10] 08/18/2021     Past Medical History:   Diagnosis Date    Arthritis     CHF (congestive heart failure) (HCC)     Chronic kidney disease     History of blood transfusion     Hyperlipidemia         Plan:        Anemia, iron deficient, FOBT positive  No overt GI bleeding  Started on IV Venofer  Will be started on erythropoietin stimulating agent  Continue PPI  Will need EGD, colonoscopy once his status permits. Time spent reviewing the chart, seeing the patient, and discussing with the attending MD around 30 minutes. Explained to the patient and d/W Nursing Staff  Will F/U with you  Please call or Page for any issues or change in status  Thanks    This note is created with the assistance of the speech recognition program.  While intending to generate a document that actually reflects the content of the visit, document can still have some errors including those of syntax and sound like substitutions which may escape proof reading.   Actual meaning can be

## 2023-10-20 NOTE — CARE COORDINATION
Writer left , for Dr. Armen Sparks, to discuss option for P2P for Pt. To go to [place], [place].

## 2023-10-20 NOTE — PROGRESS NOTES
Palliative Care Progress Note    NAME:  Juaquin [facility] RECORD NUMBER:  [id number]  AGE: 82 y.o. GENDER: male  : 1935  TODAY'S DATE:  10/20/2023    Reason for Consult:  goals of care      Plan        Palliative Interaction: Patient seen and examined on rounds. Continuation of goals of care discussion. Patient currently DNR-CCA no intubation, we discuss this at length. Patient confirming this and adamant that should he arrest, he wants nature to take its course. We discussion continuation of care vs. Hospice care given his current condition. At this time patient wanting to continue with current treatments. I encourage him to discuss this further with his family. Patient to remain a DNR-CCA at this time. Palliative will follow to continue goals of care discussions. Education/support to staff  Education/support to family  Education/support to patient  Discharge planning/helping to coordinate care  Communications with primary service  Decision making regarding life prolonging treatment      Principle Problem/Diagnosis:  Acute congestive heart failure, unspecified heart failure type ([address])    Additional Assessments:    1- Symptom management/ pain control     Pain Assessment:  The patient is not having any pain. Anxiety:  none                          Dyspnea:  acute dyspnea                          Fatigue:   Generalized         We feel the patient symptoms are being controlled. his current regimen is reviewed by myself and discussed with the staff.        2- Goals of care evaluation   The patient goals of care are improve or maintain function/quality of life   Goals of care discussed with:    [x] Patient independently    [] Patient and Family    [] Family or Healthcare DPOA independently    [] Unable to discuss with patient, family/DPOA not present    3- Code Status  DNR-CCA    4- Other recommendations  - We will continue to provide comfort and support to the patient and

## 2023-10-20 NOTE — PLAN OF CARE
Problem: Safety - Adult  Goal: Free from fall injury  Outcome: Progressing  Note: Pt assessed as a fall risk this shift. Remains free from falls and accidental injury at this time. Fall precautions in place, ibed is locked and in lowest position. Adequate lighting provided. Bed alarm activated. Will continue to monitor needs during hourly rounding, and reinforce education on use of call light. Problem: Skin/Tissue Integrity  Goal: Absence of new skin breakdown  Description: 1. Monitor for areas of redness and/or skin breakdown  2. Assess vascular access sites hourly  3. Every 4-6 hours minimum:  Change oxygen saturation probe site  4. Every 4-6 hours:  If on nasal continuous positive airway pressure, respiratory therapy assess nares and determine need for appliance change or resting period. Outcome: Progressing  Note: Skin assessment complete. . Coccyx reddened. Sensicare applied PRN. Turned and repositioned every two hours. Area kept free from moisture. Proper nourishment and fluids encouraged, as appropriate. Will continue to monitor for additional needs and changes in skin breakdown.       Problem: ABCDS Injury Assessment  Goal: Absence of physical injury  Outcome: Progressing  Flowsheets (Taken 10/20/2023 0518)  Absence of Physical Injury: Implement safety measures based on patient assessment     Problem: Chronic Conditions and Co-morbidities  Goal: Patient's chronic conditions and co-morbidity symptoms are monitored and maintained or improved  Outcome: Progressing  Flowsheets (Taken 10/20/2023 0518)  Care Plan - Patient's Chronic Conditions and Co-Morbidity Symptoms are Monitored and Maintained or Improved:   Monitor and assess patient's chronic conditions and comorbid symptoms for stability, deterioration, or improvement   Collaborate with multidisciplinary team to address chronic and comorbid conditions and prevent exacerbation or deterioration     Problem: Pain  Goal: Verbalizes/displays adequate

## 2023-10-20 NOTE — PROGRESS NOTES
[facility]   Progress Note                   Date:   10/20/2023  Patient name: Deborah Pierce  Date of admission:  10/9/2023  9:43 AM  MRN:   [id number]  YOB: 1935  PCP: Bimal Solis MD    Reason for Admission: Acute congestive heart failure, unspecified heart failure type ([address]) [I50.9]    Subjective:       Clinical Changes / Abnormalities:re consulted for cardiac management concern     Patient seen and examined. Eating lunch on NC ,Denies chest pain . Tele/vitals/labs reviewed . Had blood pressure drop  Back on levo    V paced rhythm      Medications:   Scheduled Meds:   iron sucrose  100 mg IntraVENous Q24H    epoetin azeb-epbx  3,000 Units SubCUTAneous Once per day on Mon Wed Fri    cefepime  2,000 mg IntraVENous Q12H    potassium chloride  20 mEq Oral BID    miconazole   Topical BID    pantoprazole (PROTONIX) 40 mg in sodium chloride (PF) 0.9 % 10 mL injection  40 mg IntraVENous Q12H    linezolid  600 mg IntraVENous Q12H    albuterol  2.5 mg Nebulization 4x Daily RT    [Held by provider] apixaban  2.5 mg Oral BID    rosuvastatin  20 mg Oral Daily    allopurinol  100 mg Oral Daily    fluticasone  2 spray Each Nostril Daily    sodium chloride flush  5-40 mL IntraVENous 2 times per day     Continuous Infusions:   norepinephrine 5 mcg/min (10/20/23 1302)    sodium chloride      sodium chloride Stopped (10/14/23 1456)     CBC:   Recent Labs     10/19/23  0444 10/19/23  1834 10/20/23  0440 10/20/23  0918   WBC 14.3*  --  PLEASE DISREGARD RESULTS. SPECIMEN CONTAMINATED. 12.2*   HGB 7.0* 7.4* PLEASE DISREGARD RESULTS. SPECIMEN CONTAMINATED. 7.4*     --  PLEASE DISREGARD RESULTS. SPECIMEN CONTAMINATED.  337       BMP:    Recent Labs     10/19/23  0444 10/20/23  0515 10/20/23  0918   * 137 139   K 3.3* 3.3* 3.2*   CL 98 100 103   CO2 24 26 26   BUN 70* 61* 59*   CREATININE 2.3* 2.1* 2.0*   GLUCOSE 130* 132* 144*       Hepatic:   Recent Labs     10/18/23  0515

## 2023-10-21 PROBLEM — I95.9 HYPOTENSION: Status: ACTIVE | Noted: 2023-10-21

## 2023-10-21 PROBLEM — D72.829 LEUKOCYTOSIS: Status: ACTIVE | Noted: 2023-10-21

## 2023-10-21 PROBLEM — L03.115 CELLULITIS OF RIGHT LOWER EXTREMITY: Status: ACTIVE | Noted: 2023-10-21

## 2023-10-21 PROBLEM — B34.8 RHINOVIRUS INFECTION: Status: ACTIVE | Noted: 2023-10-21

## 2023-10-21 LAB
ABSOLUTE BANDS #: 0.19 K/UL (ref 0–1)
ANION GAP SERPL CALCULATED.3IONS-SCNC: 11 MMOL/L (ref 9–17)
BANDS: 2 % (ref 0–10)
BASOPHILS # BLD: 0 K/UL (ref 0–0.2)
BASOPHILS NFR BLD: 0 % (ref 0–2)
BUN SERPL-MCNC: 53 MG/DL (ref 8–23)
CALCIUM SERPL-MCNC: 8.3 MG/DL (ref 8.6–10.4)
CHLORIDE SERPL-SCNC: 102 MMOL/L (ref 98–107)
CO2 SERPL-SCNC: 26 MMOL/L (ref 20–31)
CREAT SERPL-MCNC: 2 MG/DL (ref 0.7–1.2)
EOSINOPHIL # BLD: 0.09 K/UL (ref 0–0.4)
EOSINOPHILS RELATIVE PERCENT: 1 % (ref 0–4)
ERYTHROCYTE [DISTWIDTH] IN BLOOD BY AUTOMATED COUNT: 22.9 % (ref 11.5–14.9)
GFR SERPL CREATININE-BSD FRML MDRD: 32 ML/MIN/1.73M2
GLUCOSE SERPL-MCNC: 106 MG/DL (ref 70–99)
HCT VFR BLD AUTO: 24.3 % (ref 41–53)
HGB BLD-MCNC: 7.4 G/DL (ref 13.5–17.5)
LYMPHOCYTES NFR BLD: 0.94 K/UL (ref 1–4.8)
LYMPHOCYTES RELATIVE PERCENT: 10 % (ref 24–44)
MCH RBC QN AUTO: 22.2 PG (ref 26–34)
MCHC RBC AUTO-ENTMCNC: 30.4 G/DL (ref 31–37)
MCV RBC AUTO: 72.9 FL (ref 80–100)
MONOCYTES NFR BLD: 0.47 K/UL (ref 0.1–1.3)
MONOCYTES NFR BLD: 5 % (ref 1–7)
MORPHOLOGY: ABNORMAL
NEUTROPHILS NFR BLD: 82 % (ref 36–66)
NEUTS SEG NFR BLD: 7.71 K/UL (ref 1.3–9.1)
PLATELET # BLD AUTO: 287 K/UL (ref 150–450)
PMV BLD AUTO: 7 FL (ref 6–12)
POTASSIUM SERPL-SCNC: 3.6 MMOL/L (ref 3.7–5.3)
RBC # BLD AUTO: 3.33 M/UL (ref 4.5–5.9)
SODIUM SERPL-SCNC: 139 MMOL/L (ref 135–144)
WBC OTHER # BLD: 9.4 K/UL (ref 3.5–11)

## 2023-10-21 PROCEDURE — 2580000003 HC RX 258: Performed by: INTERNAL MEDICINE

## 2023-10-21 PROCEDURE — 6360000002 HC RX W HCPCS: Performed by: NURSE PRACTITIONER

## 2023-10-21 PROCEDURE — 2700000000 HC OXYGEN THERAPY PER DAY

## 2023-10-21 PROCEDURE — 6370000000 HC RX 637 (ALT 250 FOR IP): Performed by: INTERNAL MEDICINE

## 2023-10-21 PROCEDURE — 2500000003 HC RX 250 WO HCPCS: Performed by: INTERNAL MEDICINE

## 2023-10-21 PROCEDURE — 99232 SBSQ HOSP IP/OBS MODERATE 35: CPT | Performed by: NURSE PRACTITIONER

## 2023-10-21 PROCEDURE — 94640 AIRWAY INHALATION TREATMENT: CPT

## 2023-10-21 PROCEDURE — 94660 CPAP INITIATION&MGMT: CPT

## 2023-10-21 PROCEDURE — 2580000003 HC RX 258: Performed by: NURSE PRACTITIONER

## 2023-10-21 PROCEDURE — 80048 BASIC METABOLIC PNL TOTAL CA: CPT

## 2023-10-21 PROCEDURE — 6360000002 HC RX W HCPCS: Performed by: INTERNAL MEDICINE

## 2023-10-21 PROCEDURE — 99223 1ST HOSP IP/OBS HIGH 75: CPT | Performed by: INTERNAL MEDICINE

## 2023-10-21 PROCEDURE — 2580000003 HC RX 258: Performed by: STUDENT IN AN ORGANIZED HEALTH CARE EDUCATION/TRAINING PROGRAM

## 2023-10-21 PROCEDURE — 99232 SBSQ HOSP IP/OBS MODERATE 35: CPT | Performed by: STUDENT IN AN ORGANIZED HEALTH CARE EDUCATION/TRAINING PROGRAM

## 2023-10-21 PROCEDURE — 2000000000 HC ICU R&B

## 2023-10-21 PROCEDURE — 2580000003 HC RX 258: Performed by: FAMILY MEDICINE

## 2023-10-21 PROCEDURE — A4216 STERILE WATER/SALINE, 10 ML: HCPCS | Performed by: STUDENT IN AN ORGANIZED HEALTH CARE EDUCATION/TRAINING PROGRAM

## 2023-10-21 PROCEDURE — 6370000000 HC RX 637 (ALT 250 FOR IP): Performed by: FAMILY MEDICINE

## 2023-10-21 PROCEDURE — 6360000002 HC RX W HCPCS: Performed by: STUDENT IN AN ORGANIZED HEALTH CARE EDUCATION/TRAINING PROGRAM

## 2023-10-21 PROCEDURE — 94761 N-INVAS EAR/PLS OXIMETRY MLT: CPT

## 2023-10-21 PROCEDURE — 99231 SBSQ HOSP IP/OBS SF/LOW 25: CPT | Performed by: INTERNAL MEDICINE

## 2023-10-21 PROCEDURE — 85025 COMPLETE CBC W/AUTO DIFF WBC: CPT

## 2023-10-21 PROCEDURE — C9113 INJ PANTOPRAZOLE SODIUM, VIA: HCPCS | Performed by: STUDENT IN AN ORGANIZED HEALTH CARE EDUCATION/TRAINING PROGRAM

## 2023-10-21 PROCEDURE — 36415 COLL VENOUS BLD VENIPUNCTURE: CPT

## 2023-10-21 PROCEDURE — 2500000003 HC RX 250 WO HCPCS: Performed by: STUDENT IN AN ORGANIZED HEALTH CARE EDUCATION/TRAINING PROGRAM

## 2023-10-21 RX ORDER — BUMETANIDE 0.25 MG/ML
1 INJECTION INTRAMUSCULAR; INTRAVENOUS 2 TIMES DAILY
Status: DISCONTINUED | OUTPATIENT
Start: 2023-10-21 | End: 2023-10-23

## 2023-10-21 RX ORDER — ALBUTEROL SULFATE 2.5 MG/3ML
2.5 SOLUTION RESPIRATORY (INHALATION) EVERY 4 HOURS PRN
Status: DISCONTINUED | OUTPATIENT
Start: 2023-10-21 | End: 2023-11-09 | Stop reason: HOSPADM

## 2023-10-21 RX ADMIN — BUMETANIDE 1 MG: 0.25 INJECTION INTRAMUSCULAR; INTRAVENOUS at 20:32

## 2023-10-21 RX ADMIN — ROSUVASTATIN CALCIUM 20 MG: 20 TABLET, FILM COATED ORAL at 09:13

## 2023-10-21 RX ADMIN — POTASSIUM CHLORIDE 20 MEQ: 10 CAPSULE, COATED, EXTENDED RELEASE ORAL at 09:13

## 2023-10-21 RX ADMIN — ALLOPURINOL 100 MG: 100 TABLET ORAL at 09:12

## 2023-10-21 RX ADMIN — SODIUM CHLORIDE, PRESERVATIVE FREE 10 ML: 5 INJECTION INTRAVENOUS at 20:31

## 2023-10-21 RX ADMIN — ANTI-FUNGAL POWDER MICONAZOLE NITRATE TALC FREE: 1.42 POWDER TOPICAL at 20:32

## 2023-10-21 RX ADMIN — CEFEPIME 2000 MG: 2 INJECTION, POWDER, FOR SOLUTION INTRAVENOUS at 05:22

## 2023-10-21 RX ADMIN — SODIUM CHLORIDE, PRESERVATIVE FREE 10 ML: 5 INJECTION INTRAVENOUS at 09:13

## 2023-10-21 RX ADMIN — CEFEPIME 2000 MG: 2 INJECTION, POWDER, FOR SOLUTION INTRAVENOUS at 17:47

## 2023-10-21 RX ADMIN — ALTEPLASE 1 MG: 2.2 INJECTION, POWDER, LYOPHILIZED, FOR SOLUTION INTRAVENOUS at 16:43

## 2023-10-21 RX ADMIN — ALBUTEROL SULFATE 2.5 MG: 2.5 SOLUTION RESPIRATORY (INHALATION) at 09:32

## 2023-10-21 RX ADMIN — SODIUM CHLORIDE, PRESERVATIVE FREE 40 MG: 5 INJECTION INTRAVENOUS at 09:14

## 2023-10-21 RX ADMIN — SODIUM CHLORIDE, PRESERVATIVE FREE 40 MG: 5 INJECTION INTRAVENOUS at 20:32

## 2023-10-21 RX ADMIN — LINEZOLID 600 MG: 600 INJECTION, SOLUTION INTRAVENOUS at 05:24

## 2023-10-21 RX ADMIN — IRON SUCROSE 100 MG: 20 INJECTION, SOLUTION INTRAVENOUS at 14:40

## 2023-10-21 RX ADMIN — ANTI-FUNGAL POWDER MICONAZOLE NITRATE TALC FREE: 1.42 POWDER TOPICAL at 10:30

## 2023-10-21 RX ADMIN — FLUTICASONE PROPIONATE 2 SPRAY: 50 SPRAY, METERED NASAL at 09:12

## 2023-10-21 RX ADMIN — POTASSIUM CHLORIDE 20 MEQ: 10 CAPSULE, COATED, EXTENDED RELEASE ORAL at 20:31

## 2023-10-21 RX ADMIN — LINEZOLID 600 MG: 600 INJECTION, SOLUTION INTRAVENOUS at 17:52

## 2023-10-21 ASSESSMENT — ENCOUNTER SYMPTOMS: ABDOMINAL PAIN: 0

## 2023-10-21 NOTE — PLAN OF CARE
Problem: Discharge Planning  Goal: Discharge to home or other facility with appropriate resources  Outcome: Progressing     Problem: Safety - Adult  Goal: Free from fall injury  Outcome: Progressing     Problem: Skin/Tissue Integrity  Goal: Absence of new skin breakdown  Description: 1. Monitor for areas of redness and/or skin breakdown  2. Assess vascular access sites hourly  3. Every 4-6 hours minimum:  Change oxygen saturation probe site  4. Every 4-6 hours:  If on nasal continuous positive airway pressure, respiratory therapy assess nares and determine need for appliance change or resting period.   Outcome: Progressing     Problem: ABCDS Injury Assessment  Goal: Absence of physical injury  Outcome: Progressing     Problem: Chronic Conditions and Co-morbidities  Goal: Patient's chronic conditions and co-morbidity symptoms are monitored and maintained or improved  Outcome: Progressing     Problem: Pain  Goal: Verbalizes/displays adequate comfort level or baseline comfort level  Outcome: Progressing     Problem: Nutrition Deficit:  Goal: Optimize nutritional status  Outcome: Progressing

## 2023-10-21 NOTE — PROGRESS NOTES
Bedside rounding done with Dr. Monica Jean. Goal to keep SBP >90 and patient can be changed to intermediate ICU status.

## 2023-10-21 NOTE — PROGRESS NOTES
Bedside rounding done with Dr. Alessandro Payton. Physician would like patient's Bumex 1 mg BID restarted (Pt used to take 2 mg). Writer notiifed Dr. Alexy Lopez of this and physician ok'd it. complains of pain/discomfort

## 2023-10-21 NOTE — PROGRESS NOTES
[facility] -   Infectious diseases evaluation  admission date 10/9/2023    reason for consultation:   Right thigh cellulitis    Impression :   Current:  Right thigh cellulitis  Distal lower extremity venous stasis ulcers with surrounding cellulitis improving  Leukocytosis  Rhinovirus infection  Ileus   Bilateral lower extremity lymphedema  Anemia  CHF  Acute hypoxic respiratory failure   Acute on chronic renal failure  Coronary artery disease status post CABG  Obesity      Recommendations   Zyvox 600 mg IV every 12 hours through 10/21/23. and Cefepime 2 gm IV every 12 hours through 10/25/23. Follow CBC renal function. Continue supportive care      Infection Control Recommendations   [place] Precautions  Droplet isolation    Antimicrobial Stewardship Recommendations   Simplification of therapy  Targeted therapy    History of Present Illness:   Initial history:  Kenji River is a 82y.o.-year-old male presented to the hospital on 10/9/23 with worsening shortness of breath associated with lower extremity edema for 1 day. -Symptoms moderate to severe, worse with exertion, no alleviating or aggravating factors. He had a chronic lower extremity lymphedema with venous stasis ulcers with surrounding redness. The patient was treated with diuretics for CHF. On 10/14/2023 he developed fever with a temperature max of 102.3. WBC increased to 18.3, procalcitonin level 0.55  Respiratory panel with COVID-19 PCR was positive for rhinovirus on 10/13/2023. Started on IV Zyvox and cefepime on 10/14/2023  UA 10/16 showed 3-5 WBC, negative nitrate, negative leukocyte esterase  Stool occult blood positive  Procalcitonin level 2.2 and lactic acid 2.7 on 10/16    Interval changes  10/21/2023   Patient seen and examined in the ICU. Just finished breakfast,  Tired. SpO2 97% on 2.5 L NC. No pressors. Denies any pain, SOB, fever, chills, n/v.  He is congested.   RUE PICC site (PROTONIX) 40 mg in sodium chloride (PF) 0.9 % 10 mL injection  40 mg IntraVENous Q12H    linezolid  600 mg IntraVENous Q12H    albuterol  2.5 mg Nebulization 4x Daily RT    [Held by provider] apixaban  2.5 mg Oral BID    rosuvastatin  20 mg Oral Daily    allopurinol  100 mg Oral Daily    fluticasone  2 spray Each Nostril Daily    sodium chloride flush  5-40 mL IntraVENous 2 times per day       Social History:     Social History     Socioeconomic History    Marital status: Single     Spouse name: Not on file    Number of children: 3    Years of education: Not on file    Highest education level: Not on file   Occupational History    Not on file   Tobacco Use    Smoking status: Former     Types: Cigarettes     Quit date: 1967     Years since quittin.8     Passive exposure: Past    Smokeless tobacco: Never   Vaping Use    Vaping Use: Never used   Substance and Sexual Activity    Alcohol use: Not Currently    Drug use: Never    Sexual activity: Not Currently   Other Topics Concern    Not on file   Social History Narrative    Not on file     Social Determinants of Health     Financial Resource Strain: Low Risk  (2023)    Overall Financial Resource Strain (CARDIA)     Difficulty of Paying Living Expenses: Not hard at all   Food Insecurity: No Food Insecurity (2023)    Hunger Vital Sign     Worried About Running Out of Food in the Last Year: Never true     [address] in the Last Year: Never true   Transportation Needs: Unknown (2023)    PRAPARE - Transportation     Lack of Transportation (Medical): Not on file     Lack of Transportation (Non-Medical):  No   Physical Activity: Inactive (5/10/2023)    Exercise Vital Sign     Days of Exercise per Week: 0 days     Minutes of Exercise per Session: 0 min   Stress: Not on file   Social Connections: Not on file   Intimate Partner Violence: Not on file   Housing Stability: Unknown (2023)    Housing Stability Vital Sign     Unable to Pay for Housing in the

## 2023-10-21 NOTE — PLAN OF CARE
Problem: Discharge Planning  Goal: Discharge to home or other facility with appropriate resources  10/21/2023 0049 by Brandee Dejesus RN  Outcome: Progressing  10/20/2023 1449 by Kristina Kumar RN  Outcome: Progressing  Flowsheets (Taken 10/20/2023 0800)  Discharge to home or other facility with appropriate resources: Identify barriers to discharge with patient and caregiver     Problem: Safety - Adult  Goal: Free from fall injury  10/21/2023 0049 by Brandee Dejesus RN  Outcome: Progressing  10/20/2023 1449 by Kristina Kumar RN  Outcome: Progressing     Problem: Skin/Tissue Integrity  Goal: Absence of new skin breakdown  Description: 1. Monitor for areas of redness and/or skin breakdown  2. Assess vascular access sites hourly  3. Every 4-6 hours minimum:  Change oxygen saturation probe site  4. Every 4-6 hours:  If on nasal continuous positive airway pressure, respiratory therapy assess nares and determine need for appliance change or resting period.   10/21/2023 0049 by Brandee Dejesus RN  Outcome: Progressing  10/20/2023 1449 by Kristina Kumar RN  Outcome: Progressing

## 2023-10-21 NOTE — PROGRESS NOTES
disease  DNR CCA no intubation    Make ICU intermediate status  Use midodrine if blood pressure drops  BiPAP at night and while sleeping during the day  Awaiting results of basic metabolic panel, patient has been hypokalemic defer to nephrology  Needs mobilization  Can discontinue DuoNeb aerosols, patient is not a smoker  Can make albuterol aerosols as needed no documented asthma and does not have COPD  Antibiotics per ID    Critical Care Time   0 min    Electronically signed by Mariam Manriquez MD on 10/21/2023 at 9:38 AM

## 2023-10-21 NOTE — PROGRESS NOTES
[facility]      Progress Note      Date:   10/21/2023  Patient name:  Js Median  Date of admission:  10/9/2023  9:43 AM  MRN:   [id number]  YOB: 1935        Brief HPI    Pt admitted for acute CHF, respiratory failure    ASSESSMENT/PLAN     Hospital Problems             Last Modified POA    * (Principal) Acute congestive heart failure, unspecified heart failure type ([address]) 10/9/2023 Yes    Lymphedema of both lower extremities 10/10/2023 Yes    Non-pressure chronic ulcer of right 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 ([address]) 10/10/2023 Yes    Non-pressure chronic ulcer of left 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 ([address]) 10/10/2023 Yes    Paroxysmal A-fib ([address]) 10/12/2023 Yes    Coronary artery disease involving native coronary artery of native heart without angina pectoris 10/10/2023 Yes    Adult BMI 40.0-44.9 kg/sq m ([address]) 10/10/2023 Yes    Mixed hyperlipidemia 10/10/2023 Yes    Secondary hypercoagulable state ([address]) 10/10/2023 Yes    Primary hypertension 10/10/2023 Yes    DONI (acute kidney injury) ([address]) 10/11/2023 Yes    Acute GI bleeding 10/16/2023 Yes    Hypokalemia 10/16/2023 Yes    Acute respiratory failure with hypoxia ([address]) 10/16/2023 Yes    Severe anemia 10/17/2023 Yes    Encounter for palliative care 10/20/2023 Yes    Goals of care, counseling/discussion 10/20/2023 Yes    Cellulitis of right lower extremity 10/21/2023 Yes    Rhinovirus infection 10/21/2023 Yes    Leukocytosis 10/21/2023 Yes    Hypotension 10/21/2023 Yes         Levophed off since midnight, continue midodrine. Hgb is stable, GI on board, continue protonix 40 IV BID  hold metoprolol unless tachycardic, OK to hold eliquis  Continue BiPAP while asleep, pulm managing  Continue cefepime, linezolid ending today, ID on board, cellulitis is improving  potassium replaced. Renal function stable. Remove valadez & bladder scan in 6 hours      DNR-CCA   ADULT DIET; Regular;  No Added Salt (3-4 gm)  ADULT ORAL NUTRITION

## 2023-10-21 NOTE — PROGRESS NOTES
10/21/23 1541   Encounter Summary   Encounter Overview/Reason  Spiritual/Emotional Needs   Service Provided For: Patient   Referral/Consult From: Palliative Care   Complexity of Encounter Low   Spiritual/Emotional needs   Type Spiritual Support   Assessment/Intervention/Outcome   Assessment Unable to assess   Intervention Prayer (assurance of)/[place]

## 2023-10-21 NOTE — PROGRESS NOTES
Dr. Mary Huang would like patient's valadez catheter removed. Writer got the ok from nephrology. External catheter to be placed.

## 2023-10-21 NOTE — PROGRESS NOTES
[facility]      Progress Note      Date:   10/20/2023  Patient name:  Rachel Harmon  Date of admission:  10/9/2023  9:43 AM  MRN:   [id number]  YOB: 1935        Brief HPI    Pt admitted for acute CHF, respiratory failure    ASSESSMENT/PLAN     Hospital Problems             Last Modified POA    * (Principal) Acute congestive heart failure, unspecified heart failure type ([address]) 10/9/2023 Yes    Lymphedema of both lower extremities 10/10/2023 Yes    Non-pressure chronic ulcer of right 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 ([address]) 10/10/2023 Yes    Non-pressure chronic ulcer of left 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 ([address]) 10/10/2023 Yes    Paroxysmal A-fib ([address]) 10/12/2023 Yes    Coronary artery disease involving native coronary artery of native heart without angina pectoris 10/10/2023 Yes    Adult BMI 40.0-44.9 kg/sq m ([address]) 10/10/2023 Yes    Mixed hyperlipidemia 10/10/2023 Yes    Secondary hypercoagulable state ([address]) 10/10/2023 Yes    Primary hypertension 10/10/2023 Yes    DONI (acute kidney injury) ([address]) 10/11/2023 Yes    Acute GI bleeding 10/16/2023 Yes    Hypokalemia 10/16/2023 Yes    Acute respiratory failure with hypoxia ([address]) 10/16/2023 Yes    Severe anemia 10/17/2023 Yes    Encounter for palliative care 10/20/2023 Yes    Goals of care, counseling/discussion 10/20/2023 Yes    Cellulitis of right lower extremity 10/21/2023 Yes    Rhinovirus infection 10/21/2023 Yes    Leukocytosis 10/21/2023 Yes           Levophed was required to keep MAP >60, pt still on midodrine. Hgb is stable, GI on board, continue protonix 40 IV BID  hold metoprolol unless tachycardic, OK to hold eliquis  Continue BiPAP, pulm managing  Continue cefepime & linezolid, ID on board, cellulitis is improving  potassium replaced. Renal function stable. DNR-CCA   ADULT DIET; Regular; No Added Salt (3-4 gm)  ADULT ORAL NUTRITION SUPPLEMENT; Dinner;  Low Calorie/High Protein Oral Supplement   DVT:Holding eliquis BUN 61* 59* 53*   CREATININE 2.1* 2.0* 2.0*   GLUCOSE 132* 144* 106*       Magnesium:   Lab Results   Component Value Date/Time    MG 2.2 10/20/2023 09:18 AM     Phosphorus: No results found for: \"PHOS\"  Ionized Calcium: No results found for: \"CAION\"   PT/INR:    Lab Results   Component Value Date/Time    PROTIME 13.9 10/09/2023 10:54 AM    INR 1.0 10/09/2023 10:54 AM     PTT:    Lab Results   Component Value Date/Time    APTT 31.9 04/06/2022 10:17 AM         Neha Poe MD   10/21/2023 2:16 PM

## 2023-10-21 NOTE — CARE COORDINATION
ONGOING DISCHARGE  NOTE:      Writer reviewed notes, Patient is agreeable to discharge to [facility]   [address] [facility]  M:[phone]      Pre cert was started on  and denied. Insurance is now offering a Peer to Peer. This needs to be done, by Monday at  12:45. Luis Eduardo Griffith  did inform Dr. Fab Hensley of this & he is willing to do this    Call: [phone] By [phone] on 10/23/23. Reference #, [id number]     Policy # [id number]. : 1935. Per Beckie Jefferson, if pt. Is Denied, she wants him to go to [facility]    Will continue to follow for additional discharge needs. If patient is discharged prior to next notation, then this note serves as note for discharge by case management.     Electronically signed by Tiffany Teresa RN on 10/21/2023 at 9:46 AM

## 2023-10-21 NOTE — CONSULTS
[facility]   Consult Note         Today's Date: 10/21/2023  Patient Name: Betsy Ross  Date of admission: 10/9/2023  9:43 AM  Patient's age: 82 y.o., 1935  Admission Dx: Acute congestive heart failure, unspecified heart failure type ([address]) [I50.9]    Reason for Consult:  Cardiac evaluation    Requesting Physician: Claire Stokes MD    REASON FOR CONSULT:  CHF, SOB    History Obtained From:  Patient, chart, staff, records    HISTORY OF PRESENT ILLNESS:      The patient is a 82 y.o. male who is admitted to the hospital for SOB. Found to have rhinovirus infection. Also some SOB from CHF. Past Medical History:   has a past medical history of Arthritis, CHF (congestive heart failure) ([address]), Chronic kidney disease, History of blood transfusion, and Hyperlipidemia. Past Surgical History:   has a past surgical history that includes joint replacement; Cardiac surgery (2020); and pacemaker placement (Right, 2020). Home Medications:    Prior to Admission medications    Medication Sig Start Date End Date Taking?  Authorizing Provider   fluticasone (FLONASE) 50 MCG/ACT nasal spray 2 sprays by Each Nostril route daily 9/13/23   Claire Stokes MD   bumetanide (BUMEX) 2 MG tablet TAKE 1 TABLET TWICE DAILY 8/15/23   Claire Stokes MD   magnesium oxide (MAG-OX) 400 (240 Mg) MG tablet TAKE 1 TABLET TWICE DAILY 7/25/23   Adela Banuelos MD   furosemide (LASIX) 40 MG tablet TAKE 1 TABLET TWICE DAILY 7/19/23   Claire Stokes MD   potassium chloride (KLOR-CON) 10 MEQ extended release tablet TAKE 1 TABLET EVERY DAY 5/2/23   Claire Stokes MD   clopidogrel (PLAVIX) 75 MG tablet TAKE 1 TABLET EVERY DAY 4/12/23   Claire Stokes MD   allopurinol (ZYLOPRIM) 100 MG tablet TAKE 1 TABLET EVERY DAY 4/12/23   Claire Stokes MD   metoprolol tartrate (LOPRESSOR) 50 MG tablet TAKE 1 TABLET TWICE DAILY 3/17/23   JUAN FRANCISCO Mayo CNP   albuterol (PROVENTIL) (5 MG/ML) 0.5% nebulizer Tech. Comments:  PACER    Patient Status: Inpatient    Height: 71.65 inches Weight: 156.55 pounds BSA: 1.91 m^2 BMI: 21.44 kg/m^2    Rhythm: Ventricular paced rhythm HR: 93 bpm BP: 128/65 mmHg    CONCLUSIONS    Summary  Normal left ventricle size and function with an estimated EF > 55%. No segmental wall motion abnormalities seen. Moderate left ventricular hypertrophy. Normal right ventricular size and function. Pacemaker lead seen in right  ventricle. Mild tricuspid regurgitation. Normal right ventricular systolic pressure. No significant pericardial effusion is seen. Signature  ----------------------------------------------------------------------------   Electronically signed by Monica Graves(Sonographer) on 04/04/2022 12:10   PM  ----------------------------------------------------------------------------    ----------------------------------------------------------------------------   Electronically signed by Júnior John(Interpreting physician) on 04/04/2022   01:06 PM  ----------------------------------------------------------------------------  FINDINGS  Left Atrium  Left atrium is normal in size. Left Ventricle  Normal left ventricle size and function with an estimated EF > 55%. No segmental wall motion abnormalities seen. Moderate left ventricular hypertrophy. Right Atrium  Right atrium is normal in size. Right Ventricle  Normal right ventricular size and function. Pacemaker lead seen in right  ventricle. Mitral Valve  Normal mitral valve structure and function. Trivial mitral regurgitation. Aortic Valve  Normal aortic valve structure and function without stenosis or  regurgitation. Tricuspid Valve  Normal tricuspid valve structure and function. Mild tricuspid regurgitation. Normal right ventricular systolic pressure. Pulmonic Valve  Pulmonic valve not well visualized but Doppler velocities are normal. No  pulmonic insufficiency.   Pericardial Effusion  No significant pericardial

## 2023-10-22 PROBLEM — J96.01 ACUTE RESPIRATORY FAILURE WITH HYPOXIA (HCC): Status: RESOLVED | Noted: 2023-10-16 | Resolved: 2023-10-22

## 2023-10-22 LAB
ANION GAP SERPL CALCULATED.3IONS-SCNC: 11 MMOL/L (ref 9–17)
BUN SERPL-MCNC: 46 MG/DL (ref 8–23)
CALCIUM SERPL-MCNC: 8.2 MG/DL (ref 8.6–10.4)
CHLORIDE SERPL-SCNC: 100 MMOL/L (ref 98–107)
CO2 SERPL-SCNC: 25 MMOL/L (ref 20–31)
CREAT SERPL-MCNC: 2 MG/DL (ref 0.7–1.2)
GFR SERPL CREATININE-BSD FRML MDRD: 32 ML/MIN/1.73M2
GLUCOSE SERPL-MCNC: 119 MG/DL (ref 70–99)
MAGNESIUM SERPL-MCNC: 2.1 MG/DL (ref 1.6–2.6)
POTASSIUM SERPL-SCNC: 3.5 MMOL/L (ref 3.7–5.3)
SODIUM SERPL-SCNC: 136 MMOL/L (ref 135–144)

## 2023-10-22 PROCEDURE — 6370000000 HC RX 637 (ALT 250 FOR IP): Performed by: INTERNAL MEDICINE

## 2023-10-22 PROCEDURE — 2580000003 HC RX 258: Performed by: STUDENT IN AN ORGANIZED HEALTH CARE EDUCATION/TRAINING PROGRAM

## 2023-10-22 PROCEDURE — 6360000002 HC RX W HCPCS: Performed by: STUDENT IN AN ORGANIZED HEALTH CARE EDUCATION/TRAINING PROGRAM

## 2023-10-22 PROCEDURE — 6360000002 HC RX W HCPCS: Performed by: INTERNAL MEDICINE

## 2023-10-22 PROCEDURE — 6360000002 HC RX W HCPCS: Performed by: NURSE PRACTITIONER

## 2023-10-22 PROCEDURE — 2580000003 HC RX 258: Performed by: NURSE PRACTITIONER

## 2023-10-22 PROCEDURE — 80048 BASIC METABOLIC PNL TOTAL CA: CPT

## 2023-10-22 PROCEDURE — 2060000000 HC ICU INTERMEDIATE R&B

## 2023-10-22 PROCEDURE — 2580000003 HC RX 258: Performed by: FAMILY MEDICINE

## 2023-10-22 PROCEDURE — 2580000003 HC RX 258: Performed by: INTERNAL MEDICINE

## 2023-10-22 PROCEDURE — APPSS30 APP SPLIT SHARED TIME 16-30 MINUTES: Performed by: NURSE PRACTITIONER

## 2023-10-22 PROCEDURE — 6370000000 HC RX 637 (ALT 250 FOR IP): Performed by: FAMILY MEDICINE

## 2023-10-22 PROCEDURE — 36415 COLL VENOUS BLD VENIPUNCTURE: CPT

## 2023-10-22 PROCEDURE — 99231 SBSQ HOSP IP/OBS SF/LOW 25: CPT | Performed by: INTERNAL MEDICINE

## 2023-10-22 PROCEDURE — 94660 CPAP INITIATION&MGMT: CPT

## 2023-10-22 PROCEDURE — 51798 US URINE CAPACITY MEASURE: CPT

## 2023-10-22 PROCEDURE — 94761 N-INVAS EAR/PLS OXIMETRY MLT: CPT

## 2023-10-22 PROCEDURE — A4216 STERILE WATER/SALINE, 10 ML: HCPCS | Performed by: STUDENT IN AN ORGANIZED HEALTH CARE EDUCATION/TRAINING PROGRAM

## 2023-10-22 PROCEDURE — 99232 SBSQ HOSP IP/OBS MODERATE 35: CPT | Performed by: STUDENT IN AN ORGANIZED HEALTH CARE EDUCATION/TRAINING PROGRAM

## 2023-10-22 PROCEDURE — C9113 INJ PANTOPRAZOLE SODIUM, VIA: HCPCS | Performed by: STUDENT IN AN ORGANIZED HEALTH CARE EDUCATION/TRAINING PROGRAM

## 2023-10-22 PROCEDURE — 2500000003 HC RX 250 WO HCPCS: Performed by: INTERNAL MEDICINE

## 2023-10-22 PROCEDURE — 99231 SBSQ HOSP IP/OBS SF/LOW 25: CPT | Performed by: NURSE PRACTITIONER

## 2023-10-22 PROCEDURE — 2700000000 HC OXYGEN THERAPY PER DAY

## 2023-10-22 PROCEDURE — 6370000000 HC RX 637 (ALT 250 FOR IP): Performed by: STUDENT IN AN ORGANIZED HEALTH CARE EDUCATION/TRAINING PROGRAM

## 2023-10-22 PROCEDURE — 83735 ASSAY OF MAGNESIUM: CPT

## 2023-10-22 RX ORDER — BENZONATATE 200 MG/1
200 CAPSULE ORAL 3 TIMES DAILY
Status: DISCONTINUED | OUTPATIENT
Start: 2023-10-22 | End: 2023-11-09 | Stop reason: HOSPADM

## 2023-10-22 RX ORDER — DEXTROMETHORPHAN POLISTIREX 30 MG/5ML
60 SUSPENSION ORAL EVERY 12 HOURS SCHEDULED
Status: DISCONTINUED | OUTPATIENT
Start: 2023-10-22 | End: 2023-11-09 | Stop reason: HOSPADM

## 2023-10-22 RX ORDER — PANTOPRAZOLE SODIUM 40 MG/1
40 TABLET, DELAYED RELEASE ORAL
Status: DISCONTINUED | OUTPATIENT
Start: 2023-10-23 | End: 2023-11-09 | Stop reason: HOSPADM

## 2023-10-22 RX ADMIN — POTASSIUM CHLORIDE 20 MEQ: 10 CAPSULE, COATED, EXTENDED RELEASE ORAL at 22:06

## 2023-10-22 RX ADMIN — ROSUVASTATIN CALCIUM 20 MG: 20 TABLET, FILM COATED ORAL at 08:09

## 2023-10-22 RX ADMIN — IRON SUCROSE 100 MG: 20 INJECTION, SOLUTION INTRAVENOUS at 15:33

## 2023-10-22 RX ADMIN — ANTI-FUNGAL POWDER MICONAZOLE NITRATE TALC FREE: 1.42 POWDER TOPICAL at 22:06

## 2023-10-22 RX ADMIN — CEFEPIME 2000 MG: 2 INJECTION, POWDER, FOR SOLUTION INTRAVENOUS at 06:34

## 2023-10-22 RX ADMIN — NYSTATIN 500000 UNITS: 100000 SUSPENSION ORAL at 21:42

## 2023-10-22 RX ADMIN — CEFEPIME 2000 MG: 2 INJECTION, POWDER, FOR SOLUTION INTRAVENOUS at 18:29

## 2023-10-22 RX ADMIN — POTASSIUM CHLORIDE 20 MEQ: 10 CAPSULE, COATED, EXTENDED RELEASE ORAL at 08:08

## 2023-10-22 RX ADMIN — Medication 60 MG: at 21:42

## 2023-10-22 RX ADMIN — Medication 60 MG: at 10:20

## 2023-10-22 RX ADMIN — BENZONATATE 200 MG: 200 CAPSULE ORAL at 14:26

## 2023-10-22 RX ADMIN — ALLOPURINOL 100 MG: 100 TABLET ORAL at 08:08

## 2023-10-22 RX ADMIN — BENZONATATE 200 MG: 200 CAPSULE ORAL at 21:42

## 2023-10-22 RX ADMIN — BUMETANIDE 1 MG: 0.25 INJECTION INTRAMUSCULAR; INTRAVENOUS at 08:08

## 2023-10-22 RX ADMIN — SODIUM CHLORIDE, PRESERVATIVE FREE 40 MG: 5 INJECTION INTRAVENOUS at 08:08

## 2023-10-22 RX ADMIN — FLUTICASONE PROPIONATE 2 SPRAY: 50 SPRAY, METERED NASAL at 08:08

## 2023-10-22 RX ADMIN — SODIUM CHLORIDE, PRESERVATIVE FREE 10 ML: 5 INJECTION INTRAVENOUS at 22:07

## 2023-10-22 RX ADMIN — ANTI-FUNGAL POWDER MICONAZOLE NITRATE TALC FREE: 1.42 POWDER TOPICAL at 08:08

## 2023-10-22 RX ADMIN — BUMETANIDE 1 MG: 0.25 INJECTION INTRAMUSCULAR; INTRAVENOUS at 22:18

## 2023-10-22 RX ADMIN — NYSTATIN 500000 UNITS: 100000 SUSPENSION ORAL at 16:35

## 2023-10-22 RX ADMIN — BENZONATATE 200 MG: 200 CAPSULE ORAL at 10:20

## 2023-10-22 ASSESSMENT — ENCOUNTER SYMPTOMS
ABDOMINAL PAIN: 0
SHORTNESS OF BREATH: 0
DIARRHEA: 0
COUGH: 1

## 2023-10-22 NOTE — PLAN OF CARE
Problem: Discharge Planning  Goal: Discharge to home or other facility with appropriate resources  Outcome: Progressing     Problem: Safety - Adult  Goal: Free from fall injury  Outcome: Progressing  Flowsheets (Taken 10/22/2023 0800)  Free From Fall Injury: Based on caregiver fall risk screen, instruct family/caregiver to ask for assistance with transferring infant if caregiver noted to have fall risk factors     Problem: Skin/Tissue Integrity  Goal: Absence of new skin breakdown  Description: 1. Monitor for areas of redness and/or skin breakdown  2. Assess vascular access sites hourly  3. Every 4-6 hours minimum:  Change oxygen saturation probe site  4. Every 4-6 hours:  If on nasal continuous positive airway pressure, respiratory therapy assess nares and determine need for appliance change or resting period.   Outcome: Progressing     Problem: ABCDS Injury Assessment  Goal: Absence of physical injury  Outcome: Progressing  Flowsheets  Taken 10/22/2023 1232 by Luis Guzmán  Absence of Physical Injury: Implement safety measures based on patient assessment  Taken 10/22/2023 0800 by Randall Goodwin RN  Absence of Physical Injury: Implement safety measures based on patient assessment     Problem: Chronic Conditions and Co-morbidities  Goal: Patient's chronic conditions and co-morbidity symptoms are monitored and maintained or improved  Outcome: Progressing     Problem: Pain  Goal: Verbalizes/displays adequate comfort level or baseline comfort level  Outcome: Progressing     Problem: Nutrition Deficit:  Goal: Optimize nutritional status  Outcome: Progressing

## 2023-10-22 NOTE — CARE COORDINATION
ONGOING DISCHARGE  NOTE:        Writer reviewed notes, Patient is agreeable to discharge to [facility]   [address]  X:[phone]        Pre cert was started on  and denied. Insurance is now offering a Peer to Peer. This needs to be done, by Monday at  12:45. Dot Low  did inform Dr. Rah Jacobo of this & he is willing to do this     Call: [phone] By [phone] on 10/23/23. Reference #, [id number]     Policy # [id number]. : 1935. Per Alison Helms, if pt. Is Denied, she wants him to go to [place]     Will continue to follow for additional discharge needs. If patient is discharged prior to next notation, then this note serves as note for discharge by case management.     Electronically signed by Alcon Ross RN on 10/22/2023 at 3:28 PM

## 2023-10-22 NOTE — PLAN OF CARE
Problem: Discharge Planning  Goal: Discharge to home or other facility with appropriate resources  10/21/2023 2243 by Rikki Acuña RN  Outcome: Not Progressing  Flowsheets (Taken 10/21/2023 2000)  Discharge to home or other facility with appropriate resources:   Identify barriers to discharge with patient and caregiver   Refer to discharge planning if patient needs post-hospital services based on physician order or complex needs related to functional status, cognitive ability or social support system  10/21/2023 1857 by Sarah Benjamin RN  Outcome: Progressing     Problem: Chronic Conditions and Co-morbidities  Goal: Patient's chronic conditions and co-morbidity symptoms are monitored and maintained or improved  10/21/2023 2243 by Rikki Acuña RN  Outcome: Not Progressing  Flowsheets (Taken 10/21/2023 2000)  Care Plan - Patient's Chronic Conditions and Co-Morbidity Symptoms are Monitored and Maintained or Improved:   Monitor and assess patient's chronic conditions and comorbid symptoms for stability, deterioration, or improvement   Collaborate with multidisciplinary team to address chronic and comorbid conditions and prevent exacerbation or deterioration  10/21/2023 1857 by Sarah Benjamin, RN  Outcome: Progressing     Problem: Discharge Planning  Goal: Discharge to home or other facility with appropriate resources  10/21/2023 2243 by Rikki Acuña RN  Outcome: Not Progressing  Flowsheets (Taken 10/21/2023 2000)  Discharge to home or other facility with appropriate resources:   Identify barriers to discharge with patient and caregiver   Refer to discharge planning if patient needs post-hospital services based on physician order or complex needs related to functional status, cognitive ability or social support system  10/21/2023 1857 by Sarah Benjamin, RN  Outcome: Progressing     Problem: Chronic Conditions and Co-morbidities  Goal: Patient's chronic conditions and co-morbidity

## 2023-10-22 NOTE — PROGRESS NOTES
[facility] -   Infectious diseases evaluation  admission date 10/9/2023    reason for consultation:   Right thigh cellulitis    Impression :   Current:  Right thigh cellulitis  Distal lower extremity venous stasis ulcers with surrounding cellulitis improving  Leukocytosis  Rhinovirus infection  Ileus   Bilateral lower extremity lymphedema  Anemia  CHF  Acute hypoxic respiratory failure   Acute on chronic renal failure  Coronary artery disease status post CABG  Obesity    Recommendations   Zyvox 600 mg IV q12 hours completed 10/21/23    Recommendations   Cefepime 2 gm IV every 12 hours through 10/25/23. Follow CBC renal function. Continue supportive care  Discussed with patient, RN. Infection Control Recommendations   [place] Precautions  Droplet isolation    Antimicrobial Stewardship Recommendations   Simplification of therapy  Targeted therapy    History of Present Illness:   Initial history:  Arie Meza is a 82y.o.-year-old male presented to the hospital on 10/9/23 with worsening shortness of breath associated with lower extremity edema for 1 day. -Symptoms moderate to severe, worse with exertion, no alleviating or aggravating factors. He had a chronic lower extremity lymphedema with venous stasis ulcers with surrounding redness. The patient was treated with diuretics for CHF. On 10/14/2023 he developed fever with a temperature max of 102.3. WBC increased to 18.3, procalcitonin level 0.55  Respiratory panel with COVID-19 PCR was positive for rhinovirus on 10/13/2023. Started on IV Zyvox and cefepime on 10/14/2023  UA 10/16 showed 3-5 WBC, negative nitrate, negative leukocyte esterase  Stool occult blood positive  Procalcitonin level 2.2 and lactic acid 2.7 on 10/16    Interval changes  10/22/2023   Patient seen and examined in the ICU. Afebrile  SpO2 97% 2 L/nc, no pressors  RUE PICC site clean.   [place] - urine clear  Denies any pain, SOB, fever,

## 2023-10-23 ENCOUNTER — APPOINTMENT (OUTPATIENT)
Dept: GENERAL RADIOLOGY | Age: 88
End: 2023-10-23
Attending: INTERNAL MEDICINE
Payer: MEDICARE

## 2023-10-23 LAB
ABSOLUTE BANDS #: 0.21 K/UL (ref 0–1)
ANION GAP SERPL CALCULATED.3IONS-SCNC: 8 MMOL/L (ref 9–17)
BANDS: 3 % (ref 0–10)
BASOPHILS # BLD: 0 K/UL (ref 0–0.2)
BASOPHILS NFR BLD: 0 % (ref 0–2)
BUN SERPL-MCNC: 43 MG/DL (ref 8–23)
CALCIUM SERPL-MCNC: 8.2 MG/DL (ref 8.6–10.4)
CHLORIDE SERPL-SCNC: 106 MMOL/L (ref 98–107)
CO2 SERPL-SCNC: 27 MMOL/L (ref 20–31)
CREAT SERPL-MCNC: 1.9 MG/DL (ref 0.7–1.2)
EOSINOPHIL # BLD: 0.07 K/UL (ref 0–0.4)
EOSINOPHILS RELATIVE PERCENT: 1 % (ref 0–4)
ERYTHROCYTE [DISTWIDTH] IN BLOOD BY AUTOMATED COUNT: 22.5 % (ref 11.5–14.9)
GFR SERPL CREATININE-BSD FRML MDRD: 34 ML/MIN/1.73M2
GLUCOSE SERPL-MCNC: 106 MG/DL (ref 70–99)
HCT VFR BLD AUTO: 23.2 % (ref 41–53)
HGB BLD-MCNC: 7.1 G/DL (ref 13.5–17.5)
LYMPHOCYTES NFR BLD: 0.89 K/UL (ref 1–4.8)
LYMPHOCYTES RELATIVE PERCENT: 13 % (ref 24–44)
MCH RBC QN AUTO: 22.7 PG (ref 26–34)
MCHC RBC AUTO-ENTMCNC: 30.5 G/DL (ref 31–37)
MCV RBC AUTO: 74.4 FL (ref 80–100)
METAMYELOCYTES ABSOLUTE COUNT: 0.07 K/UL
METAMYELOCYTES: 1 %
MONOCYTES NFR BLD: 0.34 K/UL (ref 0.1–1.3)
MONOCYTES NFR BLD: 5 % (ref 1–7)
MORPHOLOGY: ABNORMAL
MYELOCYTES ABSOLUTE COUNT: 0.07 K/UL
MYELOCYTES: 1 %
NEUTROPHILS NFR BLD: 76 % (ref 36–66)
NEUTS SEG NFR BLD: 5.18 K/UL (ref 1.3–9.1)
NUCLEATED RED BLOOD CELLS: 1 PER 100 WBC
PLATELET # BLD AUTO: 236 K/UL (ref 150–450)
PMV BLD AUTO: 6.8 FL (ref 6–12)
POTASSIUM SERPL-SCNC: 3.7 MMOL/L (ref 3.7–5.3)
RBC # BLD AUTO: 3.12 M/UL (ref 4.5–5.9)
SODIUM SERPL-SCNC: 141 MMOL/L (ref 135–144)
WBC OTHER # BLD: 6.8 K/UL (ref 3.5–11)

## 2023-10-23 PROCEDURE — 6370000000 HC RX 637 (ALT 250 FOR IP): Performed by: FAMILY MEDICINE

## 2023-10-23 PROCEDURE — 94660 CPAP INITIATION&MGMT: CPT

## 2023-10-23 PROCEDURE — 99232 SBSQ HOSP IP/OBS MODERATE 35: CPT | Performed by: FAMILY MEDICINE

## 2023-10-23 PROCEDURE — 6360000002 HC RX W HCPCS: Performed by: INTERNAL MEDICINE

## 2023-10-23 PROCEDURE — 2500000003 HC RX 250 WO HCPCS: Performed by: INTERNAL MEDICINE

## 2023-10-23 PROCEDURE — 36415 COLL VENOUS BLD VENIPUNCTURE: CPT

## 2023-10-23 PROCEDURE — 6360000002 HC RX W HCPCS: Performed by: NURSE PRACTITIONER

## 2023-10-23 PROCEDURE — 99231 SBSQ HOSP IP/OBS SF/LOW 25: CPT | Performed by: INTERNAL MEDICINE

## 2023-10-23 PROCEDURE — 2060000000 HC ICU INTERMEDIATE R&B

## 2023-10-23 PROCEDURE — 94761 N-INVAS EAR/PLS OXIMETRY MLT: CPT

## 2023-10-23 PROCEDURE — 71045 X-RAY EXAM CHEST 1 VIEW: CPT

## 2023-10-23 PROCEDURE — 2580000003 HC RX 258: Performed by: FAMILY MEDICINE

## 2023-10-23 PROCEDURE — 6370000000 HC RX 637 (ALT 250 FOR IP): Performed by: INTERNAL MEDICINE

## 2023-10-23 PROCEDURE — 2580000003 HC RX 258: Performed by: INTERNAL MEDICINE

## 2023-10-23 PROCEDURE — 6370000000 HC RX 637 (ALT 250 FOR IP): Performed by: STUDENT IN AN ORGANIZED HEALTH CARE EDUCATION/TRAINING PROGRAM

## 2023-10-23 PROCEDURE — 80048 BASIC METABOLIC PNL TOTAL CA: CPT

## 2023-10-23 PROCEDURE — APPSS30 APP SPLIT SHARED TIME 16-30 MINUTES: Performed by: NURSE PRACTITIONER

## 2023-10-23 PROCEDURE — 99232 SBSQ HOSP IP/OBS MODERATE 35: CPT | Performed by: INTERNAL MEDICINE

## 2023-10-23 PROCEDURE — 2580000003 HC RX 258: Performed by: NURSE PRACTITIONER

## 2023-10-23 PROCEDURE — 2700000000 HC OXYGEN THERAPY PER DAY

## 2023-10-23 PROCEDURE — 85025 COMPLETE CBC W/AUTO DIFF WBC: CPT

## 2023-10-23 RX ORDER — BUMETANIDE 0.25 MG/ML
2 INJECTION INTRAMUSCULAR; INTRAVENOUS 2 TIMES DAILY
Status: DISCONTINUED | OUTPATIENT
Start: 2023-10-23 | End: 2023-10-23

## 2023-10-23 RX ORDER — ALLOPURINOL 100 MG/1
50 TABLET ORAL DAILY
Status: DISCONTINUED | OUTPATIENT
Start: 2023-10-24 | End: 2023-11-09 | Stop reason: HOSPADM

## 2023-10-23 RX ORDER — BUMETANIDE 0.25 MG/ML
1.5 INJECTION INTRAMUSCULAR; INTRAVENOUS 2 TIMES DAILY
Status: DISCONTINUED | OUTPATIENT
Start: 2023-10-23 | End: 2023-10-24

## 2023-10-23 RX ADMIN — PANTOPRAZOLE SODIUM 40 MG: 40 TABLET, DELAYED RELEASE ORAL at 14:07

## 2023-10-23 RX ADMIN — PANTOPRAZOLE SODIUM 40 MG: 40 TABLET, DELAYED RELEASE ORAL at 05:59

## 2023-10-23 RX ADMIN — BENZONATATE 200 MG: 200 CAPSULE ORAL at 14:07

## 2023-10-23 RX ADMIN — FLUTICASONE PROPIONATE 2 SPRAY: 50 SPRAY, METERED NASAL at 10:06

## 2023-10-23 RX ADMIN — POTASSIUM CHLORIDE 20 MEQ: 10 CAPSULE, COATED, EXTENDED RELEASE ORAL at 22:28

## 2023-10-23 RX ADMIN — ANTI-FUNGAL POWDER MICONAZOLE NITRATE TALC FREE: 1.42 POWDER TOPICAL at 10:06

## 2023-10-23 RX ADMIN — CEFEPIME 2000 MG: 2 INJECTION, POWDER, FOR SOLUTION INTRAVENOUS at 05:57

## 2023-10-23 RX ADMIN — ROSUVASTATIN CALCIUM 20 MG: 20 TABLET, FILM COATED ORAL at 14:03

## 2023-10-23 RX ADMIN — NYSTATIN 500000 UNITS: 100000 SUSPENSION ORAL at 14:07

## 2023-10-23 RX ADMIN — CEFEPIME 2000 MG: 2 INJECTION, POWDER, FOR SOLUTION INTRAVENOUS at 18:12

## 2023-10-23 RX ADMIN — IRON SUCROSE 100 MG: 20 INJECTION, SOLUTION INTRAVENOUS at 14:08

## 2023-10-23 RX ADMIN — ALLOPURINOL 100 MG: 100 TABLET ORAL at 09:58

## 2023-10-23 RX ADMIN — BENZONATATE 200 MG: 200 CAPSULE ORAL at 22:28

## 2023-10-23 RX ADMIN — NYSTATIN 500000 UNITS: 100000 SUSPENSION ORAL at 22:28

## 2023-10-23 RX ADMIN — Medication 60 MG: at 22:28

## 2023-10-23 RX ADMIN — BENZONATATE 200 MG: 200 CAPSULE ORAL at 09:58

## 2023-10-23 RX ADMIN — SODIUM CHLORIDE, PRESERVATIVE FREE 10 ML: 5 INJECTION INTRAVENOUS at 22:45

## 2023-10-23 RX ADMIN — EPOETIN ALFA-EPBX 3000 UNITS: 3000 INJECTION, SOLUTION INTRAVENOUS; SUBCUTANEOUS at 18:12

## 2023-10-23 RX ADMIN — POTASSIUM CHLORIDE 20 MEQ: 10 CAPSULE, COATED, EXTENDED RELEASE ORAL at 09:58

## 2023-10-23 RX ADMIN — BUMETANIDE 1 MG: 0.25 INJECTION INTRAMUSCULAR; INTRAVENOUS at 10:00

## 2023-10-23 RX ADMIN — Medication 60 MG: at 09:57

## 2023-10-23 RX ADMIN — NYSTATIN 500000 UNITS: 100000 SUSPENSION ORAL at 09:58

## 2023-10-23 RX ADMIN — BUMETANIDE 1.5 MG: 0.25 INJECTION INTRAMUSCULAR; INTRAVENOUS at 22:37

## 2023-10-23 RX ADMIN — ANTI-FUNGAL POWDER MICONAZOLE NITRATE TALC FREE: 1.42 POWDER TOPICAL at 22:29

## 2023-10-23 ASSESSMENT — ENCOUNTER SYMPTOMS
COUGH: 1
ABDOMINAL PAIN: 0
DIARRHEA: 0
SHORTNESS OF BREATH: 0

## 2023-10-23 NOTE — PROGRESS NOTES
[facility]      Progress Note      Date:   10/22/2023  Patient name:  Ya Greene  Date of admission:  10/9/2023  9:43 AM  MRN:   [id number]  YOB: 1935        Brief HPI    Pt admitted for acute CHF, respiratory failure    ASSESSMENT/PLAN     Hospital Problems             Last Modified POA    * (Principal) Acute congestive heart failure, unspecified heart failure type ([address]) 10/9/2023 Yes    Lymphedema of both lower extremities 10/10/2023 Yes    Non-pressure chronic ulcer of right 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 ([address]) 10/10/2023 Yes    Non-pressure chronic ulcer of left 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 ([address]) 10/10/2023 Yes    Paroxysmal A-fib ([address]) 10/12/2023 Yes    Coronary artery disease involving native coronary artery of native heart without angina pectoris 10/10/2023 Yes    Adult BMI 40.0-44.9 kg/sq m ([address]) 10/10/2023 Yes    Mixed hyperlipidemia 10/10/2023 Yes    Secondary hypercoagulable state ([address]) 10/10/2023 Yes    Primary hypertension 10/10/2023 Yes    DONI (acute kidney injury) ([address]) 10/11/2023 Yes    Acute GI bleeding 10/16/2023 Yes    Hypokalemia 10/16/2023 Yes    Severe anemia 10/17/2023 Yes    Encounter for palliative care 10/20/2023 Yes    Goals of care, counseling/discussion 10/20/2023 Yes    Cellulitis of right lower extremity 10/21/2023 Yes    Rhinovirus infection 10/21/2023 Yes    Leukocytosis 10/21/2023 Yes    Hypotension 10/21/2023 Yes       Levophed off 2 days, continue midodrine, transfer to [facility]  Start oral nystatin and magic mouthwash for oral thrush  Hgb is stable, continue protonix 40 BID, switch to oral  hold metoprolol unless tachycardic, consider resuming tomorrow if BP stays elevated, hold eliquis  Continue BiPAP while asleep, pulm managing  Continue cefepime, linezolid completed, ID on board, cellulitis is improving  potassium replaced. Renal function stable.    Tomorrow is 10 days since rhinovirus diagnosis, can stop contact Comments: RLE thigh cellulitis is almost completely resolved   Neurological:      Mental Status: He is alert and oriented to person, place, and time.           Intake/Output:    Intake/Output Summary (Last 24 hours) at 10/22/2023 2058  Last data filed at 10/22/2023 1200  Gross per 24 hour   Intake 205 ml   Output 500 ml   Net -295 ml           Laboratory Testing:  CBC:   Recent Labs     10/21/23  0429   WBC 9.4   HGB 7.4*          BMP:    Recent Labs     10/20/23  0918 10/21/23  0429 10/22/23  0426    139 136   K 3.2* 3.6* 3.5*    102 100   CO2 26 26 25   BUN 59* 53* 46*   CREATININE 2.0* 2.0* 2.0*   GLUCOSE 144* 106* 119*       Magnesium:   Lab Results   Component Value Date/Time    MG 2.1 10/22/2023 04:26 AM     Phosphorus: No results found for: \"PHOS\"  Ionized Calcium: No results found for: \"CAION\"   PT/INR:    Lab Results   Component Value Date/Time    PROTIME 13.9 10/09/2023 10:54 AM    INR 1.0 10/09/2023 10:54 AM     PTT:    Lab Results   Component Value Date/Time    APTT 31.9 04/06/2022 10:17 AM         Aquiles Quiroz MD   10/22/2023 8:58 PM

## 2023-10-23 NOTE — PLAN OF CARE
Problem: Discharge Planning  Goal: Discharge to home or other facility with appropriate resources  10/23/2023 0408 by Celestina Greenfield RN  Outcome: Progressing  10/22/2023 1541 by Clarene Meckel, RN  Outcome: Progressing     Problem: Safety - Adult  Goal: Free from fall injury  10/23/2023 0408 by Celestina Greenfield RN  Outcome: Progressing  Note: Patient weak. Lethargic but arouses easily. Bed alarm on. Making no attempt to get out of bed per self. 10/22/2023 1541 by Clarene Meckel, RN  Outcome: Progressing  Flowsheets (Taken 10/22/2023 0800)  Free From Fall Injury: Based on caregiver fall risk screen, instruct family/caregiver to ask for assistance with transferring infant if caregiver noted to have fall risk factors     Problem: Skin/Tissue Integrity  Goal: Absence of new skin breakdown  Description: 1. Monitor for areas of redness and/or skin breakdown  2. Assess vascular access sites hourly  3. Every 4-6 hours minimum:  Change oxygen saturation probe site  4. Every 4-6 hours:  If on nasal continuous positive airway pressure, respiratory therapy assess nares and determine need for appliance change or resting period. 10/23/2023 0408 by Celestina Greenfield RN  Outcome: Progressing  Note: Bilateral legs wrapped in ace wraps. Buttocks reddened and excoriated. Triad cream applied. Remains on bariatric alternating pressure bed. Skin kept clean and dry. Assisted with repositioning.   10/22/2023 1541 by Clarene Meckel, RN  Outcome: Progressing     Problem: ABCDS Injury Assessment  Goal: Absence of physical injury  10/23/2023 0408 by Celestina Greenfield RN  Outcome: Progressing  10/22/2023 1541 by Clarene Meckel, RN  Outcome: Progressing  Flowsheets  Taken 10/22/2023 1232 by Cali Clancy  Absence of Physical Injury: Implement safety measures based on patient assessment  Taken 10/22/2023 0800 by Clarene Meckel, RN  Absence of Physical Injury: Implement

## 2023-10-23 NOTE — PROGRESS NOTES
10/23/23 1545   Encounter Summary   Encounter Overview/Reason  Spiritual/Emotional Needs   Service Provided For: Patient   Referral/Consult From: Palliative Care   Last Encounter  10/23/23   Complexity of Encounter Low   Spiritual/Emotional needs   Type Spiritual Support   Palliative Care   Type Palliative Care, Follow-up   Assessment/Intervention/Outcome   Assessment Calm;Coping   Intervention Active listening;Explored/Affirmed feelings, thoughts, concerns;Prayer (assurance of)/[place];Sustaining Presence/Ministry of presence   Outcome Receptive

## 2023-10-23 NOTE — PROGRESS NOTES
[facility] -   Infectious diseases evaluation  admission date 10/9/2023    reason for consultation:   Right thigh cellulitis    Impression :   Current:  Right thigh cellulitis  Distal lower extremity venous stasis ulcers with surrounding cellulitis improving  Leukocytosis  Rhinovirus infection  Ileus   Bilateral lower extremity lymphedema  Anemia  CHF  Acute hypoxic respiratory failure   Acute on chronic renal failure  Coronary artery disease status post CABG  Obesity    Recommendations   Zyvox 600 mg IV q12 hours completed 10/21/23    Recommendations   Cefepime 2 gm IV every 12 hours through 10/25/23. Follow CBC renal function. Continue supportive care  Discussed with patient, RN. Infection Control Recommendations   [place] Precautions  Droplet isolation    Antimicrobial Stewardship Recommendations   Simplification of therapy  Targeted therapy    History of Present Illness:   Initial history:  Trina Hunt[place] is a 82y.o.-year-old male presented to the hospital on 10/9/23 with worsening shortness of breath associated with lower extremity edema for 1 day. -Symptoms moderate to severe, worse with exertion, no alleviating or aggravating factors. He had a chronic lower extremity lymphedema with venous stasis ulcers with surrounding redness. The patient was treated with diuretics for CHF. On 10/14/2023 he developed fever with a temperature max of 102.3. WBC increased to 18.3, procalcitonin level 0.55  Respiratory panel with COVID-19 PCR was positive for rhinovirus on 10/13/2023.   Started on IV Zyvox and cefepime on 10/14/2023  UA 10/16 showed 3-5 WBC, negative nitrate, negative leukocyte esterase  Stool occult blood positive  Procalcitonin level 2.2 and lactic acid 2.7 on 10/16    Interval changes  10/23/2023   He was seen and examined earlier today, less swelling and redness to the right upper thigh, comfortable on oxygen per nasal cannula, no new

## 2023-10-23 NOTE — PROGRESS NOTES
Physical Therapy  DATE: 10/23/2023    NAME: Keshia Landa  MRN: [id number]   : 1935    Patient not seen this date for Physical Therapy due to:      [] Cancel by RN or physician due to:    [] Hemodialysis    [] Critical Lab Value Level     [] Blood transfusion in progress    [] Acute or unstable cardiovascular status   _MAP < 55 or more than >115  _HR < 40 or > 130    [] Acute or unstable pulmonary status   -FiO2 > 60%   _RR < 5 or >40    _O2 sats < 85%    [] Strict Bedrest    [] Off Unit for surgery or procedure    [] Off Unit for testing       [] Pending imaging to R/O fracture    [] Refusal by Patient      [x] Other; checked on pt @ 1025. Pt unavailable- bed bath with PCT. Checked back @ 1100- pt unavailable with nursing. Will continue to follow. [] PT being discontinued at this time. Patient independent. No further needs. [] PT being discontinued at this time as the patient has been transferred to hospice care. No further needs.       Electronically signed by Jerilyn Moffett PTA on 10/23/23 at 2:39 PM EDT

## 2023-10-23 NOTE — PROGRESS NOTES
[facility] Medicine      Progress Note      Date:   10/23/2023  Patient name:  Radames Munoz  Date of admission:  10/9/2023  9:43 AM  MRN:   [id number]  YOB: 1935        Brief HPI    Pt admitted for acute CHF, respiratory failure    ASSESSMENT/PLAN     Hospital Problems             Last Modified POA    * (Principal) Acute congestive heart failure, unspecified heart failure type ([address]) 10/9/2023 Yes    Lymphedema of both lower extremities 10/10/2023 Yes    Non-pressure chronic ulcer of right 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 ([address]) 10/10/2023 Yes    Non-pressure chronic ulcer of left 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 ([address]) 10/10/2023 Yes    Paroxysmal A-fib ([address]) 10/12/2023 Yes    Coronary artery disease involving native coronary artery of native heart without angina pectoris 10/10/2023 Yes    Adult BMI 40.0-44.9 kg/sq m ([address]) 10/10/2023 Yes    Mixed hyperlipidemia 10/10/2023 Yes    Secondary hypercoagulable state ([address]) 10/10/2023 Yes    Primary hypertension 10/10/2023 Yes    DONI (acute kidney injury) ([address]) 10/11/2023 Yes    Acute GI bleeding 10/16/2023 Yes    Hypokalemia 10/16/2023 Yes    Severe anemia 10/17/2023 Yes    Encounter for palliative care 10/20/2023 Yes    Goals of care, counseling/discussion 10/20/2023 Yes    Cellulitis of right lower extremity 10/21/2023 Yes    Rhinovirus infection 10/21/2023 Yes    Leukocytosis 10/21/2023 Yes    Hypotension 10/21/2023 Yes     Levophed off 2 days, continue midodrine, transfer to [facility]  Start oral nystatin and magic mouthwash for oral thrush  Hgb is 7.1 this am, transfuse to keep hgb >7, continue protonix 40 BID, switch to oral  hold metoprolol unless tachycardic, consider resuming tomorrow if BP stays elevated, hold eliquis  Continue BiPAP while asleep, pulm managing  Continue cefepime, linezolid completed, ID on board, cellulitis is improving  potassium replaced. Renal function stable.    Today is 10 days since rhinovirus diagnosis,

## 2023-10-23 NOTE — PLAN OF CARE
Problem: Discharge Planning  Goal: Discharge to home or other facility with appropriate resources  10/23/2023 1714 by Sean Benjamin RN  Outcome: Progressing     Problem: Safety - Adult  Goal: Free from fall injury  10/23/2023 1714 by Sean Benjamin RN  Outcome: Progressing     Problem: Skin/Tissue Integrity  Goal: Absence of new skin breakdown  Description: 1. Monitor for areas of redness and/or skin breakdown  2. Assess vascular access sites hourly  3. Every 4-6 hours minimum:  Change oxygen saturation probe site  4. Every 4-6 hours:  If on nasal continuous positive airway pressure, respiratory therapy assess nares and determine need for appliance change or resting period.   10/23/2023 1714 by Sean Benjamin RN  Outcome: Progressing     Problem: ABCDS Injury Assessment  Goal: Absence of physical injury  10/23/2023 1714 by Sean Benjamin RN  Outcome: Progressing     Problem: Chronic Conditions and Co-morbidities  Goal: Patient's chronic conditions and co-morbidity symptoms are monitored and maintained or improved  10/23/2023 1714 by Sean Benjamin RN  Outcome: Progressing     Problem: Pain  Goal: Verbalizes/displays adequate comfort level or baseline comfort level  10/23/2023 1714 by Sean Benjamin RN  Outcome: Progressing     Problem: Nutrition Deficit:  Goal: Optimize nutritional status  10/23/2023 1714 by Sean Benjamin RN  Outcome: Progressing

## 2023-10-24 LAB
ANION GAP SERPL CALCULATED.3IONS-SCNC: 7 MMOL/L (ref 9–17)
BUN SERPL-MCNC: 41 MG/DL (ref 8–23)
CALCIUM SERPL-MCNC: 8.3 MG/DL (ref 8.6–10.4)
CHLORIDE SERPL-SCNC: 107 MMOL/L (ref 98–107)
CO2 SERPL-SCNC: 28 MMOL/L (ref 20–31)
CREAT SERPL-MCNC: 2 MG/DL (ref 0.7–1.2)
GFR SERPL CREATININE-BSD FRML MDRD: 32 ML/MIN/1.73M2
GLUCOSE SERPL-MCNC: 118 MG/DL (ref 70–99)
POTASSIUM SERPL-SCNC: 3.8 MMOL/L (ref 3.7–5.3)
SODIUM SERPL-SCNC: 142 MMOL/L (ref 135–144)

## 2023-10-24 PROCEDURE — 97110 THERAPEUTIC EXERCISES: CPT

## 2023-10-24 PROCEDURE — 6370000000 HC RX 637 (ALT 250 FOR IP): Performed by: INTERNAL MEDICINE

## 2023-10-24 PROCEDURE — 6360000002 HC RX W HCPCS: Performed by: NURSE PRACTITIONER

## 2023-10-24 PROCEDURE — 2700000000 HC OXYGEN THERAPY PER DAY

## 2023-10-24 PROCEDURE — 6370000000 HC RX 637 (ALT 250 FOR IP): Performed by: FAMILY MEDICINE

## 2023-10-24 PROCEDURE — 2580000003 HC RX 258: Performed by: INTERNAL MEDICINE

## 2023-10-24 PROCEDURE — 36415 COLL VENOUS BLD VENIPUNCTURE: CPT

## 2023-10-24 PROCEDURE — 94660 CPAP INITIATION&MGMT: CPT

## 2023-10-24 PROCEDURE — 80048 BASIC METABOLIC PNL TOTAL CA: CPT

## 2023-10-24 PROCEDURE — 6370000000 HC RX 637 (ALT 250 FOR IP): Performed by: STUDENT IN AN ORGANIZED HEALTH CARE EDUCATION/TRAINING PROGRAM

## 2023-10-24 PROCEDURE — 94761 N-INVAS EAR/PLS OXIMETRY MLT: CPT

## 2023-10-24 PROCEDURE — 6360000002 HC RX W HCPCS: Performed by: INTERNAL MEDICINE

## 2023-10-24 PROCEDURE — 97530 THERAPEUTIC ACTIVITIES: CPT

## 2023-10-24 PROCEDURE — 2060000000 HC ICU INTERMEDIATE R&B

## 2023-10-24 PROCEDURE — 2500000003 HC RX 250 WO HCPCS: Performed by: INTERNAL MEDICINE

## 2023-10-24 PROCEDURE — 6370000000 HC RX 637 (ALT 250 FOR IP)

## 2023-10-24 PROCEDURE — 99232 SBSQ HOSP IP/OBS MODERATE 35: CPT | Performed by: FAMILY MEDICINE

## 2023-10-24 PROCEDURE — 99232 SBSQ HOSP IP/OBS MODERATE 35: CPT | Performed by: INTERNAL MEDICINE

## 2023-10-24 PROCEDURE — 2580000003 HC RX 258: Performed by: FAMILY MEDICINE

## 2023-10-24 PROCEDURE — 6360000002 HC RX W HCPCS: Performed by: FAMILY MEDICINE

## 2023-10-24 PROCEDURE — 2580000003 HC RX 258: Performed by: NURSE PRACTITIONER

## 2023-10-24 RX ORDER — BUMETANIDE 1 MG/1
2 TABLET ORAL 2 TIMES DAILY
Status: DISCONTINUED | OUTPATIENT
Start: 2023-10-24 | End: 2023-11-09 | Stop reason: HOSPADM

## 2023-10-24 RX ADMIN — FLUTICASONE PROPIONATE 2 SPRAY: 50 SPRAY, METERED NASAL at 11:09

## 2023-10-24 RX ADMIN — CEFEPIME 2000 MG: 2 INJECTION, POWDER, FOR SOLUTION INTRAVENOUS at 18:49

## 2023-10-24 RX ADMIN — NYSTATIN 500000 UNITS: 100000 SUSPENSION ORAL at 10:51

## 2023-10-24 RX ADMIN — NYSTATIN 500000 UNITS: 100000 SUSPENSION ORAL at 14:06

## 2023-10-24 RX ADMIN — ANTI-FUNGAL POWDER MICONAZOLE NITRATE TALC FREE: 1.42 POWDER TOPICAL at 23:05

## 2023-10-24 RX ADMIN — NYSTATIN 500000 UNITS: 100000 SUSPENSION ORAL at 16:26

## 2023-10-24 RX ADMIN — POTASSIUM CHLORIDE 20 MEQ: 10 CAPSULE, COATED, EXTENDED RELEASE ORAL at 10:50

## 2023-10-24 RX ADMIN — NYSTATIN 500000 UNITS: 100000 SUSPENSION ORAL at 22:58

## 2023-10-24 RX ADMIN — ALLOPURINOL 50 MG: 100 TABLET ORAL at 10:51

## 2023-10-24 RX ADMIN — BUMETANIDE 2 MG: 1 TABLET ORAL at 22:58

## 2023-10-24 RX ADMIN — BENZONATATE 200 MG: 200 CAPSULE ORAL at 10:51

## 2023-10-24 RX ADMIN — BENZONATATE 200 MG: 200 CAPSULE ORAL at 14:06

## 2023-10-24 RX ADMIN — PANTOPRAZOLE SODIUM 40 MG: 40 TABLET, DELAYED RELEASE ORAL at 16:25

## 2023-10-24 RX ADMIN — Medication 60 MG: at 22:58

## 2023-10-24 RX ADMIN — BENZONATATE 200 MG: 200 CAPSULE ORAL at 22:58

## 2023-10-24 RX ADMIN — ANTI-FUNGAL POWDER MICONAZOLE NITRATE TALC FREE: 1.42 POWDER TOPICAL at 11:17

## 2023-10-24 RX ADMIN — ALTEPLASE 1 MG: 2.2 INJECTION, POWDER, LYOPHILIZED, FOR SOLUTION INTRAVENOUS at 15:45

## 2023-10-24 RX ADMIN — Medication 60 MG: at 10:51

## 2023-10-24 RX ADMIN — PANTOPRAZOLE SODIUM 40 MG: 40 TABLET, DELAYED RELEASE ORAL at 06:00

## 2023-10-24 RX ADMIN — POTASSIUM CHLORIDE 20 MEQ: 10 CAPSULE, COATED, EXTENDED RELEASE ORAL at 22:58

## 2023-10-24 RX ADMIN — SODIUM CHLORIDE, PRESERVATIVE FREE 10 ML: 5 INJECTION INTRAVENOUS at 23:14

## 2023-10-24 RX ADMIN — CEFEPIME 2000 MG: 2 INJECTION, POWDER, FOR SOLUTION INTRAVENOUS at 06:04

## 2023-10-24 RX ADMIN — IRON SUCROSE 100 MG: 20 INJECTION, SOLUTION INTRAVENOUS at 14:11

## 2023-10-24 RX ADMIN — ROSUVASTATIN CALCIUM 20 MG: 20 TABLET, FILM COATED ORAL at 11:01

## 2023-10-24 RX ADMIN — SODIUM CHLORIDE, PRESERVATIVE FREE 10 ML: 5 INJECTION INTRAVENOUS at 11:09

## 2023-10-24 RX ADMIN — BUMETANIDE 1.5 MG: 0.25 INJECTION INTRAMUSCULAR; INTRAVENOUS at 10:50

## 2023-10-24 ASSESSMENT — ENCOUNTER SYMPTOMS
ABDOMINAL PAIN: 0
DIARRHEA: 0
COUGH: 1
SHORTNESS OF BREATH: 0

## 2023-10-24 NOTE — PLAN OF CARE
Problem: Discharge Planning  Goal: Discharge to home or other facility with appropriate resources  10/24/2023 0404 by Barrie Montana RN  Outcome: Progressing  10/23/2023 1714 by Monica Dewitt RN  Outcome: Progressing     Problem: Safety - Adult  Goal: Free from fall injury  10/24/2023 0404 by Barrie Montana RN  Outcome: Progressing  Note: Patient weak. Unable to move self. Bed alarm on. Alert and oriented. 10/23/2023 1714 by Monica Dewitt RN  Outcome: Progressing     Problem: Skin/Tissue Integrity  Goal: Absence of new skin breakdown  Description: 1. Monitor for areas of redness and/or skin breakdown  2. Assess vascular access sites hourly  3. Every 4-6 hours minimum:  Change oxygen saturation probe site  4. Every 4-6 hours:  If on nasal continuous positive airway pressure, respiratory therapy assess nares and determine need for appliance change or resting period. 10/24/2023 0404 by Barrie Montana RN  Outcome: Progressing  Note: Bilateral lower legs remain wrapped. Let thigh red dry skin. Buttocks reddened with triad cream applied. Pillow supported and assisted with turning. 10/23/2023 1714 by Monica Dewitt RN  Outcome: Progressing     Problem: ABCDS Injury Assessment  Goal: Absence of physical injury  10/24/2023 0404 by Barrie Montana RN  Outcome: Progressing  10/23/2023 1714 by Monica Dewitt RN  Outcome: Progressing     Problem: Chronic Conditions and Co-morbidities  Goal: Patient's chronic conditions and co-morbidity symptoms are monitored and maintained or improved  10/24/2023 0404 by Barrie Montana RN  Outcome: Progressing  Note: Patient with generalized edema. Bumex increased. Telemetry paced rhythm. Vs stable. 10/23/2023 1714 by Monica Dewitt RN  Outcome: Progressing     Problem: Pain  Goal: Verbalizes/displays adequate comfort level or baseline comfort level  10/24/2023 0404 by Barrie Montana RN  Outcome: Progressing  Note: Denies pain.

## 2023-10-24 NOTE — PROGRESS NOTES
10/24/23 1617   Encounter Summary   Encounter Overview/Reason  Spiritual/Emotional Needs   Service Provided For: Patient   Referral/Consult From: Palliative Care   Last Encounter  10/24/23   Complexity of Encounter Low   Spiritual/Emotional needs   Type Spiritual Support   Palliative Care   Type Palliative Care, Follow-up   Assessment/Intervention/Outcome   Assessment Unable to assess  (patient sleeping)   Intervention Prayer (assurance of)/[place]

## 2023-10-24 NOTE — PLAN OF CARE
Problem: Discharge Planning  Goal: Discharge to home or other facility with appropriate resources  10/24/2023 1633 by Noy Byrne RN  Outcome: Progressing     Problem: Safety - Adult  Goal: Free from fall injury  10/24/2023 1633 by Noy Byrne RN  Outcome: Progressing     Problem: Skin/Tissue Integrity  Goal: Absence of new skin breakdown  Description: 1. Monitor for areas of redness and/or skin breakdown  2. Assess vascular access sites hourly  3. Every 4-6 hours minimum:  Change oxygen saturation probe site  4. Every 4-6 hours:  If on nasal continuous positive airway pressure, respiratory therapy assess nares and determine need for appliance change or resting period.   10/24/2023 1633 by Noy Byrne RN  Outcome: Progressing     Problem: ABCDS Injury Assessment  Goal: Absence of physical injury  10/24/2023 1633 by Noy Byrne RN  Outcome: Progressing     Problem: Pain  Goal: Verbalizes/displays adequate comfort level or baseline comfort level  10/24/2023 1633 by Noy Byrne RN  Outcome: Progressing     Problem: Nutrition Deficit:  Goal: Optimize nutritional status  10/24/2023 1633 by Noy Byrne RN  Outcome: Progressing  Flowsheets (Taken 10/24/2023 1527 by Dario House, RD, LD)  Nutrient intake appropriate for improving, restoring, or maintaining nutritional needs: Monitor oral intake, labs, and treatment plans

## 2023-10-24 NOTE — CARE COORDINATION
Insurance denied the pt to go to [facility]. He informed the pt daughter Peggyann Balloon. She would like the pt referred to [facility] [place].  Writer made a referral

## 2023-10-24 NOTE — PROGRESS NOTES
[facility] -   Infectious diseases evaluation  admission date 10/9/2023    reason for consultation:   Right thigh cellulitis    Impression :   Current:  Right thigh cellulitis  Distal lower extremity venous stasis ulcers with surrounding cellulitis improving  Leukocytosis  Rhinovirus infection  Ileus   Bilateral lower extremity lymphedema  Anemia  CHF  Acute hypoxic respiratory failure   Acute on chronic renal failure  Coronary artery disease status post CABG  Obesity    Recommendations   Zyvox 600 mg IV q12 hours completed 10/21/23    Recommendations   Cefepime 2 gm IV every 12 hours through 10/25/23. Follow CBC renal function. Continue supportive care        Infection Control Recommendations   [place] Precautions  Droplet isolation    Antimicrobial Stewardship Recommendations   Simplification of therapy  Targeted therapy    History of Present Illness:   Initial history:  Sarita Wharton is a 82y.o.-year-old male presented to the hospital on 10/9/23 with worsening shortness of breath associated with lower extremity edema for 1 day. -Symptoms moderate to severe, worse with exertion, no alleviating or aggravating factors. He had a chronic lower extremity lymphedema with venous stasis ulcers with surrounding redness. The patient was treated with diuretics for CHF. On 10/14/2023 he developed fever with a temperature max of 102.3. WBC increased to 18.3, procalcitonin level 0.55  Respiratory panel with COVID-19 PCR was positive for rhinovirus on 10/13/2023. Started on IV Zyvox and cefepime on 10/14/2023  UA 10/16 showed 3-5 WBC, negative nitrate, negative leukocyte esterase  Stool occult blood positive  Procalcitonin level 2.2 and lactic acid 2.7 on 10/16    Interval changes  10/24/2023   He remains afebrile, comfortable on oxygen per nasal cannula, no new complaints  RUE PICC site clean.           Patient Vitals for the past 8 hrs:   BP Temp Temp src Pulse Resp

## 2023-10-24 NOTE — PROGRESS NOTES
[facility] [place]  [place]GRESS NOTE  Date: 10/24/2023  Patient Name: Yadira Aguilar       Room: 2342/3186-39  MRN: [id number]    : 1935  (82 y.o.)  Gender: male   Referring Practitioner: Saira Lynch MD  Diagnosis: Acute CHF      Discharge Recommendations:  Further Occupational Therapy is recommended upon facility discharge. OT Equipment Recommendations  Other: TBD    Restrictions/Precautions  Restrictions/Precautions  Restrictions/Precautions: Fall Risk;General Precautions; Bed Alarm; Other (comment)  Required Braces or Orthoses?: No  Implants present? : Metal implants; Pacemaker  Position Activity Restriction  Other position/activity restrictions: NIV, valadez, IV's, telemetry     Subjective  Subjective  Pain: Pt denies pain. Comments: Pt pleasant and agreeable to participate, co-tx with Heidy POLANCO PTA. Objective  Orientation  Overall Orientation Status: Within Functional Limits  Cognition  Overall Cognitive Status: Exceptions  Arousal/Alertness: Appropriate responses to stimuli  Following Commands: Follows one step commands with repetition  Attention Span: Appears intact  Safety Judgement: Decreased awareness of need for safety  Problem Solving: Assistance required to generate solutions;Assistance required to identify errors made;Assistance required to implement solutions  Insights: Decreased awareness of deficits  Initiation: Requires cues for some  Sequencing: Requires cues for some    Activities of Daily Living  ADL  Feeding: Setup  Grooming: Minimal assistance  Grooming Skilled Clinical Factors: Assist with hair care, increased cueing to engage pt participation in face washing.   UE Bathing: Maximum assistance  LE Bathing: Dependent/Total  UE Dressing: Maximum assistance  LE Dressing: Dependent/Total  Toileting: Dependent/Total  Additional Comments: ADL scores based on skilled observation and clinical reasoning, unless otherwise

## 2023-10-24 NOTE — PROGRESS NOTES
Comprehensive Nutrition Assessment    Type and Reason for Visit:  Reassess    Nutrition Recommendations/Plan:   Will continue to provide No added Salt diet  Will change supplements to Magic Cup twice daily     Malnutrition Assessment:  Malnutrition Status:  No malnutrition (10/18/23 1639)    Context:  Acute Illness     Findings of the 6 clinical characteristics of malnutrition:  Energy Intake:  No significant decrease in energy intake  Weight Loss:  No significant weight loss     Body Fat Loss:  No significant body fat loss     Muscle Mass Loss:  No significant muscle mass loss    Fluid Accumulation:  Moderate to Severe Extremities   Strength:       Nutrition Assessment:    Pt consuming less than 50%. He refused breakfast this morning. Plan is for discharge to [facility]. Nutrition Related Findings:    pitting edema to all extremities, Labs/Meds: Reviewed, BM 10/23 Wound Type: Skin Tears, Venous Stasis       Current Nutrition Intake & Therapies:    Average Meal Intake: 26-50%, 0%  Average Supplements Intake: Unable to assess  ADULT DIET; Regular; No Added Salt (3-4 gm)  ADULT ORAL NUTRITION SUPPLEMENT; Dinner; Low Calorie/High Protein Oral Supplement    Anthropometric Measures:  Height: 185.4 cm (6' 1\")  Ideal Body Weight (IBW): 184 lbs (84 kg)    Admission Body Weight: 142 kg (313 lb)  Current Body Weight: 147 kg (324 lb),   IBW.  Weight Source: Bed Scale  Current BMI (kg/m2): 42.8                               Estimated Daily Nutrient Needs:  Energy Requirements Based On: Kcal/kg  Weight Used for Energy Requirements: Admission  Energy (kcal/day): 2682-5728 kcals based on 13-14 kcals/kg  Weight Used for Protein Requirements: Ideal  Protein (g/day): 109-117 gm protein based 1.3-1.4 gm/kg       Nutrition Diagnosis:   Inadequate protein-energy intake related to  (current medical condition) as evidenced by intake 0-25%, intake 26-50%    Nutrition Interventions:   Food and/or Nutrient Delivery: Continue Current

## 2023-10-24 NOTE — PROGRESS NOTES
Physical Therapy  [address] [facility]    Date: 10/24/23  Patient Name: Jacklyn Bateman       Room: [facility]/3935-81  MRN: [id number]   Account: [de-identified]   : 1935  (82 y.o.) Gender: male     Referring Practitioner: Mckayla Whatley MD  Diagnosis: Acute CHF  Past Medical History:  has a past medical history of Arthritis, CHF (congestive heart failure) ([address]), Chronic kidney disease, History of blood transfusion, and Hyperlipidemia. Past Surgical History:   has a past surgical history that includes joint replacement; Cardiac surgery (); and pacemaker placement (Right, ). Additional Pertinent Hx: CHF exacerbation    Overall Orientation Status: Within Functional Limits  Restrictions/Precautions  Restrictions/Precautions: Fall Risk;General Precautions; Bed Alarm; Other (comment)  Required Braces or Orthoses?: No  Implants present? : Metal implants; Pacemaker  Position Activity Restriction  Other position/activity restrictions: NIV, valadez, IV's, telemetry    Subjective: Pt sleeping in bed upon arrival, agreeable to therapy  Comments: co-treat with SHARONDA Viera       Pain Assessment: None - Denies Pain     Oxygen Therapy  SpO2: 94 %  Pulse Oximetry Type: Continuous  Pulse Oximeter Device Mode: Continuous  Pulse Oximeter Device Location: Finger  O2 Device: Nasal cannula  O2 Flow Rate (L/min): 2 L/min    Bed Mobility  Rolling: Maximal assistance (x2)  Supine to Sit: Maximal assistance (x2)  Sit to Supine: Maximal assistance (x2)  Scooting: Dependent/Total  Bed mobility  Scooting: Dependent/Total    Transfers:  Sit to Stand: 2 Person Assistance;Minimal Assistance  Stand to Sit: Minimal Assistance;2 Person Assistance    Ambulation  Assistance: Minimal assistance;2 Person assistance (with RW.  Pt took ~ 2 side steps to [facility]        Stairs/Curb  Stairs?: No      EXERCISES    Other exercises?: Yes  Other exercises 1: bed mobility x2  Other exercises 2: seated EOB ~ 18 minutes SBA  Other exercises 3:

## 2023-10-24 NOTE — PROGRESS NOTES
[facility]  Family Medicine      Progress Note      Date:   10/24/2023  Patient name:  Jannie Hussein  Date of admission:  10/9/2023  9:43 AM  MRN:   [id number]  YOB: 1935        Brief HPI    Pt admitted for acute CHF, respiratory failure    ASSESSMENT/PLAN     Hospital Problems             Last Modified POA    * (Principal) Acute congestive heart failure, unspecified heart failure type ([address]) 10/9/2023 Yes    Lymphedema of both lower extremities 10/10/2023 Yes    Non-pressure chronic ulcer of right 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 ([address]) 10/10/2023 Yes    Non-pressure chronic ulcer of left 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 ([address]) 10/10/2023 Yes    Paroxysmal A-fib ([address]) 10/12/2023 Yes    Coronary artery disease involving native coronary artery of native heart without angina pectoris 10/10/2023 Yes    Adult BMI 40.0-44.9 kg/sq m ([address]) 10/10/2023 Yes    Mixed hyperlipidemia 10/10/2023 Yes    Secondary hypercoagulable state ([address]) 10/10/2023 Yes    Primary hypertension 10/10/2023 Yes    DONI (acute kidney injury) ([address]) 10/11/2023 Yes    Acute GI bleeding 10/16/2023 Yes    Hypokalemia 10/16/2023 Yes    Severe anemia 10/17/2023 Yes    Encounter for palliative care 10/20/2023 Yes    Goals of care, counseling/discussion 10/20/2023 Yes    Cellulitis of right lower extremity 10/21/2023 Yes    Rhinovirus infection 10/21/2023 Yes    Leukocytosis 10/21/2023 Yes    Hypotension 10/21/2023 Yes   Levophed off, continue midodrine. Start oral nystatin and magic mouthwash for oral thrush  Hgb is 7.1 yesterday, transfuse to keep hgb >7, continue protonix 40 BID, switch to oral  hold metoprolol unless tachycardic, consider resuming if BP stays elevated, hold eliquis  Continue BiPAP while asleep, pulm managing  Continue cefepime, linezolid completed, ID on board, cellulitis is improving  potassium replaced. Renal function stable.    Today is 11 days since rhinovirus diagnosis, can stop contact precautions  Discharge

## 2023-10-25 LAB
ANION GAP SERPL CALCULATED.3IONS-SCNC: 7 MMOL/L (ref 9–17)
BUN SERPL-MCNC: 40 MG/DL (ref 8–23)
CALCIUM SERPL-MCNC: 8.6 MG/DL (ref 8.6–10.4)
CHLORIDE SERPL-SCNC: 107 MMOL/L (ref 98–107)
CO2 SERPL-SCNC: 30 MMOL/L (ref 20–31)
CREAT SERPL-MCNC: 1.9 MG/DL (ref 0.7–1.2)
ERYTHROCYTE [DISTWIDTH] IN BLOOD BY AUTOMATED COUNT: 23 % (ref 11.5–14.9)
GFR SERPL CREATININE-BSD FRML MDRD: 34 ML/MIN/1.73M2
GLUCOSE SERPL-MCNC: 120 MG/DL (ref 70–99)
HCT VFR BLD AUTO: 26.2 % (ref 41–53)
HGB BLD-MCNC: 7.8 G/DL (ref 13.5–17.5)
MCH RBC QN AUTO: 22.7 PG (ref 26–34)
MCHC RBC AUTO-ENTMCNC: 29.8 G/DL (ref 31–37)
MCV RBC AUTO: 75.9 FL (ref 80–100)
PLATELET # BLD AUTO: 279 K/UL (ref 150–450)
PMV BLD AUTO: 6.8 FL (ref 6–12)
POTASSIUM SERPL-SCNC: 3.6 MMOL/L (ref 3.7–5.3)
RBC # BLD AUTO: 3.45 M/UL (ref 4.5–5.9)
SODIUM SERPL-SCNC: 144 MMOL/L (ref 135–144)
WBC OTHER # BLD: 7.6 K/UL (ref 3.5–11)

## 2023-10-25 PROCEDURE — 6370000000 HC RX 637 (ALT 250 FOR IP): Performed by: STUDENT IN AN ORGANIZED HEALTH CARE EDUCATION/TRAINING PROGRAM

## 2023-10-25 PROCEDURE — 99232 SBSQ HOSP IP/OBS MODERATE 35: CPT | Performed by: NURSE PRACTITIONER

## 2023-10-25 PROCEDURE — 6370000000 HC RX 637 (ALT 250 FOR IP): Performed by: INTERNAL MEDICINE

## 2023-10-25 PROCEDURE — 99232 SBSQ HOSP IP/OBS MODERATE 35: CPT | Performed by: INTERNAL MEDICINE

## 2023-10-25 PROCEDURE — 6360000002 HC RX W HCPCS: Performed by: INTERNAL MEDICINE

## 2023-10-25 PROCEDURE — 6370000000 HC RX 637 (ALT 250 FOR IP): Performed by: FAMILY MEDICINE

## 2023-10-25 PROCEDURE — 6370000000 HC RX 637 (ALT 250 FOR IP)

## 2023-10-25 PROCEDURE — 94660 CPAP INITIATION&MGMT: CPT

## 2023-10-25 PROCEDURE — 97110 THERAPEUTIC EXERCISES: CPT

## 2023-10-25 PROCEDURE — 2580000003 HC RX 258: Performed by: NURSE PRACTITIONER

## 2023-10-25 PROCEDURE — 97530 THERAPEUTIC ACTIVITIES: CPT

## 2023-10-25 PROCEDURE — 2700000000 HC OXYGEN THERAPY PER DAY

## 2023-10-25 PROCEDURE — 85027 COMPLETE CBC AUTOMATED: CPT

## 2023-10-25 PROCEDURE — 36415 COLL VENOUS BLD VENIPUNCTURE: CPT

## 2023-10-25 PROCEDURE — 99232 SBSQ HOSP IP/OBS MODERATE 35: CPT | Performed by: FAMILY MEDICINE

## 2023-10-25 PROCEDURE — 80048 BASIC METABOLIC PNL TOTAL CA: CPT

## 2023-10-25 PROCEDURE — 6360000002 HC RX W HCPCS: Performed by: NURSE PRACTITIONER

## 2023-10-25 PROCEDURE — 2060000000 HC ICU INTERMEDIATE R&B

## 2023-10-25 PROCEDURE — 2580000003 HC RX 258: Performed by: FAMILY MEDICINE

## 2023-10-25 PROCEDURE — 6360000002 HC RX W HCPCS: Performed by: FAMILY MEDICINE

## 2023-10-25 RX ADMIN — POTASSIUM CHLORIDE 20 MEQ: 10 CAPSULE, COATED, EXTENDED RELEASE ORAL at 21:13

## 2023-10-25 RX ADMIN — BUMETANIDE 2 MG: 1 TABLET ORAL at 10:44

## 2023-10-25 RX ADMIN — NYSTATIN 500000 UNITS: 100000 SUSPENSION ORAL at 15:02

## 2023-10-25 RX ADMIN — NYSTATIN 500000 UNITS: 100000 SUSPENSION ORAL at 16:48

## 2023-10-25 RX ADMIN — ANTI-FUNGAL POWDER MICONAZOLE NITRATE TALC FREE: 1.42 POWDER TOPICAL at 21:00

## 2023-10-25 RX ADMIN — SODIUM CHLORIDE, PRESERVATIVE FREE 10 ML: 5 INJECTION INTRAVENOUS at 22:06

## 2023-10-25 RX ADMIN — BENZONATATE 200 MG: 200 CAPSULE ORAL at 15:02

## 2023-10-25 RX ADMIN — BENZONATATE 200 MG: 200 CAPSULE ORAL at 21:13

## 2023-10-25 RX ADMIN — Medication 60 MG: at 10:46

## 2023-10-25 RX ADMIN — SODIUM CHLORIDE, PRESERVATIVE FREE 10 ML: 5 INJECTION INTRAVENOUS at 10:48

## 2023-10-25 RX ADMIN — FLUTICASONE PROPIONATE 2 SPRAY: 50 SPRAY, METERED NASAL at 10:46

## 2023-10-25 RX ADMIN — ALLOPURINOL 50 MG: 100 TABLET ORAL at 10:42

## 2023-10-25 RX ADMIN — PANTOPRAZOLE SODIUM 40 MG: 40 TABLET, DELAYED RELEASE ORAL at 15:01

## 2023-10-25 RX ADMIN — NYSTATIN 500000 UNITS: 100000 SUSPENSION ORAL at 21:13

## 2023-10-25 RX ADMIN — PANTOPRAZOLE SODIUM 40 MG: 40 TABLET, DELAYED RELEASE ORAL at 10:44

## 2023-10-25 RX ADMIN — EPOETIN ALFA-EPBX 3000 UNITS: 3000 INJECTION, SOLUTION INTRAVENOUS; SUBCUTANEOUS at 16:46

## 2023-10-25 RX ADMIN — ANTI-FUNGAL POWDER MICONAZOLE NITRATE TALC FREE: 1.42 POWDER TOPICAL at 10:47

## 2023-10-25 RX ADMIN — NYSTATIN 500000 UNITS: 100000 SUSPENSION ORAL at 10:40

## 2023-10-25 RX ADMIN — BUMETANIDE 2 MG: 1 TABLET ORAL at 21:13

## 2023-10-25 RX ADMIN — ROSUVASTATIN CALCIUM 20 MG: 20 TABLET, FILM COATED ORAL at 10:42

## 2023-10-25 RX ADMIN — Medication 60 MG: at 21:12

## 2023-10-25 RX ADMIN — ONDANSETRON 4 MG: 2 INJECTION INTRAMUSCULAR; INTRAVENOUS at 12:05

## 2023-10-25 RX ADMIN — POTASSIUM CHLORIDE 20 MEQ: 10 CAPSULE, COATED, EXTENDED RELEASE ORAL at 10:41

## 2023-10-25 RX ADMIN — BENZONATATE 200 MG: 200 CAPSULE ORAL at 10:45

## 2023-10-25 RX ADMIN — CEFEPIME 2000 MG: 2 INJECTION, POWDER, FOR SOLUTION INTRAVENOUS at 05:21

## 2023-10-25 ASSESSMENT — ENCOUNTER SYMPTOMS
DIARRHEA: 0
ABDOMINAL PAIN: 0
SHORTNESS OF BREATH: 0
COUGH: 1

## 2023-10-25 ASSESSMENT — PAIN SCALES - GENERAL: PAINLEVEL_OUTOF10: 0

## 2023-10-25 NOTE — PROGRESS NOTES
[id number] [facility]   INPATIENT OCCUPATIONAL THERAPY  PROGRESS NOTE  Date: 10/25/2023  Patient Name: Deborah Pierce       Room: 1930/8324-13  MRN: [id number]    : 1935  (82 y.o.)  Gender: male   Referring Practitioner: Bimal Solis MD  Diagnosis: Acute CHF      Discharge Recommendations:  Further Occupational Therapy is recommended upon facility discharge. OT Equipment Recommendations  Other: TBD    Restrictions/Precautions  Restrictions/Precautions  Restrictions/Precautions: Fall Risk;General Precautions; Bed Alarm; Other (comment)  Required Braces or Orthoses?: No  Implants present? : Metal implants; Pacemaker  Position Activity Restriction  Other position/activity restrictions: NIV, valadez, IV's, telemetry    Vitals  O2 Device: Nasal cannula  Comment: O2 monitored throughout reading WFL (90-99%)    Subjective  Subjective  Subjective: \"I want to lay down. \" pt states 1 min into sitting EOB. edu and encouragement provided to increased participation on sitting tolerance activity  Subjective  Pain: denies pain  Comments: RN Ok'd co-tx    Objective  Orientation  Overall Orientation Status: Within Functional Limits  Cognition  Overall Cognitive Status: Exceptions  Arousal/Alertness: Appropriate responses to stimuli  Following Commands:  Follows one step commands with repetition  Attention Span: Appears intact  Safety Judgement: Decreased awareness of need for safety  Problem Solving: Assistance required to generate solutions;Assistance required to identify errors made;Assistance required to implement solutions  Insights: Decreased awareness of deficits  Initiation: Requires cues for some  Sequencing: Requires cues for some    Activities of Daily Living  ADL  Feeding: Setup  Grooming: Minimal assistance  UE Bathing: Maximum assistance  LE Bathing: Dependent/Total  UE Dressing: Maximum assistance  LE Dressing: Dependent/Total  Toileting: Dependent/Total  Additional Comments: ADL scores based on

## 2023-10-25 NOTE — PROGRESS NOTES
Palliative Care Progress Note    NAME:  [address] [place] [address] RECORD NUMBER:  [id number]  AGE: 82 y.o. GENDER: male  : 1935  TODAY'S DATE:  10/25/2023    Reason for Consult:   Goals of care     Assessment completed by   Jovanny Flowers CNP  Palliative care     Plan      Palliative Interaction:  I went to bedside and spoke with patient he states he feels weakness and fatigue today. He states he has nausea but is improved currently. Patient states he is tired of being sick and not feeling well. We discussed a hospice informational meeting to discuss the options hospice has to keep him comfortable. Patient is not sure and wants me to call his daughter and see what she thinks. I call Beth Genao and discuss a hospice meeting and she denies she states she works in an ECF and knows what hospice is. She states they are not ready for them yet. Beth Genao states she will be in this afternoon and discuss this with her dad. Palliative care will continue to follow.      Education/support to family  Education/support to patient  Discharge planning/helping to coordinate care  Communications with primary service  Pharmacologic pain management  Providing support for coping/adaptation/distress of family  Providing support for coping/adaptation/distress of patient  Discussing meaning/purpose   Caregiver support/education  Continue with current plan of care  Code status clarified: DNRCCA  Principle Problem/Diagnosis:  Acute congestive heart failure, unspecified heart failure type ([address])    Additional Assessments:  Principal Problem:    Acute congestive heart failure, unspecified heart failure type ([address])  Active Problems:    Lymphedema of both lower extremities    Non-pressure chronic ulcer of right 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 ([address])    Non-pressure chronic ulcer of left 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 ([address])    Paroxysmal A-fib ([address])    Coronary artery disease involving native coronary artery of native heart without

## 2023-10-25 NOTE — FLOWSHEET NOTE
10/25/23 1813   Treatment Team Notification   Name of Team Member Notified Dr. Cayla Castro Team Role Attending Provider   Method of Communication Secure Message   Response Waiting for response   Notification Time 1814     9 beat run formerly [facility] @ [phone]

## 2023-10-25 NOTE — PROGRESS NOTES
10/25/23 1522   Encounter Summary   Encounter Overview/Reason  Spiritual/Emotional Needs   Service Provided For: Patient   Referral/Consult From: Palliative Care   Last Encounter  10/25/23   Complexity of Encounter Low   Spiritual/Emotional needs   Type Spiritual Support   Palliative Care   Type Palliative Care, Follow-up   Assessment/Intervention/Outcome   Assessment Unable to assess  (patient sleeping)   Intervention Prayer (assurance of)/[place]

## 2023-10-25 NOTE — CARE COORDINATION
DISCHARGE PLANNING NOTE:    DIANAW requested this writer submit updated clinicals for the patient's insurance authorization. This writer submitted updated PT/OT, and progress notes to [employer].     Electronically signed by Marielle Ji RN on 10/25/2023 at 1:24 PM

## 2023-10-25 NOTE — PROGRESS NOTES
[facility]      Progress Note      Date:   10/25/2023  Patient name:  Edwina Ronquillo  Date of admission:  10/9/2023  9:43 AM  MRN:   [id number]  YOB: 1935        Brief HPI    Pt admitted for acute CHF, respiratory failure    ASSESSMENT/PLAN     Hospital Problems             Last Modified POA    * (Principal) Acute congestive heart failure, unspecified heart failure type ([address]) 10/9/2023 Yes    Lymphedema of both lower extremities 10/10/2023 Yes    Non-pressure chronic ulcer of right 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 ([address]) 10/10/2023 Yes    Non-pressure chronic ulcer of left 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 ([address]) 10/10/2023 Yes    Paroxysmal A-fib ([address]) 10/12/2023 Yes    Coronary artery disease involving native coronary artery of native heart without angina pectoris 10/10/2023 Yes    Adult BMI 40.0-44.9 kg/sq m ([address]) 10/10/2023 Yes    Mixed hyperlipidemia 10/10/2023 Yes    Secondary hypercoagulable state ([address]) 10/10/2023 Yes    Primary hypertension 10/10/2023 Yes    DONI (acute kidney injury) ([address]) 10/11/2023 Yes    Acute GI bleeding 10/16/2023 Yes    Hypokalemia 10/16/2023 Yes    Severe anemia 10/17/2023 Yes    Encounter for palliative care 10/20/2023 Yes    Goals of care, counseling/discussion 10/20/2023 Yes    Cellulitis of right lower extremity 10/21/2023 Yes    Rhinovirus infection 10/21/2023 Yes    Leukocytosis 10/21/2023 Yes    Hypotension 10/21/2023 Yes     Started oral nystatin and magic mouthwash for oral thrush  Hgb is 7.8 today, transfuse to keep hgb >7, continue protonix 40 BID, switch to oral  hold metoprolol unless tachycardic, consider resuming if BP stays elevated, hold eliquis  Continue BiPAP while asleep, pulm managing  Continue cefepime, linezolid completed, ID on board, cellulitis is improving  Potassium being replaced. Renal function stable. Discharge plan is SNF, LTAC was denied during peer - peer, awaiting pre-cert. DNR-CCA   ADULT DIET;  Regular;

## 2023-10-25 NOTE — PROGRESS NOTES
Physical Therapy  [address]    Date: 10/25/23  Patient Name: Remo Siemens       Room: [facility]1609-  MRN: [id number]   Account: [de-identified]   : 1935  (82 y.o.) Gender: male     Referring Practitioner: Marcia Bowman MD  Diagnosis: Acute CHF  Past Medical History:  has a past medical history of Arthritis, CHF (congestive heart failure) ([address]), Chronic kidney disease, History of blood transfusion, and Hyperlipidemia. Past Surgical History:   has a past surgical history that includes joint replacement; Cardiac surgery (); and pacemaker placement (Right, ). Additional Pertinent Hx: CHF exacerbation    Overall Orientation Status: Within Functional Limits  Restrictions/Precautions  Restrictions/Precautions: Fall Risk;General Precautions; Bed Alarm; Other (comment)  Required Braces or Orthoses?: No  Implants present? : Metal implants; Pacemaker  Position Activity Restriction  Other position/activity restrictions: NIV, valadez, IV's, telemetry    Subjective: Pt lying in bed upon arrival, agreeabel to therapy with some encouragement  Comments: GRISEL Bray approved therapy; co-treat with SHARONDA Richards       Pain Assessment: None - Denies Pain     Oxygen Therapy  SpO2: 90 %  Pulse Oximetry Type: Continuous  Pulse Oximeter Device Mode: Continuous  Pulse Oximeter Device Location: Finger  O2 Device: Nasal cannula  O2 Flow Rate (L/min): 5 L/min    Bed Mobility  Rolling: Dependent/Total;Rolling Left (x2)  Supine to Sit: Dependent/Total (x2)  Sit to Supine: Dependent/Total (x2)  Scooting: Dependent/Total;2 Person assistance (to [facility])  Comment: pt with decreased participation this date  Bed mobility  Scooting: Dependent/Total;2 Person assistance (to [facility])    Transfers: pt deferred this date    EXERCISES    Other exercises?: Yes  Other exercises 1: bed mobility x2  Other exercises 2: seated EOB ~ 3 minutes dependent x1; pt with heavy posterior lean.  Pt stating he was having trouble breathing this date and deferred sitting up for any longer  Other exercises 3: educated on mobility while in bed with ankle pumps and heel slides           Activity Tolerance: Patient limited by fatigue, Patient limited by endurance    Current Treatment Recommendations: Balance training, Functional mobility training, Transfer training, Gait training, Equipment evaluation, education, & procurement, Therapeutic activities, Positioning, Safety education & training    Conditions Requiring Skilled Therapeutic Intervention  Treatment Diagnosis: impaired mobility and decreased IND d/t endurance deficits  History: The patient is a 82 y.o. male who presents to [facility] [place] [place] with complaints of increasing shortness of breath and worsening lower extremity swelling for the past few days. He states that he has been taking his diuretics as prescribed and urinating per usual. He has multiple co-morbid conditions including CHF, Lymphedema with lower extremity ulcers and CKD. He denies any fevers, chills, chest pain, cough, abdominal pain, N/V, dysuria, headaches, numbness or confusion.   Discharge Recommendations: Patient would benefit from continued therapy after discharge;Continue to assess pending progress    AM-[facility] Basic Mobility - Inpatient   How much help is needed turning from your back to your side while in a flat bed without using bedrails?: Total  How much help is needed moving from lying on your back to sitting on the side of a flat bed without using bedrails?: Total  How much help is needed moving to and from a bed to a chair?: Total  How much help is needed standing up from a chair using your arms?: Total  How much help is needed walking in hospital room?: Total  How much help is needed climbing 3-5 steps with a railing?: Total  AM-PAC Inpatient Mobility Raw Score : 6  AM-PAC Inpatient T-Scale Score : 23.55  Mobility Inpatient CMS 0-100% Score: 100  Mobility Inpatient CMS G-Code Modifier : CN   Goals  Short Term Goals  Time Frame for

## 2023-10-26 PROCEDURE — 2060000000 HC ICU INTERMEDIATE R&B

## 2023-10-26 PROCEDURE — 2700000000 HC OXYGEN THERAPY PER DAY

## 2023-10-26 PROCEDURE — 2580000003 HC RX 258: Performed by: FAMILY MEDICINE

## 2023-10-26 PROCEDURE — 6370000000 HC RX 637 (ALT 250 FOR IP): Performed by: STUDENT IN AN ORGANIZED HEALTH CARE EDUCATION/TRAINING PROGRAM

## 2023-10-26 PROCEDURE — 6370000000 HC RX 637 (ALT 250 FOR IP)

## 2023-10-26 PROCEDURE — 94660 CPAP INITIATION&MGMT: CPT

## 2023-10-26 PROCEDURE — 6370000000 HC RX 637 (ALT 250 FOR IP): Performed by: INTERNAL MEDICINE

## 2023-10-26 PROCEDURE — 99231 SBSQ HOSP IP/OBS SF/LOW 25: CPT | Performed by: INTERNAL MEDICINE

## 2023-10-26 PROCEDURE — 94761 N-INVAS EAR/PLS OXIMETRY MLT: CPT

## 2023-10-26 PROCEDURE — 6370000000 HC RX 637 (ALT 250 FOR IP): Performed by: FAMILY MEDICINE

## 2023-10-26 RX ADMIN — ALLOPURINOL 50 MG: 100 TABLET ORAL at 10:06

## 2023-10-26 RX ADMIN — NYSTATIN 500000 UNITS: 100000 SUSPENSION ORAL at 14:02

## 2023-10-26 RX ADMIN — FLUTICASONE PROPIONATE 2 SPRAY: 50 SPRAY, METERED NASAL at 10:09

## 2023-10-26 RX ADMIN — BENZONATATE 200 MG: 200 CAPSULE ORAL at 14:11

## 2023-10-26 RX ADMIN — SODIUM CHLORIDE, PRESERVATIVE FREE 10 ML: 5 INJECTION INTRAVENOUS at 20:24

## 2023-10-26 RX ADMIN — Medication 60 MG: at 20:23

## 2023-10-26 RX ADMIN — ROSUVASTATIN CALCIUM 20 MG: 20 TABLET, FILM COATED ORAL at 10:09

## 2023-10-26 RX ADMIN — ANTI-FUNGAL POWDER MICONAZOLE NITRATE TALC FREE: 1.42 POWDER TOPICAL at 20:24

## 2023-10-26 RX ADMIN — Medication 60 MG: at 10:06

## 2023-10-26 RX ADMIN — NYSTATIN 500000 UNITS: 100000 SUSPENSION ORAL at 20:23

## 2023-10-26 RX ADMIN — SODIUM CHLORIDE, PRESERVATIVE FREE 10 ML: 5 INJECTION INTRAVENOUS at 10:06

## 2023-10-26 RX ADMIN — BENZONATATE 200 MG: 200 CAPSULE ORAL at 20:23

## 2023-10-26 RX ADMIN — PANTOPRAZOLE SODIUM 40 MG: 40 TABLET, DELAYED RELEASE ORAL at 14:11

## 2023-10-26 RX ADMIN — ANTI-FUNGAL POWDER MICONAZOLE NITRATE TALC FREE: 1.42 POWDER TOPICAL at 10:06

## 2023-10-26 RX ADMIN — BENZONATATE 200 MG: 200 CAPSULE ORAL at 10:06

## 2023-10-26 RX ADMIN — BUMETANIDE 2 MG: 1 TABLET ORAL at 10:06

## 2023-10-26 RX ADMIN — BUMETANIDE 2 MG: 1 TABLET ORAL at 20:23

## 2023-10-26 RX ADMIN — POTASSIUM CHLORIDE 20 MEQ: 10 CAPSULE, COATED, EXTENDED RELEASE ORAL at 10:06

## 2023-10-26 RX ADMIN — POTASSIUM CHLORIDE 20 MEQ: 10 CAPSULE, COATED, EXTENDED RELEASE ORAL at 20:23

## 2023-10-26 RX ADMIN — PANTOPRAZOLE SODIUM 40 MG: 40 TABLET, DELAYED RELEASE ORAL at 05:55

## 2023-10-26 RX ADMIN — NYSTATIN 500000 UNITS: 100000 SUSPENSION ORAL at 10:06

## 2023-10-26 ASSESSMENT — ENCOUNTER SYMPTOMS
SHORTNESS OF BREATH: 0
ABDOMINAL PAIN: 0
DIARRHEA: 0
COUGH: 1

## 2023-10-26 NOTE — PLAN OF CARE
Problem: Safety - Adult  Goal: Free from fall injury  10/26/2023 0400 by Maria Isabel Lewis RN  Outcome: Progressing  Flowsheets (Taken 10/26/2023 0059)  Free From Fall Injury: Instruct family/caregiver on patient safety     Problem: Discharge Planning  Goal: Discharge to home or other facility with appropriate resources  10/26/2023 0400 by Maria Isabel Lewis RN  Outcome: Progressing     Problem: ABCDS Injury Assessment  Goal: Absence of physical injury  10/26/2023 0400 by Maria Isabel Lewis RN  Outcome: Progressing  Flowsheets (Taken 10/26/2023 0059)  Absence of Physical Injury: Implement safety measures based on patient assessment

## 2023-10-26 NOTE — PROGRESS NOTES
10/26/23 1355   Encounter Summary   Encounter Overview/Reason  Palliative Care   Service Provided For: Patient   Referral/Consult From: Palliative Care   Last Encounter  10/26/23   Complexity of Encounter Low   Spiritual/Emotional needs   Type Spiritual Support   Palliative Care   Type Palliative Care, Follow-up   Assessment/Intervention/Outcome   Assessment Unable to assess   Intervention Prayer (assurance of)/[place]

## 2023-10-26 NOTE — PLAN OF CARE
Problem: Discharge Planning  Goal: Discharge to home or other facility with appropriate resources  10/26/2023 1736 by Angel Lawrence RN  Outcome: Progressing     Problem: Safety - Adult  Goal: Free from fall injury  10/26/2023 1736 by Angel Lawrence RN  Outcome: Progressing     Problem: Skin/Tissue Integrity  Goal: Absence of new skin breakdown  Outcome: Progressing     Problem: ABCDS Injury Assessment  Goal: Absence of physical injury  10/26/2023 1736 by Angel Lawrence RN  Outcome: Progressing     Problem: Chronic Conditions and Co-morbidities  Goal: Patient's chronic conditions and co-morbidity symptoms are monitored and maintained or improved  Outcome: Progressing     Problem: Pain  Goal: Verbalizes/displays adequate comfort level or baseline comfort level  Outcome: Progressing     Problem: Nutrition Deficit:  Goal: Optimize nutritional status  Outcome: Progressing

## 2023-10-26 NOTE — PROGRESS NOTES
GRISEL Anton agrees with all charting of GRISEL Fuentes on 10/25/2023 @ [phone] to 10/26/2023 @ 0730.

## 2023-10-26 NOTE — CARE COORDINATION
Writer attempting to check authorization status online via [employer].  Portal is currently experiencing technical issues, unable to login. Electronically signed by JANE Grace on 10/26/2023 at 12:03 PM    Writer was able to login to [facility] portal.  Authorization is still pending.   Electronically signed by JANE Grace on 10/26/2023 at 2:38 PM

## 2023-10-26 NOTE — PROGRESS NOTES
[facility] -   Infectious diseases evaluation  admission date 10/9/2023    reason for consultation:   Right thigh cellulitis    Impression :   Current:  Right thigh cellulitis  Distal lower extremity venous stasis ulcers with surrounding cellulitis improving  Leukocytosis  Rhinovirus infection  Ileus   Bilateral lower extremity lymphedema  Anemia  CHF  Acute hypoxic respiratory failure   Acute on chronic renal failure  Coronary artery disease status post CABG  Obesity    Recommendations   Zyvox 600 mg IV q12 hours completed 10/21/23    Recommendations   Cefepime course completed  Monitor off antibiotics  Follow CBC renal function. Continue supportive care        Infection Control Recommendations   [place] Precautions  Droplet isolation    Antimicrobial Stewardship Recommendations   Simplification of therapy  Targeted therapy    History of Present Illness:   Initial history:  Beth Scott is a 82y.o.-year-old male presented to the hospital on 10/9/23 with worsening shortness of breath associated with lower extremity edema for 1 day. -Symptoms moderate to severe, worse with exertion, no alleviating or aggravating factors. He had a chronic lower extremity lymphedema with venous stasis ulcers with surrounding redness. The patient was treated with diuretics for CHF. On 10/14/2023 he developed fever with a temperature max of 102.3. WBC increased to 18.3, procalcitonin level 0.55  Respiratory panel with COVID-19 PCR was positive for rhinovirus on 10/13/2023. Started on IV Zyvox and cefepime on 10/14/2023  UA 10/16 showed 3-5 WBC, negative nitrate, negative leukocyte esterase  Stool occult blood positive  Procalcitonin level 2.2 and lactic acid 2.7 on 10/16    Interval changes  10/26/2023   He remains afebrile, had congested cough, no new events. WBC 7.6 yesterday  RUE PICC site clean.           Patient Vitals for the past 8 hrs:   BP Temp Temp src Pulse Resp SpO2

## 2023-10-27 LAB
ANION GAP SERPL CALCULATED.3IONS-SCNC: 6 MMOL/L (ref 9–17)
BUN SERPL-MCNC: 35 MG/DL (ref 8–23)
CALCIUM SERPL-MCNC: 8.4 MG/DL (ref 8.6–10.4)
CHLORIDE SERPL-SCNC: 107 MMOL/L (ref 98–107)
CO2 SERPL-SCNC: 30 MMOL/L (ref 20–31)
CREAT SERPL-MCNC: 1.6 MG/DL (ref 0.7–1.2)
GFR SERPL CREATININE-BSD FRML MDRD: 42 ML/MIN/1.73M2
GLUCOSE SERPL-MCNC: 100 MG/DL (ref 70–99)
POTASSIUM SERPL-SCNC: 3.7 MMOL/L (ref 3.7–5.3)
SODIUM SERPL-SCNC: 143 MMOL/L (ref 135–144)

## 2023-10-27 PROCEDURE — 6370000000 HC RX 637 (ALT 250 FOR IP): Performed by: INTERNAL MEDICINE

## 2023-10-27 PROCEDURE — 94761 N-INVAS EAR/PLS OXIMETRY MLT: CPT

## 2023-10-27 PROCEDURE — 2060000000 HC ICU INTERMEDIATE R&B

## 2023-10-27 PROCEDURE — 97110 THERAPEUTIC EXERCISES: CPT

## 2023-10-27 PROCEDURE — 6370000000 HC RX 637 (ALT 250 FOR IP): Performed by: STUDENT IN AN ORGANIZED HEALTH CARE EDUCATION/TRAINING PROGRAM

## 2023-10-27 PROCEDURE — 6370000000 HC RX 637 (ALT 250 FOR IP)

## 2023-10-27 PROCEDURE — 99232 SBSQ HOSP IP/OBS MODERATE 35: CPT | Performed by: INTERNAL MEDICINE

## 2023-10-27 PROCEDURE — 36415 COLL VENOUS BLD VENIPUNCTURE: CPT

## 2023-10-27 PROCEDURE — 6360000002 HC RX W HCPCS: Performed by: INTERNAL MEDICINE

## 2023-10-27 PROCEDURE — 2700000000 HC OXYGEN THERAPY PER DAY

## 2023-10-27 PROCEDURE — 99232 SBSQ HOSP IP/OBS MODERATE 35: CPT | Performed by: FAMILY MEDICINE

## 2023-10-27 PROCEDURE — 6370000000 HC RX 637 (ALT 250 FOR IP): Performed by: FAMILY MEDICINE

## 2023-10-27 PROCEDURE — 80048 BASIC METABOLIC PNL TOTAL CA: CPT

## 2023-10-27 PROCEDURE — 94660 CPAP INITIATION&MGMT: CPT

## 2023-10-27 PROCEDURE — 2580000003 HC RX 258: Performed by: FAMILY MEDICINE

## 2023-10-27 RX ADMIN — ANTI-FUNGAL POWDER MICONAZOLE NITRATE TALC FREE: 1.42 POWDER TOPICAL at 20:54

## 2023-10-27 RX ADMIN — PANTOPRAZOLE SODIUM 40 MG: 40 TABLET, DELAYED RELEASE ORAL at 06:28

## 2023-10-27 RX ADMIN — NYSTATIN 500000 UNITS: 100000 SUSPENSION ORAL at 15:00

## 2023-10-27 RX ADMIN — Medication 60 MG: at 20:54

## 2023-10-27 RX ADMIN — BENZONATATE 200 MG: 200 CAPSULE ORAL at 20:54

## 2023-10-27 RX ADMIN — BENZONATATE 200 MG: 200 CAPSULE ORAL at 08:27

## 2023-10-27 RX ADMIN — SODIUM CHLORIDE, PRESERVATIVE FREE 10 ML: 5 INJECTION INTRAVENOUS at 08:28

## 2023-10-27 RX ADMIN — BENZONATATE 200 MG: 200 CAPSULE ORAL at 15:00

## 2023-10-27 RX ADMIN — NYSTATIN 500000 UNITS: 100000 SUSPENSION ORAL at 08:27

## 2023-10-27 RX ADMIN — Medication 60 MG: at 08:28

## 2023-10-27 RX ADMIN — BUMETANIDE 2 MG: 1 TABLET ORAL at 20:54

## 2023-10-27 RX ADMIN — NYSTATIN 500000 UNITS: 100000 SUSPENSION ORAL at 17:50

## 2023-10-27 RX ADMIN — ALLOPURINOL 50 MG: 100 TABLET ORAL at 08:27

## 2023-10-27 RX ADMIN — POTASSIUM CHLORIDE 20 MEQ: 10 CAPSULE, COATED, EXTENDED RELEASE ORAL at 08:27

## 2023-10-27 RX ADMIN — FLUTICASONE PROPIONATE 2 SPRAY: 50 SPRAY, METERED NASAL at 08:29

## 2023-10-27 RX ADMIN — POTASSIUM CHLORIDE 20 MEQ: 10 CAPSULE, COATED, EXTENDED RELEASE ORAL at 20:54

## 2023-10-27 RX ADMIN — EPOETIN ALFA-EPBX 3000 UNITS: 3000 INJECTION, SOLUTION INTRAVENOUS; SUBCUTANEOUS at 17:50

## 2023-10-27 RX ADMIN — PANTOPRAZOLE SODIUM 40 MG: 40 TABLET, DELAYED RELEASE ORAL at 17:50

## 2023-10-27 RX ADMIN — ROSUVASTATIN CALCIUM 20 MG: 20 TABLET, FILM COATED ORAL at 08:29

## 2023-10-27 RX ADMIN — SODIUM CHLORIDE, PRESERVATIVE FREE 10 ML: 5 INJECTION INTRAVENOUS at 20:55

## 2023-10-27 RX ADMIN — ANTI-FUNGAL POWDER MICONAZOLE NITRATE TALC FREE: 1.42 POWDER TOPICAL at 08:27

## 2023-10-27 RX ADMIN — NYSTATIN 500000 UNITS: 100000 SUSPENSION ORAL at 20:54

## 2023-10-27 RX ADMIN — BUMETANIDE 2 MG: 1 TABLET ORAL at 08:27

## 2023-10-27 ASSESSMENT — ENCOUNTER SYMPTOMS
COUGH: 1
ABDOMINAL PAIN: 0
DIARRHEA: 0
SHORTNESS OF BREATH: 0

## 2023-10-27 NOTE — CARE COORDINATION
Writer received call from Kevin Islas NP regarding pt need for bipap at facility. Chau Schwarz gave writer three possible bipap setting for pt to verify with [facility] if they could accomodate. Writer spoke to Chelsi with [facility] and gave bipap settings. Will give to LANDON for review. Electronically signed by JANE Chao on 10/27/2023 at 4:02 PM    Call received from Brigette Allen at [facility]. No AVAPS only standard bipaps. Writer placed perfectserve message to bedside RN regarding this.   Electronically signed by JANE Chao on 10/27/2023 at 4:36 PM

## 2023-10-27 NOTE — PROGRESS NOTES
[facility] Medicine      Progress Note      Date:   10/27/2023  Patient name:  Magdy Roth  Date of admission:  10/9/2023  9:43 AM  MRN:   [id number]  YOB: 1935        Brief HPI    Pt admitted for acute CHF, respiratory failure    ASSESSMENT/PLAN     Hospital Problems             Last Modified POA    * (Principal) Acute congestive heart failure, unspecified heart failure type ([address]) 10/9/2023 Yes    Lymphedema of both lower extremities 10/10/2023 Yes    Non-pressure chronic ulcer of right 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 ([address]) 10/10/2023 Yes    Non-pressure chronic ulcer of left 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 ([address]) 10/10/2023 Yes    Paroxysmal A-fib ([address]) 10/12/2023 Yes    Coronary artery disease involving native coronary artery of native heart without angina pectoris 10/10/2023 Yes    Adult BMI 40.0-44.9 kg/sq m ([address]) 10/10/2023 Yes    Mixed hyperlipidemia 10/10/2023 Yes    Secondary hypercoagulable state ([address]) 10/10/2023 Yes    Primary hypertension 10/10/2023 Yes    DONI (acute kidney injury) ([address]) 10/11/2023 Yes    Acute GI bleeding 10/16/2023 Yes    Hypokalemia 10/16/2023 Yes    Severe anemia 10/17/2023 Yes    Encounter for palliative care 10/20/2023 Yes    Goals of care, counseling/discussion 10/20/2023 Yes    Cellulitis of right lower extremity 10/21/2023 Yes    Rhinovirus infection 10/21/2023 Yes    Leukocytosis 10/21/2023 Yes    Hypotension 10/21/2023 Yes   Started oral nystatin and magic mouthwash for oral thrush  Hgb is stable, transfuse to keep hgb >7, continue protonix 40 BID, switch to oral  hold metoprolol unless tachycardic, consider resuming if BP stays elevated, hold eliquis  Continue BiPAP while asleep, pulm managing  Cefepime and linezolid completed, ID on board, cellulitis  resolving  Potassium being replaced. Renal function stable. Discharge plan is SNF, [facility] Puls unable to support needed BiPAP requirements. DNR-CCA   ADULT DIET; Regular;  No Added Salt

## 2023-10-27 NOTE — PROGRESS NOTES
[facility] -   Infectious diseases evaluation  admission date 10/9/2023    reason for consultation:   Right thigh cellulitis    Impression :   Current:  Right thigh cellulitis  Distal lower extremity venous stasis ulcers with surrounding cellulitis improving  Leukocytosis  Rhinovirus infection  Ileus   Bilateral lower extremity lymphedema  Anemia  CHF  Acute hypoxic respiratory failure   Acute on chronic renal failure  Coronary artery disease status post CABG  Obesity    Recommendations   Zyvox 600 mg IV q12 hours completed 10/21/23    Recommendations   Cefepime course completed  Monitor off antibiotics  Follow CBC renal function. Continue supportive care        Infection Control Recommendations   Elizabeth Precautions  Droplet isolation    Antimicrobial Stewardship Recommendations   Simplification of therapy  Targeted therapy    History of Present Illness:   Initial history:  Rachel Harmon is a 82y.o.-year-old male presented to the hospital on 10/9/23 with worsening shortness of breath associated with lower extremity edema for 1 day. -Symptoms moderate to severe, worse with exertion, no alleviating or aggravating factors. He had a chronic lower extremity lymphedema with venous stasis ulcers with surrounding redness. The patient was treated with diuretics for CHF. On 10/14/2023 he developed fever with a temperature max of 102.3. WBC increased to 18.3, procalcitonin level 0.55  Respiratory panel with COVID-19 PCR was positive for rhinovirus on 10/13/2023. Started on IV Zyvox and cefepime on 10/14/2023  UA 10/16 showed 3-5 WBC, negative nitrate, negative leukocyte esterase  Stool occult blood positive  Procalcitonin level 2.2 and lactic acid 2.7 on 10/16    Interval changes  10/27/2023   The patient was a seen and examined earlier today, afebrile, no new events. RUE PICC site clean.           Patient Vitals for the past 8 hrs:   BP Temp Temp src Pulse Resp SpO2

## 2023-10-27 NOTE — PROGRESS NOTES
10/27/23 1153   Encounter Summary   Encounter Overview/Reason  Palliative Care   Service Provided For: Patient   Referral/Consult From: Palliative Care   Last Encounter  10/27/23   Complexity of Encounter Low   Spiritual/Emotional needs   Type Spiritual Support   Assessment/Intervention/Outcome   Assessment Unable to assess  (Sleeping)   Intervention Prayer (assurance of)/[place]

## 2023-10-27 NOTE — PROGRESS NOTES
RN Pearla Cheadle agrees with all charting of GRISEL Aramndo on 10/26/2023 @ [phone] to 10/27/2023 @ 0730.

## 2023-10-27 NOTE — PROGRESS NOTES
Physical Therapy  Facility/Department: [facility] PROGRESSIVE CARE  Daily Treatment Note  NAME: Katerina Loja  : 1935  MRN: [id number]    Date of Service: 10/27/2023    Discharge Recommendations:  Patient would benefit from continued therapy after discharge, Continue to assess pending progress        Patient Diagnosis(es): The primary encounter diagnosis was Acute on chronic congestive heart failure, unspecified heart failure type ([address] Diagnoses of Acute congestive heart failure, unspecified heart failure type (HCC), SOB (shortness of breath), Paroxysmal A-fib ([address]), Lymphedema of both lower extremities, Localized edema, and Non-pressure chronic ulcer of right 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 ([address]) were also pertinent to this visit. Activity Tolerance: Patient limited by fatigue     Plan    Physcial Therapy Plan  General Plan: 5-7 times per week  Specific Instructions for Next Treatment: Mobilize as able  Current Treatment Recommendations: Balance training;Functional mobility training;Transfer training;Gait training;Equipment evaluation, education, & procurement; Therapeutic activities; Positioning; Safety education & training     Restrictions  Restrictions/Precautions  Restrictions/Precautions: Fall Risk, General Precautions, Bed Alarm, Other (comment)  Required Braces or Orthoses?: No  Implants present? : Metal implants, Pacemaker  Position Activity Restriction  Other position/activity restrictions: NIV, valadez, IV's, telemetry     Subjective    Pt on Bi pap upon entering room    Cognition  Overall Cognitive Status: Exceptions  Arousal/Alertness: Appropriate responses to stimuli  Following Commands:  Follows one step commands with repetition  Attention Span: Appears intact  Safety Judgement: Decreased awareness of need for safety  Problem Solving: Assistance required to generate solutions;Assistance required to identify errors made;Assistance required to implement solutions  Insights: Decreased awareness of deficits  Initiation: Requires cues for some  Sequencing: Requires cues for some     Objective       Bed Mobility Training: No, exercise only performed.     Transfer Training: No       PT Exercises  Exercise Treatment: (B) LE supine exercise x 10 reps Ap's, heelslides, Hip Abd MIN (B) SLR MOD A (B)           AM-PAC Basic Mobility - Inpatient   How much help is needed turning from your back to your side while in a flat bed without using bedrails?: Total  How much help is needed moving from lying on your back to sitting on the side of a flat bed without using bedrails?: Total  How much help is needed moving to and from a bed to a chair?: Total  How much help is needed standing up from a chair using your arms?: Total  How much help is needed walking in hospital room?: Total  How much help is needed climbing 3-5 steps with a railing?: Total  AM-PAC Inpatient Mobility Raw Score : 6  AM-PAC Inpatient T-Scale Score : 23.55  Mobility Inpatient CMS 0-100% Score: 100  Mobility Inpatient CMS G-Code Modifier : CN      Goals  Short Term Goals  Time Frame for Short Term Goals: 7 days  Short Term Goal 1: Pt will improve endurance to good to tolerate PT sessions x30 mins  Short Term Goal 2: Pt will improve sitting and standing balance to good to increase safety  Short Term Goal 3: Pt will perform bed mobility SBA with HOB elevated partially  (sleeps in lift chair recliner at home)  Short Term Goal 4: Pt will transfer with least restrictive device (RW vs cane) SBA  Short Term Goal 5: Pt will ambulate 50 ft with least restrictive device (RW vs cane) SBA    Education  Patient Education  Education Given To: Patient  Education Provided: Role of Therapy;Home Exercise Program  Education Method: Demonstration;Verbal  Barriers to Learning: Hearing  Education Outcome: Continued education needed    Therapy Time   Individual Concurrent Group Co-treatment   Time In 0950         Time Out 1006         Minutes [address],

## 2023-10-27 NOTE — CARE COORDINATION
Writer checked authorization status in [place]. Authorization approved 10/27/2023-10/31/2023.   Electronically signed by JANE Witt on 10/27/2023 at 9:47 AM

## 2023-10-27 NOTE — CARE COORDINATION
Writer placed perfectserve message to bedside RN Ochoa Kenmore regarding discharge plans. Pulmonary states pt needs BIPAP or AVAPS at facility for sleeping and naps. Writer placed call to admissions at [facility]. No answer, voicemial left. Electronically signed by JANE Chaparro on 10/27/2023 at 1:01 PM    Writer placed call back to [place]. Spoke to [place], [facility] authorization was approved. Approval faxed to [phone].   Electronically signed by JANE Chaparro on 10/27/2023 at 4:03 PM

## 2023-10-27 NOTE — PLAN OF CARE
Problem: Safety - Adult  Goal: Free from fall injury  10/27/2023 0311 by Ariana Domingo RN  Note: Pt assessed as a fall risk this shift. Remains free from falls and accidental injury at this time. Fall precautions in place, including falling star sign and fall risk band on pt. Bed is locked and in lowest position. Adequate lighting provided, personal alarm activated and attached to bed. Will continue to monitor needs during hourly rounding, and reinforce education on use of call light. Problem: Pain  Goal: Verbalizes/displays adequate comfort level or baseline comfort level  10/27/2023 0311 by Ariana Domingo RN  Note: No pain at this time. 0/10 pain scale.       Problem: Nutrition Deficit:  Goal: Optimize nutritional status  10/27/2023 0311 by Ariana Domingo RN  Flowsheets (Taken 10/27/2023 0632)  Nutrient intake appropriate for improving, restoring, or maintaining nutritional needs:   Monitor oral intake, labs, and treatment plans   Recommend appropriate diets, oral nutritional supplements, and vitamin/mineral supplements

## 2023-10-28 PROCEDURE — 6370000000 HC RX 637 (ALT 250 FOR IP): Performed by: STUDENT IN AN ORGANIZED HEALTH CARE EDUCATION/TRAINING PROGRAM

## 2023-10-28 PROCEDURE — 2060000000 HC ICU INTERMEDIATE R&B

## 2023-10-28 PROCEDURE — 6370000000 HC RX 637 (ALT 250 FOR IP): Performed by: INTERNAL MEDICINE

## 2023-10-28 PROCEDURE — 6370000000 HC RX 637 (ALT 250 FOR IP): Performed by: FAMILY MEDICINE

## 2023-10-28 PROCEDURE — 94761 N-INVAS EAR/PLS OXIMETRY MLT: CPT

## 2023-10-28 PROCEDURE — 99232 SBSQ HOSP IP/OBS MODERATE 35: CPT | Performed by: INTERNAL MEDICINE

## 2023-10-28 PROCEDURE — 6370000000 HC RX 637 (ALT 250 FOR IP)

## 2023-10-28 PROCEDURE — 2580000003 HC RX 258: Performed by: FAMILY MEDICINE

## 2023-10-28 PROCEDURE — 94660 CPAP INITIATION&MGMT: CPT

## 2023-10-28 RX ORDER — DEXTROMETHORPHAN POLISTIREX 30 MG/5ML
60 SUSPENSION ORAL EVERY 12 HOURS SCHEDULED
Qty: 200 ML | Refills: 0
Start: 2023-10-28 | End: 2023-11-07

## 2023-10-28 RX ORDER — PANTOPRAZOLE SODIUM 40 MG/1
40 TABLET, DELAYED RELEASE ORAL
Qty: 30 TABLET | Refills: 3
Start: 2023-10-28

## 2023-10-28 RX ORDER — MIDODRINE HYDROCHLORIDE 10 MG/1
10 TABLET ORAL 4 TIMES DAILY PRN
Qty: 1 TABLET | Refills: 0
Start: 2023-10-28

## 2023-10-28 RX ORDER — BENZONATATE 200 MG/1
200 CAPSULE ORAL 3 TIMES DAILY
Qty: 21 CAPSULE | Refills: 0
Start: 2023-10-28 | End: 2023-11-04

## 2023-10-28 RX ORDER — POLYETHYLENE GLYCOL 3350 17 G/17G
17 POWDER, FOR SOLUTION ORAL DAILY PRN
Qty: 30 PACKET | Refills: 0
Start: 2023-10-28 | End: 2023-11-27

## 2023-10-28 RX ADMIN — NYSTATIN 500000 UNITS: 100000 SUSPENSION ORAL at 12:21

## 2023-10-28 RX ADMIN — SODIUM CHLORIDE, PRESERVATIVE FREE 10 ML: 5 INJECTION INTRAVENOUS at 19:34

## 2023-10-28 RX ADMIN — ANTI-FUNGAL POWDER MICONAZOLE NITRATE TALC FREE: 1.42 POWDER TOPICAL at 08:06

## 2023-10-28 RX ADMIN — Medication 60 MG: at 08:05

## 2023-10-28 RX ADMIN — FLUTICASONE PROPIONATE 2 SPRAY: 50 SPRAY, METERED NASAL at 08:05

## 2023-10-28 RX ADMIN — POTASSIUM CHLORIDE 20 MEQ: 10 CAPSULE, COATED, EXTENDED RELEASE ORAL at 08:05

## 2023-10-28 RX ADMIN — ROSUVASTATIN CALCIUM 20 MG: 20 TABLET, FILM COATED ORAL at 08:05

## 2023-10-28 RX ADMIN — ALLOPURINOL 50 MG: 100 TABLET ORAL at 08:05

## 2023-10-28 RX ADMIN — POTASSIUM CHLORIDE 20 MEQ: 10 CAPSULE, COATED, EXTENDED RELEASE ORAL at 19:33

## 2023-10-28 RX ADMIN — BUMETANIDE 2 MG: 1 TABLET ORAL at 19:33

## 2023-10-28 RX ADMIN — SODIUM CHLORIDE, PRESERVATIVE FREE 10 ML: 5 INJECTION INTRAVENOUS at 08:06

## 2023-10-28 RX ADMIN — NYSTATIN 500000 UNITS: 100000 SUSPENSION ORAL at 08:05

## 2023-10-28 RX ADMIN — NYSTATIN 500000 UNITS: 100000 SUSPENSION ORAL at 19:34

## 2023-10-28 RX ADMIN — BENZONATATE 200 MG: 200 CAPSULE ORAL at 19:34

## 2023-10-28 RX ADMIN — Medication 60 MG: at 19:34

## 2023-10-28 RX ADMIN — BENZONATATE 200 MG: 200 CAPSULE ORAL at 08:05

## 2023-10-28 RX ADMIN — ANTI-FUNGAL POWDER MICONAZOLE NITRATE TALC FREE: 1.42 POWDER TOPICAL at 19:34

## 2023-10-28 RX ADMIN — BUMETANIDE 2 MG: 1 TABLET ORAL at 08:05

## 2023-10-28 RX ADMIN — BENZONATATE 200 MG: 200 CAPSULE ORAL at 12:21

## 2023-10-28 RX ADMIN — PANTOPRAZOLE SODIUM 40 MG: 40 TABLET, DELAYED RELEASE ORAL at 08:05

## 2023-10-28 NOTE — CARE COORDINATION
ONGOING DISCHARGE PLAN:    Patient is alert and oriented x4. Spoke with patient regarding discharge plan and patient confirms that plan is still to discharge to [facility]. This writer explained to the patient that we are awaiting a return call from [facility] admissions regarding BiPAP capabilities. Per LSW note they do not have AVAPS, only standard BiPAP. Per Nilesh Dickinson NP, if there is a backup rate we can send. Voicemail left with admissions at [facility], awaiting return call. Bedside RN, Gloria Room, updated. Will continue to follow for additional discharge needs. If patient is discharged prior to next notation, then this note serves as note for discharge by case management. Electronically signed by Cam Pisano RN on 10/28/2023 at 10:28 AM    ADDENDUM:    Second voicemail left for [facility] regarding BiPAP capabilities. Awaiting return call.     Electronically signed by Cam Pisano RN on 10/28/2023 at 2:15 PM

## 2023-10-28 NOTE — PROGRESS NOTES
tablet 2.5 mg, 2.5 mg, Oral, BID, Nic Watkins MD, 2.5 mg at 10/16/23 2041    rosuvastatin (CRESTOR) tablet 20 mg, 20 mg, Oral, Daily, Nic Watkins MD, 20 mg at 10/28/23 0805    fluticasone (FLONASE) 50 MCG/ACT nasal spray 2 spray, 2 spray, Each Nostril, Daily, Nic Watkins MD, 2 spray at 10/28/23 0805    sodium chloride flush 0.9 % injection 5-40 mL, 5-40 mL, IntraVENous, 2 times per day, Nic Watkins MD, 10 mL at 10/28/23 0806    sodium chloride flush 0.9 % injection 10 mL, 10 mL, IntraVENous, PRN, Nic Watkins MD, 10 mL at 10/21/23 2031    0.9 % sodium chloride infusion, , IntraVENous, PRN, Nic Watkins MD, Stopped at 10/14/23 1456    ondansetron (ZOFRAN-ODT) disintegrating tablet 4 mg, 4 mg, Oral, Q8H PRN **OR** ondansetron (ZOFRAN) injection 4 mg, 4 mg, IntraVENous, Q6H PRN, Nic Watkins MD, 4 mg at 10/25/23 1205    polyethylene glycol (GLYCOLAX) packet 17 g, 17 g, Oral, Daily PRN, Nic Watkins MD    acetaminophen (TYLENOL) tablet 650 mg, 650 mg, Oral, Q6H PRN, 650 mg at 10/15/23 0830 **OR** acetaminophen (TYLENOL) suppository 650 mg, 650 mg, Rectal, Q6H PRN, Nic Watkins MD    potassium chloride (KLOR-CON M) extended release tablet 40 mEq, 40 mEq, Oral, PRN, 40 mEq at 10/19/23 0621 **OR** potassium bicarb-citric acid (EFFER-K) effervescent tablet 40 mEq, 40 mEq, Oral, PRN, 40 mEq at 10/20/23 0944 **OR** potassium chloride 10 mEq/100 mL IVPB (Peripheral Line), 10 mEq, IntraVENous, PRN, Nic Watkins MD, Stopped at 10/17/23 1444    magnesium sulfate 2000 mg in water 50 mL IVPB, 2,000 mg, IntraVENous, PRN, Nic Watkins MD    Lab Results:     Lab Results   Component Value Date    WBC 7.6 10/25/2023    HGB 7.8 (L) 10/25/2023    HCT 26.2 (L) 10/25/2023    MCV 75.9 (L) 10/25/2023     10/25/2023     Lab Results   Component Value Date    CALCIUM 8.4 (L) 10/27/2023     10/27/2023    K 3.7 10/27/2023    CO2 30 10/27/2023     10/27/2023    BUN 35 (H) 10/27/2023    CREATININE 1.6 (H) 10/27/2023       Lab Results   Component Value Date    INR 1.0 10/09/2023    PROTIME 13.9 10/09/2023       Radiology:     No chest films to day to review  ASSESSMENT:       Acute chronic hypoxic  and hypercapnic respiratory failure  Septic shock-resolved  Rhinovirus infection  Right thigh cellulitis  Acute kidney injury with hypokalemia  Acute on chronic heart failure with preserved ejection fraction  Chronic lymphedema  SONU-clinical diagnosis  Suspect OHS as well given elevated CO2, doubt COPD because he quit smoking over 50 years ago  Coronary disease status post CABG 2020 with AICD placement  Morbid obesity  Anemia most likely chronic disease  DNR CCA no intubation  PLAN:   He has been doing well with AVAPS device, no objection to BiPAP with backup rate upon discharge to extended-care facility  Continue with diuretics upon discharge  Completed antibiotics  Aggressive PT/OT  aWaiting decision regarding BiPAP device for [facility]  Discussed with , as long as they have a backup rate continues BiPAP  No objection to discharge once arrangements are made for his BiPAP  New BiPAP orders placed for discharge  Eventual follow-up in our office in 4 to 6 weeks, once discharged from facility      Electronically signed by JUAN FRANCISCO Dahl CNP on 10/28/23     This progress note was completed using a voice transcription system. Every effort was made to ensure accuracy. However, inadvertent computerized transcription errors may be present.     MARCELLO DIAL-BC, NP-C  [facility] Pulmonary, Critical Care & Sleep

## 2023-10-28 NOTE — PROGRESS NOTES
10/28/23 1519   Encounter Summary   Encounter Overview/Reason  Spiritual/Emotional Needs   Service Provided For: Patient and family together   Referral/Consult From: Palliative Care   Last Encounter  10/28/23   Complexity of Encounter Low   Spiritual/Emotional needs   Type Spiritual Support   Palliative Care   Type Palliative Care, Follow-up   Assessment/Intervention/Outcome   Assessment Calm;Coping   Intervention Active listening;Explored/Affirmed feelings, thoughts, concerns;Prayer (assurance of)/[place];Sustaining Presence/Ministry of presence   Outcome Comfort;Coping;Engaged in conversation;Receptive

## 2023-10-28 NOTE — PROGRESS NOTES
Past Surgical History:   Procedure Laterality Date    CARDIAC SURGERY      bypass    JOINT REPLACEMENT      Left hip    PACEMAKER PLACEMENT Right 2020    and defib       Medications:      bumetanide  2 mg Oral BID    allopurinol  50 mg Oral Daily    benzonatate  200 mg Oral TID    dextromethorphan  60 mg Oral 2 times per day    nystatin  5 mL Oral 4x Daily    pantoprazole  40 mg Oral BID AC    epoetin azeb-epbx  3,000 Units SubCUTAneous Once per day on     potassium chloride  20 mEq Oral BID    miconazole   Topical BID    [Held by provider] apixaban  2.5 mg Oral BID    rosuvastatin  20 mg Oral Daily    fluticasone  2 spray Each Nostril Daily    sodium chloride flush  5-40 mL IntraVENous 2 times per day       Social History:     Social History     Socioeconomic History    Marital status: Single     Spouse name: Not on file    Number of children: 3    Years of education: Not on file    Highest education level: Not on file   Occupational History    Not on file   Tobacco Use    Smoking status: Former     Types: Cigarettes     Quit date:      Years since quittin.8     Passive exposure: Past    Smokeless tobacco: Never   Vaping Use    Vaping Use: Never used   Substance and Sexual Activity    Alcohol use: Not Currently    Drug use: Never    Sexual activity: Not Currently   Other Topics Concern    Not on file   Social History Narrative    Not on file     Social Determinants of Health     Financial Resource Strain: Low Risk  (2023)    Overall Financial Resource Strain (CARDIA)     Difficulty of Paying Living Expenses: Not hard at all   Food Insecurity: No Food Insecurity (2023)    Hunger Vital Sign     Worried About Running Out of Food in the Last Year: Never true     [address] in the Last Year: Never true   Transportation Needs: Unknown (2023)    PRAPARE - Transportation     Lack of Transportation (Medical): Not on file     Lack of Transportation (Non-Medical):  No   Physical Activity: Inactive (5/10/2023)    Exercise Vital Sign     Days of Exercise per Week: 0 days     Minutes of Exercise per Session: 0 min   Stress: Not on file   Social Connections: Not on file   Intimate Partner Violence: Not on file   Housing Stability: Unknown (2/2/2023)    Housing Stability Vital Sign     Unable to Pay for Housing in the Last Year: Not on file     Number of [address] in the Last Year: Not on file     Unstable Housing in the Last Year: No       Family History:   History reviewed. No pertinent family history. Medical Decision Making:   I have independently reviewed/ordered the following labs:    CBC with Differential:   No results for input(s): \"WBC\", \"HGB\", \"HCT\", \"PLT\", \"SEGSPCT\", \"BANDSPCT\", \"LYMPHOPCT\", \"MONOPCT\", \"EOSPCT\" in the last 72 hours. BMP:  Recent Labs     10/27/23  0542      K 3.7      CO2 30   BUN 35*   CREATININE 1.6*       Hepatic Function Panel:   No results for input(s): \"PROT\", \"LABALBU\", \"BILIDIR\", \"IBILI\", \"BILITOT\", \"ALKPHOS\", \"ALT\", \"AST\" in the last 72 hours. No results for input(s): \"RPR\" in the last 72 hours. No results for input(s): \"HIV\" in the last 72 hours. No results for input(s): \"BC\" in the last 72 hours. Lab Results   Component Value Date/Time    CREATININE 1.6 10/27/2023 05:42 AM    GLUCOSE 100 10/27/2023 05:42 AM       Detailed results: Thank you for allowing us to participate in the care of this patient. Please call with questions. This note is created with the assistance of a speech recognition program.  While intending to generate adocument that actually reflects the content of the visit, the document can still have some errors including those of syntax and sound a like substitutions which may escape proof reading. It such instances, actual meaningcan be extrapolated by contextual diversion.     Basia De La Cruz MD  Office: [phone]  Perfect serve / office [phone]

## 2023-10-29 LAB
ANION GAP SERPL CALCULATED.3IONS-SCNC: 10 MMOL/L (ref 9–17)
BASOPHILS # BLD: 0.06 K/UL (ref 0–0.2)
BASOPHILS NFR BLD: 1 % (ref 0–2)
BUN SERPL-MCNC: 27 MG/DL (ref 8–23)
CALCIUM SERPL-MCNC: 8.3 MG/DL (ref 8.6–10.4)
CHLORIDE SERPL-SCNC: 105 MMOL/L (ref 98–107)
CO2 SERPL-SCNC: 27 MMOL/L (ref 20–31)
CREAT SERPL-MCNC: 1.3 MG/DL (ref 0.7–1.2)
EOSINOPHIL # BLD: 0.18 K/UL (ref 0–0.4)
EOSINOPHILS RELATIVE PERCENT: 3 % (ref 0–4)
ERYTHROCYTE [DISTWIDTH] IN BLOOD BY AUTOMATED COUNT: 23.8 % (ref 11.5–14.9)
GFR SERPL CREATININE-BSD FRML MDRD: 54 ML/MIN/1.73M2
GLUCOSE SERPL-MCNC: 97 MG/DL (ref 70–99)
HCT VFR BLD AUTO: 28 % (ref 41–53)
HGB BLD-MCNC: 8.3 G/DL (ref 13.5–17.5)
LYMPHOCYTES NFR BLD: 0.83 K/UL (ref 1–4.8)
LYMPHOCYTES RELATIVE PERCENT: 14 % (ref 24–44)
MCH RBC QN AUTO: 23.2 PG (ref 26–34)
MCHC RBC AUTO-ENTMCNC: 29.6 G/DL (ref 31–37)
MCV RBC AUTO: 78.4 FL (ref 80–100)
MONOCYTES NFR BLD: 0.41 K/UL (ref 0.1–1.3)
MONOCYTES NFR BLD: 7 % (ref 1–7)
MORPHOLOGY: ABNORMAL
NEUTROPHILS NFR BLD: 75 % (ref 36–66)
NEUTS SEG NFR BLD: 4.42 K/UL (ref 1.3–9.1)
PLATELET # BLD AUTO: 184 K/UL (ref 150–450)
PMV BLD AUTO: 6.7 FL (ref 6–12)
POTASSIUM SERPL-SCNC: 3.1 MMOL/L (ref 3.7–5.3)
RBC # BLD AUTO: 3.57 M/UL (ref 4.5–5.9)
SODIUM SERPL-SCNC: 142 MMOL/L (ref 135–144)
WBC OTHER # BLD: 5.9 K/UL (ref 3.5–11)

## 2023-10-29 PROCEDURE — 6370000000 HC RX 637 (ALT 250 FOR IP): Performed by: FAMILY MEDICINE

## 2023-10-29 PROCEDURE — 6370000000 HC RX 637 (ALT 250 FOR IP)

## 2023-10-29 PROCEDURE — 6370000000 HC RX 637 (ALT 250 FOR IP): Performed by: INTERNAL MEDICINE

## 2023-10-29 PROCEDURE — 2700000000 HC OXYGEN THERAPY PER DAY

## 2023-10-29 PROCEDURE — 2580000003 HC RX 258: Performed by: FAMILY MEDICINE

## 2023-10-29 PROCEDURE — 85025 COMPLETE CBC W/AUTO DIFF WBC: CPT

## 2023-10-29 PROCEDURE — 36415 COLL VENOUS BLD VENIPUNCTURE: CPT

## 2023-10-29 PROCEDURE — 2060000000 HC ICU INTERMEDIATE R&B

## 2023-10-29 PROCEDURE — 80048 BASIC METABOLIC PNL TOTAL CA: CPT

## 2023-10-29 PROCEDURE — 6370000000 HC RX 637 (ALT 250 FOR IP): Performed by: STUDENT IN AN ORGANIZED HEALTH CARE EDUCATION/TRAINING PROGRAM

## 2023-10-29 PROCEDURE — 94761 N-INVAS EAR/PLS OXIMETRY MLT: CPT

## 2023-10-29 PROCEDURE — 99232 SBSQ HOSP IP/OBS MODERATE 35: CPT | Performed by: INTERNAL MEDICINE

## 2023-10-29 RX ADMIN — NYSTATIN 500000 UNITS: 100000 SUSPENSION ORAL at 12:38

## 2023-10-29 RX ADMIN — Medication 60 MG: at 09:51

## 2023-10-29 RX ADMIN — NYSTATIN 500000 UNITS: 100000 SUSPENSION ORAL at 21:13

## 2023-10-29 RX ADMIN — NYSTATIN 500000 UNITS: 100000 SUSPENSION ORAL at 09:53

## 2023-10-29 RX ADMIN — FLUTICASONE PROPIONATE 2 SPRAY: 50 SPRAY, METERED NASAL at 09:54

## 2023-10-29 RX ADMIN — POTASSIUM CHLORIDE 20 MEQ: 10 CAPSULE, COATED, EXTENDED RELEASE ORAL at 21:13

## 2023-10-29 RX ADMIN — BUMETANIDE 2 MG: 1 TABLET ORAL at 09:53

## 2023-10-29 RX ADMIN — BUMETANIDE 2 MG: 1 TABLET ORAL at 21:13

## 2023-10-29 RX ADMIN — BENZONATATE 200 MG: 200 CAPSULE ORAL at 12:40

## 2023-10-29 RX ADMIN — ANTI-FUNGAL POWDER MICONAZOLE NITRATE TALC FREE: 1.42 POWDER TOPICAL at 21:13

## 2023-10-29 RX ADMIN — POTASSIUM CHLORIDE 20 MEQ: 10 CAPSULE, COATED, EXTENDED RELEASE ORAL at 09:53

## 2023-10-29 RX ADMIN — BENZONATATE 200 MG: 200 CAPSULE ORAL at 21:13

## 2023-10-29 RX ADMIN — POTASSIUM CHLORIDE 40 MEQ: 1500 TABLET, EXTENDED RELEASE ORAL at 09:53

## 2023-10-29 RX ADMIN — Medication 60 MG: at 21:13

## 2023-10-29 RX ADMIN — PANTOPRAZOLE SODIUM 40 MG: 40 TABLET, DELAYED RELEASE ORAL at 09:56

## 2023-10-29 RX ADMIN — SODIUM CHLORIDE, PRESERVATIVE FREE 10 ML: 5 INJECTION INTRAVENOUS at 21:14

## 2023-10-29 RX ADMIN — SODIUM CHLORIDE, PRESERVATIVE FREE 10 ML: 5 INJECTION INTRAVENOUS at 09:54

## 2023-10-29 RX ADMIN — ANTI-FUNGAL POWDER MICONAZOLE NITRATE TALC FREE: 1.42 POWDER TOPICAL at 09:53

## 2023-10-29 RX ADMIN — NYSTATIN 500000 UNITS: 100000 SUSPENSION ORAL at 18:10

## 2023-10-29 RX ADMIN — ALLOPURINOL 50 MG: 100 TABLET ORAL at 09:52

## 2023-10-29 RX ADMIN — BENZONATATE 200 MG: 200 CAPSULE ORAL at 09:53

## 2023-10-29 RX ADMIN — PANTOPRAZOLE SODIUM 40 MG: 40 TABLET, DELAYED RELEASE ORAL at 18:10

## 2023-10-29 RX ADMIN — ROSUVASTATIN CALCIUM 20 MG: 20 TABLET, FILM COATED ORAL at 09:56

## 2023-10-29 ASSESSMENT — PAIN SCALES - GENERAL: PAINLEVEL_OUTOF10: 7

## 2023-10-29 ASSESSMENT — PAIN DESCRIPTION - ORIENTATION: ORIENTATION: MID

## 2023-10-29 ASSESSMENT — PAIN DESCRIPTION - LOCATION: LOCATION: BACK

## 2023-10-29 NOTE — PROGRESS NOTES
Awaiting response from [facility] about BiPAP    Patients states no questions or concerns    Supa Dickens MD, [address]

## 2023-10-29 NOTE — PLAN OF CARE
Problem: Safety - Adult  Goal: Free from fall injury  10/29/2023 0308 by Catherine Mathis, GRISEL  Note: Pt assessed as a fall risk this shift. Remains free from falls and accidental injury at this time. Fall precautions in place, including falling star sign and fall risk band on pt. Floor free from obstacles, and bed is locked and in lowest position with personal alarm activated. Adequate lighting provided. Pt encouraged to call for any needs, as well as notifying the patient that someone will be around every hour to check on him. Problem: Skin/Tissue Integrity  Goal: Absence of new skin breakdown  Description: 1. Monitor for areas of redness and/or skin breakdown  2. Assess vascular access sites hourly  3. Every 4-6 hours minimum:  Change oxygen saturation probe site  4. Every 4-6 hours:  If on nasal continuous positive airway pressure, respiratory therapy assess nares and determine need for appliance change or resting period. 10/29/2023 0308 by Catherine Mathis RN  Note: Skin assessment complete. Coccyx reddened. Triad and micotin applied PRN. Turned and repositioned every two hours. Area kept free from moisture. Proper nourishment and fluids encouraged, as appropriate.

## 2023-10-29 NOTE — PROGRESS NOTES
10/29/23  0546   WBC 5.9   HGB 8.3*        BMP:    Recent Labs     10/27/23  0542 10/29/23  0546    142   K 3.7 3.1*    105   CO2 30 27   BUN 35* 27*   CREATININE 1.6* 1.3*   GLUCOSE 100* 97       Lab Results   Component Value Date/Time    NITRU NEGATIVE 10/16/2023 02:15 PM    COLORU Yellow 10/16/2023 02:15 PM    PHUR 6.5 10/16/2023 02:15 PM    WBCUA 3 to 5 10/16/2023 02:15 PM    RBCUA 6 TO 9 10/16/2023 02:15 PM    BACTERIA None 10/16/2023 02:15 PM    SPECGRAV 1.014 10/16/2023 02:15 PM    LEUKOCYTESUR TRACE 10/16/2023 02:15 PM    UROBILINOGEN Normal 10/16/2023 02:15 PM    BILIRUBINUR NEGATIVE 10/16/2023 02:15 PM    GLUCOSEU NEGATIVE 10/16/2023 02:15 PM    KETUA NEGATIVE 10/16/2023 02:15 PM     Urine Sodium:     Lab Results   Component Value Date/Time    SHANIA 78 04/05/2022 05:34 PM     Urine Creatinine:     Lab Results   Component Value Date/Time    LABCREA 37.1 04/05/2022 05:34 PM     IMPRESSION/RECOMMENDATIONS:      1. Acute kidney injury with underlying chronic kidney disease stage IIIb - consistent with prerenal azotemia/cardiorenal syndrome from diuretic use. Renal function remains stable and improving with serum creatinine 1.3 mg/dL. 2.  Acute decompensated systolic heart failure - on bumex. 3.  Hypotension -   Continue as needed midodrine. 4.  Hypokalemia - secondary to loop diuretic-sliding scale potassium coverage    5. Microcytic anemia - consistent with iron deficiency with iron saturation 6%. Continue loading dose of IV iron. Hemoglobin  8.3   g/dL. Plan:   Continue oral Bumex 2 mg bid   Continue potassium chloride 20 mEq twice daily. Sliding scale coverage  Strict urine output documentation.   Basic metabolic profile AM      Prognosis is guarded

## 2023-10-29 NOTE — PROGRESS NOTES
Past Surgical History:   Procedure Laterality Date    CARDIAC SURGERY      bypass    JOINT REPLACEMENT      Left hip    PACEMAKER PLACEMENT Right 2020    and defib       Medications:      bumetanide  2 mg Oral BID    allopurinol  50 mg Oral Daily    benzonatate  200 mg Oral TID    dextromethorphan  60 mg Oral 2 times per day    nystatin  5 mL Oral 4x Daily    pantoprazole  40 mg Oral BID AC    epoetin azeb-epbx  3,000 Units SubCUTAneous Once per day on     potassium chloride  20 mEq Oral BID    miconazole   Topical BID    [Held by provider] apixaban  2.5 mg Oral BID    rosuvastatin  20 mg Oral Daily    fluticasone  2 spray Each Nostril Daily    sodium chloride flush  5-40 mL IntraVENous 2 times per day       Social History:     Social History     Socioeconomic History    Marital status: Single     Spouse name: Not on file    Number of children: 3    Years of education: Not on file    Highest education level: Not on file   Occupational History    Not on file   Tobacco Use    Smoking status: Former     Types: Cigarettes     Quit date:      Years since quittin.8     Passive exposure: Past    Smokeless tobacco: Never   Vaping Use    Vaping Use: Never used   Substance and Sexual Activity    Alcohol use: Not Currently    Drug use: Never    Sexual activity: Not Currently   Other Topics Concern    Not on file   Social History Narrative    Not on file     Social Determinants of Health     Financial Resource Strain: Low Risk  (2023)    Overall Financial Resource Strain (CARDIA)     Difficulty of Paying Living Expenses: Not hard at all   Food Insecurity: No Food Insecurity (2023)    Hunger Vital Sign     Worried About Running Out of Food in the Last Year: Never true     [address] in the Last Year: Never true   Transportation Needs: Unknown (2023)    PRAPARE - Transportation     Lack of Transportation (Medical): Not on file     Lack of Transportation (Non-Medical):  No   Physical Activity: Inactive (5/10/2023)    Exercise Vital Sign     Days of Exercise per Week: 0 days     Minutes of Exercise per Session: 0 min   Stress: Not on file   Social Connections: Not on file   Intimate Partner Violence: Not on file   Housing Stability: Unknown (2/2/2023)    Housing Stability Vital Sign     Unable to Pay for Housing in the Last Year: Not on file     Number of [address] in the Last Year: Not on file     Unstable Housing in the Last Year: No       Family History:   History reviewed. No pertinent family history. Medical Decision Making:   I have independently reviewed/ordered the following labs:    CBC with Differential:   Recent Labs     10/29/23  0546   WBC 5.9   HGB 8.3*   HCT 28.0*      LYMPHOPCT 14*   MONOPCT 7       BMP:  Recent Labs     10/27/23  0542 10/29/23  0546    142   K 3.7 3.1*    105   CO2 30 27   BUN 35* 27*   CREATININE 1.6* 1.3*       Hepatic Function Panel:   No results for input(s): \"PROT\", \"LABALBU\", \"BILIDIR\", \"IBILI\", \"BILITOT\", \"ALKPHOS\", \"ALT\", \"AST\" in the last 72 hours. No results for input(s): \"RPR\" in the last 72 hours. No results for input(s): \"HIV\" in the last 72 hours. No results for input(s): \"BC\" in the last 72 hours. Lab Results   Component Value Date/Time    CREATININE 1.3 10/29/2023 05:46 AM    GLUCOSE 97 10/29/2023 05:46 AM       Detailed results: Thank you for allowing us to participate in the care of this patient. Please call with questions. This note is created with the assistance of a speech recognition program.  While intending to generate adocument that actually reflects the content of the visit, the document can still have some errors including those of syntax and sound a like substitutions which may escape proof reading. It such instances, actual meaningcan be extrapolated by contextual diversion.     Drew Dove MD  Office: [phone]  Perfect serve / office [phone]

## 2023-10-29 NOTE — CARE COORDINATION
ONGOING DISCHARGE PLANNING NOTE:    Writer reviewed LSW notes, and discharge plan is [place] depending on their bipap capabilities.     Electronically signed by Mt Parker RN on 10/29/2023 at 4:04 PM

## 2023-10-29 NOTE — PROGRESS NOTES
GRISEL Romero agrees with all charting of GRISEL Hong on 10/28/2023 @ [phone] to 10/29/2023 @ 0730.

## 2023-10-29 NOTE — PLAN OF CARE
Problem: Discharge Planning  Goal: Discharge to home or other facility with appropriate resources  Outcome: Progressing     Problem: Safety - Adult  Goal: Free from fall injury  10/29/2023 1552 by Barbara Kidd RN  Outcome: Progressing    Problem: Skin/Tissue Integrity  Goal: Absence of new skin breakdown  Description: 1. Monitor for areas of redness and/or skin breakdown  2. Assess vascular access sites hourly  3. Every 4-6 hours minimum:  Change oxygen saturation probe site  4. Every 4-6 hours:  If on nasal continuous positive airway pressure, respiratory therapy assess nares and determine need for appliance change or resting period.   10/29/2023 1552 by Barbara Kidd RN  Outcome: Progressing     Problem: ABCDS Injury Assessment  Goal: Absence of physical injury  Outcome: Progressing     Problem: Chronic Conditions and Co-morbidities  Goal: Patient's chronic conditions and co-morbidity symptoms are monitored and maintained or improved  Outcome: Progressing     Problem: Pain  Goal: Verbalizes/displays adequate comfort level or baseline comfort level  Outcome: Progressing     Problem: Nutrition Deficit:  Goal: Optimize nutritional status  Outcome: Progressing

## 2023-10-29 NOTE — PROGRESS NOTES
Perfect serve message sent to Dr. Nick Heller regarding patients potassium 3.1 and if she wants 40mEq + his normal 20mEq.

## 2023-10-29 NOTE — PROGRESS NOTES
Eden Richardson MD  Patient:  Tanner Every   YOB: 1950  MRN:  [id number]  Location: [facility]    2095-01  10/29/23 6:40 PM  [phone] From: Cally Rasmussen [facility] PROG CARE RE: [address] [place] [address] consult    Aldo Negron

## 2023-10-30 LAB
ANION GAP SERPL CALCULATED.3IONS-SCNC: 5 MMOL/L (ref 9–17)
BUN SERPL-MCNC: 24 MG/DL (ref 8–23)
CALCIUM SERPL-MCNC: 8 MG/DL (ref 8.6–10.4)
CHLORIDE SERPL-SCNC: 102 MMOL/L (ref 98–107)
CO2 SERPL-SCNC: 34 MMOL/L (ref 20–31)
CREAT SERPL-MCNC: 1.4 MG/DL (ref 0.7–1.2)
GFR SERPL CREATININE-BSD FRML MDRD: 49 ML/MIN/1.73M2
GLUCOSE SERPL-MCNC: 108 MG/DL (ref 70–99)
MAGNESIUM SERPL-MCNC: 1.6 MG/DL (ref 1.6–2.6)
POTASSIUM SERPL-SCNC: 3.2 MMOL/L (ref 3.7–5.3)
POTASSIUM SERPL-SCNC: 3.9 MMOL/L (ref 3.7–5.3)
SODIUM SERPL-SCNC: 141 MMOL/L (ref 135–144)

## 2023-10-30 PROCEDURE — 99232 SBSQ HOSP IP/OBS MODERATE 35: CPT | Performed by: INTERNAL MEDICINE

## 2023-10-30 PROCEDURE — 94660 CPAP INITIATION&MGMT: CPT

## 2023-10-30 PROCEDURE — 36415 COLL VENOUS BLD VENIPUNCTURE: CPT

## 2023-10-30 PROCEDURE — 6370000000 HC RX 637 (ALT 250 FOR IP)

## 2023-10-30 PROCEDURE — 2700000000 HC OXYGEN THERAPY PER DAY

## 2023-10-30 PROCEDURE — 6370000000 HC RX 637 (ALT 250 FOR IP): Performed by: INTERNAL MEDICINE

## 2023-10-30 PROCEDURE — 83880 ASSAY OF NATRIURETIC PEPTIDE: CPT

## 2023-10-30 PROCEDURE — 84132 ASSAY OF SERUM POTASSIUM: CPT

## 2023-10-30 PROCEDURE — 94761 N-INVAS EAR/PLS OXIMETRY MLT: CPT

## 2023-10-30 PROCEDURE — 2580000003 HC RX 258: Performed by: FAMILY MEDICINE

## 2023-10-30 PROCEDURE — 6360000002 HC RX W HCPCS: Performed by: INTERNAL MEDICINE

## 2023-10-30 PROCEDURE — 99232 SBSQ HOSP IP/OBS MODERATE 35: CPT | Performed by: STUDENT IN AN ORGANIZED HEALTH CARE EDUCATION/TRAINING PROGRAM

## 2023-10-30 PROCEDURE — 80048 BASIC METABOLIC PNL TOTAL CA: CPT

## 2023-10-30 PROCEDURE — 6370000000 HC RX 637 (ALT 250 FOR IP): Performed by: STUDENT IN AN ORGANIZED HEALTH CARE EDUCATION/TRAINING PROGRAM

## 2023-10-30 PROCEDURE — 2060000000 HC ICU INTERMEDIATE R&B

## 2023-10-30 PROCEDURE — 83735 ASSAY OF MAGNESIUM: CPT

## 2023-10-30 PROCEDURE — 6370000000 HC RX 637 (ALT 250 FOR IP): Performed by: FAMILY MEDICINE

## 2023-10-30 RX ADMIN — BUMETANIDE 2 MG: 1 TABLET ORAL at 20:55

## 2023-10-30 RX ADMIN — ANTI-FUNGAL POWDER MICONAZOLE NITRATE TALC FREE: 1.42 POWDER TOPICAL at 08:46

## 2023-10-30 RX ADMIN — ACETAMINOPHEN 650 MG: 325 TABLET ORAL at 00:40

## 2023-10-30 RX ADMIN — ROSUVASTATIN CALCIUM 20 MG: 20 TABLET, FILM COATED ORAL at 08:45

## 2023-10-30 RX ADMIN — BENZONATATE 200 MG: 200 CAPSULE ORAL at 13:52

## 2023-10-30 RX ADMIN — NYSTATIN 500000 UNITS: 100000 SUSPENSION ORAL at 17:55

## 2023-10-30 RX ADMIN — NYSTATIN 500000 UNITS: 100000 SUSPENSION ORAL at 08:45

## 2023-10-30 RX ADMIN — PANTOPRAZOLE SODIUM 40 MG: 40 TABLET, DELAYED RELEASE ORAL at 06:07

## 2023-10-30 RX ADMIN — ALLOPURINOL 50 MG: 100 TABLET ORAL at 08:46

## 2023-10-30 RX ADMIN — BUMETANIDE 2 MG: 1 TABLET ORAL at 08:46

## 2023-10-30 RX ADMIN — Medication 60 MG: at 08:47

## 2023-10-30 RX ADMIN — NYSTATIN 500000 UNITS: 100000 SUSPENSION ORAL at 20:56

## 2023-10-30 RX ADMIN — PANTOPRAZOLE SODIUM 40 MG: 40 TABLET, DELAYED RELEASE ORAL at 17:55

## 2023-10-30 RX ADMIN — SODIUM CHLORIDE, PRESERVATIVE FREE 10 ML: 5 INJECTION INTRAVENOUS at 08:47

## 2023-10-30 RX ADMIN — FLUTICASONE PROPIONATE 2 SPRAY: 50 SPRAY, METERED NASAL at 08:47

## 2023-10-30 RX ADMIN — ANTI-FUNGAL POWDER MICONAZOLE NITRATE TALC FREE: 1.42 POWDER TOPICAL at 20:58

## 2023-10-30 RX ADMIN — POTASSIUM CHLORIDE 20 MEQ: 10 CAPSULE, COATED, EXTENDED RELEASE ORAL at 08:46

## 2023-10-30 RX ADMIN — Medication 60 MG: at 20:56

## 2023-10-30 RX ADMIN — NYSTATIN 500000 UNITS: 100000 SUSPENSION ORAL at 13:51

## 2023-10-30 RX ADMIN — EPOETIN ALFA-EPBX 3000 UNITS: 3000 INJECTION, SOLUTION INTRAVENOUS; SUBCUTANEOUS at 17:56

## 2023-10-30 RX ADMIN — SODIUM CHLORIDE, PRESERVATIVE FREE 10 ML: 5 INJECTION INTRAVENOUS at 21:17

## 2023-10-30 RX ADMIN — BENZONATATE 200 MG: 200 CAPSULE ORAL at 08:45

## 2023-10-30 RX ADMIN — POTASSIUM CHLORIDE 20 MEQ: 10 CAPSULE, COATED, EXTENDED RELEASE ORAL at 20:55

## 2023-10-30 RX ADMIN — BENZONATATE 200 MG: 200 CAPSULE ORAL at 20:55

## 2023-10-30 ASSESSMENT — PAIN DESCRIPTION - ORIENTATION
ORIENTATION: MID
ORIENTATION: MID

## 2023-10-30 ASSESSMENT — PAIN SCALES - GENERAL
PAINLEVEL_OUTOF10: 8
PAINLEVEL_OUTOF10: 5

## 2023-10-30 ASSESSMENT — PAIN DESCRIPTION - LOCATION
LOCATION: BACK
LOCATION: BACK

## 2023-10-30 NOTE — PROGRESS NOTES
Chief Complaint(s) and History of Present Illness(es)     Amblyopia Follow Up     Laterality: left eye    Treatments tried: patching and glasses    Compliance with Treatment: always    Comments: WGFT, patching RE 2 hours daily - good compliance, no VA changes noticed, no strab, no AHP noticed, no squinting or holding objects closely                  [facility] -   Infectious diseases evaluation  admission date 10/9/2023    reason for consultation:   Right thigh cellulitis    Impression :   Current:  Right thigh cellulitis resolving  Distal lower extremity venous stasis ulcers with surrounding cellulitis resolved  Rhinovirus infection  Bilateral lower extremity lymphedema  Anemia  CHF  Acute hypoxic respiratory failure   Acute on chronic renal failure  Coronary artery disease status post CABG  Obesity    Recommendations   Zyvox 600 mg IV q12 hours completed 10/21/23    Recommendations   Cefepime course completed 10/25/2023  Monitor off antibiotics  Follow CBC renal function. Continue supportive care  Plan [facility] discharge to [facility]. Infection Control Recommendations   [place] Precautions  Droplet isolation    Antimicrobial Stewardship Recommendations   Simplification of therapy  Targeted therapy    History of Present Illness:   Initial history:  Osbaldo Dumont is a 82y.o.-year-old male presented to the hospital on 10/9/23 with worsening shortness of breath associated with lower extremity edema for 1 day. -Symptoms moderate to severe, worse with exertion, no alleviating or aggravating factors. He had a chronic lower extremity lymphedema with venous stasis ulcers with surrounding redness. The patient was treated with diuretics for CHF. On 10/14/2023 he developed fever with a temperature max of 102.3. WBC increased to 18.3, procalcitonin level 0.55  Respiratory panel with COVID-19 PCR was positive for rhinovirus on 10/13/2023. Started on IV Zyvox and cefepime on 10/14/2023  UA 10/16 showed 3-5 WBC, negative nitrate, negative leukocyte esterase  Stool occult blood positive  Procalcitonin level 2.2 and lactic acid 2.7 on 10/16    Interval changes  10/30/2023   The patient was a seen and examined . He remains afebrile, sleeping, on BiPAP  RUE PICC site clean.           Patient Vitals for the past 8 Unknown (2/2/2023)    Housing Stability Vital Sign     Unable to Pay for Housing in the Last Year: Not on file     Number of Places Lived in the Last Year: Not on file     Unstable Housing in the Last Year: No       Family History:   History reviewed. No pertinent family history. Medical Decision Making:   I have independently reviewed/ordered the following labs:    CBC with Differential:   Recent Labs     10/29/23  0546   WBC 5.9   HGB 8.3*   HCT 28.0*      LYMPHOPCT 14*   MONOPCT 7       BMP:  Recent Labs     10/29/23  0546 10/30/23  0838     --    K 3.1* 3.9     --    CO2 27  --    BUN 27*  --    CREATININE 1.3*  --        Hepatic Function Panel:   No results for input(s): \"PROT\", \"LABALBU\", \"BILIDIR\", \"IBILI\", \"BILITOT\", \"ALKPHOS\", \"ALT\", \"AST\" in the last 72 hours. No results for input(s): \"RPR\" in the last 72 hours. No results for input(s): \"HIV\" in the last 72 hours. No results for input(s): \"BC\" in the last 72 hours. Lab Results   Component Value Date/Time    CREATININE 1.3 10/29/2023 05:46 AM    GLUCOSE 97 10/29/2023 05:46 AM       Detailed results: Thank you for allowing us to participate in the care of this patient. Please call with questions. This note is created with the assistance of a speech recognition program.  While intending to generate adocument that actually reflects the content of the visit, the document can still have some errors including those of syntax and sound a like substitutions which may escape proof reading. It such instances, actual meaningcan be extrapolated by contextual diversion.     Laura Fuentes MD  Office: [phone]  Perfect serve / office [phone]

## 2023-10-30 NOTE — PROGRESS NOTES
Comprehensive Nutrition Assessment    Type and Reason for Visit:  Reassess    Nutrition Recommendations/Plan:   Continue current diet and supplements. Malnutrition Assessment:  Malnutrition Status:  No malnutrition (10/18/23 1639)    Context:  Acute Illness     Findings of the 6 clinical characteristics of malnutrition:  Energy Intake:  No significant decrease in energy intake  Weight Loss:  No significant weight loss     Body Fat Loss:  No significant body fat loss     Muscle Mass Loss:  No significant muscle mass loss    Fluid Accumulation:  Moderate to Severe Extremities    Nutrition Assessment:    Patient with loss of appetite today, refused breakfast this morning. Consumed 50-75% of meals yesterday. Possible disvharge to [place] later today. Nutrition Related Findings:    Pitting sacral edema, +2 RLE, +1 generalized, BUE, LLE. Hypoactive bowel sounds. Loss of appetite. Labs and meds reviewed. Noted wide weight variation it's likely scale or weighing process differences. Wound Type: Skin Tears, Venous Stasis       Current Nutrition Intake & Therapies:    Average Meal Intake: 0%  Average Supplements Intake: NPO  ADULT DIET; Regular; No Added Salt (3-4 gm)  ADULT ORAL NUTRITION SUPPLEMENT; Dinner, Lunch, Breakfast; Standard High Calorie/High Protein Oral Supplement    Anthropometric Measures:  Height: 185.4 cm (6' 1\")  Ideal Body Weight (IBW): 184 lbs (84 kg)    Admission Body Weight: 142 kg (313 lb)  Current Body Weight: 181.8 kg (400 lb 12.7 oz) (wide weight discrepencies. noted),   IBW.  Weight Source: Bed Scale  Current BMI (kg/m2): 52.9                          BMI Categories: Obese Class 3 (BMI 40.0 or greater)    Estimated Daily Nutrient Needs:  Energy Requirements Based On: Kcal/kg  Weight Used for Energy Requirements: Admission  Energy (kcal/day): 0035-9615 kcals based on 13-14 kcals/kg  Weight Used for Protein Requirements: Ideal  Protein (g/day): 109-117 gm protein based 1.3-1.4 gm/kg

## 2023-10-30 NOTE — CARE COORDINATION
Writer placed call to Sisseb at [facility] to discuss bipap accommodations for pt. Sisseb states they can accommodate a straight bipap for pt at [facility]. Writer placed perfectserve message to bedside RN Kobe Carrillo regarding this. Electronically signed by JANE Witt on 10/30/2023 at 11:07 AM    Writer place call back to Sisseb confirming per pulmonary, no objection to bipap with bakcup rate upon discharge. Yesenia agreeable. If pt admits it needs to be before 1600 so respiratory is present. Writer placed perfectserve message to Kobe Carrillo regarding this, will see if pt is able to discharge today. Electronically signed by JANE Witt on 10/30/2023 at 11:12 AM    Writer received perfecterve message from Kobe Carrillo that [facility] must guarantee that the bipap has a backup rate or pt will readmit to hospital.    Writer spoke to Deandra at [facility] regarding this. Will place call back to writer. Electronically signed by JANE Witt on 10/30/2023 at 1:28 PM    Writer received call from RANDY head of respiratory at [facility]. Writer explained the need to ensure bipap has back up rate for this pt. RANDY states she will have to order a bipap. Writer will fax bipap settings to admissions to confirm. Electronically signed by JANE Witt on 10/30/2023 at 1:42 PM    Writer received call back from Deandra with [facility]. Upon further review by respiratory, facility is denying this pt. Facility believes pt needs an LTACH setting to maintain respiratory status until he is not using bipap so frequently. Writer placed call to Levorichae Situ with [facility] requesting she review this pt. Electronically signed by JANE Witt on 10/30/2023 at 2:13 PM    Writer received message from Pawel Situ that pt meets criteria for [facility]. Writer placed call to pt daughter Peggyann Balloon regarding this. Peggyann Balloon is agreeable to pt going to [facility].    Writer placed call back to Latasha Nicole to confirm this. No answer, voicemail left. Electronically signed by JANE Siddiqi on 10/30/2023 at 2:41 PM    Writer received call back from Latasha Nicole. Will start authorization.   Electronically signed by JANE Siddiqi on 10/30/2023 at 3:13 PM

## 2023-10-30 NOTE — PROGRESS NOTES
10/30/23 1242   Encounter Summary   Encounter Overview/Reason  Attempted Encounter   Service Provided For: Patient not available  (pt getting treatment)

## 2023-10-30 NOTE — PLAN OF CARE
Problem: Safety - Adult  Goal: Free from fall injury  10/30/2023 0309 by Henrik Wills RN  Outcome: Progressing  Note: Pt assessed as a fall risk this shift. Remains free from falls and accidental injury at this time. Fall precautions in place, including falling star sign and fall risk band on pt. Floor free from obstacles, and bed is locked and in lowest position. Adequate lighting provided. Pt encouraged to call before getting OOB for any need. Bed alarm activated. Will continue to monitor needs during hourly rounding, and reinforce education on use of call light. Problem: Skin/Tissue Integrity  Goal: Absence of new skin breakdown  Description: 1. Monitor for areas of redness and/or skin breakdown  2. Assess vascular access sites hourly  3. Every 4-6 hours minimum:  Change oxygen saturation probe site  4. Every 4-6 hours:  If on nasal continuous positive airway pressure, respiratory therapy assess nares and determine need for appliance change or resting period. 10/30/2023 0309 by Henrik Wills RN  Outcome: Progressing  Note: Skin assessment complete. Bariatric Alternating air loss and low pressure mattress in place. Coccyx reddened. Sensicare applied PRN. Turned and repositioned every two hours. Area kept free from moisture. Proper nourishment and fluids encouraged, as appropriate. Will continue to monitor for additional needs and changes in skin breakdown.        Problem: Chronic Conditions and Co-morbidities  Goal: Patient's chronic conditions and co-morbidity symptoms are monitored and maintained or improved  10/30/2023 0309 by Henrik Wills RN  Outcome: Progressing  Note:   Patient's Chronic Conditions and Co-Morbidity symptoms are monitored and maintained or improved; monitor and assess patient's chronic conditions and comorbid symptoms for stability, deterioration, or improvement; Collaborate with multidisciplinary team to address chronic and comorbid conditions and prevent exacerbation or deterioration. Problem: Pain  Goal: Verbalizes/displays adequate comfort level or baseline comfort level  10/30/2023 0309 by Stevan Rivera RN  Outcome: Progressing  Note: Pt medicated with pain medication prn. Assessed all pain characteristics including level, type, location, frequency, and onset. Non-pharmacologic interventions offered to pt as well. Pt states pain is tolerable at this time.

## 2023-10-31 ENCOUNTER — CLINICAL DOCUMENTATION (OUTPATIENT)
Dept: OCCUPATIONAL THERAPY | Age: 88
End: 2023-10-31

## 2023-10-31 PROBLEM — J96.10 CHRONIC RESPIRATORY FAILURE (HCC): Status: ACTIVE | Noted: 2023-10-31

## 2023-10-31 LAB
ANION GAP SERPL CALCULATED.3IONS-SCNC: 6 MMOL/L (ref 9–17)
BNP SERPL-MCNC: 3952 PG/ML
BUN SERPL-MCNC: 24 MG/DL (ref 8–23)
CALCIUM SERPL-MCNC: 8 MG/DL (ref 8.6–10.4)
CHLORIDE SERPL-SCNC: 106 MMOL/L (ref 98–107)
CO2 SERPL-SCNC: 30 MMOL/L (ref 20–31)
CREAT SERPL-MCNC: 1.4 MG/DL (ref 0.7–1.2)
GFR SERPL CREATININE-BSD FRML MDRD: 49 ML/MIN/1.73M2
GLUCOSE SERPL-MCNC: 148 MG/DL (ref 70–99)
POTASSIUM SERPL-SCNC: 3.6 MMOL/L (ref 3.7–5.3)
SODIUM SERPL-SCNC: 142 MMOL/L (ref 135–144)

## 2023-10-31 PROCEDURE — 2060000000 HC ICU INTERMEDIATE R&B

## 2023-10-31 PROCEDURE — 6370000000 HC RX 637 (ALT 250 FOR IP): Performed by: INTERNAL MEDICINE

## 2023-10-31 PROCEDURE — 99231 SBSQ HOSP IP/OBS SF/LOW 25: CPT | Performed by: INTERNAL MEDICINE

## 2023-10-31 PROCEDURE — 36415 COLL VENOUS BLD VENIPUNCTURE: CPT

## 2023-10-31 PROCEDURE — 99232 SBSQ HOSP IP/OBS MODERATE 35: CPT | Performed by: STUDENT IN AN ORGANIZED HEALTH CARE EDUCATION/TRAINING PROGRAM

## 2023-10-31 PROCEDURE — 6370000000 HC RX 637 (ALT 250 FOR IP): Performed by: FAMILY MEDICINE

## 2023-10-31 PROCEDURE — 94660 CPAP INITIATION&MGMT: CPT

## 2023-10-31 PROCEDURE — 80048 BASIC METABOLIC PNL TOTAL CA: CPT

## 2023-10-31 PROCEDURE — 2700000000 HC OXYGEN THERAPY PER DAY

## 2023-10-31 PROCEDURE — 2500000003 HC RX 250 WO HCPCS: Performed by: STUDENT IN AN ORGANIZED HEALTH CARE EDUCATION/TRAINING PROGRAM

## 2023-10-31 PROCEDURE — 6370000000 HC RX 637 (ALT 250 FOR IP): Performed by: STUDENT IN AN ORGANIZED HEALTH CARE EDUCATION/TRAINING PROGRAM

## 2023-10-31 PROCEDURE — 6370000000 HC RX 637 (ALT 250 FOR IP)

## 2023-10-31 PROCEDURE — 94761 N-INVAS EAR/PLS OXIMETRY MLT: CPT

## 2023-10-31 PROCEDURE — 2580000003 HC RX 258: Performed by: FAMILY MEDICINE

## 2023-10-31 RX ORDER — MAGNESIUM SULFATE HEPTAHYDRATE 40 MG/ML
2000 INJECTION, SOLUTION INTRAVENOUS ONCE
Status: COMPLETED | OUTPATIENT
Start: 2023-10-31 | End: 2023-10-31

## 2023-10-31 RX ADMIN — MAGNESIUM SULFATE HEPTAHYDRATE 2000 MG: 40 INJECTION, SOLUTION INTRAVENOUS at 11:40

## 2023-10-31 RX ADMIN — NYSTATIN 500000 UNITS: 100000 SUSPENSION ORAL at 08:39

## 2023-10-31 RX ADMIN — PANTOPRAZOLE SODIUM 40 MG: 40 TABLET, DELAYED RELEASE ORAL at 05:13

## 2023-10-31 RX ADMIN — BENZONATATE 200 MG: 200 CAPSULE ORAL at 21:20

## 2023-10-31 RX ADMIN — BENZONATATE 200 MG: 200 CAPSULE ORAL at 08:37

## 2023-10-31 RX ADMIN — BUMETANIDE 2 MG: 1 TABLET ORAL at 08:37

## 2023-10-31 RX ADMIN — BUMETANIDE 2 MG: 1 TABLET ORAL at 21:20

## 2023-10-31 RX ADMIN — Medication 60 MG: at 21:20

## 2023-10-31 RX ADMIN — NYSTATIN 500000 UNITS: 100000 SUSPENSION ORAL at 17:41

## 2023-10-31 RX ADMIN — NYSTATIN 500000 UNITS: 100000 SUSPENSION ORAL at 14:02

## 2023-10-31 RX ADMIN — METOPROLOL TARTRATE 12.5 MG: 25 TABLET, FILM COATED ORAL at 21:20

## 2023-10-31 RX ADMIN — ANTI-FUNGAL POWDER MICONAZOLE NITRATE TALC FREE: 1.42 POWDER TOPICAL at 21:21

## 2023-10-31 RX ADMIN — ROSUVASTATIN CALCIUM 20 MG: 20 TABLET, FILM COATED ORAL at 08:50

## 2023-10-31 RX ADMIN — PANTOPRAZOLE SODIUM 40 MG: 40 TABLET, DELAYED RELEASE ORAL at 17:41

## 2023-10-31 RX ADMIN — Medication 60 MG: at 08:39

## 2023-10-31 RX ADMIN — SODIUM CHLORIDE, PRESERVATIVE FREE 10 ML: 5 INJECTION INTRAVENOUS at 08:50

## 2023-10-31 RX ADMIN — ALLOPURINOL 50 MG: 100 TABLET ORAL at 08:37

## 2023-10-31 RX ADMIN — BENZONATATE 200 MG: 200 CAPSULE ORAL at 14:02

## 2023-10-31 RX ADMIN — APIXABAN 2.5 MG: 2.5 TABLET, FILM COATED ORAL at 21:20

## 2023-10-31 RX ADMIN — NYSTATIN 500000 UNITS: 100000 SUSPENSION ORAL at 21:20

## 2023-10-31 RX ADMIN — SODIUM CHLORIDE, PRESERVATIVE FREE 10 ML: 5 INJECTION INTRAVENOUS at 21:21

## 2023-10-31 RX ADMIN — FLUTICASONE PROPIONATE 2 SPRAY: 50 SPRAY, METERED NASAL at 08:49

## 2023-10-31 RX ADMIN — POTASSIUM CHLORIDE 20 MEQ: 10 CAPSULE, COATED, EXTENDED RELEASE ORAL at 08:36

## 2023-10-31 RX ADMIN — POTASSIUM CHLORIDE 20 MEQ: 10 CAPSULE, COATED, EXTENDED RELEASE ORAL at 21:20

## 2023-10-31 RX ADMIN — ANTI-FUNGAL POWDER MICONAZOLE NITRATE TALC FREE: 1.42 POWDER TOPICAL at 08:51

## 2023-10-31 RX ADMIN — POTASSIUM CHLORIDE 40 MEQ: 1500 TABLET, EXTENDED RELEASE ORAL at 00:15

## 2023-10-31 ASSESSMENT — PAIN SCALES - GENERAL: PAINLEVEL_OUTOF10: 2

## 2023-10-31 ASSESSMENT — PAIN DESCRIPTION - PAIN TYPE: TYPE: CHRONIC PAIN

## 2023-10-31 ASSESSMENT — ENCOUNTER SYMPTOMS
DIARRHEA: 0
ABDOMINAL PAIN: 0

## 2023-10-31 ASSESSMENT — PAIN DESCRIPTION - LOCATION: LOCATION: BUTTOCKS;BACK

## 2023-10-31 NOTE — DISCHARGE SUMMARY
TREATMENT LOCATION:   [][address]  Outpatient Rehabilitation &  Therapy  [address]  P: [phone]  F: [phone] [x][facility]  Outpatient Rehabilitation &  Therapy  [address]  P: [phone]  F: [phone] [][facility] [address]  P: [phone]  F: [phone]     Lymphedema Therapy Discharge Note    Date: 10/31/2023      Patient: Prudencio James  : 1935  MRN: [id number]    Referring Provider: JUAN FRANCISCO Painting*       Phone: [phone]             Fax: [phone]  Insurance: [employer] (ID:I [id number] B LE) - [insurance restrictions/#of visits/etc here Auth required after eval and $40.00 copay]  Medical Diagnosis: I89.0  Rehab Codes: I89.0,    Onset Date: 2023      Total visits attended: 1        Cancels/No shows: 1  Date of initial visit: 2023                Date of final visit: 2023    Overview: Patient is a 82year old male referred to the Lymphedema Clinic with a diagnosis of bilateral Lower Extremity Lymphedema. He was recently discharged from [facility] with [address] [facility] HD B LE wraps. He wore them for a week and when removed his skin was compromised. This date he has odor, weeping, wounds, dried lymphorrhea, scabs to B LE. He was advised to return to Wound Care then back to lymphedema clinic. Present with supportive daughter Dominick Person. Subjective:  Refer to initial evaluation/ treatment notes. Objective:  Refer to initial evaluation/treatment notes. Assessment:  Refer to initial evaluation/ treatment notes. Treatment to Date:  [] Therapeutic Exercise           [x] Instruction in Home Program                       [] Manual Therapy  [x] Self-Care/Home Management  [] Vasopneumatic Pump             [] Other:    Discharge Status:   [] Treatment goals were met.   [] Pt

## 2023-10-31 NOTE — PROGRESS NOTES
10/31/23 1924   Encounter Summary   Encounter Overview/Reason  Spiritual/Emotional Needs   Service Provided For: Patient   Referral/Consult From: Palliative Care   Last Encounter  10/31/23   Complexity of Encounter Low   Spiritual/Emotional needs   Type Spiritual Support   Palliative Care   Type Palliative Care, Follow-up   Assessment/Intervention/Outcome   Assessment Unable to assess  (patient sleeping)   Intervention Prayer (assurance of)/[place]

## 2023-10-31 NOTE — PROGRESS NOTES
Dr. Yusef Tyson notified via perfect serve of 5 beat run of vta, no labs drawn today. MD put in own orders. 2230: potassium 3.2. Replaced with 40mEq PO.

## 2023-10-31 NOTE — PROGRESS NOTES
Bipap on standby at this time. Pulse Ox-- 100% 4lpm  PT WORE LAST NIGHT. Skin score-- 0    Nasal gel pad available at bedside. Pt awake/alert/no distress noted.

## 2023-10-31 NOTE — PLAN OF CARE
Problem: Discharge Planning  Goal: Discharge to home or other facility with appropriate resources  10/31/2023 0210 by Janay Mccracken RN  Outcome: Progressing     Problem: Safety - Adult  Goal: Free from fall injury  10/31/2023 0210 by Janay Mccracken RN  Outcome: Progressing     Problem: Skin/Tissue Integrity  Goal: Absence of new skin breakdown  Description: 1. Monitor for areas of redness and/or skin breakdown  2. Assess vascular access sites hourly  3. Every 4-6 hours minimum:  Change oxygen saturation probe site  4. Every 4-6 hours:  If on nasal continuous positive airway pressure, respiratory therapy assess nares and determine need for appliance change or resting period.   10/31/2023 0210 by Janay Mccracken RN  Outcome: Progressing     Problem: ABCDS Injury Assessment  Goal: Absence of physical injury  10/31/2023 0210 by Janay Mccracken RN  Outcome: Progressing     Problem: Chronic Conditions and Co-morbidities  Goal: Patient's chronic conditions and co-morbidity symptoms are monitored and maintained or improved  10/31/2023 0210 by Janay Mccracken RN  Outcome: Progressing     Problem: Nutrition Deficit:  Goal: Optimize nutritional status  10/31/2023 0210 by Janay Mccracken RN  Outcome: Progressing

## 2023-10-31 NOTE — PROGRESS NOTES
DATE: 10/31/2023    NAME: Sarita Wharton  MRN: [id number]   : 1935    Patient not seen this date for Physical Therapy due to:      [] Cancel by RN or physician due to:    [] Hemodialysis    [] Critical Lab Value Level     [] Blood transfusion in progress    [] Acute or unstable cardiovascular status   _MAP < 55 or more than >115  _HR < 40 or > 130    [] Acute or unstable pulmonary status   -FiO2 > 60%   _RR < 5 or >40    _O2 sats < 85%    [] Strict Bedrest    [] Off Unit for surgery or procedure    [] Off Unit for testing       [] Pending imaging to R/O fracture    [] Refusal by Patient      [x] Other: deferred treatment, pt lying in bed with Bi-pap on, not able to keep eyes open with conversation, will continue to pursue when more appropriate. [] PT being discontinued at this time. Patient independent. No further needs. [] PT being discontinued at this time as the patient has been transferred to hospice care. No further needs.       Sandie Garsia, PTA

## 2023-10-31 NOTE — FLOWSHEET NOTE
Dr Vince Yi perfect serve messaged: may we draw off picc for lab draws from now on. I noticed he was getting poked instead of utilizing picc for draws.

## 2023-10-31 NOTE — PROGRESS NOTES
[facility] -   Infectious diseases evaluation  admission date 10/9/2023    reason for consultation:   Right thigh cellulitis    Impression :   Current:  Right thigh cellulitis resolving  Distal lower extremity venous stasis ulcers with surrounding cellulitis resolved  Rhinovirus infection  Bilateral lower extremity lymphedema  Anemia  CHF  Acute hypoxic respiratory failure   Acute on chronic renal failure  Coronary artery disease status post CABG  Obesity    Recommendations   Zyvox 600 mg IV q12 hours completed 10/21/23    Recommendations   Cefepime course completed 10/25/2023  Monitor off antibiotics  Follow CBC renal function. Continue supportive care  Plan for discharge to [facility]. Remove PICC line         Infection Control Recommendations   [place] Precautions  Droplet isolation    Antimicrobial Stewardship Recommendations   Simplification of therapy  Targeted therapy    History of Present Illness:   Initial history:  Bryon Lesches is a 82y.o.-year-old male presented to the hospital on 10/9/23 with worsening shortness of breath associated with lower extremity edema for 1 day. -Symptoms moderate to severe, worse with exertion, no alleviating or aggravating factors. He had a chronic lower extremity lymphedema with venous stasis ulcers with surrounding redness. The patient was treated with diuretics for CHF. On 10/14/2023 he developed fever with a temperature max of 102.3. WBC increased to 18.3, procalcitonin level 0.55  Respiratory panel with COVID-19 PCR was positive for rhinovirus on 10/13/2023. Started on IV Zyvox and cefepime on 10/14/2023  UA 10/16 showed 3-5 WBC, negative nitrate, negative leukocyte esterase  Stool occult blood positive  Procalcitonin level 2.2 and lactic acid 2.7 on 10/16    Interval changes  10/31/2023   The patient was a seen and examined .    He was seen and examined earlier today, remains afebrile, on BiPAP,no new events,  RUE

## 2023-10-31 NOTE — PROGRESS NOTES
10/31/23 1040   Encounter Summary   Encounter Overview/Reason  Attempted Encounter   Service Provided For: Patient not available  (With nursing staff)   Referral/Consult From: Palliative Care   Last Encounter  10/31/23   Complexity of Encounter Low   Spiritual/Emotional needs   Type Spiritual Support   Palliative Care   Type Palliative Care, Follow-up   Assessment/Intervention/Outcome   Assessment Unable to assess   Intervention Prayer (assurance of)/[place]

## 2023-11-01 ENCOUNTER — APPOINTMENT (OUTPATIENT)
Dept: GENERAL RADIOLOGY | Age: 88
End: 2023-11-01
Payer: MEDICARE

## 2023-11-01 LAB
ANION GAP SERPL CALCULATED.3IONS-SCNC: 3 MMOL/L (ref 9–17)
BUN SERPL-MCNC: 22 MG/DL (ref 8–23)
CALCIUM SERPL-MCNC: 8 MG/DL (ref 8.6–10.4)
CHLORIDE SERPL-SCNC: 105 MMOL/L (ref 98–107)
CO2 SERPL-SCNC: 36 MMOL/L (ref 20–31)
CREAT SERPL-MCNC: 1.3 MG/DL (ref 0.7–1.2)
ERYTHROCYTE [DISTWIDTH] IN BLOOD BY AUTOMATED COUNT: 29.1 % (ref 11.5–14.9)
GFR SERPL CREATININE-BSD FRML MDRD: 54 ML/MIN/1.73M2
GLUCOSE SERPL-MCNC: 115 MG/DL (ref 70–99)
HCT VFR BLD AUTO: 26.7 % (ref 41–53)
HGB BLD-MCNC: 8 G/DL (ref 13.5–17.5)
MCH RBC QN AUTO: 23.6 PG (ref 26–34)
MCHC RBC AUTO-ENTMCNC: 30 G/DL (ref 31–37)
MCV RBC AUTO: 78.7 FL (ref 80–100)
PLATELET # BLD AUTO: 129 K/UL (ref 150–450)
PMV BLD AUTO: 6.8 FL (ref 6–12)
POTASSIUM SERPL-SCNC: 3.3 MMOL/L (ref 3.7–5.3)
POTASSIUM SERPL-SCNC: 3.5 MMOL/L (ref 3.7–5.3)
RBC # BLD AUTO: 3.4 M/UL (ref 4.5–5.9)
SODIUM SERPL-SCNC: 144 MMOL/L (ref 135–144)
WBC OTHER # BLD: 3.9 K/UL (ref 3.5–11)

## 2023-11-01 PROCEDURE — 6370000000 HC RX 637 (ALT 250 FOR IP): Performed by: STUDENT IN AN ORGANIZED HEALTH CARE EDUCATION/TRAINING PROGRAM

## 2023-11-01 PROCEDURE — 6370000000 HC RX 637 (ALT 250 FOR IP)

## 2023-11-01 PROCEDURE — 99232 SBSQ HOSP IP/OBS MODERATE 35: CPT | Performed by: INTERNAL MEDICINE

## 2023-11-01 PROCEDURE — 85027 COMPLETE CBC AUTOMATED: CPT

## 2023-11-01 PROCEDURE — 6370000000 HC RX 637 (ALT 250 FOR IP): Performed by: INTERNAL MEDICINE

## 2023-11-01 PROCEDURE — 2060000000 HC ICU INTERMEDIATE R&B

## 2023-11-01 PROCEDURE — 2580000003 HC RX 258: Performed by: FAMILY MEDICINE

## 2023-11-01 PROCEDURE — 6370000000 HC RX 637 (ALT 250 FOR IP): Performed by: FAMILY MEDICINE

## 2023-11-01 PROCEDURE — 94660 CPAP INITIATION&MGMT: CPT

## 2023-11-01 PROCEDURE — 84132 ASSAY OF SERUM POTASSIUM: CPT

## 2023-11-01 PROCEDURE — 36415 COLL VENOUS BLD VENIPUNCTURE: CPT

## 2023-11-01 PROCEDURE — 71046 X-RAY EXAM CHEST 2 VIEWS: CPT

## 2023-11-01 PROCEDURE — 80048 BASIC METABOLIC PNL TOTAL CA: CPT

## 2023-11-01 PROCEDURE — 94761 N-INVAS EAR/PLS OXIMETRY MLT: CPT

## 2023-11-01 PROCEDURE — 99232 SBSQ HOSP IP/OBS MODERATE 35: CPT | Performed by: STUDENT IN AN ORGANIZED HEALTH CARE EDUCATION/TRAINING PROGRAM

## 2023-11-01 PROCEDURE — 2700000000 HC OXYGEN THERAPY PER DAY

## 2023-11-01 PROCEDURE — 6360000002 HC RX W HCPCS: Performed by: INTERNAL MEDICINE

## 2023-11-01 RX ADMIN — APIXABAN 2.5 MG: 2.5 TABLET, FILM COATED ORAL at 08:18

## 2023-11-01 RX ADMIN — METOPROLOL TARTRATE 12.5 MG: 25 TABLET, FILM COATED ORAL at 08:18

## 2023-11-01 RX ADMIN — Medication 60 MG: at 08:18

## 2023-11-01 RX ADMIN — POTASSIUM CHLORIDE 20 MEQ: 10 CAPSULE, COATED, EXTENDED RELEASE ORAL at 22:03

## 2023-11-01 RX ADMIN — EPOETIN ALFA-EPBX 3000 UNITS: 3000 INJECTION, SOLUTION INTRAVENOUS; SUBCUTANEOUS at 18:00

## 2023-11-01 RX ADMIN — POTASSIUM CHLORIDE 20 MEQ: 10 CAPSULE, COATED, EXTENDED RELEASE ORAL at 08:18

## 2023-11-01 RX ADMIN — SODIUM CHLORIDE, PRESERVATIVE FREE 10 ML: 5 INJECTION INTRAVENOUS at 08:21

## 2023-11-01 RX ADMIN — SODIUM CHLORIDE, PRESERVATIVE FREE 10 ML: 5 INJECTION INTRAVENOUS at 22:04

## 2023-11-01 RX ADMIN — NYSTATIN 500000 UNITS: 100000 SUSPENSION ORAL at 14:10

## 2023-11-01 RX ADMIN — NYSTATIN 500000 UNITS: 100000 SUSPENSION ORAL at 08:18

## 2023-11-01 RX ADMIN — ROSUVASTATIN CALCIUM 20 MG: 20 TABLET, FILM COATED ORAL at 08:18

## 2023-11-01 RX ADMIN — PANTOPRAZOLE SODIUM 40 MG: 40 TABLET, DELAYED RELEASE ORAL at 18:00

## 2023-11-01 RX ADMIN — BUMETANIDE 2 MG: 1 TABLET ORAL at 08:17

## 2023-11-01 RX ADMIN — FLUTICASONE PROPIONATE 2 SPRAY: 50 SPRAY, METERED NASAL at 08:18

## 2023-11-01 RX ADMIN — BENZONATATE 200 MG: 200 CAPSULE ORAL at 22:04

## 2023-11-01 RX ADMIN — BENZONATATE 200 MG: 200 CAPSULE ORAL at 14:10

## 2023-11-01 RX ADMIN — PANTOPRAZOLE SODIUM 40 MG: 40 TABLET, DELAYED RELEASE ORAL at 05:38

## 2023-11-01 RX ADMIN — ALLOPURINOL 50 MG: 100 TABLET ORAL at 08:18

## 2023-11-01 RX ADMIN — ANTI-FUNGAL POWDER MICONAZOLE NITRATE TALC FREE: 1.42 POWDER TOPICAL at 08:22

## 2023-11-01 RX ADMIN — APIXABAN 2.5 MG: 2.5 TABLET, FILM COATED ORAL at 22:04

## 2023-11-01 RX ADMIN — ANTI-FUNGAL POWDER MICONAZOLE NITRATE TALC FREE: 1.42 POWDER TOPICAL at 22:05

## 2023-11-01 RX ADMIN — NYSTATIN 500000 UNITS: 100000 SUSPENSION ORAL at 22:04

## 2023-11-01 RX ADMIN — BUMETANIDE 2 MG: 1 TABLET ORAL at 22:04

## 2023-11-01 RX ADMIN — BENZONATATE 200 MG: 200 CAPSULE ORAL at 08:18

## 2023-11-01 RX ADMIN — NYSTATIN 500000 UNITS: 100000 SUSPENSION ORAL at 18:00

## 2023-11-01 RX ADMIN — METOPROLOL TARTRATE 12.5 MG: 25 TABLET, FILM COATED ORAL at 22:04

## 2023-11-01 RX ADMIN — Medication 60 MG: at 22:04

## 2023-11-01 ASSESSMENT — ENCOUNTER SYMPTOMS
DIARRHEA: 0
ABDOMINAL PAIN: 0

## 2023-11-01 ASSESSMENT — PAIN SCALES - GENERAL: PAINLEVEL_OUTOF10: 0

## 2023-11-01 NOTE — CARE COORDINATION
Writer placed message to Anusha Hayes regarding authorization status. Authorization still pending.   Electronically signed by JANE Cruz on 11/1/2023 at 10:56 AM

## 2023-11-01 NOTE — PROGRESS NOTES
[facility] -   Infectious diseases evaluation  admission date 10/9/2023    reason for consultation:   Right thigh cellulitis    Impression :   Current:  Right thigh cellulitis resolving  Distal lower extremity venous stasis ulcers with surrounding cellulitis resolved  Rhinovirus infection  Bilateral lower extremity lymphedema  Anemia  CHF  Acute hypoxic respiratory failure   Acute on chronic renal failure  Coronary artery disease status post CABG  Obesity    Recommendations   Zyvox 600 mg IV q12 hours completed 10/21/23    Recommendations   Cefepime course completed 10/25/2023  Monitor off antibiotics  Follow CBC renal function. Continue supportive care  Await discharge  Remove PICC line prior to discharge        Infection Control Recommendations   [place] Precautions  Droplet isolation    Antimicrobial Stewardship Recommendations   Simplification of therapy  Targeted therapy    History of Present Illness:   Initial history:  Edwina Ronquillo is a 82y.o.-year-old male presented to the hospital on 10/9/23 with worsening shortness of breath associated with lower extremity edema for 1 day. -Symptoms moderate to severe, worse with exertion, no alleviating or aggravating factors. He had a chronic lower extremity lymphedema with venous stasis ulcers with surrounding redness. The patient was treated with diuretics for CHF. On 10/14/2023 he developed fever with a temperature max of 102.3. WBC increased to 18.3, procalcitonin level 0.55  Respiratory panel with COVID-19 PCR was positive for rhinovirus on 10/13/2023. Started on IV Zyvox and cefepime on 10/14/2023  UA 10/16 showed 3-5 WBC, negative nitrate, negative leukocyte esterase  Stool occult blood positive  Procalcitonin level 2.2 and lactic acid 2.7 on 10/16    Interval changes  11/1/2023   The patient was a seen and examined .    He is afebrile, comfortable on oxygen per nasal cannula, tolerating oral, no new

## 2023-11-01 NOTE — PROGRESS NOTES
11/01/23 1542   Encounter Summary   Encounter Overview/Reason  Attempted Encounter   Service Provided For: Patient not available  (patient not in his room)

## 2023-11-01 NOTE — PROGRESS NOTES
[id number] [facility]   OCCUPATIONAL THERAPY MISSED TREATMENT NOTE   INPATIENT   Date: 23  Patient Name: Jannie Hussein       Room:   MRN: [id number]   Account #: [de-identified]    : 1935  (82 y.o.)  Gender: male   Referring Practitioner: Luis Armando Cheng MD  Diagnosis: Acute CHF             REASON FOR MISSED TREATMENT:  Patient declined   -    Refusal by Patient Stressed the importance of working with therapy as he has not agreed to work with OT since 10/25, Pt still adamantly refusing just repeatedly stating \"sleeping\"  when MCDONOUGH/L attempted to engage pt.   Electronically signed by GILBERT Moon on 23 at 11:32 AM EDT

## 2023-11-02 LAB
ANION GAP SERPL CALCULATED.3IONS-SCNC: 6 MMOL/L (ref 9–17)
BASOPHILS # BLD: 0.04 K/UL (ref 0–0.2)
BASOPHILS NFR BLD: 1 % (ref 0–2)
BUN SERPL-MCNC: 22 MG/DL (ref 8–23)
CALCIUM SERPL-MCNC: 8.3 MG/DL (ref 8.6–10.4)
CHLORIDE SERPL-SCNC: 104 MMOL/L (ref 98–107)
CO2 SERPL-SCNC: 34 MMOL/L (ref 20–31)
CREAT SERPL-MCNC: 1.3 MG/DL (ref 0.7–1.2)
EOSINOPHIL # BLD: 0.13 K/UL (ref 0–0.4)
EOSINOPHILS RELATIVE PERCENT: 3 % (ref 0–4)
ERYTHROCYTE [DISTWIDTH] IN BLOOD BY AUTOMATED COUNT: 30.1 % (ref 11.5–14.9)
GFR SERPL CREATININE-BSD FRML MDRD: 54 ML/MIN/1.73M2
GLUCOSE SERPL-MCNC: 106 MG/DL (ref 70–99)
HCT VFR BLD AUTO: 29.1 % (ref 41–53)
HGB BLD-MCNC: 8.7 G/DL (ref 13.5–17.5)
LYMPHOCYTES NFR BLD: 0.9 K/UL (ref 1–4.8)
LYMPHOCYTES RELATIVE PERCENT: 21 % (ref 24–44)
MCH RBC QN AUTO: 23.6 PG (ref 26–34)
MCHC RBC AUTO-ENTMCNC: 29.7 G/DL (ref 31–37)
MCV RBC AUTO: 79.4 FL (ref 80–100)
MONOCYTES NFR BLD: 0.47 K/UL (ref 0.1–1.3)
MONOCYTES NFR BLD: 11 % (ref 1–7)
MORPHOLOGY: ABNORMAL
NEUTROPHILS NFR BLD: 64 % (ref 36–66)
NEUTS SEG NFR BLD: 2.76 K/UL (ref 1.3–9.1)
PLATELET # BLD AUTO: 134 K/UL (ref 150–450)
PMV BLD AUTO: 8.8 FL (ref 6–12)
POTASSIUM SERPL-SCNC: 3.6 MMOL/L (ref 3.7–5.3)
RBC # BLD AUTO: 3.66 M/UL (ref 4.5–5.9)
SODIUM SERPL-SCNC: 144 MMOL/L (ref 135–144)
WBC OTHER # BLD: 4.3 K/UL (ref 3.5–11)

## 2023-11-02 PROCEDURE — 94660 CPAP INITIATION&MGMT: CPT

## 2023-11-02 PROCEDURE — 99212 OFFICE O/P EST SF 10 MIN: CPT

## 2023-11-02 PROCEDURE — 80048 BASIC METABOLIC PNL TOTAL CA: CPT

## 2023-11-02 PROCEDURE — 2700000000 HC OXYGEN THERAPY PER DAY

## 2023-11-02 PROCEDURE — 2060000000 HC ICU INTERMEDIATE R&B

## 2023-11-02 PROCEDURE — 99231 SBSQ HOSP IP/OBS SF/LOW 25: CPT | Performed by: INTERNAL MEDICINE

## 2023-11-02 PROCEDURE — 85025 COMPLETE CBC W/AUTO DIFF WBC: CPT

## 2023-11-02 PROCEDURE — 6370000000 HC RX 637 (ALT 250 FOR IP)

## 2023-11-02 PROCEDURE — 6370000000 HC RX 637 (ALT 250 FOR IP): Performed by: INTERNAL MEDICINE

## 2023-11-02 PROCEDURE — 2580000003 HC RX 258: Performed by: FAMILY MEDICINE

## 2023-11-02 PROCEDURE — 99232 SBSQ HOSP IP/OBS MODERATE 35: CPT | Performed by: STUDENT IN AN ORGANIZED HEALTH CARE EDUCATION/TRAINING PROGRAM

## 2023-11-02 PROCEDURE — 36592 COLLECT BLOOD FROM PICC: CPT

## 2023-11-02 PROCEDURE — 6370000000 HC RX 637 (ALT 250 FOR IP): Performed by: STUDENT IN AN ORGANIZED HEALTH CARE EDUCATION/TRAINING PROGRAM

## 2023-11-02 PROCEDURE — 36415 COLL VENOUS BLD VENIPUNCTURE: CPT

## 2023-11-02 PROCEDURE — 94761 N-INVAS EAR/PLS OXIMETRY MLT: CPT

## 2023-11-02 PROCEDURE — 6370000000 HC RX 637 (ALT 250 FOR IP): Performed by: FAMILY MEDICINE

## 2023-11-02 RX ADMIN — METOPROLOL TARTRATE 12.5 MG: 25 TABLET, FILM COATED ORAL at 09:41

## 2023-11-02 RX ADMIN — BENZONATATE 200 MG: 200 CAPSULE ORAL at 21:57

## 2023-11-02 RX ADMIN — BENZONATATE 200 MG: 200 CAPSULE ORAL at 09:41

## 2023-11-02 RX ADMIN — PANTOPRAZOLE SODIUM 40 MG: 40 TABLET, DELAYED RELEASE ORAL at 16:12

## 2023-11-02 RX ADMIN — BUMETANIDE 2 MG: 1 TABLET ORAL at 21:56

## 2023-11-02 RX ADMIN — SODIUM CHLORIDE, PRESERVATIVE FREE 10 ML: 5 INJECTION INTRAVENOUS at 21:58

## 2023-11-02 RX ADMIN — FLUTICASONE PROPIONATE 2 SPRAY: 50 SPRAY, METERED NASAL at 09:47

## 2023-11-02 RX ADMIN — ALLOPURINOL 50 MG: 100 TABLET ORAL at 09:40

## 2023-11-02 RX ADMIN — APIXABAN 2.5 MG: 2.5 TABLET, FILM COATED ORAL at 21:57

## 2023-11-02 RX ADMIN — Medication 60 MG: at 09:42

## 2023-11-02 RX ADMIN — BENZONATATE 200 MG: 200 CAPSULE ORAL at 16:12

## 2023-11-02 RX ADMIN — PANTOPRAZOLE SODIUM 40 MG: 40 TABLET, DELAYED RELEASE ORAL at 05:26

## 2023-11-02 RX ADMIN — APIXABAN 2.5 MG: 2.5 TABLET, FILM COATED ORAL at 09:40

## 2023-11-02 RX ADMIN — METOPROLOL TARTRATE 12.5 MG: 25 TABLET, FILM COATED ORAL at 21:57

## 2023-11-02 RX ADMIN — POTASSIUM CHLORIDE 20 MEQ: 10 CAPSULE, COATED, EXTENDED RELEASE ORAL at 09:40

## 2023-11-02 RX ADMIN — NYSTATIN 500000 UNITS: 100000 SUSPENSION ORAL at 09:42

## 2023-11-02 RX ADMIN — NYSTATIN 500000 UNITS: 100000 SUSPENSION ORAL at 16:12

## 2023-11-02 RX ADMIN — SODIUM CHLORIDE, PRESERVATIVE FREE 10 ML: 5 INJECTION INTRAVENOUS at 09:42

## 2023-11-02 RX ADMIN — Medication 60 MG: at 21:57

## 2023-11-02 RX ADMIN — NYSTATIN 500000 UNITS: 100000 SUSPENSION ORAL at 21:57

## 2023-11-02 RX ADMIN — ANTI-FUNGAL POWDER MICONAZOLE NITRATE TALC FREE: 1.42 POWDER TOPICAL at 09:47

## 2023-11-02 RX ADMIN — POTASSIUM CHLORIDE 20 MEQ: 10 CAPSULE, COATED, EXTENDED RELEASE ORAL at 21:57

## 2023-11-02 RX ADMIN — ROSUVASTATIN CALCIUM 20 MG: 20 TABLET, FILM COATED ORAL at 09:41

## 2023-11-02 RX ADMIN — ANTI-FUNGAL POWDER MICONAZOLE NITRATE TALC FREE: 1.42 POWDER TOPICAL at 21:56

## 2023-11-02 ASSESSMENT — ENCOUNTER SYMPTOMS
DIARRHEA: 0
ABDOMINAL PAIN: 0

## 2023-11-02 NOTE — PROGRESS NOTES
11/02/23 1015   Encounter Summary   Encounter Overview/Reason  Spiritual/Emotional Needs   Service Provided For: Patient   Referral/Consult From: Palliative Care   Last Encounter  11/02/23   Complexity of Encounter Low   Spiritual/Emotional needs   Type Spiritual Support   Palliative Care   Type Palliative Care, Follow-up   Assessment/Intervention/Outcome   Assessment Unable to assess  (Sleeping)   Intervention Prayer (assurance of)/[place]

## 2023-11-02 NOTE — PLAN OF CARE
Problem: Discharge Planning  Goal: Discharge to home or other facility with appropriate resources  11/2/2023 0305 by Emelina Bolivar RN  Outcome: Progressing     Problem: Safety - Adult  Goal: Free from fall injury  Outcome: Progressing  Note: The patient remained free from falls this shift, call light within reach, bed in locked and lowest position. Side rails up x2. Problem: Skin/Tissue Integrity  Goal: Absence of new skin breakdown  Description: 1. Monitor for areas of redness and/or skin breakdown  2. Assess vascular access sites hourly  3. Every 4-6 hours minimum:  Change oxygen saturation probe site  4. Every 4-6 hours:  If on nasal continuous positive airway pressure, respiratory therapy assess nares and determine need for appliance change or resting period. 11/2/2023 0305 by Emelina Bolivar RN  Outcome: Progressing     Problem: Chronic Conditions and Co-morbidities  Goal: Patient's chronic conditions and co-morbidity symptoms are monitored and maintained or improved  Outcome: Progressing     Problem: Pain  Goal: Verbalizes/displays adequate comfort level or baseline comfort level  11/2/2023 0305 by Emelina Bolivar RN  Outcome: Progressing  Flowsheets (Taken 11/2/2023 0305)  Verbalizes/displays adequate comfort level or baseline comfort level:   Encourage patient to monitor pain and request assistance   Assess pain using appropriate pain scale   Administer analgesics based on type and severity of pain and evaluate response   Implement non-pharmacological measures as appropriate and evaluate response  Note: Pt denies pain this shift.       Problem: Nutrition Deficit:  Goal: Optimize nutritional status  Outcome: Progressing

## 2023-11-02 NOTE — PROGRESS NOTES
[facility]  Progress Note      NAME:  Osbaldo Dumont  MEDICAL RECORD NUMBER:  [id number]  AGE: 82 y.o. GENDER: male  : 1935  TODAY'S DATE:  2023    [facility] nurse follow up visit for wounds to bilateral legs and buttocks. Wounds to bilateral legs have healed. Pt has extremely dry, flaky skin to his lower legs. Legs cleansed with soap and water and lotion applied. Will discontinue dressing changes but continue to wash legs daily, apply lotion, and reapply ace wraps (remove at night). Buttocks are slightly more denuded due to incontinence. Will continue triad cream.     Bilateral lower legs: Cleanse daily with soap and water, pat dry. Apply silicone lotion and wrap with ace wraps (4\" ace wrap from toe to ankle, 6\" ace wrap from ankle to knee, on in the morning, off at bedtime). Measurements:  Wound 10/09/23 Buttocks (Active)   Wound Image   23 1507   Wound Etiology Other 23 1507   Dressing Status Clean;Dry; Intact 23 1717   Wound Cleansed Soap and water 23 1507   Dressing/Treatment Triad hydro/zinc oxide-based hydrophilic paste  6346   Dressing Change Due 10/21/23 10/20/23 1127   Wound Length (cm) 0.5 cm 10/11/23 1356   Wound Width (cm) 0.7 cm 10/11/23 1356   Wound Depth (cm) 0.1 cm 10/11/23 1356   Wound Surface Area (cm^2) 0.35 cm^2 10/11/23 1356   Wound Volume (cm^3) 0.035 cm^3 10/11/23 1356   Wound Assessment Pink/red;Denuded 23 1507   Drainage Amount None (dry) 23 1507   Drainage Description Serosanguinous 10/24/23 0000   Odor None 23 1507   Sara-wound Assessment Denuded;Fragile 23 1507   Margins Attached edges 23 1507   Wound Thickness Description not for Pressure Injury Partial thickness 23 2210   Number of days: [address] [facility], DIN[place], RN, [facility], [facility], [address], Ostomy, and Continence Nursing  [phone]

## 2023-11-02 NOTE — PROGRESS NOTES
Physical Therapy Cancel Note      DATE: 2023    NAME: Beth Scott  MRN: [id number]   : 1935      Patient not seen this date for Physical Therapy due to:    Pt declined PT/OT co-tx, pt is on Bipap upon arrival, strong education for pt to try and get up with PT/OT however pt reports \"Tomorrow\", GRISEL Rangel updated.       Electronically signed by Natalia Araiza PTA on 2023 at 10:40 AM

## 2023-11-02 NOTE — DISCHARGE INSTRUCTIONS
[facility]PERBAR[facility] TRE[facility]  CENTER                            Visit  Discharge Instructions / Physician Orders  DATE: 9/1/23     Home Care: None     SUPPLIES ORDERED THRU:  none          DATE LAST SUPPLIED  NA     Wound Location:  right and left lower legs      Cleanse with: Antibacterial soap and water     Dressing Orders: ABD Kerlix-send to ER for increased edema and sob  Frequency:        Additional Orders: Increase protein to diet (meat, cheese, eggs, fish, peanut butter, nuts and beans)  Multivitamin daily  Compression pumps per BIO TAB-WALKER please pump 2-3 x per day, for 1 hour each time. OFFLOADING [] YES  TYPE:                  [] NA     Weekly wound care visits until determined otherwise. Antibiotic therapy-wound care related YES [] NO [] NA[]     MY CHART []     Smart Device  []            Your next appointment with the [facility] is 1 week                                                                                                   (Please note your next appointment above and if you are unable to keep, kindly give a 24 hour notice. Thank you.)  If more than 15 min late we cannot guarantee you will be seen due to clinician schedule  Per Policy, Excessive cancellation will call for dismissal from program.  If you experience any of the following, please call the [facility] during business hours:  [phone]     * Increase in Pain  * Temperature over 101  * Increase in drainage from your wound  * Drainage with a foul odor  * Bleeding  * Increase in swelling  * Need for compression bandage changes due to slippage, breakthrough drainage. If you need medical attention outside of the business hours of the [facility] please contact your PCP or go to the nearest emergency room. The information contained in the After Visit Summary has been reviewed with me, the patient and/or responsible adult, by my health care provider(s).  I had the opportunity to ask
Airway patent, TM normal bilaterally, normal appearing mouth, nose, throat, neck supple with full range of motion, no cervical adenopathy.

## 2023-11-02 NOTE — CARE COORDINATION
Writer received information from Suri Martins this pt has been sent for peer to peer. [phone] to set up P2P. Ref #[id number]. Writer placed call to peer to peer scheduling line to schedule for Dr. Armen Sparks. Dr. Carlos Stratton will place call to Dr. Armen Sparks at 11/3 @ 1300. Writer placed perfectserve message to Dr. Armen Sparks to confirm this scheduled call.   Electronically signed by JANE Che on 11/2/2023 at 11:11 AM

## 2023-11-02 NOTE — PLAN OF CARE
Problem: Discharge Planning  Goal: Discharge to home or other facility with appropriate resources  Outcome: Progressing     Problem: Safety - Adult  Goal: Free from fall injury  Outcome: Progressing     Problem: Skin/Tissue Integrity  Goal: Absence of new skin breakdown  Description: 1. Monitor for areas of redness and/or skin breakdown  2. Assess vascular access sites hourly  3. Every 4-6 hours minimum:  Change oxygen saturation probe site  4. Every 4-6 hours:  If on nasal continuous positive airway pressure, respiratory therapy assess nares and determine need for appliance change or resting period.   Outcome: Progressing     Problem: ABCDS Injury Assessment  Goal: Absence of physical injury  Outcome: Progressing  Flowsheets (Taken 11/2/2023 1101)  Absence of Physical Injury: Implement safety measures based on patient assessment

## 2023-11-02 NOTE — PROGRESS NOTES
defib       Medications:      metoprolol tartrate  12.5 mg Oral BID    bumetanide  2 mg Oral BID    allopurinol  50 mg Oral Daily    benzonatate  200 mg Oral TID    dextromethorphan  60 mg Oral 2 times per day    nystatin  5 mL Oral 4x Daily    pantoprazole  40 mg Oral BID AC    epoetin azeb-epbx  3,000 Units SubCUTAneous Once per day on     potassium chloride  20 mEq Oral BID    miconazole   Topical BID    apixaban  2.5 mg Oral BID    rosuvastatin  20 mg Oral Daily    fluticasone  2 spray Each Nostril Daily    sodium chloride flush  5-40 mL IntraVENous 2 times per day       Social History:     Social History     Socioeconomic History    Marital status: Single     Spouse name: Not on file    Number of children: 3    Years of education: Not on file    Highest education level: Not on file   Occupational History    Not on file   Tobacco Use    Smoking status: Former     Types: Cigarettes     Quit date:      Years since quittin.8     Passive exposure: Past    Smokeless tobacco: Never   Vaping Use    Vaping Use: Never used   Substance and Sexual Activity    Alcohol use: Not Currently    Drug use: Never    Sexual activity: Not Currently   Other Topics Concern    Not on file   Social History Narrative    Not on file     Social Determinants of Health     Financial Resource Strain: Low Risk  (2023)    Overall Financial Resource Strain (CARDIA)     Difficulty of Paying Living Expenses: Not hard at all   Food Insecurity: No Food Insecurity (2023)    Hunger Vital Sign     Worried About Running Out of Food in the Last Year: Never true     [address] in the Last Year: Never true   Transportation Needs: Unknown (2023)    PRAPARE - Transportation     Lack of Transportation (Medical): Not on file     Lack of Transportation (Non-Medical):  No   Physical Activity: Inactive (5/10/2023)    Exercise Vital Sign     Days of Exercise per Week: 0 days     Minutes of Exercise per Session: 0 min   Stress: Not on file   Social Connections: Not on file   Intimate Partner Violence: Not on file   Housing Stability: Unknown (2/2/2023)    Housing Stability Vital Sign     Unable to Pay for Housing in the Last Year: Not on file     Number of [address] in the Last Year: Not on file     Unstable Housing in the Last Year: No       Family History:   History reviewed. No pertinent family history. Medical Decision Making:   I have independently reviewed/ordered the following labs:    CBC with Differential:   Recent Labs     11/01/23  0705 11/02/23  0518   WBC 3.9 4.3   HGB 8.0* 8.7*   HCT 26.7* 29.1*   * 134*   LYMPHOPCT  --  21*   MONOPCT  --  11*       BMP:  Recent Labs     10/30/23  2230 10/31/23  1413 11/01/23  0705 11/01/23  1441 11/02/23  0518      < > 144  --  144   K 3.2*   < > 3.3* 3.5* 3.6*      < > 105  --  104   CO2 34*   < > 36*  --  34*   BUN 24*   < > 22  --  22   CREATININE 1.4*   < > 1.3*  --  1.3*   MG 1.6  --   --   --   --     < > = values in this interval not displayed. Hepatic Function Panel:   No results for input(s): \"PROT\", \"LABALBU\", \"BILIDIR\", \"IBILI\", \"BILITOT\", \"ALKPHOS\", \"ALT\", \"AST\" in the last 72 hours. No results for input(s): \"RPR\" in the last 72 hours. No results for input(s): \"HIV\" in the last 72 hours. No results for input(s): \"BC\" in the last 72 hours. Lab Results   Component Value Date/Time    CREATININE 1.3 11/02/2023 05:18 AM    GLUCOSE 106 11/02/2023 05:18 AM       Detailed results: Thank you for allowing us to participate in the care of this patient. Please call with questions. This note is created with the assistance of a speech recognition program.  While intending to generate adocument that actually reflects the content of the visit, the document can still have some errors including those of syntax and sound a like substitutions which may escape proof reading.   It such instances, actual meaningcan be extrapolated by contextual

## 2023-11-02 NOTE — PROGRESS NOTES
Occupational Therapy  DATE: 2023    NAME: Alexsandra Singleton  MRN: [id number]   : 1935    Patient not seen this date for Occupational Therapy due to:      [] Cancel by RN or physician due to:    [] Hemodialysis    [] Critical Lab Value Level     [] Blood transfusion in progress    [] Acute or unstable cardiovascular status   _MAP < 55 or more than >115  _HR < 40 or > 130    [] Acute or unstable pulmonary status   -FiO2 > 60%   _RR < 5 or >40    _O2 sats < 85%    [] Strict Bedrest    [] Off Unit for surgery or procedure    [] Off Unit for testing       [] Pending imaging to R/O fracture    [x] Refusal by Patient  (Pt refusing to work with therapy this date despite therapist max effort and education over importance of being OOB for placement and for decreasing lack of ability and muscle breakdown. Pt reporting \"tomorrow\". OT to check back tomorrow if able. RN notified.)     [] Intubated    [] Other      [] OT being discontinued at this time. Patient independent. No further needs. [] OT being discontinued at this time as the patient has been transferred to hospice care. No further needs.       Prakash Cisneros, OTR/L

## 2023-11-02 NOTE — PROGRESS NOTES
Comprehensive Nutrition Assessment    Type and Reason for Visit:  Reassess    Nutrition Recommendations/Plan:   Will continue to provide Liberalized No added salt diet   Will provide Glucerna twice daily     Malnutrition Assessment:  Malnutrition Status:  No malnutrition (10/18/23 2421)    Context:  Acute Illness     Findings of the 6 clinical characteristics of malnutrition:  Energy Intake:  No significant decrease in energy intake  Weight Loss:  No significant weight loss     Body Fat Loss:  No significant body fat loss     Muscle Mass Loss:  No significant muscle mass loss    Fluid Accumulation:  Moderate to Severe Extremities   Strength:  Not Performed    Nutrition Assessment:    Pt on BiPAP, sleeping through lunch. Supplements are stacking at bedside. Nutrition Related Findings:    mild BLE, Labs/Meds: Reviewed, BM 11/1 Wound Type: Skin Tears, Venous Stasis       Current Nutrition Intake & Therapies:    Average Meal Intake: 0%  Average Supplements Intake: Unable to assess  ADULT DIET; Regular; No Added Salt (3-4 gm)  ADULT ORAL NUTRITION SUPPLEMENT; Lunch, Breakfast; Diabetic Oral Supplement    Anthropometric Measures:  Height: 185.4 cm (6' 1\")  Ideal Body Weight (IBW): 184 lbs (84 kg)    Admission Body Weight: 142 kg (313 lb)  Current Body Weight: 181.8 kg (400 lb 12.7 oz) (wide weight discrepencies. noted),   IBW.  Weight Source: Bed Scale  Current BMI (kg/m2): 52.9                          BMI Categories: Obese Class 3 (BMI 40.0 or greater)    Estimated Daily Nutrient Needs:  Energy Requirements Based On: Kcal/kg  Weight Used for Energy Requirements: Admission  Energy (kcal/day): 8777-6967 kcals based on 13-14 kcals/kg  Weight Used for Protein Requirements: Ideal  Protein (g/day): 109-117 gm protein based 1.3-1.4 gm/kg       Nutrition Diagnosis:   Inadequate protein-energy intake related to  (current medical condition) as evidenced by intake 0-25%, intake 26-50%    Nutrition Interventions:   Food and/or Nutrient Delivery: Continue Current Diet, Modify Oral Nutrition Supplement  Nutrition Education/Counseling: No recommendation at this time  Coordination of Nutrition Care: Continue to monitor while inpatient       Goals:  Previous Goal Met: No Progress toward Goal(s)  Goals: PO intake 75% or greater       Nutrition Monitoring and Evaluation:      Food/Nutrient Intake Outcomes: Food and Nutrient Intake, Supplement Intake  Physical Signs/Symptoms Outcomes: Biochemical Data, GI Status, Fluid Status or Edema, Skin, Weight    Discharge Planning:    Continue current diet     Desirae De La Rosa LD  Contact: [phone]

## 2023-11-03 LAB
HCT VFR BLD AUTO: 29.2 % (ref 41–53)
HGB BLD-MCNC: 8.7 G/DL (ref 13.5–17.5)

## 2023-11-03 PROCEDURE — 6370000000 HC RX 637 (ALT 250 FOR IP): Performed by: FAMILY MEDICINE

## 2023-11-03 PROCEDURE — 99231 SBSQ HOSP IP/OBS SF/LOW 25: CPT | Performed by: INTERNAL MEDICINE

## 2023-11-03 PROCEDURE — 6370000000 HC RX 637 (ALT 250 FOR IP): Performed by: INTERNAL MEDICINE

## 2023-11-03 PROCEDURE — 94640 AIRWAY INHALATION TREATMENT: CPT

## 2023-11-03 PROCEDURE — 94761 N-INVAS EAR/PLS OXIMETRY MLT: CPT

## 2023-11-03 PROCEDURE — 6360000002 HC RX W HCPCS: Performed by: INTERNAL MEDICINE

## 2023-11-03 PROCEDURE — 85014 HEMATOCRIT: CPT

## 2023-11-03 PROCEDURE — 6370000000 HC RX 637 (ALT 250 FOR IP): Performed by: STUDENT IN AN ORGANIZED HEALTH CARE EDUCATION/TRAINING PROGRAM

## 2023-11-03 PROCEDURE — 94660 CPAP INITIATION&MGMT: CPT

## 2023-11-03 PROCEDURE — 36415 COLL VENOUS BLD VENIPUNCTURE: CPT

## 2023-11-03 PROCEDURE — 2700000000 HC OXYGEN THERAPY PER DAY

## 2023-11-03 PROCEDURE — 99232 SBSQ HOSP IP/OBS MODERATE 35: CPT | Performed by: STUDENT IN AN ORGANIZED HEALTH CARE EDUCATION/TRAINING PROGRAM

## 2023-11-03 PROCEDURE — 2060000000 HC ICU INTERMEDIATE R&B

## 2023-11-03 PROCEDURE — 6370000000 HC RX 637 (ALT 250 FOR IP)

## 2023-11-03 PROCEDURE — 2580000003 HC RX 258: Performed by: FAMILY MEDICINE

## 2023-11-03 PROCEDURE — 85018 HEMOGLOBIN: CPT

## 2023-11-03 RX ORDER — IPRATROPIUM BROMIDE AND ALBUTEROL SULFATE 2.5; .5 MG/3ML; MG/3ML
1 SOLUTION RESPIRATORY (INHALATION)
Status: DISCONTINUED | OUTPATIENT
Start: 2023-11-03 | End: 2023-11-09 | Stop reason: HOSPADM

## 2023-11-03 RX ADMIN — ROSUVASTATIN CALCIUM 20 MG: 20 TABLET, FILM COATED ORAL at 09:08

## 2023-11-03 RX ADMIN — EPOETIN ALFA-EPBX 3000 UNITS: 3000 INJECTION, SOLUTION INTRAVENOUS; SUBCUTANEOUS at 17:30

## 2023-11-03 RX ADMIN — PANTOPRAZOLE SODIUM 40 MG: 40 TABLET, DELAYED RELEASE ORAL at 05:42

## 2023-11-03 RX ADMIN — BENZONATATE 200 MG: 200 CAPSULE ORAL at 21:18

## 2023-11-03 RX ADMIN — METOPROLOL TARTRATE 12.5 MG: 25 TABLET, FILM COATED ORAL at 09:09

## 2023-11-03 RX ADMIN — ANTI-FUNGAL POWDER MICONAZOLE NITRATE TALC FREE: 1.42 POWDER TOPICAL at 21:19

## 2023-11-03 RX ADMIN — PANTOPRAZOLE SODIUM 40 MG: 40 TABLET, DELAYED RELEASE ORAL at 15:53

## 2023-11-03 RX ADMIN — BENZONATATE 200 MG: 200 CAPSULE ORAL at 09:08

## 2023-11-03 RX ADMIN — FLUTICASONE PROPIONATE 2 SPRAY: 50 SPRAY, METERED NASAL at 09:11

## 2023-11-03 RX ADMIN — SODIUM CHLORIDE, PRESERVATIVE FREE 10 ML: 5 INJECTION INTRAVENOUS at 09:09

## 2023-11-03 RX ADMIN — POTASSIUM CHLORIDE 20 MEQ: 10 CAPSULE, COATED, EXTENDED RELEASE ORAL at 21:18

## 2023-11-03 RX ADMIN — BENZONATATE 200 MG: 200 CAPSULE ORAL at 15:45

## 2023-11-03 RX ADMIN — SODIUM CHLORIDE, PRESERVATIVE FREE 10 ML: 5 INJECTION INTRAVENOUS at 21:18

## 2023-11-03 RX ADMIN — NYSTATIN 500000 UNITS: 100000 SUSPENSION ORAL at 09:08

## 2023-11-03 RX ADMIN — APIXABAN 2.5 MG: 2.5 TABLET, FILM COATED ORAL at 21:18

## 2023-11-03 RX ADMIN — NYSTATIN 500000 UNITS: 100000 SUSPENSION ORAL at 21:26

## 2023-11-03 RX ADMIN — NYSTATIN 500000 UNITS: 100000 SUSPENSION ORAL at 15:45

## 2023-11-03 RX ADMIN — IPRATROPIUM BROMIDE AND ALBUTEROL SULFATE 1 DOSE: 2.5; .5 SOLUTION RESPIRATORY (INHALATION) at 19:04

## 2023-11-03 RX ADMIN — ALLOPURINOL 50 MG: 100 TABLET ORAL at 09:09

## 2023-11-03 RX ADMIN — METOPROLOL TARTRATE 12.5 MG: 25 TABLET, FILM COATED ORAL at 21:18

## 2023-11-03 RX ADMIN — POTASSIUM CHLORIDE 20 MEQ: 10 CAPSULE, COATED, EXTENDED RELEASE ORAL at 09:08

## 2023-11-03 RX ADMIN — IPRATROPIUM BROMIDE AND ALBUTEROL SULFATE 1 DOSE: 2.5; .5 SOLUTION RESPIRATORY (INHALATION) at 14:38

## 2023-11-03 RX ADMIN — Medication 60 MG: at 21:19

## 2023-11-03 RX ADMIN — ANTI-FUNGAL POWDER MICONAZOLE NITRATE TALC FREE: 1.42 POWDER TOPICAL at 09:09

## 2023-11-03 RX ADMIN — IPRATROPIUM BROMIDE AND ALBUTEROL SULFATE 1 DOSE: 2.5; .5 SOLUTION RESPIRATORY (INHALATION) at 09:24

## 2023-11-03 RX ADMIN — BUMETANIDE 2 MG: 1 TABLET ORAL at 09:09

## 2023-11-03 RX ADMIN — APIXABAN 2.5 MG: 2.5 TABLET, FILM COATED ORAL at 09:09

## 2023-11-03 RX ADMIN — BUMETANIDE 2 MG: 1 TABLET ORAL at 21:18

## 2023-11-03 RX ADMIN — Medication 60 MG: at 09:08

## 2023-11-03 ASSESSMENT — ENCOUNTER SYMPTOMS
DIARRHEA: 0
ABDOMINAL PAIN: 0

## 2023-11-03 NOTE — CARE COORDINATION
P2P ws denial upheld, can go to Lower level of care. Horacio following, Per Notes, probable discharge on Monday to facility.     Electronically signed by Rylee Dominique RN on 11/3/2023 at 5:35 PM

## 2023-11-03 NOTE — PROGRESS NOTES
[id number] [facility]   OCCUPATIONAL THERAPY MISSED TREATMENT NOTE   INPATIENT   Date: 11/3/23  Patient Name: Laura Riggins       Room: 9892/5569-69  MRN: [id number]   Account #: [de-identified]    : 1935  (82 y.o.)  Gender: male   Referring Practitioner: Crescencio Hawkins MD  Diagnosis: Acute CHF             REASON FOR MISSED TREATMENT:  Patient declined   -    Refusal by Patient. Patient education and encouragement provided on benefits of therapy and importance of mobility. Patient continued to refuse despite education, stating \"I am sleepy. They keep you up all night around here. \" Patient asked writer to come back this afternoon. OT will continue to follow and check back as time permits.  1138    Electronically signed by MINDY Araya/L on 11/3/23 at 11:44 AM EDT

## 2023-11-03 NOTE — PROGRESS NOTES
[facility] -   Infectious diseases evaluation  admission date 10/9/2023    reason for consultation:   Right thigh cellulitis    Impression :   Current:  Right thigh cellulitis resolving  Distal lower extremity venous stasis ulcers with surrounding cellulitis resolved  Rhinovirus infection  Bilateral lower extremity lymphedema  Anemia  CHF  Acute hypoxic respiratory failure   Acute on chronic renal failure  Coronary artery disease status post CABG  Obesity    Recommendations   Zyvox 600 mg IV q12 hours completed 10/21/23    Recommendations   Cefepime course completed 10/25/2023  Monitor off antibiotics  Follow CBC renal function. Continue supportive care  Remove PICC line prior to discharge        Infection Control Recommendations   [place] Precautions  Droplet isolation    Antimicrobial Stewardship Recommendations   Simplification of therapy  Targeted therapy    History of Present Illness:   Initial history:  Ameena Garland is a 82y.o.-year-old male presented to the hospital on 10/9/23 with worsening shortness of breath associated with lower extremity edema for 1 day. -Symptoms moderate to severe, worse with exertion, no alleviating or aggravating factors. He had a chronic lower extremity lymphedema with venous stasis ulcers with surrounding redness. The patient was treated with diuretics for CHF. On 10/14/2023 he developed fever with a temperature max of 102.3. WBC increased to 18.3, procalcitonin level 0.55  Respiratory panel with COVID-19 PCR was positive for rhinovirus on 10/13/2023. Started on IV Zyvox and cefepime on 10/14/2023  UA 10/16 showed 3-5 WBC, negative nitrate, negative leukocyte esterase  Stool occult blood positive  Procalcitonin level 2.2 and lactic acid 2.7 on 10/16    Interval changes  11/3/2023   The patient was a seen and examined . He is afebrile ,no new complaints. RUE PICC site clean.           Patient Vitals for the past 8 hrs: [phone]

## 2023-11-03 NOTE — PLAN OF CARE
Problem: Discharge Planning  Goal: Discharge to home or other facility with appropriate resources  11/3/2023 0321 by Carla Mancera RN  Outcome: Progressing     Problem: Safety - Adult  Goal: Free from fall injury  11/3/2023 0321 by Carla Mancera RN  Outcome: Progressing  Note: The patient remained free from falls this shift, call light within reach, bed in locked and lowest position. Side rails up x2. Problem: Skin/Tissue Integrity  Goal: Absence of new skin breakdown  Description: 1. Monitor for areas of redness and/or skin breakdown  2. Assess vascular access sites hourly  3. Every 4-6 hours minimum:  Change oxygen saturation probe site  4. Every 4-6 hours:  If on nasal continuous positive airway pressure, respiratory therapy assess nares and determine need for appliance change or resting period. 11/3/2023 0321 by Carla Mancera RN  Outcome: Progressing  Note: Pt turned by staff regularly this shift. Skin care given based off of wound cares recommendation.       Problem: Chronic Conditions and Co-morbidities  Goal: Patient's chronic conditions and co-morbidity symptoms are monitored and maintained or improved  Outcome: Progressing     Problem: Pain  Goal: Verbalizes/displays adequate comfort level or baseline comfort level  Outcome: Progressing     Problem: Nutrition Deficit:  Goal: Optimize nutritional status  Outcome: Progressing

## 2023-11-03 NOTE — PROGRESS NOTES
11/03/23 1605   Encounter Summary   Encounter Overview/Reason  Attempted Encounter   Service Provided For: Patient not available  (patient with PCT)

## 2023-11-03 NOTE — CARE COORDINATION
Writer placed perfectserve message to Dr. Zara Lugo regarding pulmonary requests by Maritza Castillo for discharge. Dr. Zara Lugo will address this with pulmonology.   Electronically signed by JANE Salcedo on 11/3/2023 at 5:02 PM

## 2023-11-03 NOTE — PLAN OF CARE
Problem: Discharge Planning  Goal: Discharge to home or other facility with appropriate resources  Outcome: Progressing     Problem: Safety - Adult  Goal: Free from fall injury  Outcome: Progressing  Flowsheets (Taken 11/3/2023 1036)  Free From Fall Injury: Instruct family/caregiver on patient safety     Problem: Skin/Tissue Integrity  Goal: Absence of new skin breakdown  Description: 1. Monitor for areas of redness and/or skin breakdown  2. Assess vascular access sites hourly  3. Every 4-6 hours minimum:  Change oxygen saturation probe site  4. Every 4-6 hours:  If on nasal continuous positive airway pressure, respiratory therapy assess nares and determine need for appliance change or resting period.   Outcome: Progressing     Problem: ABCDS Injury Assessment  Goal: Absence of physical injury  Outcome: Progressing  Flowsheets (Taken 11/3/2023 1036)  Absence of Physical Injury: Implement safety measures based on patient assessment     Problem: Chronic Conditions and Co-morbidities  Goal: Patient's chronic conditions and co-morbidity symptoms are monitored and maintained or improved  Outcome: Progressing     Problem: Pain  Goal: Verbalizes/displays adequate comfort level or baseline comfort level  Outcome: Progressing     Problem: Nutrition Deficit:  Goal: Optimize nutritional status  Outcome: Progressing

## 2023-11-03 NOTE — PROGRESS NOTES
[facility]      Progress Note      Date:   11/3/2023  Patient name:  Beryl Tracey  Date of admission:  10/9/2023  9:43 AM  MRN:   [id number]  YOB: 1935        Brief HPI    Pt admitted for cellulitis, respiratory failure, pending placement    ASSESSMENT/PLAN     Hospital Problems             Last Modified POA    * (Principal) Acute congestive heart failure, unspecified heart failure type ([address]) 10/9/2023 Yes    Lymphedema of both lower extremities 10/10/2023 Yes    Non-pressure chronic ulcer of right 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 ([address]) 10/10/2023 Yes    Non-pressure chronic ulcer of left 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 ([address]) 10/10/2023 Yes    Paroxysmal A-fib ([address]) 10/12/2023 Yes    Coronary artery disease involving native coronary artery of native heart without angina pectoris 10/10/2023 Yes    Adult BMI 40.0-44.9 kg/sq m ([address]) 10/10/2023 Yes    Mixed hyperlipidemia 10/10/2023 Yes    Secondary hypercoagulable state ([address]) 10/10/2023 Yes    Primary hypertension 10/10/2023 Yes    DONI (acute kidney injury) ([address]) 10/11/2023 Yes    Acute GI bleeding 10/16/2023 Yes    Hypokalemia 10/16/2023 Yes    Severe anemia 10/17/2023 Yes    Encounter for palliative care 10/20/2023 Yes    Goals of care, counseling/discussion 10/20/2023 Yes    Cellulitis of right lower extremity 10/21/2023 Yes    Rhinovirus infection 10/21/2023 Yes    Leukocytosis 10/21/2023 Yes    Hypotension 10/21/2023 Yes    Chronic respiratory failure ([address]) 10/31/2023 Yes       Pt off BiPAP this AM, comfortable on nasal cannula, pt has significant wheezing throughout, will start duoneb q6h  Eliquis was resumed, repeat H&H today  BP stable on lopressor at 12.5mg BID  Continue bumex, midodrine, cestor, allopurinol. Repeat BMP today and replace potassium as needed    DNR-CCA   ADULT DIET; Regular;  No Added Salt (3-4 gm)  ADULT ORAL NUTRITION SUPPLEMENT; Lunch, Breakfast; Diabetic Oral Supplement   DVT:Resume

## 2023-11-04 LAB
ANION GAP SERPL CALCULATED.3IONS-SCNC: 6 MMOL/L (ref 9–17)
BUN SERPL-MCNC: 21 MG/DL (ref 8–23)
CALCIUM SERPL-MCNC: 8 MG/DL (ref 8.6–10.4)
CHLORIDE SERPL-SCNC: 102 MMOL/L (ref 98–107)
CO2 SERPL-SCNC: 34 MMOL/L (ref 20–31)
CREAT SERPL-MCNC: 1.4 MG/DL (ref 0.7–1.2)
GFR SERPL CREATININE-BSD FRML MDRD: 49 ML/MIN/1.73M2
GLUCOSE SERPL-MCNC: 98 MG/DL (ref 70–99)
HCT VFR BLD AUTO: 27 % (ref 41–53)
HGB BLD-MCNC: 8.1 G/DL (ref 13.5–17.5)
POTASSIUM SERPL-SCNC: 3.8 MMOL/L (ref 3.7–5.3)
SODIUM SERPL-SCNC: 142 MMOL/L (ref 135–144)

## 2023-11-04 PROCEDURE — 94640 AIRWAY INHALATION TREATMENT: CPT

## 2023-11-04 PROCEDURE — 6370000000 HC RX 637 (ALT 250 FOR IP): Performed by: STUDENT IN AN ORGANIZED HEALTH CARE EDUCATION/TRAINING PROGRAM

## 2023-11-04 PROCEDURE — 6370000000 HC RX 637 (ALT 250 FOR IP): Performed by: FAMILY MEDICINE

## 2023-11-04 PROCEDURE — 2700000000 HC OXYGEN THERAPY PER DAY

## 2023-11-04 PROCEDURE — 94761 N-INVAS EAR/PLS OXIMETRY MLT: CPT

## 2023-11-04 PROCEDURE — 94660 CPAP INITIATION&MGMT: CPT

## 2023-11-04 PROCEDURE — 6370000000 HC RX 637 (ALT 250 FOR IP): Performed by: INTERNAL MEDICINE

## 2023-11-04 PROCEDURE — 2060000000 HC ICU INTERMEDIATE R&B

## 2023-11-04 PROCEDURE — 80048 BASIC METABOLIC PNL TOTAL CA: CPT

## 2023-11-04 PROCEDURE — 6370000000 HC RX 637 (ALT 250 FOR IP)

## 2023-11-04 PROCEDURE — 99232 SBSQ HOSP IP/OBS MODERATE 35: CPT | Performed by: NURSE PRACTITIONER

## 2023-11-04 PROCEDURE — 94664 DEMO&/EVAL PT USE INHALER: CPT

## 2023-11-04 PROCEDURE — 99232 SBSQ HOSP IP/OBS MODERATE 35: CPT | Performed by: FAMILY MEDICINE

## 2023-11-04 PROCEDURE — 36415 COLL VENOUS BLD VENIPUNCTURE: CPT

## 2023-11-04 PROCEDURE — 2580000003 HC RX 258: Performed by: FAMILY MEDICINE

## 2023-11-04 PROCEDURE — 85018 HEMOGLOBIN: CPT

## 2023-11-04 PROCEDURE — 85014 HEMATOCRIT: CPT

## 2023-11-04 RX ADMIN — POTASSIUM CHLORIDE 20 MEQ: 10 CAPSULE, COATED, EXTENDED RELEASE ORAL at 07:57

## 2023-11-04 RX ADMIN — BENZONATATE 200 MG: 200 CAPSULE ORAL at 13:35

## 2023-11-04 RX ADMIN — ANTI-FUNGAL POWDER MICONAZOLE NITRATE TALC FREE: 1.42 POWDER TOPICAL at 07:57

## 2023-11-04 RX ADMIN — Medication 60 MG: at 20:51

## 2023-11-04 RX ADMIN — NYSTATIN 500000 UNITS: 100000 SUSPENSION ORAL at 20:53

## 2023-11-04 RX ADMIN — FLUTICASONE PROPIONATE 2 SPRAY: 50 SPRAY, METERED NASAL at 07:58

## 2023-11-04 RX ADMIN — SODIUM CHLORIDE, PRESERVATIVE FREE 10 ML: 5 INJECTION INTRAVENOUS at 07:58

## 2023-11-04 RX ADMIN — BUMETANIDE 2 MG: 1 TABLET ORAL at 20:52

## 2023-11-04 RX ADMIN — SODIUM CHLORIDE, PRESERVATIVE FREE 10 ML: 5 INJECTION INTRAVENOUS at 20:53

## 2023-11-04 RX ADMIN — NYSTATIN 500000 UNITS: 100000 SUSPENSION ORAL at 07:57

## 2023-11-04 RX ADMIN — PANTOPRAZOLE SODIUM 40 MG: 40 TABLET, DELAYED RELEASE ORAL at 16:37

## 2023-11-04 RX ADMIN — IPRATROPIUM BROMIDE AND ALBUTEROL SULFATE 1 DOSE: 2.5; .5 SOLUTION RESPIRATORY (INHALATION) at 19:52

## 2023-11-04 RX ADMIN — IPRATROPIUM BROMIDE AND ALBUTEROL SULFATE 1 DOSE: 2.5; .5 SOLUTION RESPIRATORY (INHALATION) at 07:12

## 2023-11-04 RX ADMIN — BUMETANIDE 2 MG: 1 TABLET ORAL at 07:57

## 2023-11-04 RX ADMIN — APIXABAN 2.5 MG: 2.5 TABLET, FILM COATED ORAL at 20:52

## 2023-11-04 RX ADMIN — NYSTATIN 500000 UNITS: 100000 SUSPENSION ORAL at 16:37

## 2023-11-04 RX ADMIN — ALLOPURINOL 50 MG: 100 TABLET ORAL at 07:57

## 2023-11-04 RX ADMIN — APIXABAN 2.5 MG: 2.5 TABLET, FILM COATED ORAL at 07:57

## 2023-11-04 RX ADMIN — POTASSIUM CHLORIDE 20 MEQ: 10 CAPSULE, COATED, EXTENDED RELEASE ORAL at 20:51

## 2023-11-04 RX ADMIN — BENZONATATE 200 MG: 200 CAPSULE ORAL at 07:57

## 2023-11-04 RX ADMIN — ROSUVASTATIN CALCIUM 20 MG: 20 TABLET, FILM COATED ORAL at 07:58

## 2023-11-04 RX ADMIN — METOPROLOL TARTRATE 12.5 MG: 25 TABLET, FILM COATED ORAL at 07:58

## 2023-11-04 RX ADMIN — BENZONATATE 200 MG: 200 CAPSULE ORAL at 20:52

## 2023-11-04 RX ADMIN — Medication 60 MG: at 07:58

## 2023-11-04 RX ADMIN — METOPROLOL TARTRATE 12.5 MG: 25 TABLET, FILM COATED ORAL at 20:52

## 2023-11-04 RX ADMIN — NYSTATIN 500000 UNITS: 100000 SUSPENSION ORAL at 13:35

## 2023-11-04 RX ADMIN — PANTOPRAZOLE SODIUM 40 MG: 40 TABLET, DELAYED RELEASE ORAL at 05:56

## 2023-11-04 ASSESSMENT — ENCOUNTER SYMPTOMS
DIARRHEA: 0
ABDOMINAL PAIN: 0

## 2023-11-04 ASSESSMENT — PAIN SCALES - GENERAL: PAINLEVEL_OUTOF10: 0

## 2023-11-04 NOTE — PROGRESS NOTES
11/04/23 1423   Encounter Summary   Encounter Overview/Reason  Spiritual/Emotional Needs   Service Provided For: Patient   Referral/Consult From: Palliative Care   Last Encounter  11/04/23   Complexity of Encounter Low   Spiritual/Emotional needs   Type Spiritual Support   Palliative Care   Type Palliative Care, Follow-up   Assessment/Intervention/Outcome   Assessment Calm   Intervention Active listening;Sustaining Presence/Ministry of presence   Outcome Engaged in conversation

## 2023-11-04 NOTE — PLAN OF CARE
Problem: Discharge Planning  Goal: Discharge to home or other facility with appropriate resources  11/3/2023 2314 by Forest Milian RN  Outcome: Progressing  11/3/2023 1948 by Basil Vang RN  Outcome: Progressing     Problem: Safety - Adult  Goal: Free from fall injury  11/3/2023 2314 by Forest Milian RN  Outcome: Progressing  11/3/2023 1948 by Basil Vang RN  Outcome: Progressing  Flowsheets (Taken 11/3/2023 1036)  Free From Fall Injury: Instruct family/caregiver on patient safety     Problem: Skin/Tissue Integrity  Goal: Absence of new skin breakdown  Description: 1. Monitor for areas of redness and/or skin breakdown  2. Assess vascular access sites hourly  3. Every 4-6 hours minimum:  Change oxygen saturation probe site  4. Every 4-6 hours:  If on nasal continuous positive airway pressure, respiratory therapy assess nares and determine need for appliance change or resting period.   11/3/2023 2314 by Forest Milian RN  Outcome: Progressing  11/3/2023 1948 by Basil Vang RN  Outcome: Progressing     Problem: ABCDS Injury Assessment  Goal: Absence of physical injury  11/3/2023 2314 by Forest Milian RN  Outcome: Progressing  11/3/2023 1948 by Basil Vang RN  Outcome: Progressing  Flowsheets (Taken 11/3/2023 1036)  Absence of Physical Injury: Implement safety measures based on patient assessment     Problem: Chronic Conditions and Co-morbidities  Goal: Patient's chronic conditions and co-morbidity symptoms are monitored and maintained or improved  11/3/2023 2314 by Forest Milian RN  Outcome: Progressing  11/3/2023 1948 by Basil Vang RN  Outcome: Progressing     Problem: Pain  Goal: Verbalizes/displays adequate comfort level or baseline comfort level  11/3/2023 2314 by Forest Milian RN  Outcome: Progressing  11/3/2023 1948 by Basil Vang RN  Outcome: Progressing     Problem: Nutrition Deficit:  Goal: Optimize nutritional status  11/3/2023 2314 by Monday, Jeannette Toledo RN  Outcome: Progressing  11/3/2023 1948 by Mago Samayoa RN  Outcome: Progressing

## 2023-11-04 NOTE — PROGRESS NOTES
[facility] -   Infectious diseases evaluation  admission date 10/9/2023    reason for consultation:   Right thigh cellulitis    Impression :   Current:  Right thigh cellulitis resolving  Distal lower extremity venous stasis ulcers with surrounding cellulitis resolved  Rhinovirus infection  Bilateral lower extremity lymphedema  Anemia  CHF  Acute hypoxic respiratory failure   Acute on chronic renal failure  Coronary artery disease status post CABG  Obesity    Recommendations   Zyvox 600 mg IV q12 hours completed 10/21/23    Recommendations   Monitor off antibiotics  Follow CBC renal function. Continue supportive care  Remove PICC line prior to discharge. Awaiting Discharge. Discussed with RN. Infection Control Recommendations   [place] Precautions  Droplet isolation    Antimicrobial Stewardship Recommendations   Simplification of therapy  Targeted therapy    History of Present Illness:   Initial history:  Andriy Armando is a 82y.o.-year-old male presented to the hospital on 10/9/23 with worsening shortness of breath associated with lower extremity edema for 1 day. -Symptoms moderate to severe, worse with exertion, no alleviating or aggravating factors. He had a chronic lower extremity lymphedema with venous stasis ulcers with surrounding redness. The patient was treated with diuretics for CHF. On 10/14/2023 he developed fever with a temperature max of 102.3. WBC increased to 18.3, procalcitonin level 0.55  Respiratory panel with COVID-19 PCR was positive for rhinovirus on 10/13/2023. Started on IV Zyvox and cefepime on 10/14/2023  UA 10/16 showed 3-5 WBC, negative nitrate, negative leukocyte esterase  Stool occult blood positive  Procalcitonin level 2.2 and lactic acid 2.7 on 10/16    Interval changes  11/4/2023   Afebrile. Sleeping. No acute events per RN.         Patient Vitals for the past 8 hrs:   BP Temp Temp src Pulse Resp SpO2   11/04/23 1145 (!) Housing Stability Vital Sign     Unable to Pay for Housing in the Last Year: Not on file     Number of Places Lived in the Last Year: Not on file     Unstable Housing in the Last Year: No       Family History:   History reviewed. No pertinent family history. Medical Decision Making:   I have independently reviewed/ordered the following labs:    CBC with Differential:   Recent Labs     11/02/23  0518 11/03/23  0836 11/04/23  0545   WBC 4.3  --   --    HGB 8.7* 8.7* 8.1*   HCT 29.1* 29.2* 27.0*   *  --   --    LYMPHOPCT 21*  --   --    MONOPCT 11*  --   --        BMP:  Recent Labs     11/02/23  0518 11/04/23  0545    142   K 3.6* 3.8    102   CO2 34* 34*   BUN 22 21   CREATININE 1.3* 1.4*       Hepatic Function Panel:   No results for input(s): \"PROT\", \"LABALBU\", \"BILIDIR\", \"IBILI\", \"BILITOT\", \"ALKPHOS\", \"ALT\", \"AST\" in the last 72 hours. No results for input(s): \"RPR\" in the last 72 hours. No results for input(s): \"HIV\" in the last 72 hours. No results for input(s): \"BC\" in the last 72 hours. Lab Results   Component Value Date/Time    CREATININE 1.4 11/04/2023 05:45 AM    GLUCOSE 98 11/04/2023 05:45 AM       Detailed results: Thank you for allowing us to participate in the care of this patient. Please call with questions. This note is created with the assistance of a speech recognition program.  While intending to generate adocument that actually reflects the content of the visit, the document can still have some errors including those of syntax and sound a like substitutions which may escape proof reading. It such instances, actual meaningcan be extrapolated by contextual diversion.     JUAN FRANCISCO Dixon - CNP  Office: [phone]  Perfect serve / office [phone]

## 2023-11-04 NOTE — PLAN OF CARE
Problem: Discharge Planning  Goal: Discharge to home or other facility with appropriate resources  Outcome: Progressing  Flowsheets (Taken 11/4/2023 0826)  Discharge to home or other facility with appropriate resources: Identify barriers to discharge with patient and caregiver     Problem: Safety - Adult  Goal: Free from fall injury  Outcome: Progressing  Flowsheets (Taken 11/4/2023 1112)  Free From Fall Injury: Instruct family/caregiver on patient safety     Problem: Skin/Tissue Integrity  Goal: Absence of new skin breakdown  Description: 1. Monitor for areas of redness and/or skin breakdown  2. Assess vascular access sites hourly  3. Every 4-6 hours minimum:  Change oxygen saturation probe site  4. Every 4-6 hours:  If on nasal continuous positive airway pressure, respiratory therapy assess nares and determine need for appliance change or resting period.   Outcome: Progressing     Problem: ABCDS Injury Assessment  Goal: Absence of physical injury  Outcome: Progressing  Flowsheets (Taken 11/4/2023 1112)  Absence of Physical Injury: Implement safety measures based on patient assessment     Problem: Chronic Conditions and Co-morbidities  Goal: Patient's chronic conditions and co-morbidity symptoms are monitored and maintained or improved  Outcome: Progressing  Flowsheets (Taken 11/4/2023 0826)  Care Plan - Patient's Chronic Conditions and Co-Morbidity Symptoms are Monitored and Maintained or Improved: Monitor and assess patient's chronic conditions and comorbid symptoms for stability, deterioration, or improvement     Problem: Pain  Goal: Verbalizes/displays adequate comfort level or baseline comfort level  Outcome: Progressing     Problem: Nutrition Deficit:  Goal: Optimize nutritional status  Outcome: Progressing

## 2023-11-04 NOTE — PROGRESS NOTES
--   --    HGB 8.7* 8.7* 8.1*   *  --   --      BMP:    Recent Labs     11/01/23  1441 11/02/23  0518 11/04/23  0545   NA  --  144 142   K 3.5* 3.6* 3.8   CL  --  104 102   CO2  --  34* 34*   BUN  --  22 21   CREATININE  --  1.3* 1.4*   GLUCOSE  --  106* 98       Lab Results   Component Value Date/Time    NITRU NEGATIVE 10/16/2023 02:15 PM    COLORU Yellow 10/16/2023 02:15 PM    PHUR 6.5 10/16/2023 02:15 PM    WBCUA 3 to 5 10/16/2023 02:15 PM    RBCUA 6 TO 9 10/16/2023 02:15 PM    BACTERIA None 10/16/2023 02:15 PM    SPECGRAV 1.014 10/16/2023 02:15 PM    LEUKOCYTESUR TRACE 10/16/2023 02:15 PM    UROBILINOGEN Normal 10/16/2023 02:15 PM    BILIRUBINUR NEGATIVE 10/16/2023 02:15 PM    GLUCOSEU NEGATIVE 10/16/2023 02:15 PM    KETUA NEGATIVE 10/16/2023 02:15 PM     Urine Sodium:     Lab Results   Component Value Date/Time    SHANIA 78 04/05/2022 05:34 PM     Urine Creatinine:     Lab Results   Component Value Date/Time    LABCREA 37.1 04/05/2022 05:34 PM     IMPRESSION/RECOMMENDATIONS:      1. Acute kidney injury with underlying chronic kidney disease stage IIIb - consistent with prerenal azotemia/cardiorenal syndrome from diuretic use. Serum creatinine today is 1.4 mg/dL. Monitor urine output closely    2. Acute decompensated systolic heart failure - continue oral Bumex. Daily weights. Strict urine output documentation. 3.  Hypotension -   Continue as needed midodrine. 4.  Microcytic anemia - consistent with iron deficiency with iron saturation 6%. He has completed loading dose of IV iron and is currently on Retacrit 3000 units subcutaneously 3 times a week. Hemoglobin today is 8.1 g/dL.     Prognosis is guarded    Maged Vang  Attending Clinical Nephrologist

## 2023-11-04 NOTE — PROGRESS NOTES
[facility]      Progress Note      Date:   11/4/2023  Patient name:  Osbaldo Dumont  Date of admission:  10/9/2023  9:43 AM  MRN:   [id number]  YOB: 1935        Brief HPI    Pt admitted for cellulitis, respiratory failure, pending placement    ASSESSMENT/PLAN     Hospital Problems             Last Modified POA    * (Principal) Acute congestive heart failure, unspecified heart failure type ([address]) 10/9/2023 Yes    Lymphedema of both lower extremities 10/10/2023 Yes    Non-pressure chronic ulcer of right 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 ([address]) 10/10/2023 Yes    Non-pressure chronic ulcer of left 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 ([address]) 10/10/2023 Yes    Paroxysmal A-fib ([address]) 10/12/2023 Yes    Coronary artery disease involving native coronary artery of native heart without angina pectoris 10/10/2023 Yes    Adult BMI 40.0-44.9 kg/sq m ([address]) 10/10/2023 Yes    Mixed hyperlipidemia 10/10/2023 Yes    Secondary hypercoagulable state ([address]) 10/10/2023 Yes    Primary hypertension 10/10/2023 Yes    DONI (acute kidney injury) ([address]) 10/11/2023 Yes    Acute GI bleeding 10/16/2023 Yes    Hypokalemia 10/16/2023 Yes    Severe anemia 10/17/2023 Yes    Encounter for palliative care 10/20/2023 Yes    Goals of care, counseling/discussion 10/20/2023 Yes    Cellulitis of right lower extremity 10/21/2023 Yes    Rhinovirus infection 10/21/2023 Yes    Leukocytosis 10/21/2023 Yes    Hypotension 10/21/2023 Yes    Chronic respiratory failure ([address]) 10/31/2023 Yes     Pt off BiPAP this AM, comfortable on nasal cannula, pt has transitioned to Bilevel and is off of AVAPS, tolerating well. Eliquis was resumed, repeat Hgb 8.1 today  BP stable on lopressor at 12.5mg BID  Continue bumex, midodrine, cestor, allopurinol. Repeat BMP today and replace potassium as needed    DNR-CCA   ADULT DIET; Regular;  No Added Salt (3-4 gm)  ADULT ORAL NUTRITION SUPPLEMENT; Lunch, Breakfast; Diabetic Oral Supplement   DVT:Resume \"PHOS\"  Ionized Calcium: No results found for: \"CAION\"   PT/INR:    Lab Results   Component Value Date/Time    PROTIME 13.9 10/09/2023 10:54 AM    INR 1.0 10/09/2023 10:54 AM     PTT:    Lab Results   Component Value Date/Time    APTT 31.9 04/06/2022 10:17 AM         Saman Kaufman MD   11/4/2023 8:11 AM

## 2023-11-05 LAB
ANION GAP SERPL CALCULATED.3IONS-SCNC: 8 MMOL/L (ref 9–17)
BUN SERPL-MCNC: 18 MG/DL (ref 8–23)
CALCIUM SERPL-MCNC: 7.8 MG/DL (ref 8.6–10.4)
CHLORIDE SERPL-SCNC: 99 MMOL/L (ref 98–107)
CO2 SERPL-SCNC: 31 MMOL/L (ref 20–31)
CREAT SERPL-MCNC: 1.4 MG/DL (ref 0.7–1.2)
GFR SERPL CREATININE-BSD FRML MDRD: 49 ML/MIN/1.73M2
GLUCOSE SERPL-MCNC: 100 MG/DL (ref 70–99)
HCT VFR BLD AUTO: 27.8 % (ref 41–53)
HGB BLD-MCNC: 8.4 G/DL (ref 13.5–17.5)
POTASSIUM SERPL-SCNC: 3 MMOL/L (ref 3.7–5.3)
POTASSIUM SERPL-SCNC: 3.5 MMOL/L (ref 3.7–5.3)
SODIUM SERPL-SCNC: 138 MMOL/L (ref 135–144)

## 2023-11-05 PROCEDURE — 94640 AIRWAY INHALATION TREATMENT: CPT

## 2023-11-05 PROCEDURE — 6370000000 HC RX 637 (ALT 250 FOR IP): Performed by: FAMILY MEDICINE

## 2023-11-05 PROCEDURE — 2700000000 HC OXYGEN THERAPY PER DAY

## 2023-11-05 PROCEDURE — 94761 N-INVAS EAR/PLS OXIMETRY MLT: CPT

## 2023-11-05 PROCEDURE — 6370000000 HC RX 637 (ALT 250 FOR IP)

## 2023-11-05 PROCEDURE — 85014 HEMATOCRIT: CPT

## 2023-11-05 PROCEDURE — 6370000000 HC RX 637 (ALT 250 FOR IP): Performed by: INTERNAL MEDICINE

## 2023-11-05 PROCEDURE — 85018 HEMOGLOBIN: CPT

## 2023-11-05 PROCEDURE — 2060000000 HC ICU INTERMEDIATE R&B

## 2023-11-05 PROCEDURE — 99232 SBSQ HOSP IP/OBS MODERATE 35: CPT | Performed by: FAMILY MEDICINE

## 2023-11-05 PROCEDURE — 99232 SBSQ HOSP IP/OBS MODERATE 35: CPT | Performed by: NURSE PRACTITIONER

## 2023-11-05 PROCEDURE — 80048 BASIC METABOLIC PNL TOTAL CA: CPT

## 2023-11-05 PROCEDURE — 2580000003 HC RX 258: Performed by: FAMILY MEDICINE

## 2023-11-05 PROCEDURE — 6370000000 HC RX 637 (ALT 250 FOR IP): Performed by: STUDENT IN AN ORGANIZED HEALTH CARE EDUCATION/TRAINING PROGRAM

## 2023-11-05 PROCEDURE — 84132 ASSAY OF SERUM POTASSIUM: CPT

## 2023-11-05 PROCEDURE — 6360000002 HC RX W HCPCS: Performed by: INTERNAL MEDICINE

## 2023-11-05 PROCEDURE — 94660 CPAP INITIATION&MGMT: CPT

## 2023-11-05 PROCEDURE — 36415 COLL VENOUS BLD VENIPUNCTURE: CPT

## 2023-11-05 PROCEDURE — 94664 DEMO&/EVAL PT USE INHALER: CPT

## 2023-11-05 PROCEDURE — 89220 SPUTUM SPECIMEN COLLECTION: CPT

## 2023-11-05 PROCEDURE — 6360000002 HC RX W HCPCS: Performed by: FAMILY MEDICINE

## 2023-11-05 RX ORDER — POTASSIUM CHLORIDE 750 MG/1
40 CAPSULE, EXTENDED RELEASE ORAL 2 TIMES DAILY
Status: DISCONTINUED | OUTPATIENT
Start: 2023-11-05 | End: 2023-11-09 | Stop reason: HOSPADM

## 2023-11-05 RX ADMIN — ALLOPURINOL 50 MG: 100 TABLET ORAL at 08:36

## 2023-11-05 RX ADMIN — ANTI-FUNGAL POWDER MICONAZOLE NITRATE TALC FREE: 1.42 POWDER TOPICAL at 20:53

## 2023-11-05 RX ADMIN — APIXABAN 2.5 MG: 2.5 TABLET, FILM COATED ORAL at 08:36

## 2023-11-05 RX ADMIN — NYSTATIN 500000 UNITS: 100000 SUSPENSION ORAL at 08:35

## 2023-11-05 RX ADMIN — PANTOPRAZOLE SODIUM 40 MG: 40 TABLET, DELAYED RELEASE ORAL at 06:04

## 2023-11-05 RX ADMIN — SODIUM CHLORIDE, PRESERVATIVE FREE 10 ML: 5 INJECTION INTRAVENOUS at 20:49

## 2023-11-05 RX ADMIN — ANTI-FUNGAL POWDER MICONAZOLE NITRATE TALC FREE: 1.42 POWDER TOPICAL at 08:39

## 2023-11-05 RX ADMIN — POTASSIUM CHLORIDE 10 MEQ: 7.46 INJECTION, SOLUTION INTRAVENOUS at 15:33

## 2023-11-05 RX ADMIN — NYSTATIN 500000 UNITS: 100000 SUSPENSION ORAL at 17:20

## 2023-11-05 RX ADMIN — IPRATROPIUM BROMIDE AND ALBUTEROL SULFATE 1 DOSE: 2.5; .5 SOLUTION RESPIRATORY (INHALATION) at 19:28

## 2023-11-05 RX ADMIN — POTASSIUM CHLORIDE 10 MEQ: 7.46 INJECTION, SOLUTION INTRAVENOUS at 11:22

## 2023-11-05 RX ADMIN — METOPROLOL TARTRATE 12.5 MG: 25 TABLET, FILM COATED ORAL at 20:46

## 2023-11-05 RX ADMIN — POTASSIUM CHLORIDE 20 MEQ: 10 CAPSULE, COATED, EXTENDED RELEASE ORAL at 08:36

## 2023-11-05 RX ADMIN — SODIUM CHLORIDE, PRESERVATIVE FREE 10 ML: 5 INJECTION INTRAVENOUS at 08:35

## 2023-11-05 RX ADMIN — Medication 60 MG: at 20:46

## 2023-11-05 RX ADMIN — POTASSIUM CHLORIDE 40 MEQ: 10 CAPSULE, COATED, EXTENDED RELEASE ORAL at 20:46

## 2023-11-05 RX ADMIN — IPRATROPIUM BROMIDE AND ALBUTEROL SULFATE 1 DOSE: 2.5; .5 SOLUTION RESPIRATORY (INHALATION) at 14:59

## 2023-11-05 RX ADMIN — POTASSIUM CHLORIDE 10 MEQ: 7.46 INJECTION, SOLUTION INTRAVENOUS at 08:34

## 2023-11-05 RX ADMIN — APIXABAN 2.5 MG: 2.5 TABLET, FILM COATED ORAL at 20:46

## 2023-11-05 RX ADMIN — BENZONATATE 200 MG: 200 CAPSULE ORAL at 20:46

## 2023-11-05 RX ADMIN — BENZONATATE 200 MG: 200 CAPSULE ORAL at 12:40

## 2023-11-05 RX ADMIN — NYSTATIN 500000 UNITS: 100000 SUSPENSION ORAL at 20:46

## 2023-11-05 RX ADMIN — ROSUVASTATIN CALCIUM 20 MG: 20 TABLET, FILM COATED ORAL at 08:35

## 2023-11-05 RX ADMIN — FLUTICASONE PROPIONATE 2 SPRAY: 50 SPRAY, METERED NASAL at 08:39

## 2023-11-05 RX ADMIN — BUMETANIDE 2 MG: 1 TABLET ORAL at 08:35

## 2023-11-05 RX ADMIN — IPRATROPIUM BROMIDE AND ALBUTEROL SULFATE 1 DOSE: 2.5; .5 SOLUTION RESPIRATORY (INHALATION) at 07:13

## 2023-11-05 RX ADMIN — POTASSIUM CHLORIDE 10 MEQ: 7.46 INJECTION, SOLUTION INTRAVENOUS at 14:13

## 2023-11-05 RX ADMIN — BUMETANIDE 2 MG: 1 TABLET ORAL at 20:46

## 2023-11-05 RX ADMIN — POTASSIUM CHLORIDE 10 MEQ: 7.46 INJECTION, SOLUTION INTRAVENOUS at 09:40

## 2023-11-05 RX ADMIN — ALBUTEROL SULFATE 2.5 MG: 2.5 SOLUTION RESPIRATORY (INHALATION) at 21:35

## 2023-11-05 RX ADMIN — NYSTATIN 500000 UNITS: 100000 SUSPENSION ORAL at 12:40

## 2023-11-05 RX ADMIN — POTASSIUM CHLORIDE 10 MEQ: 7.46 INJECTION, SOLUTION INTRAVENOUS at 12:41

## 2023-11-05 RX ADMIN — Medication 60 MG: at 08:35

## 2023-11-05 RX ADMIN — METOPROLOL TARTRATE 12.5 MG: 25 TABLET, FILM COATED ORAL at 08:36

## 2023-11-05 RX ADMIN — BENZONATATE 200 MG: 200 CAPSULE ORAL at 08:35

## 2023-11-05 RX ADMIN — PANTOPRAZOLE SODIUM 40 MG: 40 TABLET, DELAYED RELEASE ORAL at 15:32

## 2023-11-05 ASSESSMENT — PAIN SCALES - GENERAL: PAINLEVEL_OUTOF10: 0

## 2023-11-05 ASSESSMENT — ENCOUNTER SYMPTOMS
ABDOMINAL PAIN: 0
DIARRHEA: 0

## 2023-11-05 NOTE — PLAN OF CARE
Problem: Discharge Planning  Goal: Discharge to home or other facility with appropriate resources  11/5/2023 0333 by Jl Obrien RN  Outcome: Progressing      Problem: Safety - Adult  Goal: Free from fall injury  11/5/2023 0333 by Jl Obrien RN  Outcome: Progressing       Problem: Skin/Tissue Integrity  Goal: Absence of new skin breakdown  Description: 1. Monitor for areas of redness and/or skin breakdown  2. Assess vascular access sites hourly  3. Every 4-6 hours minimum:  Change oxygen saturation probe site  4. Every 4-6 hours:  If on nasal continuous positive airway pressure, respiratory therapy assess nares and determine need for appliance change or resting period.   11/5/2023 0333 by Jl Obrien RN  Outcome: Progressing       Problem: ABCDS Injury Assessment  Goal: Absence of physical injury  11/5/2023 0333 by Jl Obrien RN  Outcome: Progressing       Problem: Chronic Conditions and Co-morbidities  Goal: Patient's chronic conditions and co-morbidity symptoms are monitored and maintained or improved  11/5/2023 0333 by Jl Obrien RN  Outcome: Progressing    Problem: Pain  Goal: Verbalizes/displays adequate comfort level or baseline comfort level  11/5/2023 0333 by Jl Obrien RN  Outcome: Progressing       Problem: Nutrition Deficit:  Goal: Optimize nutritional status  11/5/2023 0333 by Jl Obrien RN  Outcome: Progressing

## 2023-11-05 NOTE — PROGRESS NOTES
[facility] -   Infectious diseases evaluation  admission date 10/9/2023    reason for consultation:   Right thigh cellulitis    Impression :   Current:  Right thigh cellulitis resolving  Distal lower extremity venous stasis ulcers with surrounding cellulitis resolved  Rhinovirus infection  Bilateral lower extremity lymphedema  Anemia  CHF  Acute hypoxic respiratory failure   Acute on chronic renal failure  Coronary artery disease status post CABG  Obesity    Recommendations   Zyvox 600 mg IV q12 hours completed 10/21/23    Recommendations   Monitor off antibiotics  Follow CBC renal function. Continue supportive care  Remove PICC line prior to discharge. Awaiting Discharge. Discussed with RN. Infection Control Recommendations   [place] Precautions  Droplet isolation    Antimicrobial Stewardship Recommendations   Simplification of therapy  Targeted therapy    History of Present Illness:   Initial history:  Grace Reyna is a 82y.o.-year-old male presented to the hospital on 10/9/23 with worsening shortness of breath associated with lower extremity edema for 1 day. -Symptoms moderate to severe, worse with exertion, no alleviating or aggravating factors. He had a chronic lower extremity lymphedema with venous stasis ulcers with surrounding redness. The patient was treated with diuretics for CHF. On 10/14/2023 he developed fever with a temperature max of 102.3. WBC increased to 18.3, procalcitonin level 0.55  Respiratory panel with COVID-19 PCR was positive for rhinovirus on 10/13/2023.   Started on IV Zyvox and cefepime on 10/14/2023  UA 10/16 showed 3-5 WBC, negative nitrate, negative leukocyte esterase  Stool occult blood positive  Procalcitonin level 2.2 and lactic acid 2.7 on 10/16    Interval changes  11/5/2023   Afebrile  VS stable, SPO2 99% 2L/nc  No acute changes  No complaints, eating/drinking, denies n/v/d        Patient Vitals for the past 8 hrs:

## 2023-11-05 NOTE — CARE COORDINATION
ONGOING DISCHARGE PLANNING NOTE:    Writer reviewed LSW notes, and discharge plan is to discharge to [facility] pending BiPAP. Respiratory not available over the weekend, possibly Monday.     Electronically signed by Ivan Villalta RN on 11/5/2023 at 4:23 PM

## 2023-11-05 NOTE — PROGRESS NOTES
BRONCHOSPASM/BRONCHOCONSTRICTION     [x]         IMPROVE AERATION/BREATH SOUNDS  [x]   ADMINISTER BRONCHODILATOR THERAPY AS APPROPRIATE  [x]   ASSESS BREATH SOUNDS  [x]   IMPLEMENT AEROSOL/MDI PROTOCOL  [x]   PATIENT EDUCATION AS NEEDED   NONINVASIVE VENTILATION    PROVIDE OPTIMAL VENTILATION/ACCEPTABLE SPO2   IMPLEMENT NONINVASIVE VENTILATION PROTOCOL   MAINTAIN ACCEPTABLE SPO2   ASSESS SKIN INTEGRITY/BREAKDOWN SCORE   PATIENT EDUCATION AS NEEDED   BIPAP AS NEEDED Physical Therapy  {PT ALL NOTES:084571}  8:10 AM  Pt refused evaluation. Explained reason behind order but pt states \"I am able to climb 2 flights of stairs. I don't use anything to walk. I don't see any reason to have the evalaution.   Gil Collins [id number]

## 2023-11-05 NOTE — PLAN OF CARE
Problem: Discharge Planning  Goal: Discharge to home or other facility with appropriate resources  11/5/2023 1515 by Estuardo Mccray RN  Outcome: Progressing  Flowsheets (Taken 11/5/2023 0850)  Discharge to home or other facility with appropriate resources: Identify barriers to discharge with patient and caregiver     Problem: Safety - Adult  Goal: Free from fall injury  11/5/2023 1515 by Estuardo Mccray RN  Outcome: Progressing  Flowsheets (Taken 11/5/2023 1048)  Free From Fall Injury: Instruct family/caregiver on patient safety     Problem: Skin/Tissue Integrity  Goal: Absence of new skin breakdown  Description: 1. Monitor for areas of redness and/or skin breakdown  2. Assess vascular access sites hourly  3. Every 4-6 hours minimum:  Change oxygen saturation probe site  4. Every 4-6 hours:  If on nasal continuous positive airway pressure, respiratory therapy assess nares and determine need for appliance change or resting period.   11/5/2023 1515 by Estuardo Mccray RN  Outcome: Progressing     Problem: ABCDS Injury Assessment  Goal: Absence of physical injury  11/5/2023 1515 by Estuardo Mccray RN  Outcome: Progressing  Flowsheets (Taken 11/5/2023 1048)  Absence of Physical Injury: Implement safety measures based on patient assessment     Problem: Chronic Conditions and Co-morbidities  Goal: Patient's chronic conditions and co-morbidity symptoms are monitored and maintained or improved  11/5/2023 1515 by Estuardo Mccray RN  Outcome: Progressing  Flowsheets (Taken 11/5/2023 0850)  Care Plan - Patient's Chronic Conditions and Co-Morbidity Symptoms are Monitored and Maintained or Improved: Monitor and assess patient's chronic conditions and comorbid symptoms for stability, deterioration, or improvement     Problem: Pain  Goal: Verbalizes/displays adequate comfort level or baseline comfort level  11/5/2023 1515 by Estuardo Mccray RN  Outcome: Progressing     Problem: Nutrition

## 2023-11-05 NOTE — PROGRESS NOTES
Patient's vital signs remained stable. He is on 2 L nasal cannula with adequate saturations. Normally does not wear oxygen. Wore his BiPAP overnight. Plan for skilled tomorrow. We are following for distance.   Please don't hesitate to  contact our service should there be any new concerns

## 2023-11-05 NOTE — PROGRESS NOTES
[facility]      Progress Note      Date:   11/5/2023  Patient name:  Arcadio Vargas  Date of admission:  10/9/2023  9:43 AM  MRN:   [id number]  YOB: 1935        Brief HPI    Pt admitted for cellulitis, respiratory failure, pending placement, hopefully [facility] 11/6/23    ASSESSMENT/PLAN     Hospital Problems             Last Modified POA    * (Principal) Acute congestive heart failure, unspecified heart failure type ([address]) 10/9/2023 Yes    Lymphedema of both lower extremities 10/10/2023 Yes    Non-pressure chronic ulcer of right 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 ([address]) 10/10/2023 Yes    Non-pressure chronic ulcer of left 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 ([address]) 10/10/2023 Yes    Paroxysmal A-fib ([address]) 10/12/2023 Yes    Coronary artery disease involving native coronary artery of native heart without angina pectoris 10/10/2023 Yes    Adult BMI 40.0-44.9 kg/sq m ([address]) 10/10/2023 Yes    Mixed hyperlipidemia 10/10/2023 Yes    Secondary hypercoagulable state ([address]) 10/10/2023 Yes    Primary hypertension 10/10/2023 Yes    DONI (acute kidney injury) ([address]) 10/11/2023 Yes    Acute GI bleeding 10/16/2023 Yes    Hypokalemia 10/16/2023 Yes    Severe anemia 10/17/2023 Yes    Encounter for palliative care 10/20/2023 Yes    Goals of care, counseling/discussion 10/20/2023 Yes    Cellulitis of right lower extremity 10/21/2023 Yes    Rhinovirus infection 10/21/2023 Yes    Leukocytosis 10/21/2023 Yes    Hypotension 10/21/2023 Yes    Chronic respiratory failure ([address]) 10/31/2023 Yes   Pt off BiPAP this AM, comfortable on nasal cannula, pt has transitioned to Bilevel and is off of AVAPS, tolerating well. Eliquis was resumed, repeat Hgb 8.4 today  BP stable on lopressor at 12.5mg BID  Continue bumex, midodrine, cestor, allopurinol. Repeat BMP today and replace potassium as needed, it was 3.0 today. No significant events overnight. DNR-CCA   ADULT DIET; Regular;  No Added Salt (3-4 gm)  ADULT ORAL

## 2023-11-06 LAB
ANION GAP SERPL CALCULATED.3IONS-SCNC: 8 MMOL/L (ref 9–17)
BUN SERPL-MCNC: 17 MG/DL (ref 8–23)
CALCIUM SERPL-MCNC: 7.8 MG/DL (ref 8.6–10.4)
CHLORIDE SERPL-SCNC: 98 MMOL/L (ref 98–107)
CO2 SERPL-SCNC: 31 MMOL/L (ref 20–31)
CREAT SERPL-MCNC: 1.3 MG/DL (ref 0.7–1.2)
GFR SERPL CREATININE-BSD FRML MDRD: 54 ML/MIN/1.73M2
GLUCOSE SERPL-MCNC: 100 MG/DL (ref 70–99)
POTASSIUM SERPL-SCNC: 3.5 MMOL/L (ref 3.7–5.3)
SODIUM SERPL-SCNC: 137 MMOL/L (ref 135–144)

## 2023-11-06 PROCEDURE — 2580000003 HC RX 258: Performed by: FAMILY MEDICINE

## 2023-11-06 PROCEDURE — 99239 HOSP IP/OBS DSCHRG MGMT >30: CPT | Performed by: FAMILY MEDICINE

## 2023-11-06 PROCEDURE — 2700000000 HC OXYGEN THERAPY PER DAY

## 2023-11-06 PROCEDURE — 94660 CPAP INITIATION&MGMT: CPT

## 2023-11-06 PROCEDURE — 6370000000 HC RX 637 (ALT 250 FOR IP): Performed by: STUDENT IN AN ORGANIZED HEALTH CARE EDUCATION/TRAINING PROGRAM

## 2023-11-06 PROCEDURE — 6370000000 HC RX 637 (ALT 250 FOR IP)

## 2023-11-06 PROCEDURE — 6360000002 HC RX W HCPCS: Performed by: INTERNAL MEDICINE

## 2023-11-06 PROCEDURE — 80048 BASIC METABOLIC PNL TOTAL CA: CPT

## 2023-11-06 PROCEDURE — 6370000000 HC RX 637 (ALT 250 FOR IP): Performed by: INTERNAL MEDICINE

## 2023-11-06 PROCEDURE — 94761 N-INVAS EAR/PLS OXIMETRY MLT: CPT

## 2023-11-06 PROCEDURE — 36415 COLL VENOUS BLD VENIPUNCTURE: CPT

## 2023-11-06 PROCEDURE — 6370000000 HC RX 637 (ALT 250 FOR IP): Performed by: FAMILY MEDICINE

## 2023-11-06 PROCEDURE — 99231 SBSQ HOSP IP/OBS SF/LOW 25: CPT | Performed by: INTERNAL MEDICINE

## 2023-11-06 PROCEDURE — 2060000000 HC ICU INTERMEDIATE R&B

## 2023-11-06 PROCEDURE — 94640 AIRWAY INHALATION TREATMENT: CPT

## 2023-11-06 RX ADMIN — BUMETANIDE 2 MG: 1 TABLET ORAL at 09:45

## 2023-11-06 RX ADMIN — ANTI-FUNGAL POWDER MICONAZOLE NITRATE TALC FREE: 1.42 POWDER TOPICAL at 20:14

## 2023-11-06 RX ADMIN — IPRATROPIUM BROMIDE AND ALBUTEROL SULFATE 1 DOSE: 2.5; .5 SOLUTION RESPIRATORY (INHALATION) at 18:50

## 2023-11-06 RX ADMIN — BENZONATATE 200 MG: 200 CAPSULE ORAL at 20:13

## 2023-11-06 RX ADMIN — ROSUVASTATIN CALCIUM 20 MG: 20 TABLET, FILM COATED ORAL at 09:44

## 2023-11-06 RX ADMIN — APIXABAN 2.5 MG: 2.5 TABLET, FILM COATED ORAL at 20:13

## 2023-11-06 RX ADMIN — APIXABAN 2.5 MG: 2.5 TABLET, FILM COATED ORAL at 09:44

## 2023-11-06 RX ADMIN — Medication 60 MG: at 20:13

## 2023-11-06 RX ADMIN — BENZONATATE 200 MG: 200 CAPSULE ORAL at 09:44

## 2023-11-06 RX ADMIN — PANTOPRAZOLE SODIUM 40 MG: 40 TABLET, DELAYED RELEASE ORAL at 05:33

## 2023-11-06 RX ADMIN — IPRATROPIUM BROMIDE AND ALBUTEROL SULFATE 1 DOSE: 2.5; .5 SOLUTION RESPIRATORY (INHALATION) at 15:03

## 2023-11-06 RX ADMIN — POTASSIUM CHLORIDE 40 MEQ: 10 CAPSULE, COATED, EXTENDED RELEASE ORAL at 20:13

## 2023-11-06 RX ADMIN — BENZONATATE 200 MG: 200 CAPSULE ORAL at 13:07

## 2023-11-06 RX ADMIN — EPOETIN ALFA-EPBX 3000 UNITS: 3000 INJECTION, SOLUTION INTRAVENOUS; SUBCUTANEOUS at 17:46

## 2023-11-06 RX ADMIN — POTASSIUM CHLORIDE 40 MEQ: 10 CAPSULE, COATED, EXTENDED RELEASE ORAL at 09:45

## 2023-11-06 RX ADMIN — ALLOPURINOL 50 MG: 100 TABLET ORAL at 09:44

## 2023-11-06 RX ADMIN — FLUTICASONE PROPIONATE 2 SPRAY: 50 SPRAY, METERED NASAL at 09:52

## 2023-11-06 RX ADMIN — ANTI-FUNGAL POWDER MICONAZOLE NITRATE TALC FREE: 1.42 POWDER TOPICAL at 09:50

## 2023-11-06 RX ADMIN — PANTOPRAZOLE SODIUM 40 MG: 40 TABLET, DELAYED RELEASE ORAL at 17:46

## 2023-11-06 RX ADMIN — NYSTATIN 500000 UNITS: 100000 SUSPENSION ORAL at 20:13

## 2023-11-06 RX ADMIN — BUMETANIDE 2 MG: 1 TABLET ORAL at 20:13

## 2023-11-06 RX ADMIN — SODIUM CHLORIDE, PRESERVATIVE FREE 10 ML: 5 INJECTION INTRAVENOUS at 20:13

## 2023-11-06 RX ADMIN — Medication 60 MG: at 09:45

## 2023-11-06 RX ADMIN — IPRATROPIUM BROMIDE AND ALBUTEROL SULFATE 1 DOSE: 2.5; .5 SOLUTION RESPIRATORY (INHALATION) at 08:24

## 2023-11-06 RX ADMIN — SODIUM CHLORIDE, PRESERVATIVE FREE 10 ML: 5 INJECTION INTRAVENOUS at 09:52

## 2023-11-06 RX ADMIN — METOPROLOL TARTRATE 12.5 MG: 25 TABLET, FILM COATED ORAL at 09:45

## 2023-11-06 RX ADMIN — NYSTATIN 500000 UNITS: 100000 SUSPENSION ORAL at 17:46

## 2023-11-06 RX ADMIN — METOPROLOL TARTRATE 12.5 MG: 25 TABLET, FILM COATED ORAL at 20:13

## 2023-11-06 RX ADMIN — NYSTATIN 500000 UNITS: 100000 SUSPENSION ORAL at 09:44

## 2023-11-06 ASSESSMENT — ENCOUNTER SYMPTOMS
DIARRHEA: 0
ABDOMINAL PAIN: 0

## 2023-11-06 NOTE — PROGRESS NOTES
11/06/23 1210   Encounter Summary   Encounter Overview/Reason  Spiritual/Emotional Needs   Service Provided For: Patient   Referral/Consult From: Palliative Care   Last Encounter  11/06/23   Complexity of Encounter Low   Spiritual/Emotional needs   Type Spiritual Support   Palliative Care   Type Palliative Care, Follow-up   Assessment/Intervention/Outcome   Assessment Unable to assess   Intervention Prayer (assurance of)/[place]

## 2023-11-06 NOTE — PROGRESS NOTES
[facility] -   Infectious diseases evaluation  admission date 10/9/2023    reason for consultation:   Right thigh cellulitis    Impression :   Current:  Right thigh cellulitis resolving  Distal lower extremity venous stasis ulcers with surrounding cellulitis resolved  Rhinovirus infection  Bilateral lower extremity lymphedema  Anemia  CHF  Acute hypoxic respiratory failure   Acute on chronic renal failure  Coronary artery disease status post CABG  Obesity    Recommendations   Zyvox 600 mg IV q12 hours completed 10/21/23    Recommendations   Monitor off antibiotics  Follow CBC renal function. Continue supportive care  Remove PICC line prior to discharge. Awaiting Discharge. Discussed with RN. Infection Control Recommendations   [place] Precautions  Droplet isolation    Antimicrobial Stewardship Recommendations   Simplification of therapy  Targeted therapy    History of Present Illness:   Initial history:  Betsy Ross is a 82y.o.-year-old male presented to the hospital on 10/9/23 with worsening shortness of breath associated with lower extremity edema for 1 day. -Symptoms moderate to severe, worse with exertion, no alleviating or aggravating factors. He had a chronic lower extremity lymphedema with venous stasis ulcers with surrounding redness. The patient was treated with diuretics for CHF. On 10/14/2023 he developed fever with a temperature max of 102.3. WBC increased to 18.3, procalcitonin level 0.55  Respiratory panel with COVID-19 PCR was positive for rhinovirus on 10/13/2023.   Started on IV Zyvox and cefepime on 10/14/2023  UA 10/16 showed 3-5 WBC, negative nitrate, negative leukocyte esterase  Stool occult blood positive  Procalcitonin level 2.2 and lactic acid 2.7 on 10/16    Interval changes  11/6/2023   Patient afebrile, comfortable on oxygen per nasal cannula, no new complaints        Patient Vitals for the past 8 hrs:   BP Temp Temp src Pulse Days of Exercise per Week: 0 days     Minutes of Exercise per Session: 0 min   Stress: Not on file   Social Connections: Not on file   Intimate Partner Violence: Not on file   Housing Stability: Unknown (2/2/2023)    Housing Stability Vital Sign     Unable to Pay for Housing in the Last Year: Not on file     Number of [address] in the Last Year: Not on file     Unstable Housing in the Last Year: No       Family History:   History reviewed. No pertinent family history. Medical Decision Making:   I have independently reviewed/ordered the following labs:    CBC with Differential:   Recent Labs     11/04/23  0545 11/05/23 0540   HGB 8.1* 8.4*   HCT 27.0* 27.8*       BMP:  Recent Labs     11/05/23 0540 11/05/23  1806 11/06/23 0539     --  137   K 3.0* 3.5* 3.5*   CL 99  --  98   CO2 31  --  31   BUN 18  --  17   CREATININE 1.4*  --  1.3*       Hepatic Function Panel:   No results for input(s): \"PROT\", \"LABALBU\", \"BILIDIR\", \"IBILI\", \"BILITOT\", \"ALKPHOS\", \"ALT\", \"AST\" in the last 72 hours. No results for input(s): \"RPR\" in the last 72 hours. No results for input(s): \"HIV\" in the last 72 hours. No results for input(s): \"BC\" in the last 72 hours. Lab Results   Component Value Date/Time    CREATININE 1.3 11/06/2023 05:39 AM    GLUCOSE 100 11/06/2023 05:39 AM       Detailed results: Thank you for allowing us to participate in the care of this patient. Please call with questions. This note is created with the assistance of a speech recognition program.  While intending to generate adocument that actually reflects the content of the visit, the document can still have some errors including those of syntax and sound a like substitutions which may escape proof reading. It such instances, actual meaningcan be extrapolated by contextual diversion.     Natalie Garcia MD  Office: [phone]  Perfect serve / office [phone]

## 2023-11-06 NOTE — CARE COORDINATION
Writer attempted to submit authorization for this pt for [facility]. Pt does not have any recent therapy notation to submit to authorization. Writer placed call to pt daughter Danny Crowell to update on [place] acceptance and encouragement for the pt to participate in skilled therapy or insurance may not approve him to admit to a skilled facility. Analy agreeable. No further questions at this time.   Electronically signed by JANE Tsang on 11/6/2023 at 4:44 PM

## 2023-11-06 NOTE — PROGRESS NOTES
[facility]      Progress Note      Date:   11/6/2023  Patient name:  Humble Humphreys  Date of admission:  10/9/2023  9:43 AM  MRN:   [id number]  YOB: 1935        Brief HPI    Pt admitted for cellulitis, respiratory failure, pending placement, hopefully [facility] 11/6/23    ASSESSMENT/PLAN     Hospital Problems             Last Modified POA    * (Principal) Acute congestive heart failure, unspecified heart failure type ([address]) 10/9/2023 Yes    Lymphedema of both lower extremities 10/10/2023 Yes    Non-pressure chronic ulcer of right 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 ([address]) 10/10/2023 Yes    Non-pressure chronic ulcer of left 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 ([address]) 10/10/2023 Yes    Paroxysmal A-fib ([address]) 10/12/2023 Yes    Coronary artery disease involving native coronary artery of native heart without angina pectoris 10/10/2023 Yes    Adult BMI 40.0-44.9 kg/sq m ([address]) 10/10/2023 Yes    Mixed hyperlipidemia 10/10/2023 Yes    Secondary hypercoagulable state ([address]) 10/10/2023 Yes    Primary hypertension 10/10/2023 Yes    DONI (acute kidney injury) ([address]) 10/11/2023 Yes    Acute GI bleeding 10/16/2023 Yes    Hypokalemia 10/16/2023 Yes    Severe anemia 10/17/2023 Yes    Encounter for palliative care 10/20/2023 Yes    Goals of care, counseling/discussion 10/20/2023 Yes    Cellulitis of right lower extremity 10/21/2023 Yes    Rhinovirus infection 10/21/2023 Yes    Leukocytosis 10/21/2023 Yes    Hypotension 10/21/2023 Yes    Chronic respiratory failure ([address]) 10/31/2023 Yes   Pt off BiPAP this AM, comfortable on nasal cannula, pt has transitioned to Bilevel and is off of AVAPS, tolerating well. Eliquis was resumed, repeat Hgb 8.4 yesterday  BP stable on lopressor at 12.5mg BID  Continue bumex, midodrine, cestor, allopurinol. Repeat BMP today and replace potassium as needed, it was 3.5 today. No significant events overnight.   Awaiting approval for D/C to SNF, okay to discharge once approval

## 2023-11-06 NOTE — PROGRESS NOTES
Physical Therapy  DATE: 2023    NAME: Humble Humphreys  MRN: [id number]   : 1935    Patient not seen this date for Physical Therapy due to:      [] Cancel by RN or physician due to:    [] Hemodialysis    [] Critical Lab Value Level     [] Blood transfusion in progress    [] Acute or unstable cardiovascular status   _MAP < 55 or more than >115  _HR < 40 or > 130    [] Acute or unstable pulmonary status   -FiO2 > 60%   _RR < 5 or >40    _O2 sats < 85%    [] Strict Bedrest    [] Off Unit for surgery or procedure    [] Off Unit for testing       [] Pending imaging to R/O fracture    [x] Refusal by Patient; checked on pt @ [phone]. Pt lying in bed upon arrival. Pt stating \"later\" for therapy. Writer and MCDONOUGH provided max encouragement and pt continued to keep saying \"later\". Will continue to follow. [] Other      [] PT being discontinued at this time. Patient independent. No further needs. [] PT being discontinued at this time as the patient has been transferred to hospice care. No further needs.       Electronically signed by Jeffy Noel PTA on 23 at 11:27 AM EST

## 2023-11-06 NOTE — CARE COORDINATION
Writer placed call to Kush at [facility] regarding pt using his bipap use, requesting review of notation. Sisseb is going to look over notes and place call back to writer. Electronically signed by Lakia Lima on 11/6/2023 at 1:46 PM    Writer spoke to [facility] with [facility]. Facility accepts clinically.   Electronically signed by JANE Lima on 11/6/2023 at 4:30 PM

## 2023-11-06 NOTE — PROGRESS NOTES
[facility]   OCCUPATIONAL THERAPY MISSED TREATMENT NOTE   INPATIENT   Date: 23  Patient Name: Magdy Roth       Room: 1843/4545-11  MRN: [id number]   Account #: [de-identified]    : 1935  (82 y.o.)  Gender: male   Referring Practitioner: Roxana Boyer MD  Diagnosis: Acute CHF           REASON FOR MISSED TREATMENT:  Patient declined   -    Refusal by Patient, PcT in room upon arrival, reporting just finished getting pt cleaned up. Pt awake and alert in bed however declining therapy at this time. \"Can you just come back later\", writer and PTA providing therapeutic education, pt continues to state \"later\". OT attempt at [id number], will continue to follow and reattempt as able.          Electronically signed by GILBERT Mcintosh on 23 at 11:21 AM EST

## 2023-11-06 NOTE — PLAN OF CARE
Problem: Skin/Tissue Integrity  Goal: Absence of new skin breakdown  Description: 1. Monitor for areas of redness and/or skin breakdown  2. Assess vascular access sites hourly  3. Every 4-6 hours minimum:  Change oxygen saturation probe site  4. Every 4-6 hours:  If on nasal continuous positive airway pressure, respiratory therapy assess nares and determine need for appliance change or resting period. 11/6/2023 1610 by Ariana Domingo RN  Outcome: Progressing  Note: Skin assessment complete. Coccyx reddened. Triad cream applied PRN. Turned and repositioned every two hours. Area kept free from moisture. Proper nourishment and fluids encouraged, as appropriate. Will continue to monitor for additional needs and changes in skin breakdown. Problem: ABCDS Injury Assessment  Goal: Absence of physical injury  11/6/2023 1610 by Ariana Domingo RN  Note: Skin assessment complete. Coccyx reddened. Triad applied PRN. Turned and repositioned every two hours. Area kept free from moisture. Proper nourishment and fluids encouraged, as appropriate. Will continue to monitor for additional needs and changes in skin breakdown.

## 2023-11-06 NOTE — PLAN OF CARE
Problem: Discharge Planning  Goal: Discharge to home or other facility with appropriate resources  11/6/2023 0442 by Cyn Sears RN  Outcome: Progressing       Problem: Safety - Adult  Goal: Free from fall injury  11/6/2023 0442 by Cyn Sears RN  Outcome: Progressing    Problem: Skin/Tissue Integrity  Goal: Absence of new skin breakdown  Description: 1. Monitor for areas of redness and/or skin breakdown  2. Assess vascular access sites hourly  3. Every 4-6 hours minimum:  Change oxygen saturation probe site  4. Every 4-6 hours:  If on nasal continuous positive airway pressure, respiratory therapy assess nares and determine need for appliance change or resting period.   11/6/2023 0442 by Cyn Sears RN  Outcome: Progressing    Problem: ABCDS Injury Assessment  Goal: Absence of physical injury  11/6/2023 0442 by Cyn Sears RN  Outcome: Progressing       Problem: Chronic Conditions and Co-morbidities  Goal: Patient's chronic conditions and co-morbidity symptoms are monitored and maintained or improved  11/6/2023 0442 by Cyn Sears RN  Outcome: Progressing       Problem: Pain  Goal: Verbalizes/displays adequate comfort level or baseline comfort level  11/6/2023 0442 by Cyn Seasr RN  Outcome: Progressing       Problem: Nutrition Deficit:  Goal: Optimize nutritional status  11/6/2023 0442 by Cyn Sears RN  Outcome: Progressing

## 2023-11-07 PROCEDURE — 6370000000 HC RX 637 (ALT 250 FOR IP): Performed by: INTERNAL MEDICINE

## 2023-11-07 PROCEDURE — 97110 THERAPEUTIC EXERCISES: CPT

## 2023-11-07 PROCEDURE — 6370000000 HC RX 637 (ALT 250 FOR IP)

## 2023-11-07 PROCEDURE — 97530 THERAPEUTIC ACTIVITIES: CPT

## 2023-11-07 PROCEDURE — 99231 SBSQ HOSP IP/OBS SF/LOW 25: CPT | Performed by: STUDENT IN AN ORGANIZED HEALTH CARE EDUCATION/TRAINING PROGRAM

## 2023-11-07 PROCEDURE — 6370000000 HC RX 637 (ALT 250 FOR IP): Performed by: STUDENT IN AN ORGANIZED HEALTH CARE EDUCATION/TRAINING PROGRAM

## 2023-11-07 PROCEDURE — 6370000000 HC RX 637 (ALT 250 FOR IP): Performed by: FAMILY MEDICINE

## 2023-11-07 PROCEDURE — 94640 AIRWAY INHALATION TREATMENT: CPT

## 2023-11-07 PROCEDURE — 94761 N-INVAS EAR/PLS OXIMETRY MLT: CPT

## 2023-11-07 PROCEDURE — 94660 CPAP INITIATION&MGMT: CPT

## 2023-11-07 PROCEDURE — 99231 SBSQ HOSP IP/OBS SF/LOW 25: CPT | Performed by: INTERNAL MEDICINE

## 2023-11-07 PROCEDURE — 2580000003 HC RX 258: Performed by: FAMILY MEDICINE

## 2023-11-07 PROCEDURE — 2060000000 HC ICU INTERMEDIATE R&B

## 2023-11-07 PROCEDURE — 2700000000 HC OXYGEN THERAPY PER DAY

## 2023-11-07 RX ADMIN — NYSTATIN 500000 UNITS: 100000 SUSPENSION ORAL at 17:20

## 2023-11-07 RX ADMIN — POTASSIUM CHLORIDE 40 MEQ: 10 CAPSULE, COATED, EXTENDED RELEASE ORAL at 20:15

## 2023-11-07 RX ADMIN — PANTOPRAZOLE SODIUM 40 MG: 40 TABLET, DELAYED RELEASE ORAL at 17:20

## 2023-11-07 RX ADMIN — Medication 60 MG: at 08:09

## 2023-11-07 RX ADMIN — ROSUVASTATIN CALCIUM 20 MG: 20 TABLET, FILM COATED ORAL at 08:09

## 2023-11-07 RX ADMIN — Medication 60 MG: at 20:15

## 2023-11-07 RX ADMIN — IPRATROPIUM BROMIDE AND ALBUTEROL SULFATE 1 DOSE: 2.5; .5 SOLUTION RESPIRATORY (INHALATION) at 07:27

## 2023-11-07 RX ADMIN — NYSTATIN 500000 UNITS: 100000 SUSPENSION ORAL at 08:09

## 2023-11-07 RX ADMIN — BENZONATATE 200 MG: 200 CAPSULE ORAL at 13:56

## 2023-11-07 RX ADMIN — SODIUM CHLORIDE, PRESERVATIVE FREE 10 ML: 5 INJECTION INTRAVENOUS at 08:13

## 2023-11-07 RX ADMIN — METOPROLOL TARTRATE 12.5 MG: 25 TABLET, FILM COATED ORAL at 08:09

## 2023-11-07 RX ADMIN — IPRATROPIUM BROMIDE AND ALBUTEROL SULFATE 1 DOSE: 2.5; .5 SOLUTION RESPIRATORY (INHALATION) at 19:41

## 2023-11-07 RX ADMIN — APIXABAN 2.5 MG: 2.5 TABLET, FILM COATED ORAL at 20:15

## 2023-11-07 RX ADMIN — FLUTICASONE PROPIONATE 2 SPRAY: 50 SPRAY, METERED NASAL at 08:10

## 2023-11-07 RX ADMIN — BENZONATATE 200 MG: 200 CAPSULE ORAL at 08:09

## 2023-11-07 RX ADMIN — ALLOPURINOL 50 MG: 100 TABLET ORAL at 08:09

## 2023-11-07 RX ADMIN — SODIUM CHLORIDE, PRESERVATIVE FREE 10 ML: 5 INJECTION INTRAVENOUS at 20:20

## 2023-11-07 RX ADMIN — APIXABAN 2.5 MG: 2.5 TABLET, FILM COATED ORAL at 08:09

## 2023-11-07 RX ADMIN — ANTI-FUNGAL POWDER MICONAZOLE NITRATE TALC FREE: 1.42 POWDER TOPICAL at 20:19

## 2023-11-07 RX ADMIN — BUMETANIDE 2 MG: 1 TABLET ORAL at 08:09

## 2023-11-07 RX ADMIN — IPRATROPIUM BROMIDE AND ALBUTEROL SULFATE 1 DOSE: 2.5; .5 SOLUTION RESPIRATORY (INHALATION) at 13:04

## 2023-11-07 RX ADMIN — BUMETANIDE 2 MG: 1 TABLET ORAL at 20:15

## 2023-11-07 RX ADMIN — BENZONATATE 200 MG: 200 CAPSULE ORAL at 20:15

## 2023-11-07 RX ADMIN — POTASSIUM CHLORIDE 40 MEQ: 10 CAPSULE, COATED, EXTENDED RELEASE ORAL at 08:09

## 2023-11-07 RX ADMIN — NYSTATIN 500000 UNITS: 100000 SUSPENSION ORAL at 20:15

## 2023-11-07 RX ADMIN — METOPROLOL TARTRATE 12.5 MG: 25 TABLET, FILM COATED ORAL at 20:15

## 2023-11-07 RX ADMIN — PANTOPRAZOLE SODIUM 40 MG: 40 TABLET, DELAYED RELEASE ORAL at 05:59

## 2023-11-07 RX ADMIN — NYSTATIN 500000 UNITS: 100000 SUSPENSION ORAL at 13:56

## 2023-11-07 RX ADMIN — ANTI-FUNGAL POWDER MICONAZOLE NITRATE TALC FREE: 1.42 POWDER TOPICAL at 08:09

## 2023-11-07 ASSESSMENT — PAIN SCALES - GENERAL: PAINLEVEL_OUTOF10: 0

## 2023-11-07 NOTE — PROGRESS NOTES
RW    Transfers/Mobility  Bed mobility  Rolling to Left: Stand by assistance (use of bed rail)  Rolling to Right: Stand by assistance (use of bed rail)  Supine to Sit: Moderate assistance (for assist in bringing trunk upright.)  Sit to Supine: Unable to assess (pt remained in recliner chair)  Scooting: Moderate assistance (hips towards EOB)  Transfers  Stand Pivot Transfers: 2 Person assistance; Moderate assistance (cues using RW)  Sit to stand: 2 Person assistance;Maximum assistance  Stand to sit: 2 Person assistance; Moderate assistance     OT Exercises  Exercise Treatment: Pt completed BUE ROM mvmt to increase endurance for improved functional tolerance; x5 BUE punches/raises/elbow curls    Patient Education  Patient Education  Education Given To: Patient  Education Provided: Role of Therapy, Plan of Care, Transfer Training  Education Method: Verbal  Barriers to Learning: Hearing  Education Outcome: Continued education needed    Goals  Short Term Goals  Time Frame for Short Term Goals: By discharge, pt will  Short Term Goal 1: perform BADLs mod I and Good safety  Short Term Goal 2: V/D use of adaptive techniques/equipment/DME to increase IND during self care tasks  Short Term Goal 3: perform functional transfers/mobility mod I, using least restrictive device  Short Term Goal 4: tolerate standing for 4+ minutes mod I during functional activity of choice  Short Term Goal 5: actively participate in 15+ minutes of therapeutic exercise/functional activity to promote safety and independence with self care and mobility  Occupational Therapy Plan  Times Per Week: 3-5  Current Treatment Recommendations: Strengthening, Balance training, Functional mobility training, Endurance training, Safety education & training, Patient/Caregiver education & training, Equipment evaluation, education, & procurement, Positioning, Self-Care / ADL    Assessment  Activity Tolerance  Activity Tolerance: Patient limited by

## 2023-11-07 NOTE — PLAN OF CARE
Problem: Discharge Planning  Goal: Discharge to home or other facility with appropriate resources  Outcome: Progressing     Problem: Safety - Adult  Goal: Free from fall injury  Outcome: Progressing  Note: The patient remained free from falls this shift, call light within reach, bed in locked and lowest position. Side rails up x2. Problem: Skin/Tissue Integrity  Goal: Absence of new skin breakdown  Description: 1. Monitor for areas of redness and/or skin breakdown  2. Assess vascular access sites hourly  3. Every 4-6 hours minimum:  Change oxygen saturation probe site  4. Every 4-6 hours:  If on nasal continuous positive airway pressure, respiratory therapy assess nares and determine need for appliance change or resting period. 11/7/2023 0342 by Aida Giordano RN  Outcome: Progressing  Note: Pt provided with turns by staff this shift. Wound care provided as recommended by wound care nurse.      Problem: ABCDS Injury Assessment  Goal: Absence of physical injury  11/7/2023 0342 by Aida Giordano RN  Outcome: Progressing    Problem: Chronic Conditions and Co-morbidities  Goal: Patient's chronic conditions and co-morbidity symptoms are monitored and maintained or improved  Outcome: Progressing     Problem: Pain  Goal: Verbalizes/displays adequate comfort level or baseline comfort level  11/7/2023 0342 by Aida Giordano RN  Outcome: Progressing  Note: Pt denied pain this shift     Problem: Nutrition Deficit:  Goal: Optimize nutritional status  Outcome: Progressing

## 2023-11-07 NOTE — PROGRESS NOTES
11/07/23 1843   Encounter Summary   Encounter Overview/Reason  Spiritual/Emotional Needs   Service Provided For: Patient   Referral/Consult From: Palliative Care   Last Encounter  11/07/23   Complexity of Encounter Low   Spiritual/Emotional needs   Type Spiritual Support   Palliative Care   Type Palliative Care, Follow-up   Assessment/Intervention/Outcome   Assessment Unable to assess  (patient sleeping)   Intervention Prayer (assurance of)/[place]

## 2023-11-07 NOTE — PLAN OF CARE
Problem: Discharge Planning  Goal: Discharge to home or other facility with appropriate resources  11/7/2023 1326 by Raza Mitchell RN  Outcome: Progressing  Flowsheets (Taken 11/5/2023 0850 by Toi Levine RN)  Discharge to home or other facility with appropriate resources: Identify barriers to discharge with patient and caregiver     Problem: Skin/Tissue Integrity  Goal: Absence of new skin breakdown  Description: 1. Monitor for areas of redness and/or skin breakdown  2. Assess vascular access sites hourly  3. Every 4-6 hours minimum:  Change oxygen saturation probe site  4. Every 4-6 hours:  If on nasal continuous positive airway pressure, respiratory therapy assess nares and determine need for appliance change or resting period. 11/7/2023 1326 by Raza Mitchell RN  Outcome: Progressing  Note: Skin assessment complete. Waffle mattress in place. Coccyx reddened. Triad applied PRN. Turned and repositioned every two hours. Area kept free from moisture. Proper nourishment and fluids encouraged, as appropriate.

## 2023-11-07 NOTE — CARE COORDINATION
Writer is following for potential discharge to [facility]. New PT/OT notes are needed to submit authorization through [facility]. PT/OT aware of this. Writer will start authorization when available.   Electronically signed by JANE Moreno on 11/7/2023 at 10:52 AM

## 2023-11-07 NOTE — CARE COORDINATION
Writer submitted authorization for this pt through [employer].    Norma Millan ID: [id number]  Electronically signed by JANE Moreno on 11/7/2023 at 1:46 PM

## 2023-11-07 NOTE — PROGRESS NOTES
11/07/23 0940   Encounter Summary   Encounter Overview/Reason  Spiritual/Emotional Needs   Service Provided For: Patient not available   Spiritual/Emotional needs   Type Spiritual Support   Assessment/Intervention/Outcome   Assessment Unable to assess  (pATIENT WITH STAFF.)

## 2023-11-07 NOTE — PROGRESS NOTES
Physical Therapy  [address]    Date: 23  Patient Name: Arie Meza       Room: 6116/0034-85  MRN: [id number]   Account: [de-identified]   : 1935  (82 y.o.) Gender: male     Referring Practitioner: Felicitas Thomas MD  Diagnosis: Acute CHF  Past Medical History:  has a past medical history of Arthritis, CHF (congestive heart failure) ([address]), Chronic kidney disease, History of blood transfusion, and Hyperlipidemia. Past Surgical History:   has a past surgical history that includes joint replacement; Cardiac surgery (); and pacemaker placement (Right, ). Additional Pertinent Hx: CHF exacerbation    Overall Orientation Status: Within Functional Limits  Restrictions/Precautions  Restrictions/Precautions: Fall Risk;General Precautions; Bed Alarm; Other (comment)  Required Braces or Orthoses?: No  Implants present? : Metal implants; Pacemaker  Position Activity Restriction  Other position/activity restrictions: NIV, valadez, IV's, telemetry    Subjective: Pt lying in bed upon arrival, agreeable to therapy  Comments: GRISEL Bender approved therapy; co-treat with SHARONDA Viera       Pain Assessment: None - Denies Pain     Oxygen Therapy  Pulse Oximeter Device Mode: Continuous  Pulse Oximeter Device Location: Finger  O2 Device: Nasal cannula  O2 Flow Rate (L/min): 2 L/min          Bed Mobility:   Bed Mobility  Rolling: Stand by assistance  Supine to Sit: Moderate assistance (assist with trunk)  Sit to Supine: Unable to assess  Scooting: Moderate assistance  Bed mobility  Scooting: Moderate assistance    Transfers:  Sit to Stand: Maximum Assistance;2 Person Assistance  Stand to Sit: Moderate Assistance;2 Person Assistance    Ambulation  Assistance:  Moderate assistance;2 Person assistance (took a couple steps from bed to recliner while pivoting with RW)        Stairs/Curb  Stairs?: No    EXERCISES    Other exercises?: Yes  Other exercises 1: bed mobility  Other exercises 2: seated EOB ~ 5

## 2023-11-07 NOTE — PROGRESS NOTES
BRONCHOSPASM/BRONCHOCONSTRICTION     []         IMPROVE AERATION/BREATH SOUNDS  [x]   ADMINISTER BRONCHODILATOR THERAPY AS APPROPRIATE  [x]   ASSESS BREATH SOUNDS  []   IMPLEMENT AEROSOL/MDI PROTOCOL  [x]   PATIENT EDUCATION AS NEEDED   NONINVASIVE VENTILATION    PROVIDE OPTIMAL VENTILATION/ACCEPTABLE SPO2   IMPLEMENT NONINVASIVE VENTILATION PROTOCOL   MAINTAIN ACCEPTABLE SPO2   ASSESS SKIN INTEGRITY/BREAKDOWN SCORE   PATIENT EDUCATION AS NEEDED   BIPAP AS NEEDED

## 2023-11-07 NOTE — PROGRESS NOTES
Comprehensive Nutrition Assessment    Type and Reason for Visit:  Reassess    Nutrition Recommendations/Plan:   Will change diet to Soft/Bite Sized with Magic Cup twice daily     Malnutrition Assessment:  Malnutrition Status:  No malnutrition (10/18/23 1639)    Context:  Acute Illness     Findings of the 6 clinical characteristics of malnutrition:  Energy Intake:  No significant decrease in energy intake  Weight Loss:  No significant weight loss     Body Fat Loss:  No significant body fat loss     Muscle Mass Loss:  No significant muscle mass loss    Fluid Accumulation:  Moderate to Severe Extremities   Strength:  Not Performed    Nutrition Assessment:    Pt consumed mashed potatoes and bites of meat loaf. Discussed diet with pt. He is willing to try Soft and Bite Sized. He is tired of Glucerna supplements    Nutrition Related Findings:    mild edema: all extremities/generalized, Labs/Meds: Reviewed,  11/7 Wound Type: Pressure Injury, Unstageable       Current Nutrition Intake & Therapies:    Average Meal Intake: 1-25%  Average Supplements Intake: 0%  ADULT DIET; Dysphagia - Soft and Bite Sized; No Added Salt (3-4 gm)  ADULT ORAL NUTRITION SUPPLEMENT; Lunch, Dinner; Frozen Oral Supplement    Anthropometric Measures:  Height: 185.4 cm (6' 1\")  Ideal Body Weight (IBW): 184 lbs (84 kg)    Admission Body Weight: 142 kg (313 lb)  Current Body Weight: 185 kg (407 lb 13.6 oz),   IBW.  Weight Source: Bed Scale  Current BMI (kg/m2): 53.8                          BMI Categories: Obese Class 3 (BMI 40.0 or greater)    Estimated Daily Nutrient Needs:  Energy Requirements Based On: Kcal/kg  Weight Used for Energy Requirements: Admission  Energy (kcal/day): 0082-5918 kcals based on 13-14 kcals/kg  Weight Used for Protein Requirements: Ideal  Protein (g/day): 109-117 gm protein based 1.3-1.4 gm/kg       Nutrition Diagnosis:   Inadequate protein-energy intake related to  (current medical condition) as evidenced by intake

## 2023-11-08 LAB
ERYTHROCYTE [DISTWIDTH] IN BLOOD BY AUTOMATED COUNT: 31.3 % (ref 11.5–14.9)
HCT VFR BLD AUTO: 27.9 % (ref 41–53)
HGB BLD-MCNC: 8.5 G/DL (ref 13.5–17.5)
MCH RBC QN AUTO: 24.9 PG (ref 26–34)
MCHC RBC AUTO-ENTMCNC: 30.5 G/DL (ref 31–37)
MCV RBC AUTO: 81.6 FL (ref 80–100)
PLATELET # BLD AUTO: 175 K/UL (ref 150–450)
PMV BLD AUTO: 7.3 FL (ref 6–12)
RBC # BLD AUTO: 3.42 M/UL (ref 4.5–5.9)
WBC OTHER # BLD: 3.9 K/UL (ref 3.5–11)

## 2023-11-08 PROCEDURE — 6370000000 HC RX 637 (ALT 250 FOR IP): Performed by: INTERNAL MEDICINE

## 2023-11-08 PROCEDURE — 6370000000 HC RX 637 (ALT 250 FOR IP)

## 2023-11-08 PROCEDURE — 6370000000 HC RX 637 (ALT 250 FOR IP): Performed by: FAMILY MEDICINE

## 2023-11-08 PROCEDURE — 2060000000 HC ICU INTERMEDIATE R&B

## 2023-11-08 PROCEDURE — 99231 SBSQ HOSP IP/OBS SF/LOW 25: CPT | Performed by: NURSE PRACTITIONER

## 2023-11-08 PROCEDURE — 94640 AIRWAY INHALATION TREATMENT: CPT

## 2023-11-08 PROCEDURE — 94761 N-INVAS EAR/PLS OXIMETRY MLT: CPT

## 2023-11-08 PROCEDURE — 99231 SBSQ HOSP IP/OBS SF/LOW 25: CPT | Performed by: INTERNAL MEDICINE

## 2023-11-08 PROCEDURE — 36415 COLL VENOUS BLD VENIPUNCTURE: CPT

## 2023-11-08 PROCEDURE — 85027 COMPLETE CBC AUTOMATED: CPT

## 2023-11-08 PROCEDURE — 99232 SBSQ HOSP IP/OBS MODERATE 35: CPT | Performed by: FAMILY MEDICINE

## 2023-11-08 PROCEDURE — 2700000000 HC OXYGEN THERAPY PER DAY

## 2023-11-08 PROCEDURE — 94660 CPAP INITIATION&MGMT: CPT

## 2023-11-08 PROCEDURE — 6370000000 HC RX 637 (ALT 250 FOR IP): Performed by: STUDENT IN AN ORGANIZED HEALTH CARE EDUCATION/TRAINING PROGRAM

## 2023-11-08 RX ORDER — POTASSIUM CHLORIDE 750 MG/1
40 CAPSULE, EXTENDED RELEASE ORAL 2 TIMES DAILY
Qty: 60 CAPSULE | Refills: 3
Start: 2023-11-08

## 2023-11-08 RX ORDER — FERROUS SULFATE 325(65) MG
325 TABLET ORAL 2 TIMES DAILY
Qty: 180 TABLET | Refills: 1
Start: 2023-11-08 | End: 2023-11-09 | Stop reason: HOSPADM

## 2023-11-08 RX ORDER — IPRATROPIUM BROMIDE AND ALBUTEROL SULFATE 2.5; .5 MG/3ML; MG/3ML
3 SOLUTION RESPIRATORY (INHALATION)
Qty: 360 ML | Refills: 0
Start: 2023-11-08

## 2023-11-08 RX ADMIN — IPRATROPIUM BROMIDE AND ALBUTEROL SULFATE 1 DOSE: 2.5; .5 SOLUTION RESPIRATORY (INHALATION) at 20:15

## 2023-11-08 RX ADMIN — IPRATROPIUM BROMIDE AND ALBUTEROL SULFATE 1 DOSE: 2.5; .5 SOLUTION RESPIRATORY (INHALATION) at 15:40

## 2023-11-08 RX ADMIN — PANTOPRAZOLE SODIUM 40 MG: 40 TABLET, DELAYED RELEASE ORAL at 16:04

## 2023-11-08 RX ADMIN — BUMETANIDE 2 MG: 1 TABLET ORAL at 20:45

## 2023-11-08 RX ADMIN — METOPROLOL TARTRATE 12.5 MG: 25 TABLET, FILM COATED ORAL at 20:45

## 2023-11-08 RX ADMIN — ROSUVASTATIN CALCIUM 20 MG: 20 TABLET, FILM COATED ORAL at 09:07

## 2023-11-08 RX ADMIN — APIXABAN 2.5 MG: 2.5 TABLET, FILM COATED ORAL at 09:05

## 2023-11-08 RX ADMIN — IPRATROPIUM BROMIDE AND ALBUTEROL SULFATE 1 DOSE: 2.5; .5 SOLUTION RESPIRATORY (INHALATION) at 08:11

## 2023-11-08 RX ADMIN — FLUTICASONE PROPIONATE 2 SPRAY: 50 SPRAY, METERED NASAL at 09:03

## 2023-11-08 RX ADMIN — Medication 60 MG: at 09:07

## 2023-11-08 RX ADMIN — APIXABAN 2.5 MG: 2.5 TABLET, FILM COATED ORAL at 20:46

## 2023-11-08 RX ADMIN — METOPROLOL TARTRATE 12.5 MG: 25 TABLET, FILM COATED ORAL at 09:05

## 2023-11-08 RX ADMIN — Medication 60 MG: at 20:45

## 2023-11-08 RX ADMIN — PANTOPRAZOLE SODIUM 40 MG: 40 TABLET, DELAYED RELEASE ORAL at 05:27

## 2023-11-08 RX ADMIN — BENZONATATE 200 MG: 200 CAPSULE ORAL at 09:07

## 2023-11-08 RX ADMIN — BENZONATATE 200 MG: 200 CAPSULE ORAL at 20:46

## 2023-11-08 RX ADMIN — BENZONATATE 200 MG: 200 CAPSULE ORAL at 16:04

## 2023-11-08 RX ADMIN — ANTI-FUNGAL POWDER MICONAZOLE NITRATE TALC FREE: 1.42 POWDER TOPICAL at 20:46

## 2023-11-08 RX ADMIN — POTASSIUM CHLORIDE 40 MEQ: 10 CAPSULE, COATED, EXTENDED RELEASE ORAL at 09:05

## 2023-11-08 RX ADMIN — POTASSIUM CHLORIDE 40 MEQ: 10 CAPSULE, COATED, EXTENDED RELEASE ORAL at 20:46

## 2023-11-08 RX ADMIN — ANTI-FUNGAL POWDER MICONAZOLE NITRATE TALC FREE: 1.42 POWDER TOPICAL at 09:07

## 2023-11-08 RX ADMIN — BUMETANIDE 2 MG: 1 TABLET ORAL at 09:04

## 2023-11-08 RX ADMIN — ALLOPURINOL 50 MG: 100 TABLET ORAL at 09:06

## 2023-11-08 ASSESSMENT — ENCOUNTER SYMPTOMS
ABDOMINAL PAIN: 0
DIARRHEA: 0

## 2023-11-08 NOTE — CARE COORDINATION
Writer checked authorization status in [place]. Authorization has been approved 11/08/2023-11/10/2023.   Electronically signed by JANE Tsang on 11/8/2023 at 9:49 AM

## 2023-11-08 NOTE — CARE COORDINATION
Transportation arranged for patient to go to [facility] via [address] by viabhav. Facility informed. Bedside nurse informed.      Number for Report: [phone]  Electronically signed by JANE Crowell on 11/8/2023 at 1:14 PM

## 2023-11-08 NOTE — DISCHARGE INSTR - DIET
Good nutrition is important when healing from an illness, injury, or surgery. Follow any nutrition recommendations given to you during your hospital stay. If you were given an oral nutrition supplement while in the hospital, continue to take this supplement at home. You can take it with meals, in-between meals, and/or before bedtime. These supplements can be purchased at most local grocery stores, pharmacies, and chain [employer]-stores. If you have any questions about your diet or nutrition, call the hospital and ask for the dietitian.     Regular soft bite sized  no added salt

## 2023-11-08 NOTE — CARE COORDINATION
Writer placed call to Marc Guthrie with [facility] admissions regarding authorization. Writer faxed approval to [phone]. Marc Guthrie states pt is okay to admit today, writer will schedule transport. Call placed to bedside GRISEL Welch regarding this.   Electronically signed by JANE Grace on 11/8/2023 at 10:55 AM

## 2023-11-08 NOTE — CARE COORDINATION
Writer received call from [facility] with [facility] regarding retacrit that is showing on after visit summary, facility cannot afford this medication. Writer informed RN MICHAELA Hernandez of this, reached out to Teena regarding this. Writer placed perfectserve message to bedside GRISEL Silva. Per Dr. Calixto Fuller, pt needs this medication at discharge. Sisseb states if pt could be chagned to Procrit facility could possibly cover this. Writer perfectserve messaged Ricardo regarding this. Writer asked [facility] if transport should be cancelled, Sisb is speaking to DON and will place call back. Electronically signed by JANE Lima on 11/8/2023 at 3:49 PM    Sisb placed call to writer. Pt can admit if he can be changed to Procrit. Writer has informed bedside GRISEL Silva of this. Writer placed call to [facility] to cancel transport, placed on will call for tomorrow.    Electronically signed by JANE Lima on 11/8/2023 at 3:56 PM

## 2023-11-08 NOTE — CARE COORDINATION
Writer placed call to pt daughter Annamarie Blakely regarding discharge plans and time. No further questions at this time.   Electronically signed by JANE Mccarty on 11/8/2023 at 1:16 PM

## 2023-11-08 NOTE — PROGRESS NOTES
..    Palliative Care Progress Note    NAME:  [address] [place] [address] RECORD NUMBER:  [id number]  AGE: 82 y.o. GENDER: male  : 1935  TODAY'S DATE:  2023    Reason for Consult:  goals of care and support    Plan        Palliative Interaction: I went to see patient and he was lying in bed with eyes closed but opened to verbal stimuli. He is on [facility]. No family present. Patient is oriented and denies any complaints. I asked about symptoms and he denies any SOB, chest pain, cough, constipation/diarrhea, and reports voiding without difficulty. He confirms DC plans today to SNF. I offered him support and he was appreciative of visit.      Education/support to staff  Education/support to patient  Discharge planning/helping to coordinate care  Providing support for coping/adaptation/distress of patient  Continue with current plan of care  Palliative care orders introduced    Principle Problem/Diagnosis:  Acute congestive heart failure, unspecified heart failure type ([address])    Additional Assessments:  Principal Problem:    Acute congestive heart failure, unspecified heart failure type ([address])  Active Problems:    Lymphedema of both lower extremities    Non-pressure chronic ulcer of right 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 ([address])    Non-pressure chronic ulcer of left 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 ([address])    Paroxysmal A-fib ([address])    Coronary artery disease involving native coronary artery of native heart without angina pectoris    Adult BMI 40.0-44.9 kg/sq m ([address])    Mixed hyperlipidemia    Secondary hypercoagulable state ([address])    Primary hypertension    DONI (acute kidney injury) ([address])    Acute GI bleeding    Hypokalemia    Severe anemia    Encounter for palliative care    Goals of care, counseling/discussion    Cellulitis of right lower extremity    Rhinovirus infection    Leukocytosis    Hypotension    Chronic respiratory failure ([address])  Resolved Problems:    Acute respiratory failure with hypoxia ([address])    1- PROVIDED HISTORY: CHF  TECHNOLOGIST PROVIDED HISTORY:  CHF  Reason for Exam: sob, CHF    FINDINGS:  Apical lordotic view. Midline sternotomy wires and clips. Right IJ ppm/AICD  with multiple unchanged electrode leads. Persistent low lung volumes. Cardiomediastinal shadow stable. Some right perihilar and bibasilar opacities, slightly increased on the  right. No clear-cut vascular congestion. Gas distended bowel loops in the visualized upper abdomen, suggesting ileus  or possibly obstruction. No sub diaphragmatic free air. Bones appear unchanged, again with scoliosis and DJD visualized spine. Impression  Persistent low lung volumes. No clear cut radiographic CHF. Probable bibasilar atelectasis and possible small effusions. Distended gas-filled bowel in the visualized upper abdomen. Correlate for  ileus, versus obstruction. No subdiaphragmatic free air. MRI Result (most recent):  No results found for this or any previous visit from the past 3650 days. 10/09/23    ECHO (TTE) COMPLETE (PRN CONTRAST/BUBBLE/STRAIN/3D) 10/11/2023  1:09 PM (Final)    Interpretation Summary    Left Ventricle: Normal left ventricular systolic function with a visually estimated EF of 50 - 55%. Left ventricle size is normal. Mildly increased wall thickness. Normal wall motion. Tricuspid Valve: The estimated RVSP is 29 mmHg. Contrast used: Definity. Signed by: Davi Felix DO on 10/11/2023  1:09 PM       Encounter Date: 10/09/23   EKG 12 Lead   Result Value    Ventricular Rate 101    Atrial Rate 101    QRS Duration 136    Q-T Interval 406    QTc Calculation (Bazett) 526    P Axis 76    R Axis 34    T Axis 44    Narrative    Ventricular-paced rhythm  Abnormal ECG  When compared with ECG of 02-APR-2022 15:34,  Vent.  rate has increased BY  19 BPM          Assessment        REVIEW OF SYSTEMS    []   UNABLE TO OBTAIN:     Constitutional:  []   Chills   []  Fatigue   []  Fevers   []  Malaise   []

## 2023-11-08 NOTE — PLAN OF CARE
Problem: Discharge Planning  Goal: Discharge to home or other facility with appropriate resources  11/8/2023 0305 by Gwen Dobbins RN  Outcome: Progressing     Problem: Safety - Adult  Goal: Free from fall injury  Outcome: Progressing  Note: The patient remained free from falls this shift, call light within reach, bed in locked and lowest position. Side rails up x2. Problem: Skin/Tissue Integrity  Goal: Absence of new skin breakdown  Description: 1. Monitor for areas of redness and/or skin breakdown  2. Assess vascular access sites hourly  3. Every 4-6 hours minimum:  Change oxygen saturation probe site  4. Every 4-6 hours:  If on nasal continuous positive airway pressure, respiratory therapy assess nares and determine need for appliance change or resting period. 11/8/2023 0305 by Gwen Dobbins RN  Outcome: Progressing  Note: Pt provided with regular turns by staff. Skin care provided based off of wound care nurses recommendations. Problem: Chronic Conditions and Co-morbidities  Goal: Patient's chronic conditions and co-morbidity symptoms are monitored and maintained or improved  Outcome: Progressing     Problem: Pain  Goal: Verbalizes/displays adequate comfort level or baseline comfort level  Outcome: Progressing  Flowsheets (Taken 11/8/2023 0305)  Verbalizes/displays adequate comfort level or baseline comfort level:   Encourage patient to monitor pain and request assistance   Administer analgesics based on type and severity of pain and evaluate response   Assess pain using appropriate pain scale  Note: Pt denies pain this shift.       Problem: Nutrition Deficit:  Goal: Optimize nutritional status  Outcome: Progressing

## 2023-11-08 NOTE — PROGRESS NOTES
11/08/23 1507   Encounter Summary   Encounter Overview/Reason  Attempted Encounter   Service Provided For: Patient   Referral/Consult From: Palliative Care   Last Encounter  11/08/23   Complexity of Encounter Low   Spiritual/Emotional needs   Type Spiritual Support   Palliative Care   Type Palliative Care, Follow-up   Assessment/Intervention/Outcome   Assessment Unable to assess   Intervention Prayer (assurance of)/[place]

## 2023-11-08 NOTE — PLAN OF CARE
Problem: Discharge Planning  Goal: Discharge to home or other facility with appropriate resources  11/8/2023 1053 by Shari Kamara RN  Outcome: Adequate for Discharge     Problem: Safety - Adult  Goal: Free from fall injury  11/8/2023 1053 by Shari Kamara RN  Outcome: Adequate for Discharge     Problem: Skin/Tissue Integrity  Goal: Absence of new skin breakdown  Description: 1. Monitor for areas of redness and/or skin breakdown  2. Assess vascular access sites hourly  3. Every 4-6 hours minimum:  Change oxygen saturation probe site  4. Every 4-6 hours:  If on nasal continuous positive airway pressure, respiratory therapy assess nares and determine need for appliance change or resting period.   11/8/2023 1053 by Shari Kamara RN  Outcome: Adequate for Discharge     Problem: ABCDS Injury Assessment  Goal: Absence of physical injury  Outcome: Adequate for Discharge     Problem: Chronic Conditions and Co-morbidities  Goal: Patient's chronic conditions and co-morbidity symptoms are monitored and maintained or improved  11/8/2023 1053 by Shari Kamara RN  Outcome: Adequate for Discharge     Problem: Pain  Goal: Verbalizes/displays adequate comfort level or baseline comfort level  11/8/2023 1053 by Shari Kamara RN  Outcome: Adequate for Discharge     Problem: Nutrition Deficit:  Goal: Optimize nutritional status  11/8/2023 1053 by Shari Kamara RN  Outcome: Adequate for Discharge

## 2023-11-08 NOTE — PROGRESS NOTES
[facility]      Progress Note      Date:   11/8/2023  Patient name:  Beryl Tracey  Date of admission:  10/9/2023  9:43 AM  MRN:   [id number]  YOB: 1935        Brief HPI    Pt admitted for cellulitis, respiratory failure, pending placement, hopefully [facility] 11/6/23    ASSESSMENT/PLAN     Hospital Problems             Last Modified POA    * (Principal) Acute congestive heart failure, unspecified heart failure type ([address]) 10/9/2023 Yes    Lymphedema of both lower extremities 10/10/2023 Yes    Non-pressure chronic ulcer of right 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 ([address]) 10/10/2023 Yes    Non-pressure chronic ulcer of left 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 ([address]) 10/10/2023 Yes    Paroxysmal A-fib ([address]) 10/12/2023 Yes    Coronary artery disease involving native coronary artery of native heart without angina pectoris 10/10/2023 Yes    Adult BMI 40.0-44.9 kg/sq m ([address]) 10/10/2023 Yes    Mixed hyperlipidemia 10/10/2023 Yes    Secondary hypercoagulable state ([address]) 10/10/2023 Yes    Primary hypertension 10/10/2023 Yes    DONI (acute kidney injury) ([address]) 10/11/2023 Yes    Acute GI bleeding 10/16/2023 Yes    Hypokalemia 10/16/2023 Yes    Severe anemia 10/17/2023 Yes    Encounter for palliative care 10/20/2023 Yes    Goals of care, counseling/discussion 10/20/2023 Yes    Cellulitis of right lower extremity 10/21/2023 Yes    Rhinovirus infection 10/21/2023 Yes    Leukocytosis 10/21/2023 Yes    Hypotension 10/21/2023 Yes    Chronic respiratory failure ([address]) 10/31/2023 Yes       Hemoglobin stable on eliquis  Pt off BiPAP, comfortable on nasal cannula, pt has transitioned to Bilevel and is off of AVAPS, tolerating well. BP stable on lopressor at 12.5mg BID  Continue bumex, midodrine, cestor, allopurinol. Pt working with PT  No significant events overnight. Awaiting approval for D/C to SNF, okay to discharge once approval obtained. DNR-CCA   ADULT DIET; Dysphagia - Soft and Bite Sized;  No Added Results   Component Value Date/Time    MG 1.6 10/30/2023 10:30 PM     Phosphorus: No results found for: \"PHOS\"  Ionized Calcium: No results found for: \"CAION\"   PT/INR:    Lab Results   Component Value Date/Time    PROTIME 13.9 10/09/2023 10:54 AM    INR 1.0 10/09/2023 10:54 AM     PTT:    Lab Results   Component Value Date/Time    APTT 31.9 04/06/2022 10:17 AM         Shelton Peck MD   11/8/2023 1:57 PM

## 2023-11-08 NOTE — CARE COORDINATION
HENS complete:  Document ID : [id number]  Electronically signed by JANE Torres on 11/8/2023 at 1:12 PM

## 2023-11-09 VITALS
SYSTOLIC BLOOD PRESSURE: 107 MMHG | BODY MASS INDEX: 41.75 KG/M2 | WEIGHT: 315 LBS | HEIGHT: 73 IN | RESPIRATION RATE: 20 BRPM | DIASTOLIC BLOOD PRESSURE: 52 MMHG | HEART RATE: 70 BPM | TEMPERATURE: 98.1 F | OXYGEN SATURATION: 95 %

## 2023-11-09 LAB
ALBUMIN SERPL-MCNC: 2.7 G/DL (ref 3.5–5.2)
ANION GAP SERPL CALCULATED.3IONS-SCNC: 7 MMOL/L (ref 9–17)
BUN SERPL-MCNC: 14 MG/DL (ref 8–23)
CALCIUM SERPL-MCNC: 8 MG/DL (ref 8.6–10.4)
CHLORIDE SERPL-SCNC: 100 MMOL/L (ref 98–107)
CO2 SERPL-SCNC: 32 MMOL/L (ref 20–31)
CREAT SERPL-MCNC: 1.4 MG/DL (ref 0.7–1.2)
GFR SERPL CREATININE-BSD FRML MDRD: 49 ML/MIN/1.73M2
GLUCOSE SERPL-MCNC: 97 MG/DL (ref 70–99)
MAGNESIUM SERPL-MCNC: 1.5 MG/DL (ref 1.6–2.6)
PHOSPHATE SERPL-MCNC: 2.8 MG/DL (ref 2.5–4.5)
POTASSIUM SERPL-SCNC: 3.9 MMOL/L (ref 3.7–5.3)
SODIUM SERPL-SCNC: 139 MMOL/L (ref 135–144)

## 2023-11-09 PROCEDURE — 6370000000 HC RX 637 (ALT 250 FOR IP): Performed by: FAMILY MEDICINE

## 2023-11-09 PROCEDURE — 6370000000 HC RX 637 (ALT 250 FOR IP): Performed by: STUDENT IN AN ORGANIZED HEALTH CARE EDUCATION/TRAINING PROGRAM

## 2023-11-09 PROCEDURE — 94761 N-INVAS EAR/PLS OXIMETRY MLT: CPT

## 2023-11-09 PROCEDURE — 83735 ASSAY OF MAGNESIUM: CPT

## 2023-11-09 PROCEDURE — 94660 CPAP INITIATION&MGMT: CPT

## 2023-11-09 PROCEDURE — 6370000000 HC RX 637 (ALT 250 FOR IP)

## 2023-11-09 PROCEDURE — 36415 COLL VENOUS BLD VENIPUNCTURE: CPT

## 2023-11-09 PROCEDURE — 99239 HOSP IP/OBS DSCHRG MGMT >30: CPT | Performed by: FAMILY MEDICINE

## 2023-11-09 PROCEDURE — 2700000000 HC OXYGEN THERAPY PER DAY

## 2023-11-09 PROCEDURE — 6370000000 HC RX 637 (ALT 250 FOR IP): Performed by: INTERNAL MEDICINE

## 2023-11-09 PROCEDURE — 94640 AIRWAY INHALATION TREATMENT: CPT

## 2023-11-09 PROCEDURE — 80069 RENAL FUNCTION PANEL: CPT

## 2023-11-09 RX ORDER — ERYTHROPOIETIN 40000 [IU]/ML
40000 INJECTION, SOLUTION INTRAVENOUS; SUBCUTANEOUS
Qty: 12 ML | Refills: 0 | Status: SHIPPED | OUTPATIENT
Start: 2023-11-10 | End: 2023-12-10

## 2023-11-09 RX ADMIN — PANTOPRAZOLE SODIUM 40 MG: 40 TABLET, DELAYED RELEASE ORAL at 06:34

## 2023-11-09 RX ADMIN — ANTI-FUNGAL POWDER MICONAZOLE NITRATE TALC FREE: 1.42 POWDER TOPICAL at 08:37

## 2023-11-09 RX ADMIN — APIXABAN 2.5 MG: 2.5 TABLET, FILM COATED ORAL at 08:36

## 2023-11-09 RX ADMIN — NYSTATIN 500000 UNITS: 100000 SUSPENSION ORAL at 08:37

## 2023-11-09 RX ADMIN — ALLOPURINOL 50 MG: 100 TABLET ORAL at 08:36

## 2023-11-09 RX ADMIN — POTASSIUM CHLORIDE 40 MEQ: 10 CAPSULE, COATED, EXTENDED RELEASE ORAL at 08:37

## 2023-11-09 RX ADMIN — BENZONATATE 200 MG: 200 CAPSULE ORAL at 08:36

## 2023-11-09 RX ADMIN — IPRATROPIUM BROMIDE AND ALBUTEROL SULFATE 1 DOSE: 2.5; .5 SOLUTION RESPIRATORY (INHALATION) at 07:09

## 2023-11-09 RX ADMIN — METOPROLOL TARTRATE 12.5 MG: 25 TABLET, FILM COATED ORAL at 08:37

## 2023-11-09 RX ADMIN — Medication 60 MG: at 08:37

## 2023-11-09 RX ADMIN — BUMETANIDE 2 MG: 1 TABLET ORAL at 08:36

## 2023-11-09 RX ADMIN — ROSUVASTATIN CALCIUM 20 MG: 20 TABLET, FILM COATED ORAL at 08:36

## 2023-11-09 NOTE — PROGRESS NOTES
RN received a call from  that the patient was set up for an 8am  to d/c. RN reviewed med rec and the patient was still on epogen which RN was told needed to be changed to procrit before d/c RN also noted that the eliquis was ordered inpatient but stopped at d/c. RN called Dr. Jose Maria Badillo and eliquis ws reordered and epogen was changed to procrit.

## 2023-11-09 NOTE — PROGRESS NOTES
Bipap on standby at this time. Pulse Ox--97% 2lpm   Pt wore last night  Skin score-- 0     Nasal gel pad available at bedside. Pt awake/alert/no distress noted.         BRONCHOSPASM/BRONCHOCONSTRICTION     [x]         IMPROVE AERATION/BREATH SOUNDS  [x]   ADMINISTER BRONCHODILATOR THERAPY AS APPROPRIATE  [x]   ASSESS BREATH SOUNDS  []   IMPLEMENT AEROSOL/MDI PROTOCOL  [x]   PATIENT EDUCATION AS NEEDED

## 2023-11-09 NOTE — CARE COORDINATION
Writer spoke to uKsh with [facility] regarding transport being set up after writer left. Writer verified LACIE was faxed to facility by RN MICHAELA Hernandez. Writer placed call to pt daughter Daniel Chou regarding discharge time and transport. No answer, voicemail left.   Electronically signed by JANE Siddiqi on 11/9/2023 at 8:18 AM

## 2023-11-09 NOTE — PROGRESS NOTES
Report called to Cathy Larsen at [facility]. Patient transported via [address]

## 2023-11-09 NOTE — PROGRESS NOTES
BRONCHOSPASM/BRONCHOCONSTRICTION     IMPROVE AERATION/BREATH SOUNDS  [x][x]   ADMINISTER BRONCHODILATOR THERAPY AS APPROPRIATE  [x]   ASSESS BREATH SOUNDS  []   IMPLEMENT AEROSOL/MDI PROTOCOL  [x]   PATIENT EDUCATION AS NEEDED

## 2023-11-09 NOTE — CARE COORDINATION
Patient will discharge to [facility]. [place], [address] [facility]  Phone  [phone]  Fax  [phone]   at 0800 via [address] [facility] faxed to facility. Patient/Family informed of discharge and is agreeable.   Bedside RN notified, Call report to [phone]

## 2023-11-13 NOTE — DISCHARGE INSTRUCTIONS
[address] -Phone: [phone] Fax: [phone]   Visit  Discharge Instructions / Physician Orders    DATE: 11/16/2023      Facility: [address]: Facility     Wound Location:  right and left lower legs, buttocks     Cleanse with: Legs: Keep Dry and Intact                        Buttocks: Soap and Water- allow to dry thoroughly before applying cream            Dressing Orders:  Ioplex foam, zinc layer, 3 layer compression wrap to bilateral lower legs -Keep Dry and Intact                   Buttocks-Triad Cream- thin layer twice daily and as needed     Frequency:  Keep Leg Wraps dry and intact, buttocks cream applied twice daily and as needed     Additional Orders: Increase protein to diet (meat, cheese, eggs, fish, peanut butter, nuts and beans), eat at least 1 container of yogurt daily. Compression pumps per BIO TAB-WALKER please pump 2-3 x per day, for 1 hour each time. Your next appointment with [address] [place] [address] is in 1 week     (Please note your next appointment above and if you are unable to keep, kindly give a 24 hour notice. Thank you.)  If more than 15 min late we cannot guarantee you will be seen due to clinician schedule  Per Policy, Excessive cancellation will call for dismissal from program.     If you experience any of the following, please call the [address] [place] [address] during business hours:  [phone]  Your Phone call may be forwarded to Tanner Ojeda during business hours that [employer] is closed. * Increase in Pain  * Temperature over 101  * Increase in drainage from your wound  * Drainage with a foul odor  * Bleeding  * Increase in swelling  * Need for compression bandage changes due to slippage, breakthrough drainage. If you need medical attention outside of the business hours of the [address] [place] [address] please contact your PCP or go to the nearest emergency room.      The information contained in the

## 2023-11-16 ENCOUNTER — HOSPITAL ENCOUNTER (OUTPATIENT)
Dept: WOUND CARE | Age: 88
Discharge: HOME | End: 2023-11-16
Payer: MEDICARE

## 2023-11-16 VITALS
DIASTOLIC BLOOD PRESSURE: 40 MMHG | RESPIRATION RATE: 18 BRPM | HEART RATE: 70 BPM | WEIGHT: 295 LBS | SYSTOLIC BLOOD PRESSURE: 101 MMHG | BODY MASS INDEX: 38.92 KG/M2 | TEMPERATURE: 97.2 F

## 2023-11-16 DIAGNOSIS — L89.323 PRESSURE INJURY OF LEFT BUTTOCK, STAGE 3 (HCC): ICD-10-CM

## 2023-11-16 DIAGNOSIS — L97.922 NON-PRESSURE CHRONIC ULCER OF LEFT LOWER LEG WITH FAT LAYER EXPOSED (HCC): Primary | ICD-10-CM

## 2023-11-16 DIAGNOSIS — L97.912 NON-PRESSURE CHRONIC ULCER OF RIGHT LOWER LEG WITH FAT LAYER EXPOSED (HCC): ICD-10-CM

## 2023-11-16 DIAGNOSIS — L89.313 PRESSURE INJURY OF RIGHT BUTTOCK, STAGE 3 (HCC): ICD-10-CM

## 2023-11-16 DIAGNOSIS — I89.0 LYMPHEDEMA OF BOTH LOWER EXTREMITIES: ICD-10-CM

## 2023-11-16 PROCEDURE — 11042 DBRDMT SUBQ TIS 1ST 20SQCM/<: CPT

## 2023-11-16 RX ORDER — LIDOCAINE HYDROCHLORIDE 20 MG/ML
JELLY TOPICAL ONCE
Status: COMPLETED | OUTPATIENT
Start: 2023-11-16 | End: 2023-11-16

## 2023-11-16 RX ORDER — LIDOCAINE HYDROCHLORIDE 40 MG/ML
SOLUTION TOPICAL ONCE
OUTPATIENT
Start: 2023-11-16 | End: 2023-11-16

## 2023-11-16 RX ORDER — LIDOCAINE HYDROCHLORIDE 20 MG/ML
JELLY TOPICAL ONCE
OUTPATIENT
Start: 2023-11-16 | End: 2023-11-16

## 2023-11-16 RX ADMIN — LIDOCAINE HYDROCHLORIDE 6 ML: 20 JELLY TOPICAL at 09:48

## 2023-11-16 ASSESSMENT — PAIN SCALES - GENERAL: PAINLEVEL_OUTOF10: 7

## 2023-11-16 ASSESSMENT — PAIN DESCRIPTION - LOCATION: LOCATION: BUTTOCKS

## 2023-11-16 ASSESSMENT — PAIN DESCRIPTION - DESCRIPTORS: DESCRIPTORS: TENDER

## 2023-11-16 NOTE — PROGRESS NOTES
Multilayer Compression Wrap   (Not Unna) Below the Knee    NAME:  Betsy Ross  YOB: 1935  MEDICAL RECORD NUMBER:  [id number]  DATE:  11/16/2023       [x] Removed old Multilayer wrap if indicated and wash leg with mild soap/water. [x] Applied moisturizing agent to dry skin as needed. [x] Applied primary and secondary dressing as ordered   [x] Applied multilayered dressing below the knee to Bilateral lower leg(s). [x] Instructed patient/caregiver not to remove dressing and to keep it clean and dry. [x] Instructed patient/caregiver on complications to report to provider, such as pain, numbness in toes, heavy drainage, and slippage of dressing. [x] Instructed patient on purpose of compression dressing and on activity and exercise recommendations.     Electronically signed by Lina Maya RN on 11/16/2023 at 10:06 AM
Description not for Pressure Injury Full thickness 11/16/23 0912   Number of days: 0          Percent of Wound(s)/Ulcer(s) Debrided: 100%    Total Surface Area Debrided:  0.01 sq cm         Estimated Blood Loss:  Minimal    Hemostasis Achieved:  by pressure    Procedural Pain:  2  / 10     Post Procedural Pain:  0 / 10     Response to treatment:  Well tolerated by patient. Plan:     Treatment Note please see Discharge Instructions    Written patient dismissal instructions given to patient and signed by patient or POA.              Electronically signed by Greta Anne MD on 11/16/2023 at 9:45 AM

## 2023-11-20 NOTE — DISCHARGE INSTRUCTIONS
[address] -Phone: [phone] Fax: [phone]    Visit  Discharge Instructions / Physician Orders     DATE: 11/22/2023      Facility: [address]: Facility     Wound Location:  right and left lower legs, buttocks     Cleanse with: Legs: Keep Dry and Intact                        Buttocks: Soap and Water- allow to dry thoroughly before applying cream            Dressing Orders: zinc layer, 3 layer compression wrap to bilateral lower legs -Keep Dry and Intact                   Buttocks-Triad Cream- thin layer twice daily and as needed     Frequency:  Keep Leg Wraps dry and intact, buttocks cream applied twice daily and as needed     Additional Orders: Increase protein to diet (meat, cheese, eggs, fish, peanut butter, nuts and beans), eat at least 1 container of yogurt daily. Compression pumps per BIO TAB-WALKER please pump 2-3 x per day, for 1 hour each time. Try to order a coccyx cushion for when your up in the chair    Your next appointment with [address] [place] [address] is in 1 week (Juan Daniel)     (Please note your next appointment above and if you are unable to keep, kindly give a 24 hour notice. Thank you.)  If more than 15 min late we cannot guarantee you will be seen due to clinician schedule  Per Policy, Excessive cancellation will call for dismissal from program.     If you experience any of the following, please call the [address] [place] [address] during business hours:  [phone]  Your Phone call may be forwarded to Tanner Ojeda during business hours that Juan Kevin is closed. * Increase in Pain  * Temperature over 101  * Increase in drainage from your wound  * Drainage with a foul odor  * Bleeding  * Increase in swelling  * Need for compression bandage changes due to slippage, breakthrough drainage.      If you need medical attention outside of the business hours of the [address] [place] [address] please contact your PCP or go to the

## 2023-11-22 ENCOUNTER — HOSPITAL ENCOUNTER (OUTPATIENT)
Dept: WOUND CARE | Age: 88
Discharge: HOME | End: 2023-11-22
Payer: MEDICARE

## 2023-11-22 VITALS
DIASTOLIC BLOOD PRESSURE: 40 MMHG | RESPIRATION RATE: 16 BRPM | HEART RATE: 70 BPM | TEMPERATURE: 98.1 F | SYSTOLIC BLOOD PRESSURE: 107 MMHG

## 2023-11-22 DIAGNOSIS — L97.912 NON-PRESSURE CHRONIC ULCER OF RIGHT LOWER LEG WITH FAT LAYER EXPOSED (HCC): Primary | ICD-10-CM

## 2023-11-22 DIAGNOSIS — I89.0 LYMPHEDEMA OF BOTH LOWER EXTREMITIES: ICD-10-CM

## 2023-11-22 DIAGNOSIS — L97.922 NON-PRESSURE CHRONIC ULCER OF LEFT LOWER LEG WITH FAT LAYER EXPOSED (HCC): ICD-10-CM

## 2023-11-22 PROCEDURE — 99212 OFFICE O/P EST SF 10 MIN: CPT

## 2023-11-22 PROCEDURE — 29581 APPL MULTLAYER CMPRN SYS LEG: CPT

## 2023-11-22 RX ORDER — LIDOCAINE HYDROCHLORIDE 40 MG/ML
SOLUTION TOPICAL ONCE
OUTPATIENT
Start: 2023-11-22 | End: 2023-11-22

## 2023-11-22 RX ORDER — LIDOCAINE HYDROCHLORIDE 20 MG/ML
JELLY TOPICAL ONCE
Status: DISCONTINUED | OUTPATIENT
Start: 2023-11-22 | End: 2023-11-23 | Stop reason: HOSPADM

## 2023-11-22 RX ORDER — LIDOCAINE HYDROCHLORIDE 20 MG/ML
JELLY TOPICAL ONCE
OUTPATIENT
Start: 2023-11-22 | End: 2023-11-22

## 2023-11-22 NOTE — PROGRESS NOTES
[facility]   Progress Note and Procedure Note      [facility] RECORD NUMBER:  [id number]  AGE: 82 y.o. GENDER: male  : 1935  EPISODE DATE:  2023    Subjective:     Chief Complaint   Patient presents with    Wound Check     BLE, Buttocks         HISTORY of PRESENT ILLNESS HPI Remo Siemens is a 82 y.o. male who presents today for wound/ulcer evaluation. History of Wound Context: The patient is here for left leg  and bilateral buttocks ulcers. The patient was hospitalized recently for CHF, was positive for rhinovirus, had right lower extremity/upper thigh cellulitis that was treated with antibiotics course that was completed. History of CHF, bilateral lower extremity lymphedema. Interval history: both leg wounds closed at this time. Buttock nearly closed, still red/purple discoloration indicating continued pressure. States is likely to be discharged from [place] this week and would prefer to go to [facility] as he lives close to there. Ulcer Identification:  Ulcer Type: venous and pressure  Contributing Factors: venous stasis, lymphedema, chronic pressure, decreased mobility, and obesity             PAST MEDICAL HISTORY        Diagnosis Date    Arthritis     CHF (congestive heart failure) (HCC)     Chronic kidney disease     History of blood transfusion     Hyperlipidemia        PAST SURGICAL HISTORY    Past Surgical History:   Procedure Laterality Date    CARDIAC SURGERY      bypass    JOINT REPLACEMENT      Left hip    PACEMAKER PLACEMENT Right     and defib       FAMILY HISTORY    History reviewed. No pertinent family history.     SOCIAL HISTORY    Social History     Tobacco Use    Smoking status: Former     Types: Cigarettes     Quit date:      Years since quittin.9     Passive exposure: Past    Smokeless tobacco: Never   Vaping Use    Vaping Use: Never used   Substance Use Topics    Alcohol use: Not Currently

## 2023-11-22 NOTE — PROGRESS NOTES
Multilayer Compression Wrap   (Not Unna) Below the Knee    NAME:  Diana Baires  YOB: 1935  MEDICAL RECORD NUMBER:  [id number]  DATE:  11/22/2023    Multilayer compression wrap: Removed old Multilayer wrap if indicated and wash leg with mild soap/water. Applied moisturizing agent to dry skin as needed. Applied primary and secondary dressing as ordered. Applied multilayered dressing below the knee to right lower leg. Applied multilayered dressing below the knee to left lower leg. Instructed patient/caregiver not to remove dressing and to keep it clean and dry. Instructed patient/caregiver on complications to report to provider, such as pain, numbness in toes, heavy drainage, and slippage of dressing. Instructed patient on purpose of compression dressing and on activity and exercise recommendations.       Electronically signed by Makayla Wood RN on 11/22/2023 at 3:28 PM

## 2023-11-22 NOTE — PLAN OF CARE
Problem: Wound:  Goal: Will show signs of wound healing; wound closure and no evidence of infection  Description: Will show signs of wound healing; wound closure and no evidence of infection  Outcome: Progressing     Problem: Falls - Risk of:  Goal: Will remain free from falls  Description: Will remain free from falls  Outcome: Progressing

## 2023-11-27 NOTE — DISCHARGE INSTRUCTIONS
[facility],[address] [phone]  Visit  Discharge Instructions / Physician Orders     DATE: 11/30/23      Facility: [address]: Facility     Wound Location:  right and left lower legs, buttocks     Cleanse with: Legs: Keep Dry and Intact                        Buttocks: Soap and Water- allow to dry thoroughly before applying cream            Dressing Orders: wear your compression wraps,   Continue using compression pumps as directed by lymphedema clinic. Buttocks-Triad Cream- thin layer twice daily and as needed     Frequency:  Keep Leg Wraps on while out of bed, buttocks cream applied twice daily and as needed     Additional Orders: Increase protein to diet (meat, cheese, eggs, fish, peanut butter, nuts and beans), eat at least 1 container of yogurt daily. Compression pumps per BIO TAB-WALKER please pump 2-3 x per day, for 1 hour each time. Try to order a coccyx cushion for when your up in the chair     Your next appointment with [address] [place], [place] [address]i [place] is in when needed     (Please note your next appointment above and if you are unable to keep, kindly give a 24 hour notice. Thank you.)  If more than 15 min late we cannot guarantee you will be seen due to clinician schedule  Per Policy, Excessive cancellation will call for dismissal from program.     If you experience any of the following, please call the [address] [place], [place] [address]i [place] during business hours:  [phone]  Your Phone call may be forwarded to Tanner Ojeda during business hours that Copyright [employer] is closed. * Increase in Pain  * Temperature over 101  * Increase in drainage from your wound  * Drainage with a foul odor  * Bleeding  * Increase in swelling  * Need for compression bandage changes due to slippage, breakthrough drainage.      If you need medical attention outside of the business hours of the [address] [place], [place] [address]i [place] please

## 2023-11-30 ENCOUNTER — HOSPITAL ENCOUNTER (OUTPATIENT)
Dept: WOUND CARE | Age: 88
Discharge: HOME | End: 2023-11-30
Payer: MEDICARE

## 2023-11-30 VITALS
SYSTOLIC BLOOD PRESSURE: 139 MMHG | HEART RATE: 70 BPM | DIASTOLIC BLOOD PRESSURE: 50 MMHG | RESPIRATION RATE: 20 BRPM | TEMPERATURE: 98.5 F

## 2023-11-30 DIAGNOSIS — L97.912 NON-PRESSURE CHRONIC ULCER OF RIGHT LOWER LEG WITH FAT LAYER EXPOSED (HCC): Primary | ICD-10-CM

## 2023-11-30 DIAGNOSIS — I89.0 LYMPHEDEMA OF BOTH LOWER EXTREMITIES: ICD-10-CM

## 2023-11-30 DIAGNOSIS — L97.922 NON-PRESSURE CHRONIC ULCER OF LEFT LOWER LEG WITH FAT LAYER EXPOSED (HCC): ICD-10-CM

## 2023-11-30 PROBLEM — L03.115 CELLULITIS OF RIGHT LOWER EXTREMITY: Status: RESOLVED | Noted: 2023-10-21 | Resolved: 2023-11-30

## 2023-11-30 PROBLEM — B99.9 INFECTION ASSOCIATED WITH LYMPHEDEMA: Status: RESOLVED | Noted: 2023-09-01 | Resolved: 2023-11-30

## 2023-11-30 PROCEDURE — 99213 OFFICE O/P EST LOW 20 MIN: CPT

## 2023-11-30 PROCEDURE — 99212 OFFICE O/P EST SF 10 MIN: CPT | Performed by: NURSE PRACTITIONER

## 2023-11-30 RX ORDER — LIDOCAINE HYDROCHLORIDE 40 MG/ML
SOLUTION TOPICAL ONCE
Status: CANCELLED | OUTPATIENT
Start: 2023-11-30 | End: 2023-11-30

## 2023-11-30 RX ORDER — LIDOCAINE HYDROCHLORIDE 20 MG/ML
JELLY TOPICAL ONCE
Status: CANCELLED | OUTPATIENT
Start: 2023-11-30 | End: 2023-11-30

## 2023-11-30 ASSESSMENT — ENCOUNTER SYMPTOMS
DIARRHEA: 0
COUGH: 0
RHINORRHEA: 0
NAUSEA: 0
SHORTNESS OF BREATH: 1
VOMITING: 0

## 2023-11-30 NOTE — PROGRESS NOTES
Pt was evaluated and examined. Toenails to bilat feet were elongated and hypertrophic. Nails 1-10 were debrided sharply in length and thickness with a nipper and , without incident. Proper foot hygiene and care was discussed with the pt.  Electronically signed by Varghese Ceballos RN,BSN,CWS [facility] on 11/30/2023 at 12:07 PM
60 tablet 0    epoetin azeb (PROCRIT) 03401 UNIT/ML injection Inject 1 mL into the skin Every Monday, Wednesday, and Friday 12 mL 0    lisinopril (PRINIVIL;ZESTRIL) 5 MG tablet TAKE 1 TABLET EVERY DAY 90 tablet 1    rosuvastatin (CRESTOR) 20 MG tablet TAKE 1 TABLET EVERY DAY 90 tablet 1    metoprolol tartrate (LOPRESSOR) 25 MG tablet Take 0.5 tablets by mouth 2 times daily 60 tablet 3    potassium chloride (MICRO-K) 10 MEQ extended release capsule Take 4 capsules by mouth 2 times daily 60 capsule 3    ipratropium 0.5 mg-albuterol 2.5 mg (DUONEB) 0.5-2.5 (3) MG/3ML SOLN nebulizer solution Inhale 3 mLs into the lungs every 6 hours while awake 360 mL 0    miconazole (MICOTIN) 2 % powder Apply topically 2 times daily. 45 g 1    midodrine (PROAMATINE) 10 MG tablet Take 1 tablet by mouth 4 times daily as needed (Systolic blood pressure less than 100 mmHg) 1 tablet 0    pantoprazole (PROTONIX) 40 MG tablet Take 1 tablet by mouth 2 times daily (before meals) 30 tablet 3    fluticasone (FLONASE) 50 MCG/ACT nasal spray 2 sprays by Each Nostril route daily 16 g 0    bumetanide (BUMEX) 2 MG tablet TAKE 1 TABLET TWICE DAILY 180 tablet 0    magnesium oxide (MAG-OX) 400 (240 Mg) MG tablet TAKE 1 TABLET TWICE DAILY 180 tablet 3    furosemide (LASIX) 40 MG tablet TAKE 1 TABLET TWICE DAILY 180 tablet 3    clopidogrel (PLAVIX) 75 MG tablet TAKE 1 TABLET EVERY DAY 90 tablet 1    allopurinol (ZYLOPRIM) 100 MG tablet TAKE 1 TABLET EVERY DAY 90 tablet 1    albuterol (PROVENTIL) (5 MG/ML) 0.5% nebulizer solution Take 0.5 mLs by nebulization 4 times daily as needed for Wheezing 120 each 3    acetylcysteine (MUCOMYST) 20 % nebulizer solution Take 3 mLs by mouth 2 times daily for 2 doses 6 mL 0     No current facility-administered medications on file prior to encounter. REVIEW OF SYSTEMS    Review of Systems   Constitutional:  Positive for fatigue. Negative for chills and fever. HENT:  Negative for congestion and rhinorrhea.

## 2023-12-22 NOTE — PLAN OF CARE
Problem: Falls - Risk of:  Goal: Will remain free from falls  Description: Will remain free from falls  4/5/2022 0457 by Damian Brink RN  Outcome: Ongoing     Problem: Falls - Risk of:  Goal: Absence of physical injury  Description: Absence of physical injury  4/5/2022 0457 by Damian Brink RN  Outcome: Ongoing     Problem: OXYGENATION/RESPIRATORY FUNCTION  Goal: Patient will maintain patent airway  4/5/2022 0457 by Damian Brink RN  Outcome: Ongoing     Problem: OXYGENATION/RESPIRATORY FUNCTION  Goal: Patient will achieve/maintain normal respiratory rate/effort  Description: Respiratory rate and effort will be within normal limits for the patient  4/5/2022 0457 by Damian Brink RN  Outcome: Ongoing     Problem: HEMODYNAMIC STATUS  Goal: Patient has stable vital signs and fluid balance  4/5/2022 0457 by Damian Brink RN  Outcome: Ongoing     Problem: FLUID AND ELECTROLYTE IMBALANCE  Goal: Fluid and electrolyte balance are achieved/maintained  4/5/2022 0457 by Damian Brink RN  Outcome: Ongoing     Problem: ACTIVITY INTOLERANCE/IMPAIRED MOBILITY  Goal: Mobility/activity is maintained at optimum level for patient  4/5/2022 0457 by Damian Brink RN  Outcome: Ongoing Detail Level: Zone Note Text (......Xxx Chief Complaint.): This diagnosis correlates with the Render Risk Assessment In Note?: no

## 2024-01-01 ENCOUNTER — HOSPITAL ENCOUNTER (EMERGENCY)
Age: 89
End: 2024-03-20
Attending: EMERGENCY MEDICINE
Payer: COMMERCIAL

## 2024-01-01 VITALS — HEART RATE: 105 BPM | RESPIRATION RATE: 50 BRPM | SYSTOLIC BLOOD PRESSURE: 136 MMHG | DIASTOLIC BLOOD PRESSURE: 21 MMHG

## 2024-01-01 DIAGNOSIS — I46.9 CARDIAC ARREST (HCC): Primary | ICD-10-CM

## 2024-01-01 PROCEDURE — 2700000000 HC OXYGEN THERAPY PER DAY

## 2024-01-01 PROCEDURE — 99283 EMERGENCY DEPT VISIT LOW MDM: CPT

## 2024-01-01 PROCEDURE — 92950 HEART/LUNG RESUSCITATION CPR: CPT

## 2024-01-01 PROCEDURE — 94761 N-INVAS EAR/PLS OXIMETRY MLT: CPT

## 2024-01-01 PROCEDURE — 99285 EMERGENCY DEPT VISIT HI MDM: CPT

## 2024-01-01 PROCEDURE — 31500 INSERT EMERGENCY AIRWAY: CPT

## 2024-01-08 ENCOUNTER — HOSPITAL ENCOUNTER (OUTPATIENT)
Age: 89
Setting detail: SPECIMEN
Discharge: HOME | End: 2024-01-08

## 2024-01-08 DIAGNOSIS — M10.00 IDIOPATHIC GOUT, UNSPECIFIED CHRONICITY, UNSPECIFIED SITE: ICD-10-CM

## 2024-01-08 PROBLEM — I50.22 CHRONIC SYSTOLIC (CONGESTIVE) HEART FAILURE (HCC): Status: ACTIVE | Noted: 2024-01-08

## 2024-01-08 PROBLEM — I50.33 ACUTE ON CHRONIC DIASTOLIC CONGESTIVE HEART FAILURE (HCC): Status: ACTIVE | Noted: 2024-01-08

## 2024-01-08 LAB — URATE SERPL-MCNC: 8.5 MG/DL (ref 3.4–7)

## 2024-03-20 NOTE — ED PROVIDER NOTES
[facility]  EMERGENCY DEPARTMENT ENCOUNTER      PtName: Eliseo Almonte  MRN: [id number]  Birthdate 1935  Date of evaluation: 3/20/24  Note Started: 12:02 PM EDT      HISTORY OF PRESENT ILLNESS     Eliseo Almonte is a 88 y.o. male who presents cardiac arrest.  EMS stated they were called out for ill person.  Upon their arrival they noted the patient to be on the floor with spontaneous respirations but unresponsive.  He was given Narcan and brought down the stairs as it was a very tight fit and at the bottom of the stairs he went into cardiac arrest.  The patient was subsequently intubated with an i-gel and chest compressions were initiated and ACLS medications were initiated.  Blood glucose by EMS was 80.  No other further history can be obtained from the patient.  EMS states that they initiated their ACLS protocol at 1120 hrs.    PAST MEDICAL HISTORY   Chronic kidney disease, congestive heart failure, hyperlipidemia    SURGICAL HISTORY     Open heart surgery    CURRENT MEDICATIONS       Crestor, lisinopril,, Flonase    ALLERGIES     has no allergies on file.    FAMILY HISTORY       family history is not on file.    SOCIAL HISTORY          PHYSICAL EXAM     INITIAL VITALS:  blood pressure is 136/21 (abnormal) and his pulse is 105 (abnormal). His respiration is 50 (abnormal).      Physical Exam  Constitutional:       Comments: Unresponsive   HENT:      Head:      Comments: Abrasions noted on the forehead     Mouth/Throat:      Comments: I-gel noted  Eyes:      Comments: Pupils fixed   Neck:      Comments: Patient in a cervical collar applied by EMS  Cardiovascular:      Comments: No cardiac activity auscultated  Pulmonary:      Comments: Breath sounds auscultated bilaterally with Ambu via i-gel            EMERGENCY DEPARTMENTCOURSE/MDM:   Vitals:    Vitals:    03/20/24 1156 03/20/24 1157 03/20/24 1158 03/20/24 1159   BP:       Pulse: (!) 101 91 (!) 173 (!) 105   Resp:  14 (!) 50

## 2024-03-20 NOTE — PROGRESS NOTES
[facility] [place]   Patient Death Note  DEATH   Shift date: 2024      Shift day: Wednesday  Shift #: 1                 Room # ERI/ERI     Name: Eliseo Almonte            Age: 88 y.o.  Gender: male          Quaker: None        Place of Cheondoism:   Admit Date & Time: 3/20/2024 11:45 AM       Referral:  ED   Actual date of death: 2024   TOD: 12.00PM       SITUATION AT DEATH:  Patient came in as full arrest and  after several attempts to resuscitate him.     IS THIS A 'S CASE?  No    SPIRITUAL/EMOTIONAL INTERVENTION:  Patient's daughter, Analy, arrived some minutes later and was very tearful.  provided ministry of presence and offered spiritual and emotional to Analy.  blessed patient's body with Analy at bedside. Analy expressed gratitude for the blessing she received.      Family Received Grief Packet?  No  Grief packet to be mailed to family.     NAME AND PHONE NUMBER OF DOCTOR SIGNING DEATH CERTIFICATE:   Angela Medley ([phone])       are now contacting the  home after a patient death.  spoke to/left message for [facility] [facility] indicating family's choice for their services.  called  home on 2024 at 3:45 Pm.    Copy of COMPLETED Release of Body Form Received?  Yes    Patient's belongings: This  did not handle patient's belongings.      HOME:  Name: [employer]  [facility]  Phone Number: [phone]    NEXT OF KIN:  Name: Analy Fuentes  Relationship: Daughter  Street Address: [address] [place] [address]  City: [place]  State: [place]  Zip code: [address]   Phone Number: [phone]    ANY FOLLOW-UP NEEDED?  No    Electronically signed by Chaplain Indio, on 3/20/2024 at 3:31 PM.  [facility]  [phone]

## 2024-03-20 NOTE — ED NOTES
Pt. Arrives to ED via LS 2 for c/o cardiac arrest.  EMS was called to patients house when family found him down.  EMS assessed patient and found him to be in PEA with pinpoint pupils.  EMS administered 2mg of epinephrine and 2 mg of narcan IN to patient.  IV access was unattainable so pt was given an IO to the left shoulder.  Upon arrival to ED pt was attached to the Rosalio machine.  Pt was transferred to ED cot from EMS stretcher while still attached to Rosalio.  Pt was in PEA and stayed in PEA until time of death was called.

## 2024-03-20 NOTE — PROGRESS NOTES
gathered info from EMS which was only the address and age of the pt.  searched online for people who may reside at that address and found Eliseo Almonte as fitting the age description given by EMS.  called contact in that chart and confirmed that pt was Eliseo Almonte and shared information with ED staff. Contact said she was on her way to the hospital.  passed on to Fr. Zhou to meet with family and complete release of body form.     Electronically signed by Chaplain Wilfrido, on 3/20/2024 at 1:56 PM.  [facility]  [phone]

## 2024-03-20 NOTE — PROGRESS NOTES
Date: 3/20/2024  Time: 1155  Indications: cardiac arrest  Preoxygenation: yes    Laryngoscope size and type Jared 4  Airway introducer used: No  Evac: No  ETT size:a 7.5 cuffed  Number of attempts:1   Cords visualized:  [x] Clearly  [] Poorly  Breath sounds present bilaterally: Yes   ETCO2   [x] Positive   ETT secured at  24 @ lip    ETT secured with yanni    Difficult airway:    No       If yes, was red tape placed around ETT:   No    Was this a Code Situation:    Yes             BP: (!) 136/21        Procedure performed by: Dr. Enrike Cole RCP  12:04 PM

## 2024-03-20 NOTE — ED NOTES
Dr Calvert, and Dr. Feldman, Merle YIP RN, Elli FISH RN, Lauren, RN, and respiratory  at bedside in room 13 waiting for patient.

## 2024-03-20 NOTE — ED NOTES
Pt was transferred to ED cot from EMS stretcher.  CPR still going with the help of the desiree machine

## 2024-12-16 NOTE — ED NOTES
LVM for patient to return call   Pt to bathroom via wheelchair      Nawaf Brothers, GRISEL  04/02/22 0242